# Patient Record
Sex: MALE | Race: WHITE | NOT HISPANIC OR LATINO | URBAN - METROPOLITAN AREA
[De-identification: names, ages, dates, MRNs, and addresses within clinical notes are randomized per-mention and may not be internally consistent; named-entity substitution may affect disease eponyms.]

---

## 2018-06-01 ENCOUNTER — OUTPATIENT (OUTPATIENT)
Dept: OUTPATIENT SERVICES | Facility: HOSPITAL | Age: 53
LOS: 1 days | End: 2018-06-01
Payer: MEDICAID

## 2018-06-01 PROCEDURE — G9001: CPT

## 2018-06-17 ENCOUNTER — INPATIENT (INPATIENT)
Facility: HOSPITAL | Age: 53
LOS: 9 days | Discharge: HOME | DRG: 64 | End: 2018-06-27
Attending: HOSPITALIST | Admitting: STUDENT IN AN ORGANIZED HEALTH CARE EDUCATION/TRAINING PROGRAM
Payer: MEDICAID

## 2018-06-17 ENCOUNTER — EMERGENCY (EMERGENCY)
Facility: HOSPITAL | Age: 53
LOS: 1 days | Discharge: ANOTHER HOSPITAL | End: 2018-06-17
Attending: EMERGENCY MEDICINE | Admitting: EMERGENCY MEDICINE
Payer: MEDICAID

## 2018-06-17 VITALS
TEMPERATURE: 99 F | SYSTOLIC BLOOD PRESSURE: 137 MMHG | OXYGEN SATURATION: 99 % | DIASTOLIC BLOOD PRESSURE: 86 MMHG | RESPIRATION RATE: 18 BRPM | HEART RATE: 88 BPM

## 2018-06-17 VITALS
TEMPERATURE: 99 F | WEIGHT: 199.96 LBS | RESPIRATION RATE: 20 BRPM | HEART RATE: 92 BPM | DIASTOLIC BLOOD PRESSURE: 92 MMHG | HEIGHT: 68 IN | OXYGEN SATURATION: 99 % | SYSTOLIC BLOOD PRESSURE: 155 MMHG

## 2018-06-17 VITALS
RESPIRATION RATE: 18 BRPM | SYSTOLIC BLOOD PRESSURE: 114 MMHG | OXYGEN SATURATION: 96 % | DIASTOLIC BLOOD PRESSURE: 67 MMHG | HEART RATE: 89 BPM

## 2018-06-17 DIAGNOSIS — N17.9 ACUTE KIDNEY FAILURE, UNSPECIFIED: ICD-10-CM

## 2018-06-17 DIAGNOSIS — D35.00 BENIGN NEOPLASM OF UNSPECIFIED ADRENAL GLAND: ICD-10-CM

## 2018-06-17 DIAGNOSIS — E87.5 HYPERKALEMIA: ICD-10-CM

## 2018-06-17 LAB
ALBUMIN SERPL ELPH-MCNC: 2.4 G/DL — LOW (ref 3.3–5)
ALBUMIN SERPL ELPH-MCNC: 3 G/DL — LOW (ref 3.3–5)
ALBUMIN SERPL ELPH-MCNC: 3.4 G/DL — SIGNIFICANT CHANGE UP (ref 3.3–5)
ALBUMIN SERPL ELPH-MCNC: 3.5 G/DL — SIGNIFICANT CHANGE UP (ref 3.3–5)
ALP SERPL-CCNC: 100 U/L — SIGNIFICANT CHANGE UP (ref 30–120)
ALP SERPL-CCNC: 82 U/L — SIGNIFICANT CHANGE UP (ref 30–120)
ALP SERPL-CCNC: 91 U/L — SIGNIFICANT CHANGE UP (ref 40–120)
ALP SERPL-CCNC: 95 U/L — SIGNIFICANT CHANGE UP (ref 40–120)
ALT FLD-CCNC: 491 U/L — HIGH (ref 10–45)
ALT FLD-CCNC: 492 U/L — HIGH (ref 10–45)
ALT FLD-CCNC: 530 U/L DA — HIGH (ref 10–60)
ALT FLD-CCNC: 666 U/L DA — HIGH (ref 10–60)
AMPHET UR-MCNC: NEGATIVE — SIGNIFICANT CHANGE UP
ANION GAP SERPL CALC-SCNC: 11 MMOL/L — SIGNIFICANT CHANGE UP (ref 5–17)
ANION GAP SERPL CALC-SCNC: 19 MMOL/L — HIGH (ref 5–17)
ANION GAP SERPL CALC-SCNC: 24 MMOL/L — HIGH (ref 5–17)
ANION GAP SERPL CALC-SCNC: 9 MMOL/L — SIGNIFICANT CHANGE UP (ref 5–17)
APAP SERPL-MCNC: <15 UG/ML — SIGNIFICANT CHANGE UP (ref 10–30)
APPEARANCE UR: ABNORMAL
APPEARANCE UR: ABNORMAL
APTT BLD: 28.2 SEC — SIGNIFICANT CHANGE UP (ref 27.5–37.4)
APTT BLD: 31 SEC — SIGNIFICANT CHANGE UP (ref 27.5–37.4)
AST SERPL-CCNC: 496 U/L — HIGH (ref 10–40)
AST SERPL-CCNC: 578 U/L — HIGH (ref 10–40)
AST SERPL-CCNC: 770 U/L — HIGH (ref 10–40)
AST SERPL-CCNC: 982 U/L — HIGH (ref 10–40)
BACTERIA # UR AUTO: ABNORMAL
BACTERIA # UR AUTO: ABNORMAL /HPF
BARBITURATES UR SCN-MCNC: NEGATIVE — SIGNIFICANT CHANGE UP
BASE EXCESS BLDA CALC-SCNC: -11.3 MMOL/L — LOW (ref -2–2)
BASE EXCESS BLDV CALC-SCNC: -9.6 MMOL/L — LOW (ref -2–2)
BASOPHILS # BLD AUTO: 0 K/UL — SIGNIFICANT CHANGE UP (ref 0–0.2)
BASOPHILS # BLD AUTO: 0.1 K/UL — SIGNIFICANT CHANGE UP (ref 0–0.2)
BASOPHILS NFR BLD AUTO: 0 % — SIGNIFICANT CHANGE UP (ref 0–2)
BENZODIAZ UR-MCNC: NEGATIVE — SIGNIFICANT CHANGE UP
BILIRUB SERPL-MCNC: 0.3 MG/DL — SIGNIFICANT CHANGE UP (ref 0.2–1.2)
BILIRUB SERPL-MCNC: 0.4 MG/DL — SIGNIFICANT CHANGE UP (ref 0.2–1.2)
BILIRUB UR-MCNC: ABNORMAL
BILIRUB UR-MCNC: NEGATIVE — SIGNIFICANT CHANGE UP
BLD GP AB SCN SERPL QL: NEGATIVE — SIGNIFICANT CHANGE UP
BUN SERPL-MCNC: 71 MG/DL — HIGH (ref 7–23)
BUN SERPL-MCNC: 74 MG/DL — HIGH (ref 7–23)
BUN SERPL-MCNC: 76 MG/DL — HIGH (ref 7–23)
BUN SERPL-MCNC: 81 MG/DL — HIGH (ref 7–23)
CALCIUM SERPL-MCNC: 5.6 MG/DL — CRITICAL LOW (ref 8.4–10.5)
CALCIUM SERPL-MCNC: 6.6 MG/DL — LOW (ref 8.4–10.5)
CALCIUM SERPL-MCNC: 6.8 MG/DL — LOW (ref 8.4–10.5)
CALCIUM SERPL-MCNC: 7.6 MG/DL — LOW (ref 8.4–10.5)
CHLORIDE SERPL-SCNC: 101 MMOL/L — SIGNIFICANT CHANGE UP (ref 96–108)
CHLORIDE SERPL-SCNC: 104 MMOL/L — SIGNIFICANT CHANGE UP (ref 96–108)
CHLORIDE SERPL-SCNC: 104 MMOL/L — SIGNIFICANT CHANGE UP (ref 96–108)
CHLORIDE SERPL-SCNC: 109 MMOL/L — HIGH (ref 96–108)
CHLORIDE UR-SCNC: 104 MMOL/L — SIGNIFICANT CHANGE UP
CK MB BLD-MCNC: SIGNIFICANT CHANGE UP % (ref 0–3.5)
CK MB CFR SERPL CALC: 254.7 NG/ML — HIGH (ref 0–6.7)
CK SERPL-CCNC: CRITICAL HIGH U/L (ref 30–200)
CK SERPL-CCNC: CRITICAL HIGH U/L (ref 30–200)
CK SERPL-CCNC: CRITICAL HIGH U/L (ref 39–308)
CO2 BLDA-SCNC: 15 MMOL/L — LOW (ref 22–30)
CO2 BLDV-SCNC: 17 MMOL/L — LOW (ref 22–30)
CO2 SERPL-SCNC: 12 MMOL/L — LOW (ref 22–31)
CO2 SERPL-SCNC: 13 MMOL/L — LOW (ref 22–31)
CO2 SERPL-SCNC: 16 MMOL/L — LOW (ref 22–31)
CO2 SERPL-SCNC: 17 MMOL/L — LOW (ref 22–31)
COCAINE METAB.OTHER UR-MCNC: POSITIVE
COLOR SPEC: YELLOW — SIGNIFICANT CHANGE UP
COLOR SPEC: YELLOW — SIGNIFICANT CHANGE UP
COMMENT - URINE: SIGNIFICANT CHANGE UP
COMMENT - URINE: SIGNIFICANT CHANGE UP
CREAT ?TM UR-MCNC: 19 MG/DL — SIGNIFICANT CHANGE UP
CREAT ?TM UR-MCNC: 35 MG/DL — SIGNIFICANT CHANGE UP
CREAT SERPL-MCNC: 5.13 MG/DL — HIGH (ref 0.5–1.3)
CREAT SERPL-MCNC: 5.41 MG/DL — HIGH (ref 0.5–1.3)
CREAT SERPL-MCNC: 5.9 MG/DL — HIGH (ref 0.5–1.3)
CREAT SERPL-MCNC: 6.1 MG/DL — HIGH (ref 0.5–1.3)
DIFF PNL FLD: ABNORMAL
DIFF PNL FLD: ABNORMAL
EOSINOPHIL # BLD AUTO: 0 K/UL — SIGNIFICANT CHANGE UP (ref 0–0.5)
EOSINOPHIL # BLD AUTO: 0 K/UL — SIGNIFICANT CHANGE UP (ref 0–0.5)
EOSINOPHIL NFR BLD AUTO: 0 % — SIGNIFICANT CHANGE UP (ref 0–6)
EPI CELLS # UR: SIGNIFICANT CHANGE UP
ETHANOL SERPL-MCNC: SIGNIFICANT CHANGE UP MG/DL (ref 0–10)
GAS PNL BLDA: SIGNIFICANT CHANGE UP
GAS PNL BLDV: SIGNIFICANT CHANGE UP
GLUCOSE BLDC GLUCOMTR-MCNC: 78 MG/DL — SIGNIFICANT CHANGE UP (ref 70–99)
GLUCOSE SERPL-MCNC: 106 MG/DL — HIGH (ref 70–99)
GLUCOSE SERPL-MCNC: 118 MG/DL — HIGH (ref 70–99)
GLUCOSE SERPL-MCNC: 119 MG/DL — HIGH (ref 70–99)
GLUCOSE SERPL-MCNC: 123 MG/DL — HIGH (ref 70–99)
GLUCOSE UR QL: 50 MG/DL
GLUCOSE UR QL: NEGATIVE MG/DL — SIGNIFICANT CHANGE UP
HCO3 BLDA-SCNC: 14 MMOL/L — LOW (ref 21–29)
HCO3 BLDV-SCNC: 16 MMOL/L — LOW (ref 21–29)
HCT VFR BLD CALC: 47.6 % — SIGNIFICANT CHANGE UP (ref 39–50)
HCT VFR BLD CALC: 49.1 % — SIGNIFICANT CHANGE UP (ref 39–50)
HCT VFR BLD CALC: 51.6 % — HIGH (ref 39–50)
HGB BLD-MCNC: 15.8 G/DL — SIGNIFICANT CHANGE UP (ref 13–17)
HGB BLD-MCNC: 15.9 G/DL — SIGNIFICANT CHANGE UP (ref 13–17)
HGB BLD-MCNC: 17.5 G/DL — HIGH (ref 13–17)
HOROWITZ INDEX BLDA+IHG-RTO: 28 — SIGNIFICANT CHANGE UP
INR BLD: 1.1 RATIO — SIGNIFICANT CHANGE UP (ref 0.88–1.16)
INR BLD: 1.11 RATIO — SIGNIFICANT CHANGE UP (ref 0.88–1.16)
INTUBATED: SIGNIFICANT CHANGE UP
KETONES UR-MCNC: NEGATIVE — SIGNIFICANT CHANGE UP
KETONES UR-MCNC: NEGATIVE — SIGNIFICANT CHANGE UP
LACTATE SERPL-SCNC: 2.3 MMOL/L — HIGH (ref 0.7–2)
LACTATE SERPL-SCNC: 2.7 MMOL/L — HIGH (ref 0.7–2)
LEUKOCYTE ESTERASE UR-ACNC: ABNORMAL
LEUKOCYTE ESTERASE UR-ACNC: NEGATIVE — SIGNIFICANT CHANGE UP
LIDOCAIN IGE QN: 1831 U/L — HIGH (ref 73–393)
LYMPHOCYTES # BLD AUTO: 0.8 K/UL — LOW (ref 1–3.3)
LYMPHOCYTES # BLD AUTO: 0.9 K/UL — LOW (ref 1–3.3)
LYMPHOCYTES # BLD AUTO: 8 % — LOW (ref 13–44)
LYMPHOCYTES # BLD AUTO: 9 % — LOW (ref 13–44)
MAGNESIUM SERPL-MCNC: 2.2 MG/DL — SIGNIFICANT CHANGE UP (ref 1.6–2.6)
MAGNESIUM SERPL-MCNC: 2.3 MG/DL — SIGNIFICANT CHANGE UP (ref 1.6–2.6)
MANUAL DIF COMMENT BLD-IMP: SIGNIFICANT CHANGE UP
MANUAL SMEAR VERIFICATION: SIGNIFICANT CHANGE UP
MCHC RBC-ENTMCNC: 31.5 PG — SIGNIFICANT CHANGE UP (ref 27–34)
MCHC RBC-ENTMCNC: 31.9 PG — SIGNIFICANT CHANGE UP (ref 27–34)
MCHC RBC-ENTMCNC: 32.4 GM/DL — SIGNIFICANT CHANGE UP (ref 32–36)
MCHC RBC-ENTMCNC: 32.6 PG — SIGNIFICANT CHANGE UP (ref 27–34)
MCHC RBC-ENTMCNC: 33.2 GM/DL — SIGNIFICANT CHANGE UP (ref 32–36)
MCHC RBC-ENTMCNC: 34 GM/DL — SIGNIFICANT CHANGE UP (ref 32–36)
MCV RBC AUTO: 93.8 FL — SIGNIFICANT CHANGE UP (ref 80–100)
MCV RBC AUTO: 97.2 FL — SIGNIFICANT CHANGE UP (ref 80–100)
MCV RBC AUTO: 98.2 FL — SIGNIFICANT CHANGE UP (ref 80–100)
METHADONE UR-MCNC: NEGATIVE — SIGNIFICANT CHANGE UP
MONOCYTES # BLD AUTO: 0.7 K/UL — SIGNIFICANT CHANGE UP (ref 0–0.9)
MONOCYTES # BLD AUTO: 1 K/UL — HIGH (ref 0–0.9)
MONOCYTES NFR BLD AUTO: 4 % — SIGNIFICANT CHANGE UP (ref 2–14)
MONOCYTES NFR BLD AUTO: 7 % — SIGNIFICANT CHANGE UP (ref 2–14)
NEUTROPHILS # BLD AUTO: 14 K/UL — HIGH (ref 1.8–7.4)
NEUTROPHILS # BLD AUTO: 19.7 K/UL — HIGH (ref 1.8–7.4)
NEUTROPHILS NFR BLD AUTO: 69 % — SIGNIFICANT CHANGE UP (ref 43–77)
NEUTROPHILS NFR BLD AUTO: 74 % — SIGNIFICANT CHANGE UP (ref 43–77)
NEUTS BAND # BLD: 19 % — HIGH (ref 0–8)
NITRITE UR-MCNC: NEGATIVE — SIGNIFICANT CHANGE UP
NITRITE UR-MCNC: POSITIVE
OPIATES UR-MCNC: NEGATIVE — SIGNIFICANT CHANGE UP
OSMOLALITY SERPL: 315 MOS/KG — HIGH (ref 275–300)
PCO2 BLDA: 31 MMHG — LOW (ref 32–46)
PCO2 BLDV: 37 MMHG — SIGNIFICANT CHANGE UP (ref 35–50)
PCP SPEC-MCNC: SIGNIFICANT CHANGE UP
PCP UR-MCNC: NEGATIVE — SIGNIFICANT CHANGE UP
PH BLDA: 7.28 — LOW (ref 7.35–7.45)
PH BLDV: 7.27 — LOW (ref 7.35–7.45)
PH UR: 5 — SIGNIFICANT CHANGE UP (ref 5–8)
PH UR: 6 — SIGNIFICANT CHANGE UP (ref 5–8)
PHOSPHATE SERPL-MCNC: 5.2 MG/DL — HIGH (ref 2.5–4.5)
PLAT MORPH BLD: NORMAL — SIGNIFICANT CHANGE UP
PLATELET # BLD AUTO: 136 K/UL — LOW (ref 150–400)
PLATELET # BLD AUTO: 151 K/UL — SIGNIFICANT CHANGE UP (ref 150–400)
PLATELET # BLD AUTO: 195 K/UL — SIGNIFICANT CHANGE UP (ref 150–400)
PO2 BLDA: 89 MMHG — SIGNIFICANT CHANGE UP (ref 74–108)
PO2 BLDV: 46 MMHG — HIGH (ref 25–45)
POTASSIUM SERPL-MCNC: 5.3 MMOL/L — SIGNIFICANT CHANGE UP (ref 3.5–5.3)
POTASSIUM SERPL-MCNC: 5.6 MMOL/L — HIGH (ref 3.5–5.3)
POTASSIUM SERPL-MCNC: 6.3 MMOL/L — CRITICAL HIGH (ref 3.5–5.3)
POTASSIUM SERPL-MCNC: 6.4 MMOL/L — CRITICAL HIGH (ref 3.5–5.3)
POTASSIUM SERPL-SCNC: 5.3 MMOL/L — SIGNIFICANT CHANGE UP (ref 3.5–5.3)
POTASSIUM SERPL-SCNC: 5.6 MMOL/L — HIGH (ref 3.5–5.3)
POTASSIUM SERPL-SCNC: 6.3 MMOL/L — CRITICAL HIGH (ref 3.5–5.3)
POTASSIUM SERPL-SCNC: 6.4 MMOL/L — CRITICAL HIGH (ref 3.5–5.3)
PROT ?TM UR-MCNC: 37 MG/DL — HIGH (ref 0–12)
PROT SERPL-MCNC: 5.4 G/DL — LOW (ref 6–8.3)
PROT SERPL-MCNC: 6.7 G/DL — SIGNIFICANT CHANGE UP (ref 6–8.3)
PROT SERPL-MCNC: 6.8 G/DL — SIGNIFICANT CHANGE UP (ref 6–8.3)
PROT SERPL-MCNC: 7.1 G/DL — SIGNIFICANT CHANGE UP (ref 6–8.3)
PROT UR-MCNC: 100 MG/DL
PROT UR-MCNC: 30 MG/DL
PROT/CREAT UR-RTO: 1.1 RATIO — HIGH (ref 0–0.2)
PROTHROM AB SERPL-ACNC: 12 SEC — SIGNIFICANT CHANGE UP (ref 9.8–12.7)
PROTHROM AB SERPL-ACNC: 12 SEC — SIGNIFICANT CHANGE UP (ref 9.8–12.7)
RAPID RVP RESULT: SIGNIFICANT CHANGE UP
RBC # BLD: 4.85 M/UL — SIGNIFICANT CHANGE UP (ref 4.2–5.8)
RBC # BLD: 5.06 M/UL — SIGNIFICANT CHANGE UP (ref 4.2–5.8)
RBC # BLD: 5.5 M/UL — SIGNIFICANT CHANGE UP (ref 4.2–5.8)
RBC # FLD: 12.6 % — SIGNIFICANT CHANGE UP (ref 10.3–14.5)
RBC # FLD: 12.6 % — SIGNIFICANT CHANGE UP (ref 10.3–14.5)
RBC # FLD: 13.1 % — SIGNIFICANT CHANGE UP (ref 10.3–14.5)
RBC BLD AUTO: NORMAL — SIGNIFICANT CHANGE UP
RBC CASTS # UR COMP ASSIST: ABNORMAL /HPF (ref 0–2)
RBC CASTS # UR COMP ASSIST: SIGNIFICANT CHANGE UP /HPF (ref 0–4)
RH IG SCN BLD-IMP: POSITIVE — SIGNIFICANT CHANGE UP
SAO2 % BLDA: 96 % — SIGNIFICANT CHANGE UP (ref 92–96)
SAO2 % BLDV: 79 % — SIGNIFICANT CHANGE UP (ref 67–88)
SODIUM SERPL-SCNC: 132 MMOL/L — LOW (ref 135–145)
SODIUM SERPL-SCNC: 134 MMOL/L — LOW (ref 135–145)
SODIUM SERPL-SCNC: 136 MMOL/L — SIGNIFICANT CHANGE UP (ref 135–145)
SODIUM SERPL-SCNC: 137 MMOL/L — SIGNIFICANT CHANGE UP (ref 135–145)
SODIUM UR-SCNC: 119 MMOL/L — SIGNIFICANT CHANGE UP
SODIUM UR-SCNC: 77 MMOL/L — SIGNIFICANT CHANGE UP
SP GR SPEC: 1.01 — LOW (ref 1.01–1.02)
SP GR SPEC: 1.02 — SIGNIFICANT CHANGE UP (ref 1.01–1.02)
THC UR QL: NEGATIVE — SIGNIFICANT CHANGE UP
TROPONIN I SERPL-MCNC: 12.34 NG/ML — HIGH (ref 0.02–0.06)
TROPONIN T, HIGH SENSITIVITY RESULT: 460 NG/L — HIGH (ref 0–51)
TROPONIN T, HIGH SENSITIVITY RESULT: 542 NG/L — HIGH (ref 0–51)
UROBILINOGEN FLD QL: 1 MG/DL
UROBILINOGEN FLD QL: NEGATIVE — SIGNIFICANT CHANGE UP
WBC # BLD: 14.2 K/UL — HIGH (ref 3.8–10.5)
WBC # BLD: 15.6 K/UL — HIGH (ref 3.8–10.5)
WBC # BLD: 21.7 K/UL — HIGH (ref 3.8–10.5)
WBC # FLD AUTO: 14.2 K/UL — HIGH (ref 3.8–10.5)
WBC # FLD AUTO: 15.6 K/UL — HIGH (ref 3.8–10.5)
WBC # FLD AUTO: 21.7 K/UL — HIGH (ref 3.8–10.5)
WBC UR QL: SIGNIFICANT CHANGE UP
WBC UR QL: SIGNIFICANT CHANGE UP /HPF (ref 0–5)

## 2018-06-17 PROCEDURE — 71045 X-RAY EXAM CHEST 1 VIEW: CPT | Mod: 26

## 2018-06-17 PROCEDURE — 99254 IP/OBS CNSLTJ NEW/EST MOD 60: CPT | Mod: GC

## 2018-06-17 PROCEDURE — 36556 INSERT NON-TUNNEL CV CATH: CPT | Mod: GC

## 2018-06-17 PROCEDURE — 74176 CT ABD & PELVIS W/O CONTRAST: CPT | Mod: 26

## 2018-06-17 PROCEDURE — 93010 ELECTROCARDIOGRAM REPORT: CPT

## 2018-06-17 PROCEDURE — 71250 CT THORAX DX C-: CPT | Mod: 26

## 2018-06-17 PROCEDURE — 99291 CRITICAL CARE FIRST HOUR: CPT | Mod: 25

## 2018-06-17 PROCEDURE — 71045 X-RAY EXAM CHEST 1 VIEW: CPT | Mod: 26,77

## 2018-06-17 PROCEDURE — 70450 CT HEAD/BRAIN W/O DYE: CPT | Mod: 26

## 2018-06-17 PROCEDURE — 72125 CT NECK SPINE W/O DYE: CPT | Mod: 26

## 2018-06-17 PROCEDURE — 99291 CRITICAL CARE FIRST HOUR: CPT

## 2018-06-17 RX ORDER — ALBUTEROL 90 UG/1
2.5 AEROSOL, METERED ORAL ONCE
Qty: 0 | Refills: 0 | Status: COMPLETED | OUTPATIENT
Start: 2018-06-17 | End: 2018-06-17

## 2018-06-17 RX ORDER — ALBUTEROL 90 UG/1
10 AEROSOL, METERED ORAL ONCE
Qty: 0 | Refills: 0 | Status: DISCONTINUED | OUTPATIENT
Start: 2018-06-17 | End: 2018-06-17

## 2018-06-17 RX ORDER — SODIUM CHLORIDE 9 MG/ML
1000 INJECTION INTRAMUSCULAR; INTRAVENOUS; SUBCUTANEOUS
Qty: 0 | Refills: 0 | Status: DISCONTINUED | OUTPATIENT
Start: 2018-06-17 | End: 2018-06-18

## 2018-06-17 RX ORDER — HEPARIN SODIUM 5000 [USP'U]/ML
5000 INJECTION INTRAVENOUS; SUBCUTANEOUS EVERY 8 HOURS
Qty: 0 | Refills: 0 | Status: DISCONTINUED | OUTPATIENT
Start: 2018-06-17 | End: 2018-06-18

## 2018-06-17 RX ORDER — VANCOMYCIN HCL 1 G
1000 VIAL (EA) INTRAVENOUS ONCE
Qty: 0 | Refills: 0 | Status: COMPLETED | OUTPATIENT
Start: 2018-06-17 | End: 2018-06-17

## 2018-06-17 RX ORDER — INSULIN HUMAN 100 [IU]/ML
5 INJECTION, SOLUTION SUBCUTANEOUS ONCE
Qty: 0 | Refills: 0 | Status: COMPLETED | OUTPATIENT
Start: 2018-06-17 | End: 2018-06-17

## 2018-06-17 RX ORDER — SODIUM CHLORIDE 9 MG/ML
1000 INJECTION INTRAMUSCULAR; INTRAVENOUS; SUBCUTANEOUS ONCE
Qty: 0 | Refills: 0 | Status: COMPLETED | OUTPATIENT
Start: 2018-06-17 | End: 2018-06-17

## 2018-06-17 RX ORDER — INSULIN HUMAN 100 [IU]/ML
5 INJECTION, SOLUTION SUBCUTANEOUS ONCE
Qty: 0 | Refills: 0 | Status: DISCONTINUED | OUTPATIENT
Start: 2018-06-17 | End: 2018-06-17

## 2018-06-17 RX ORDER — FUROSEMIDE 40 MG
60 TABLET ORAL ONCE
Qty: 0 | Refills: 0 | Status: COMPLETED | OUTPATIENT
Start: 2018-06-17 | End: 2018-06-17

## 2018-06-17 RX ORDER — ASPIRIN/CALCIUM CARB/MAGNESIUM 324 MG
300 TABLET ORAL ONCE
Qty: 0 | Refills: 0 | Status: COMPLETED | OUTPATIENT
Start: 2018-06-17 | End: 2018-06-17

## 2018-06-17 RX ORDER — PIPERACILLIN AND TAZOBACTAM 4; .5 G/20ML; G/20ML
3.38 INJECTION, POWDER, LYOPHILIZED, FOR SOLUTION INTRAVENOUS ONCE
Qty: 0 | Refills: 0 | Status: COMPLETED | OUTPATIENT
Start: 2018-06-17 | End: 2018-06-17

## 2018-06-17 RX ORDER — DEXTROSE 50 % IN WATER 50 %
50 SYRINGE (ML) INTRAVENOUS ONCE
Qty: 0 | Refills: 0 | Status: COMPLETED | OUTPATIENT
Start: 2018-06-17 | End: 2018-06-17

## 2018-06-17 RX ORDER — NALOXONE HYDROCHLORIDE 4 MG/.1ML
0.4 SPRAY NASAL ONCE
Qty: 0 | Refills: 0 | Status: COMPLETED | OUTPATIENT
Start: 2018-06-17 | End: 2018-06-17

## 2018-06-17 RX ORDER — CALCIUM GLUCONATE 100 MG/ML
2 VIAL (ML) INTRAVENOUS ONCE
Qty: 0 | Refills: 0 | Status: COMPLETED | OUTPATIENT
Start: 2018-06-17 | End: 2018-06-17

## 2018-06-17 RX ADMIN — SODIUM CHLORIDE 1000 MILLILITER(S): 9 INJECTION INTRAMUSCULAR; INTRAVENOUS; SUBCUTANEOUS at 12:38

## 2018-06-17 RX ADMIN — ALBUTEROL 2.5 MILLIGRAM(S): 90 AEROSOL, METERED ORAL at 23:20

## 2018-06-17 RX ADMIN — Medication 60 MILLIGRAM(S): at 16:12

## 2018-06-17 RX ADMIN — Medication 50 MILLILITER(S): at 16:25

## 2018-06-17 RX ADMIN — Medication 300 MILLIGRAM(S): at 12:30

## 2018-06-17 RX ADMIN — SODIUM CHLORIDE 1000 MILLILITER(S): 9 INJECTION INTRAMUSCULAR; INTRAVENOUS; SUBCUTANEOUS at 11:01

## 2018-06-17 RX ADMIN — Medication 50 MILLILITER(S): at 22:24

## 2018-06-17 RX ADMIN — Medication 200 GRAM(S): at 16:25

## 2018-06-17 RX ADMIN — ALBUTEROL 2.5 MILLIGRAM(S): 90 AEROSOL, METERED ORAL at 16:12

## 2018-06-17 RX ADMIN — INSULIN HUMAN 5 UNIT(S): 100 INJECTION, SOLUTION SUBCUTANEOUS at 22:24

## 2018-06-17 RX ADMIN — SODIUM CHLORIDE 1000 MILLILITER(S): 9 INJECTION INTRAMUSCULAR; INTRAVENOUS; SUBCUTANEOUS at 15:10

## 2018-06-17 RX ADMIN — SODIUM CHLORIDE 1000 MILLILITER(S): 9 INJECTION INTRAMUSCULAR; INTRAVENOUS; SUBCUTANEOUS at 10:14

## 2018-06-17 RX ADMIN — Medication 250 MILLIGRAM(S): at 11:16

## 2018-06-17 RX ADMIN — INSULIN HUMAN 5 UNIT(S): 100 INJECTION, SOLUTION SUBCUTANEOUS at 16:26

## 2018-06-17 RX ADMIN — NALOXONE HYDROCHLORIDE 0.4 MILLIGRAM(S): 4 SPRAY NASAL at 10:30

## 2018-06-17 RX ADMIN — SODIUM CHLORIDE 125 MILLILITER(S): 9 INJECTION INTRAMUSCULAR; INTRAVENOUS; SUBCUTANEOUS at 20:37

## 2018-06-17 RX ADMIN — HEPARIN SODIUM 5000 UNIT(S): 5000 INJECTION INTRAVENOUS; SUBCUTANEOUS at 22:25

## 2018-06-17 RX ADMIN — PIPERACILLIN AND TAZOBACTAM 200 GRAM(S): 4; .5 INJECTION, POWDER, LYOPHILIZED, FOR SOLUTION INTRAVENOUS at 11:17

## 2018-06-17 RX ADMIN — Medication 125 MILLIGRAM(S): at 16:25

## 2018-06-17 NOTE — ED PROVIDER NOTE - OBJECTIVE STATEMENT
51 yo male BIBEMS found at home lethargic but arousable with altered mental status.  As per son, who found him today, states patient last known normal was 2 days ago. Son states patient has had a generalized rash on his body x 2 weeks, thinks it was the soap he used.  Rectal temp here 97.8.  Son also admits patient uses cocaine.  No hx of heroin use.  As per son, does not have PMD and doesn't see any doctors.

## 2018-06-17 NOTE — H&P ADULT - REASON FOR ADMISSION
Patient is a 52y old  Male who presents with a chief complaint of Patient is a 52y old  Male who presents with a chief complaint of AMS, found down

## 2018-06-17 NOTE — ED PROVIDER NOTE - MEDICAL DECISION MAKING DETAILS
Ronen Walker MD (resident): AMS and rash, limited historian (both pt and family). concern for ARF w/ rhabdo (Tx w/ IVF) and hyperK+ (Tx w/ Ca2+, insulin, dextrose, consult w/ Renal). Pt also w/ UTI. Empiric atbx given by OSH as pt still with undifferentiate cause of organ failure. + cocaine on UTox, possible contributing factor. Also with subacute CVA, not a TPA candidate, last normal well past TPA window, and complicated by other medical issues at this time. Transferred and accepted by MICU.

## 2018-06-17 NOTE — H&P ADULT - HISTORY OF PRESENT ILLNESS
52 M w/ Hx of CVA, who was transferred from [facility], [facility], UTI, and [facility]. Initial presentation at OSH was AMS, found by son on the floor, last normal 2 days ago. Diffuse rash for 2 weeks.    In ED, T: 52 M w/ Hx of CVA, who was transferred from [place] for ARF. Per son, his father lives alone and he visited him on Thursday. and was acting "different". He gave him some water and came back today to check up on him. He was very lethargic, not speaking, and found down on the floor. He noticed rash on the patient's trunk and leg, decided to bring his father to [place].       In ED, T: 52 M w/ Hx of CVA, who was transferred from [place] for ARF. Per son, his father lives alone and he visited him on Thursday. and was acting "different". He gave him some water and came back today to check up on him. He was very lethargic, not speaking, and found down on the floor. He noticed rash on the patient's trunk and leg, decided to bring his father to [place].     Pt was recently at [facility] a few weeks prior for cyst near the groin. Cyst was drained, but unclear if received abx.    In ED, T: 99.3 rectal, HR: 88, BP: 137/86, RR;18, SpO2: 99% on O2  Labs: WBC 15.6, K: 6.4, BUN/Cr: 74/5.41, ALP: 91, AST: 578, ALT:492, CK>51789  Trop 542, U Tox + for cocaine   Pt got 1L NS, Albuterol/calcium gluconate/Insulin reg 5U/D50, furosemide 60. Methylpred 125mg 52 M w/ Hx of CVA, who was transferred from [place] for ARF. Per son, he visited him on Thursday (patient lives alone) and was acting "different". He gave him some water and came back today to check up on him. He was very lethargic, not speaking, and found down on the floor. He noticed rash on the patient's trunk and leg, decided to bring his father to [place].     Pt was recently at [facility] a few weeks prior for cyst near the groin. Cyst was drained, but unclear if received abx.    In ED, T: 99.3 rectal, HR: 88, BP: 137/86, RR;18, SpO2: 99% on O2  Labs: WBC 15.6, K: 6.4, BUN/Cr: 74/5.41, ALP: 91, AST: 578, ALT:492, CK>14267  Trop 542, U Tox + for cocaine   Pt got 1L NS, Albuterol/calcium gluconate/Insulin reg 5U/D50, furosemide 60. Methylpred 125mg 52 M w/ with unknown PMHx, who was transferred from [place] for ARF. Per son, he visited him on Thursday (patient lives alone) and was acting "different". He gave him some water and came back today to check up on him. He was very lethargic, not speaking, and found down on the floor. He noticed rash on the patient's trunk and leg, decided to bring his father to [place].   Pt has not been to a doctor before, no hx of renal disease.    Pt was recently at [facility] a few weeks prior for cyst near the groin. Cyst was drained, but unclear if received abx. Labs were drawn at that time, son says renal function was within normal limits at that time.     In ED, T: 99.3 rectal, HR: 88, BP: 137/86, RR;18, SpO2: 99% on O2  Labs: WBC 15.6, K: 6.4, BUN/Cr: 74/5.41, ALP: 91, AST: 578, ALT:492, CK>79401  Trop 542, U Tox + for cocaine   Pt got 1L NS, Albuterol/calcium gluconate/Insulin reg 5U/D50, furosemide 60. Methylpred 125mg 52 M w/ with unknown PMHx, who was transferred from [place] for ARF. Per son, he visited him on Saturday (patient lives alone) and was acting "different". He gave him some water and came back today to check up on him. He was very lethargic, not speaking, and found down on the floor. He noticed rash on the patient's trunk and leg, decided to bring his father to [place].   Pt has not been to a doctor before, no hx of renal disease.    Pt was recently at [facility] a few weeks prior for cyst near the groin. Cyst was drained, but unclear if received abx. Labs were drawn at that time, son says renal function was within normal limits at that time.     In ED, T: 99.3 rectal, HR: 88, BP: 137/86, RR;18, SpO2: 99% on O2  Labs: WBC 15.6, K: 6.4, BUN/Cr: 74/5.41, ALP: 91, AST: 578, ALT:492, CK>21993  Trop 542, U Tox + for cocaine   Pt got 1L NS, Albuterol/calcium gluconate/Insulin reg 5U/D50, furosemide 60. Methylpred 125mg

## 2018-06-17 NOTE — CONSULT NOTE ADULT - SUBJECTIVE AND OBJECTIVE BOX
Neurology Consult    Reason for consult: L MCA/PCA infarct    HPI: Patient is a 52 year old right handed male currently admitted for rhabdomyolysis, SHAQUILLE, subacute infarcts. Patient transferred from [place]. As per chart and primary team, patient was last seen by son on Thursday when he was acting differently. Today when the son went to see him again, he was found to be on the floor, unclear for how long. Was not speaking and was noted to have a petechial rash on trunk and legs. U tox positive for cocaine. Patient is a poor historian.    REVIEW OF SYSTEMS:  Unable to assess    MEDICATIONS  ALBUTerol   0.5% 10 milliGRAM(s) Nebulizer once  dextrose 50% Injectable 50 milliLiter(s) IV Push once  heparin  Injectable 5000 Unit(s) SubCutaneous every 8 hours  insulin regular  human recombinant. 5 Unit(s) SubCutaneous once  sodium chloride 0.9%. 1000 milliLiter(s) IV Continuous <Continuous>    PMH: No pertinent past medical history     PSH: No significant past surgical history      FAMILY HISTORY:  No pertinent family history in first degree relatives  No history of dementia, strokes, or seizures     SOCIAL HISTORY:  No history of tobacco or alcohol use     Allergies  Allergy Status Unknown    Vital Signs Last 24 Hrs  T(C): 37.3 (2018 19:46), Max: 37.4 (2018 15:05)  T(F): 99.1 (2018 19:46), Max: 99.3 (2018 15:05)  HR: 87 (2018 22:00) (81 - 98)  BP: 129/60 (2018 20:00) (108/66 - 155/92)  BP(mean): 86 (2018 20:00) (86 - 95)  RR: 17 (2018 22:00) (17 - 46)  SpO2: 95% (2018 22:00) (94% - 99%)    Neurological Examination:    Mental Status: Patient is alert, awake, oriented only to self. Does not answer questions directly, when asked where he is, answers "hell". Patient is fluent, no dysarthria, no aphasia. Does not follow all commands.    Cranial Nerves: PERRL, EOMI, no blink to threat on right, no gross facial asymmetry    Motor Exam: Not cooperating for exam  Right upper extremity: no movement against gravity  Left upper extremity: no drift  Right lower extremity: minimal movement against gravity  Left lower extremity: no drift    Normal bulk/tone    Sensory: Decreased sensation to noxious stimuli in RLE    Coordination: Unable to assess    Plantar: Up on right    GENERAL: No acute distress  HEENT:  Normocephalic, atraumatic  EXTREMITIES: No edema, clubbing, cyanosis  MUSCULOSKELETAL: Normal range of motion  SKIN: No rashes    LABS:  CBC Full  -  ( 2018 21:01 )  WBC Count : 14.2 K/uL  Hemoglobin : 15.9 g/dL  Hematocrit : 49.1 %  Platelet Count - Automated : 136 K/uL  Mean Cell Volume : 97.2 fl  Mean Cell Hemoglobin : 31.5 pg  Mean Cell Hemoglobin Concentration : 32.4 gm/dL    Urinalysis Basic - ( 2018 15:28 )    Color: Yellow / Appearance: SL Turbid / S.008 / pH: x  Gluc: x / Ketone: Negative  / Bili: Negative / Urobili: Negative   Blood: x / Protein: 30 mg/dL / Nitrite: Negative   Leuk Esterase: Negative / RBC: 5-10 /HPF / WBC 3-5 /HPF   Sq Epi: x / Non Sq Epi: x / Bacteria: Few /HPF    -17    137  |  101  |  81<H>  ----------------------------<  106<H>  6.3<HH>   |  12<L>  |  6.10<H>    Ca    7.6<L>      2018 21:01  Phos  5.2     06-17  Mg     2.3     -17    TPro  7.1  /  Alb  3.5  /  TBili  0.4  /  DBili  x   /  AST  496<H>  /  ALT  491<H>  /  AlkPhos  95  06-17    LIVER FUNCTIONS - ( 2018 21:01 )  Alb: 3.5 g/dL / Pro: 7.1 g/dL / ALK PHOS: 95 U/L / ALT: 491 U/L / AST: 496 U/L / GGT: x           PT/INR - ( 2018 15:03 )   PT: 12.0 sec;   INR: 1.11 ratio      PTT - ( 2018 15:03 )  PTT:28.2 sec    RADIOLOGY:  CT head: Left MCA distribution and probable left posterior cerebral artery distribution changes most consistent with subacute infarction.

## 2018-06-17 NOTE — CONSULT NOTE ADULT - PROBLEM SELECTOR RECOMMENDATION 2
in setting of renal failure. S/p D50 and insulin and lasix in the ER. No peaked Ts noted on initial EKG.   - Monitor K.  - Manage medically.  - If K does not improved w/ medical management will need dialysis in ICU setting; discussed w/ son.

## 2018-06-17 NOTE — ED PROVIDER NOTE - CARE PLAN
Principal Discharge DX:	Cerebrovascular accident (CVA), unspecified mechanism  Secondary Diagnosis:	Sepsis, due to unspecified organism Principal Discharge DX:	Cerebrovascular accident (CVA), unspecified mechanism  Secondary Diagnosis:	Sepsis, due to unspecified organism  Secondary Diagnosis:	Acute renal failure, unspecified acute renal failure type

## 2018-06-17 NOTE — ED PROVIDER NOTE - PROGRESS NOTE DETAILS
discussed case with Dr. Benjamin, will see patient later today, will admit attempted to call transfer center to speak with MICU attending, no call back, will call transfer center again discussed case with Dr. Beach from ICU, recommend ED to ED transfer (no beds in ICU), Dr. Ramos in ED aware and accepting.

## 2018-06-17 NOTE — H&P ADULT - NSHPPHYSICALEXAM_GEN_ALL_CORE
PHYSICAL EXAM:  GENERAL: NAD, calm  HEAD:  Atraumatic, Normocephalic  EYES: EOMI, PERRLA, conjunctiva and sclera clear  NECK: Supple, No JVD  CHEST/LUNG: Clear to auscultation bilaterally; No wheeze  HEART: Regular rate and rhythm; No murmurs, rubs, or gallops  ABDOMEN: Soft, Nontender, Distended; Bowel sounds present  EXTREMITIES:  2+ Peripheral Pulses, No clubbing, cyanosis, or edema  PSYCH: AAOx1-2, calm   NEUROLOGY: non-focal  SKIN: Urticarial rash on trunk w/ blanching, confluence. Petechial rash on leg PHYSICAL EXAM:  GENERAL: NAD, appears uncomfortable but no severe distress. follows simple commands  HEAD:  Atraumatic, Normocephalic  EYES: EOMI, conjunctiva and sclera clear  NECK: Supple, No JVD  CHEST/LUNG: Clear to auscultation bilaterally; No wheeze  HEART: Regular rate and rhythm; No murmurs, rubs, or gallops  ABDOMEN: Soft, Nontender, Distended; Bowel sounds present  EXTREMITIES:  2+ Peripheral Pulses, No clubbing, cyanosis, or edema  PSYCH: AAOx1-2,   NEUROLOGY: non-focal  SKIN: Urticarial rash on trunk w/ blanching, confluence. Petechial rash on leg PHYSICAL EXAM:  GENERAL: NAD, appears uncomfortable but no severe distress. follows simple commands  HEAD:  Atraumatic, Normocephalic  EYES: EOMI, conjunctiva and sclera clear  NECK: Supple, No JVD  CHEST/LUNG: Clear to auscultation bilaterally; No wheeze  HEART: Regular rate and rhythm; No murmurs, rubs, or gallops  ABDOMEN: Soft, Nontender, Distended; Bowel sounds present  EXTREMITIES:  2+ Peripheral Pulses, No clubbing, cyanosis, or edema  PSYCH: AAOx1-2,   NEUROLOGY: R hemiparesis, decreased R sided sensation; R hand with edema +pulse +cap refill; not tight  SKIN: Urticarial rash on trunk w/ blanching, confluence. Petechial rash on leg

## 2018-06-17 NOTE — ED ADULT NURSE NOTE - OBJECTIVE STATEMENT
52 y.o. Male presents to the ED transferred from [facility] for cerebral infarct, acute renal failure, NSTEMI, urosepsis and rhabdomyolysis. EMS reports pt was found on floor 2 days ago with altered mental status. Pt arrived with generalized rash throughout body. Lethargic appearance. Pt states feeling weak. Verbalizing. A&Ox2 - disoriented to time. Edema noted in all four extremities. Pt arrived with b/l thumb 20g and 20g on L AC from [facility]. Vital signs stable. Stage 2 noted on buttock b/l. Pt arrived with gilbert catheter - draining. Comfort and safety provided. Will continue to monitor. Son at bedside. Dr. Ramos and Dr. Walker at bedside for assessment.

## 2018-06-17 NOTE — ED PROVIDER NOTE - CRITICAL CARE PROVIDED
additional history taking/consult w/ pt's family directly relating to pts condition/interpretation of diagnostic studies/consultation with other physicians

## 2018-06-17 NOTE — H&P ADULT - NSHPLABSRESULTS_GEN_ALL_CORE
15.8   15.6  )-----------( 151      ( 2018 15:03 )             47.6           136  |  104  |  74<H>  ----------------------------<  119<H>  6.4<HH>   |  13<L>  |  5.41<H>    Ca    6.6<L>      2018 15:03  Mg     2.2         TPro  6.7  /  Alb  3.4  /  TBili  0.3  /  DBili  x   /  AST  578<H>  /  ALT  492<H>  /  AlkPhos  91                Urinalysis Basic - ( 2018 15:28 )    Color: Yellow / Appearance: SL Turbid / S.008 / pH: x  Gluc: x / Ketone: Negative  / Bili: Negative / Urobili: Negative   Blood: x / Protein: 30 mg/dL / Nitrite: Negative   Leuk Esterase: Negative / RBC: 5-10 /HPF / WBC 3-5 /HPF   Sq Epi: x / Non Sq Epi: x / Bacteria: Few /HPF        PT/INR - ( 2018 15:03 )   PT: 12.0 sec;   INR: 1.11 ratio         PTT - ( 2018 15:03 )  PTT:28.2 sec    Lactate Trend   @ 12:05 Lactate:2.3    @ 10:56 Lactate:2.7       CARDIAC MARKERS ( 2018 15:03 )  x     / x     / >96079 U/L / x     / x      CARDIAC MARKERS ( 2018 10:56 )  12.338 ng/mL / x     / 03898 U/L / x     / x            CAPILLARY BLOOD GLUCOSE      POCT Blood Glucose.: 101 mg/dL (2018 14:40) 15.8   15.6  )-----------( 151      ( 2018 15:03 )             47.6           136  |  104  |  74<H>  ----------------------------<  119<H>  6.4<HH>   |  13<L>  |  5.41<H>    Ca    6.6<L>      2018 15:03  Mg     2.2         TPro  6.7  /  Alb  3.4  /  TBili  0.3  /  DBili  x   /  AST  578<H>  /  ALT  492<H>  /  AlkPhos  91      Urinalysis Basic - ( 2018 15:28 )    Color: Yellow / Appearance: SL Turbid / S.008 / pH: x  Gluc: x / Ketone: Negative  / Bili: Negative / Urobili: Negative   Blood: x / Protein: 30 mg/dL / Nitrite: Negative   Leuk Esterase: Negative / RBC: 5-10 /HPF / WBC 3-5 /HPF   Sq Epi: x / Non Sq Epi: x / Bacteria: Few /HPF        PT/INR - ( 2018 15:03 )   PT: 12.0 sec;   INR: 1.11 ratio         PTT - ( 2018 15:03 )  PTT:28.2 sec    Lactate Trend   @ 12:05 Lactate:2.3    @ 10:56 Lactate:2.7       CARDIAC MARKERS ( 2018 15:03 )  x     / x     / >51567 U/L / x     / x      CARDIAC MARKERS ( 2018 10:56 )  12.338 ng/mL / x     / 52472 U/L / x     / x            CAPILLARY BLOOD GLUCOSE      POCT Blood Glucose.: 101 mg/dL (2018 14:40)    < from: CT Abdomen and Pelvis No Cont (18 @ 10:24) >    2.8 x 2.0 cm low density nodule right adrenal gland likely a benign   adenoma. Markedly enlarged left adrenal gland measuring at least 4.7 x   4.3 cm also low density, however given its size and the definitive   diagnosis of benign adenoma is uncertain. Recommend MRI evaluation.     Atrophic left kidney.    There is anterior wedging of the L2 vertebral body of uncertain   chronicity. Correlate clinically for pain.    < end of copied text >    < from: Xray Chest 1 View AP/PA (18 @ 10:30) >    No acute process.    < end of copied text >

## 2018-06-17 NOTE — ED ADULT NURSE REASSESSMENT NOTE - NS ED NURSE REASSESS COMMENT FT1
hob 44 when lower snores and sats drop
lactate repeated post bolus infusion pt denies chest pains pt pending dispo chemistry repeated also
p more awake moves left leg off bed with command attempts to lift right leg but does come off bed able to squeeze with left arm but unable to move right arm hand swollen from ems iv but no worse than arrival scattered rhonchi to ascultation denies pains monitor nsr no ectopy  pt pending dispo  another liter to be given per md due to labs
pt returned from ct scan and more awake with chills rash status quo ? sulfur rash h/o being on unknown antibiotic for groin abscess twice a day last known well 2 days ago right slight facial droop moves both legs but strength weak to both legs not moving right arm but can squeeze with left arm pupils equal and reactive sluggishly still  fluid blouses in place labs repeated hemolyzed

## 2018-06-17 NOTE — ED PROVIDER NOTE - PHYSICAL EXAMINATION
Physical Exam:   GEN: middle aged M who is in NAD, AAOx2 (place and person)  HEAD: NCAT  ENT: PERRLA, MMM  RESP: CTAB, no respiratory distress  CV: normal rate and regular rhythm  ABD: soft and NTND  EXT: No edema, No deformity of extremities  SKIN: + petechial rash of BLE, + patches of erythematous blanching rash in lower ext and abd  NEURO: CN grossly intact, No focal motor or sensory deficits.   ~ Ronen Walker MD Physical Exam:   GEN: middle aged M who is in NAD, AAOx2 (place and person)  HEAD: NCAT  ENT: PERRLA, MMM  RESP: CTAB, no respiratory distress  CV: normal rate and regular rhythm  ABD: soft and NTND  EXT: No edema, No deformity of extremities  SKIN: + non-blanchable rash of BLE, + patches of erythematous blanching rash in lower ext and abd  NEURO: CN grossly intact, No focal motor or sensory deficits.   ~ Ronen Walker MD Physical Exam:   GEN: middle aged M who is in NAD, AAOx2 (place and person)  HEAD: NCAT  ENT: PERRLA, MMM  RESP: CTAB, no respiratory distress  CV: normal rate and regular rhythm  ABD: soft and NTND  EXT: No edema, No deformity of extremities  SKIN: + non-blanchable rash of BLE, + patches of erythematous blanching rash in lower ext and abd  NEURO: CN grossly intact, moving all 4 extremities  ~ Ronen Walker MD

## 2018-06-17 NOTE — CONSULT NOTE ADULT - PROBLEM SELECTOR RECOMMENDATION 9
in setting of left atrophic kidney. SHAQUILLE likely 2/2 rhabdomyolysis 2/2 cocaine use, or AIN from drug use. Unknown baseline sCr. P/w sCr to 5.9. Making urine. CPK trending down from 35k to 20k s/p 1L NS IVF. UTox positive for cocaine. No significant peripheral Eos, but pt w/ diffuse new rash; possibly from cocaine, recent antibiotics or ?new soaps.     - Obtain UA, Ulytes.  - Obtain renal sono.  - BMP q12h.  - Obtain C3, C4, dsDNA, ANCAs  - C/w Gutierres and monitor UO.  - Give NS IVF for hydration.  - Monitor CPK.  - No need for HD at this time, but if UO downtrends and pt w/ worsening electrolyte abnormalities will need HD. Discussed with son.   - HD consent signed and in chart.

## 2018-06-17 NOTE — H&P ADULT - ASSESSMENT
51 y/o M w/ Hx of CVA, who was transferred from [place] for ARF, rhabdomyolysis 51 y/o M w/ Hx of CVA, who was transferred from [place] for ARF, rhabdomyolysis found to be cocaine positive.     #Neuro   Pt w/ AMS, AAO x 1-2, poss 2/2 cocaine use   Neuro check as per ICU protocol     #CV    #Pulmonary     #GI    #Renal/FEN     #Heme    #ID    #DVT ppx 51 y/o M w/ Hx of CVA, who was transferred from [place] for ARF, rhabdomyolysis found to be cocaine positive, subacute strokes on CT.     #Neuro   Pt w/ AMS, AAO x 1-2, in setting of subacute strokes and cocaine   Neuro check as per ICU protocol   Repeat CT in 24 hours  Will obtain MRI, MRA     #CV  HD stable  + trop in setting of rhabdo cocaine use  No ST change  Cont to trend trop    #Pulmonary   Saturating well on NC  No acute issues at this time     #GI  NPO at this time  Elevated LFTs, hepatocellular pattern   Check Tylenol level, EToH level   Check hepatitis panel     #Renal/FEN   Acute renal failure w/ hyperkalemia in the setting of rhabdo, cocaine use  As per renal, consider poss AIN 2/2 drug use  No need for urgent HD at this time  Will send C3, C4, dsDNA, ANCAs  Renal U/S  Trend BMP, CK q6  IVF @ 125 mL/hr   Monitor Urine output     #Heme  H/H wnl   Leukocytosis w/ bandemia from unknown source  Trend CBC daily     #ID  Leukocytosis w/ bandemia  If spikes, low threshold for broad spectrum abx, ID c/s    #DVT ppx   HSQ q8 51 y/o M w/ Hx of CVA, who was transferred from [place] for ARF, rhabdomyolysis found to be cocaine positive, subacute strokes on CT.     #Neuro   Pt w/ AMS, AAO x 1-2, in setting of subacute strokes and cocaine use; will assess for other toxidrome - check ASA,APAP, EtOH, osmol gap  Neuro check as per ICU protocol   Repeat CT in 24 hours  Will obtain MRI, MRA; appreciate neuro consult    #CV  HD stable  + trop in setting of rhabdo cocaine use  No ST change  Cont to trend trop, EKG    #Pulmonary   Saturating well on NC  No acute issues at this time     #GI  NPO at this time  Elevated LFTs, hepatocellular pattern   Check Tylenol level, EToH level   Check hepatitis panel     #Renal/FEN   Acute renal failure w/ hyperkalemia in the setting of rhabdo, cocaine use  As per renal, consider poss AIN 2/2 drug use  No need for urgent HD at this time  Will send C3, C4, dsDNA, ANCAs  Renal U/S  Trend BMP, CK q6  IVF @ 125 mL/hr   Monitor Urine output     #Heme  H/H wnl   Leukocytosis w/ bandemia from unknown source  Trend CBC daily     #ID  Leukocytosis w/ bandemia  UA and CXR benign  If spikes, low threshold for broad spectrum abx    #Vasc  Edema of RUE and hand- remains soft, goodperfusion, +pulse  -neurovascular checks q1h  -RUE elevation  -RUE duplex, XR    #DVT ppx   HSQ q8

## 2018-06-17 NOTE — ED ADULT NURSE NOTE - OBJECTIVE STATEMENT
unable to assess psych questions but hard stop pt with diffuse rash to body some flat in patches hive like to axilla petechial to lower legs restless and confused was at [facility] recently for groin abcess  son states pt told him rash from soap use no lip swelling pupils equal react sluggish b/l  scab to right arm linear x 2 etiology unknown 2 punctures left arm from [facility] per son redness and sclerosed to left hand son states from iv at [facility] son denies heroin use only knows of h/o cocaine use and snorts not shoots unable to assess psych questions but hard stop pt with diffuse rash to body some flat in patches hive like to axilla petechial to lower legs restless and confused was at [facility] recently for groin abcess  son states pt told him rash from soap use no lip swelling pupils equal react sluggish b/l  scab to right arm linear x 2 etiology unknown 2 punctures left arm from [facility] per son redness and sclerosed to left hand son states from iv at [facility] son denies heroin use only knows of h/o cocaine use and snorts not shoots  iv from ems  d'c/d on arrival infiltrated

## 2018-06-17 NOTE — ED PROVIDER NOTE - OBJECTIVE STATEMENT
Ronen Walker MD (resident): 52 M transferred for ARF, rhabdo, UTI, and CVA. Ronen Walker MD (resident): 52 M w/ Hx of CVA, who was transferred for ARF, rhabdo, UTI, and CVA. Initial presentation at OSH was AMS, found by son on the floor, last normal 2 days ago. Diffuse rash for 2 weeks.

## 2018-06-17 NOTE — ED PROVIDER NOTE - ATTENDING CONTRIBUTION TO CARE
52M with no known previous medical history transferred from OSH for L MCA stroke, ARF in the setting of rhabdomyolysis, urosepsis and elevated troponin with unremarkable EKG.  As per son, pt found down by son yesterday with altered mental status.  Son put patient in bed, however, this morning patient was no better prompting ED visit.  Pt last seen by son 1 week prior.  Unclear how long patient was down.  Pt also noted to have diffuse rash noted by son.  Pt recently hospitalized at [facility] and treated with "sulfa drug" for unclear reason.  Pt was additionally using new soap.  At transferring ED pt underwent pan-scan which revealed L MCA infarct, UA suggestive of urinary tract infection, rhabdo with renal failure creatinine of 5.  Covered broadly with vancomycin and zosyn.  4L IVF bolus.     VSS  (Attending - Richard) ill-appearing, AAOx1 (person), responsive to verbal stimuli, NCAT, MMM, uvula midline, OP WNL, Trachea midline, PERRL, EOMI, CTAB, Non-tachy, Normal perfusion, soft, NTND, No edema, No deformity of extremities, non-blanching rash of b/l extremities with blanching erythematous patches of trunk, CN grossly intact, Normal coordination, No focal motor or sensory deficits. 52M with no known previous medical history transferred from OSH for L MCA stroke, ARF in the setting of rhabdomyolysis, possible urosepsis and elevated troponin with unremarkable EKG.  As per son, pt found down by son yesterday with altered mental status.  Son put patient in bed, however, this morning patient was no better prompting ED visit.  Pt last seen by son 1 week prior.  Unclear how long patient was down.  Pt also noted to have diffuse rash noted by son.  Pt recently hospitalized at [facility] and treated with "sulfa drug" for unclear reason.  Pt was additionally using new soap.  At transferring ED pt underwent pan-scan which revealed L MCA infarct, UA suggestive of urinary tract infection, rhabdo with renal failure creatinine of 5.  Covered broadly with vancomycin and zosyn.  4L IVF bolus.     VSS  (Attending - Richard) ill-appearing, AAOx1 (person), responsive to verbal stimuli, NCAT, MMM, uvula midline, OP WNL, Trachea midline, PERRL, EOMI, CTAB, Non-tachy, Normal perfusion, soft, NTND, No edema, No deformity of extremities, non-blanching rash of b/l extremities with blanching erythematous patches of trunk, CN grossly intact, no focal motor deficits     pt with multiple medical issues.  AMS likely 2/2 subacute L MCA infarct +/- metabolic encephalopathy.  ARF 2/2 rhabdo.  pt covered with broad spectrum abx for possible UTI.  additionally transaminitis.  rash potentially drug reaction, vasculitis also in differential given apparent cocaine use.  continue ivf, repeat cmp, pt/ptt, ck, MICU consult, likely renal consult, reasses 52M with no known previous medical history transferred from OSH for L MCA stroke, ARF in the setting of rhabdomyolysis, possible urosepsis and elevated troponin with unremarkable EKG.  As per son, pt found down by son yesterday with altered mental status.  Son put patient in bed, however, this morning patient was no better prompting ED visit.  Pt last seen by son 1 week prior.  Unclear how long patient was down.  Pt also noted to have diffuse rash noted by son.  Pt recently hospitalized at [facility] and treated with "sulfa drug" for unclear reason.  Pt was additionally using new soap.  At transferring ED pt underwent pan-scan which revealed L MCA infarct, UA suggestive of urinary tract infection, rhabdo with renal failure creatinine of 5.  Covered broadly with vancomycin and zosyn.  4L IVF bolus.     VSS  (Attending - Richard) ill-appearing, AAOx1 (person), responsive to verbal stimuli, NCAT, MMM, uvula midline, OP WNL, Trachea midline, PERRL, EOMI, CTAB, Non-tachy, Normal perfusion, soft, NTND, No edema, No deformity of extremities, non-blanching rash of b/l extremities with blanching erythematous patches of trunk, CN grossly intact, no focal motor deficits     pt with multiple medical issues.  AMS likely 2/2 subacute L MCA infarct +/- metabolic encephalopathy.  ARF 2/2 rhabdo.  pt covered with broad spectrum abx for possible UTI.  additionally transaminitis.  rash potentially drug reaction, vasculitis also in differential given apparent cocaine use.  evaluated troponin, no ekg changes, will continue to trend with cardiac monitoring.  continue ivf, repeat cmp, pt/ptt, ck, MICU consult, likely renal consult, reassess

## 2018-06-17 NOTE — H&P ADULT - NSHPSOCIALHISTORY_GEN_ALL_CORE
Pt , lives alone, has one son that checks in on him  Works as    No hx of IV drug use Pt , lives alone, has one son that checks in on him  Works as    Known hx of cocaine use   No hx of IV drug use, never smoker, no alcohol use

## 2018-06-17 NOTE — ED PROVIDER NOTE - CARE PLAN
Principal Discharge DX:	Acute renal failure (ARF)  Secondary Diagnosis:	Hyperkalemia  Secondary Diagnosis:	Rhabdomyolysis

## 2018-06-17 NOTE — CONSULT NOTE ADULT - ASSESSMENT
52 year old right handed male currently admitted for rhabdomyolysis, SHAQUILLE, subacute infarcts. Patient transferred from [place]. As per chart and primary team, patient was last seen by son on Thursday when he was acting differently. Today when the son went to see him again, he was found to be on the floor, unclear for how long. Was not speaking and was noted to have a petechial rash on trunk and legs. U tox positive for cocaine. Patient is a poor historian.  On neuro exam, patient oriented only to self. Minimally follows commands. No dysarthria or aphasia. No blink to threat on right. RUE and RLE weakness. Decreased sensation in RLE. Petechial rash on extremities. Babinski on right.  CT head showed subacute infarct in L PCA and MCA distribution  NIHSS 14    L PCA/MCA infarct    Recommend:  MRI brain without contrast  MRA head without contrast and neck with contrast  TTE with bubble study for possible valvular heart disease  ASA 81 mg PO QD once patient passes swallow evaluation, if not,  PA  Lipitor 80 mg PO QHS  DVT prophylaxis with heparin/lovenox  HbA1c, LDL and continue with aggressive vascular risk factors modifications   NPO until patient passes dysphagia screen  Tele monitor  PT/OT/S&S eval

## 2018-06-17 NOTE — CONSULT NOTE ADULT - SUBJECTIVE AND OBJECTIVE BOX
[facility] DIVISION OF KIDNEY DISEASES AND HYPERTENSION -- INITIAL CONSULT NOTE  --------------------------------------------------------------------------------  HPI:  Pt is a 52yoM brought in by son after being found down at home and w/ AMS. Hx obtained from son at bedside.    No known medical Hx, but pt has not been to a doctor.   Son says pts sugars "have been high" but never diagnosed w/ diabetes.  Pt does not take any medications.  No known allergies.  Denies any Hx of alcohol use, but pt does some cigarettes. Says he has hx of cocaine use in the past, but as far as he knows, pt has stopped long ago.     Last he spoke to pt was Friday, and pt sounded at baseline.  Pt lives alone (is ). Son came to see him the next day (Saturday) and found pt to be very lethargic, not speaking, and down on the ground.     Pt also developed rash.   Son says pt had posted on Facebook Thursday, asking what soaps could give a rash. Son says pt used a new Dial soap which he never used before and then c/o rash.   Pt also had a groin MRSA abscess for which he was prescribed Abx last week. Son is unsure of name of Abx and feels pt probably taking the medication on and off.     PAST HISTORY  --------------------------------------------------------------------------------  PAST MEDICAL & SURGICAL HISTORY:  No pertinent past medical history    FAMILY HISTORY:    PAST SOCIAL HISTORY:    ALLERGIES & MEDICATIONS  --------------------------------------------------------------------------------  Allergies    Allergy Status Unknown    Intolerances      Standing Inpatient Medications    PRN Inpatient Medications      REVIEW OF SYSTEMS  --------------------------------------------------------------------------------  Unable to assess due to clinical condition.    VITALS/PHYSICAL EXAM  --------------------------------------------------------------------------------  T(C): 37.1 (06-17-18 @ 15:16), Max: 37.4 (06-17-18 @ 15:05)  HR: 92 (06-17-18 @ 16:05) (87 - 98)  BP: 139/88 (06-17-18 @ 16:05) (108/66 - 155/92)  RR: 18 (06-17-18 @ 16:05) (18 - 20)  SpO2: 97% (06-17-18 @ 16:05) (95% - 99%)  Wt(kg): --  Height (cm): 172.72 (06-17-18 @ 08:55)  Weight (kg): 90.7 (06-17-18 @ 08:55)  BMI (kg/m2): 30.4 (06-17-18 @ 08:55)  BSA (m2): 2.04 (06-17-18 @ 08:55)      Physical Exam:  	Gen: appears lethargic, obese male  	HEENT: anicteric, supple neck  	Pulm: CTA B/L  	CV: RRR, S1S2; no rub  	Abd: +BS, soft, nontender/nondistended, obese abdomen  	: +Gutierres with dark yellow urine   	UE: Warm, mild dorsal hand edema  	LE: Warm, no edema  	Neuro: awake, makes eye contact, does not respond verbally, grunts  	Skin: Warm, petechial rash over b/l feet and ankles, large patches of erythema over thighs, b/l axillary areas and upper torso     LABS/STUDIES  --------------------------------------------------------------------------------              15.8   15.6  >-----------<  151      [06-17-18 @ 15:03]              47.6     136  |  104  |  74  ----------------------------<  119      [06-17-18 @ 15:03]  6.4   |  13  |  5.41        Ca     6.6     [06-17-18 @ 15:03]      Mg     2.2     [06-17-18 @ 10:56]    TPro  6.7  /  Alb  3.4  /  TBili  0.3  /  DBili  x   /  AST  578  /  ALT  492  /  AlkPhos  91  [06-17-18 @ 15:03]    PT/INR: PT 12.0 , INR 1.11       [06-17-18 @ 15:03]  PTT: 28.2       [06-17-18 @ 15:03]    Troponin 12.338      [06-17-18 @ 10:56]  CK >38410      [06-17-18 @ 15:03]    Creatinine Trend:  SCr 5.41 [06-17 @ 15:03]  SCr 5.13 [06-17 @ 12:05]  SCr 5.90 [06-17 @ 10:56]    Urinalysis - [06-17-18 @ 15:28]      Color Yellow / Appearance SL Turbid / SG 1.008 / pH 6.0      Gluc 50 / Ketone Negative  / Bili Negative / Urobili Negative       Blood Large / Protein 30 / Leuk Est Negative / Nitrite Negative      RBC 5-10 / WBC 3-5 / Hyaline  / Gran  / Sq Epi  / Non Sq Epi  / Bacteria Few    Urine Sodium 77      [06-17-18 @ 15:28]

## 2018-06-17 NOTE — CONSULT NOTE ADULT - ASSESSMENT
52yoM w/ Hx of cocaine and tobacco use and elevated blood sugars, brought in by son after being found down at home and w/ AMS. Renal called for SHAQUILLE and hyperkalemia. Found to have left atrophic kidney on CT scan.

## 2018-06-17 NOTE — H&P ADULT - ATTENDING COMMENTS
care provided 6/17/18  critical care time 40 mins  Patient seen and examined.  Agree with resident note as above.  Patient with hx of cocaine and EtOH use presents with altered mentation, found tohave subacute CVA with R hemiparesis, R hand and UE edema with associated rhabdo, SHAQUILLE with hyperkalemia and metabolic acidosis, encephalopathy.  Exam as noted above, vitals stable.  Plan as noted - volume resuscitation, trend labs and HD if no improvement, MARTHA, check EtOH/APAP/ASA/osm gap.  Infection workup.  Appreciate neuro recs.  Family updated and at bedside.  KATHY is son.  He has designated 2 aunts to be alternates for updates and to give consent.

## 2018-06-18 ENCOUNTER — TRANSCRIPTION ENCOUNTER (OUTPATIENT)
Age: 53
End: 2018-06-18

## 2018-06-18 LAB
ALBUMIN SERPL ELPH-MCNC: 2.9 G/DL — LOW (ref 3.3–5)
ALBUMIN SERPL ELPH-MCNC: 3.1 G/DL — LOW (ref 3.3–5)
ALBUMIN SERPL ELPH-MCNC: 3.3 G/DL — SIGNIFICANT CHANGE UP (ref 3.3–5)
ALP SERPL-CCNC: 74 U/L — SIGNIFICANT CHANGE UP (ref 40–120)
ALP SERPL-CCNC: 77 U/L — SIGNIFICANT CHANGE UP (ref 40–120)
ALP SERPL-CCNC: 82 U/L — SIGNIFICANT CHANGE UP (ref 40–120)
ALT FLD-CCNC: 293 U/L — HIGH (ref 10–45)
ALT FLD-CCNC: 335 U/L — HIGH (ref 10–45)
ALT FLD-CCNC: 382 U/L — HIGH (ref 10–45)
ANION GAP SERPL CALC-SCNC: 19 MMOL/L — HIGH (ref 5–17)
ANION GAP SERPL CALC-SCNC: 20 MMOL/L — HIGH (ref 5–17)
ANION GAP SERPL CALC-SCNC: 21 MMOL/L — HIGH (ref 5–17)
AST SERPL-CCNC: 224 U/L — HIGH (ref 10–40)
AST SERPL-CCNC: 255 U/L — HIGH (ref 10–40)
AST SERPL-CCNC: 331 U/L — HIGH (ref 10–40)
BASE EXCESS BLDV CALC-SCNC: -3.9 MMOL/L — LOW (ref -2–2)
BASE EXCESS BLDV CALC-SCNC: -6.2 MMOL/L — LOW (ref -2–2)
BASOPHILS # BLD AUTO: 0 K/UL — SIGNIFICANT CHANGE UP (ref 0–0.2)
BILIRUB SERPL-MCNC: 0.3 MG/DL — SIGNIFICANT CHANGE UP (ref 0.2–1.2)
BILIRUB SERPL-MCNC: 0.3 MG/DL — SIGNIFICANT CHANGE UP (ref 0.2–1.2)
BILIRUB SERPL-MCNC: 0.4 MG/DL — SIGNIFICANT CHANGE UP (ref 0.2–1.2)
BUN SERPL-MCNC: 67 MG/DL — HIGH (ref 7–23)
BUN SERPL-MCNC: 71 MG/DL — HIGH (ref 7–23)
BUN SERPL-MCNC: 75 MG/DL — HIGH (ref 7–23)
C3 SERPL-MCNC: 89 MG/DL — SIGNIFICANT CHANGE UP (ref 81–157)
C4 SERPL-MCNC: 22 MG/DL — SIGNIFICANT CHANGE UP (ref 13–39)
CA-I SERPL-SCNC: 0.98 MMOL/L — LOW (ref 1.12–1.3)
CA-I SERPL-SCNC: 1.02 MMOL/L — LOW (ref 1.12–1.3)
CALCIUM SERPL-MCNC: 7.2 MG/DL — LOW (ref 8.4–10.5)
CALCIUM SERPL-MCNC: 7.3 MG/DL — LOW (ref 8.4–10.5)
CALCIUM SERPL-MCNC: 7.5 MG/DL — LOW (ref 8.4–10.5)
CHLORIDE BLDV-SCNC: 106 MMOL/L — SIGNIFICANT CHANGE UP (ref 96–108)
CHLORIDE BLDV-SCNC: 109 MMOL/L — HIGH (ref 96–108)
CHLORIDE SERPL-SCNC: 101 MMOL/L — SIGNIFICANT CHANGE UP (ref 96–108)
CHLORIDE SERPL-SCNC: 102 MMOL/L — SIGNIFICANT CHANGE UP (ref 96–108)
CHLORIDE SERPL-SCNC: 99 MMOL/L — SIGNIFICANT CHANGE UP (ref 96–108)
CHOLEST SERPL-MCNC: 176 MG/DL — SIGNIFICANT CHANGE UP (ref 10–199)
CK MB BLD-MCNC: 0.5 % — SIGNIFICANT CHANGE UP (ref 0–3.5)
CK MB BLD-MCNC: 0.8 % — SIGNIFICANT CHANGE UP (ref 0–3.5)
CK MB BLD-MCNC: 1.3 % — SIGNIFICANT CHANGE UP (ref 0–3.5)
CK MB CFR SERPL CALC: 125.4 NG/ML — HIGH (ref 0–6.7)
CK MB CFR SERPL CALC: 47.8 NG/ML — HIGH (ref 0–6.7)
CK MB CFR SERPL CALC: 78.3 NG/ML — HIGH (ref 0–6.7)
CK SERPL-CCNC: 9039 U/L — HIGH (ref 30–200)
CK SERPL-CCNC: 9372 U/L — HIGH (ref 30–200)
CK SERPL-CCNC: CRITICAL HIGH U/L (ref 30–200)
CO2 BLDV-SCNC: 18 MMOL/L — LOW (ref 22–30)
CO2 BLDV-SCNC: 22 MMOL/L — SIGNIFICANT CHANGE UP (ref 22–30)
CO2 SERPL-SCNC: 14 MMOL/L — LOW (ref 22–31)
CO2 SERPL-SCNC: 17 MMOL/L — LOW (ref 22–31)
CO2 SERPL-SCNC: 18 MMOL/L — LOW (ref 22–31)
CREAT SERPL-MCNC: 5.23 MG/DL — HIGH (ref 0.5–1.3)
CREAT SERPL-MCNC: 5.59 MG/DL — HIGH (ref 0.5–1.3)
CREAT SERPL-MCNC: 5.84 MG/DL — HIGH (ref 0.5–1.3)
CULTURE RESULTS: NO GROWTH — SIGNIFICANT CHANGE UP
EOSINOPHIL # BLD AUTO: 0 K/UL — SIGNIFICANT CHANGE UP (ref 0–0.5)
GAS PNL BLDV: 134 MMOL/L — LOW (ref 136–145)
GAS PNL BLDV: 135 MMOL/L — LOW (ref 136–145)
GAS PNL BLDV: SIGNIFICANT CHANGE UP
GLUCOSE BLDV-MCNC: 122 MG/DL — HIGH (ref 70–99)
GLUCOSE BLDV-MCNC: 134 MG/DL — HIGH (ref 70–99)
GLUCOSE SERPL-MCNC: 101 MG/DL — HIGH (ref 70–99)
GLUCOSE SERPL-MCNC: 117 MG/DL — HIGH (ref 70–99)
GLUCOSE SERPL-MCNC: 128 MG/DL — HIGH (ref 70–99)
HBA1C BLD-MCNC: 5.4 % — SIGNIFICANT CHANGE UP (ref 4–5.6)
HBV CORE AB SER-ACNC: SIGNIFICANT CHANGE UP
HBV CORE IGM SER-ACNC: SIGNIFICANT CHANGE UP
HBV SURFACE AG SER-ACNC: SIGNIFICANT CHANGE UP
HCO3 BLDV-SCNC: 17 MMOL/L — LOW (ref 21–29)
HCO3 BLDV-SCNC: 21 MMOL/L — SIGNIFICANT CHANGE UP (ref 21–29)
HCT VFR BLD CALC: 44.5 % — SIGNIFICANT CHANGE UP (ref 39–50)
HCT VFR BLD CALC: 44.9 % — SIGNIFICANT CHANGE UP (ref 39–50)
HCT VFR BLD CALC: 45.3 % — SIGNIFICANT CHANGE UP (ref 39–50)
HCT VFR BLDA CALC: 46 % — SIGNIFICANT CHANGE UP (ref 39–50)
HCT VFR BLDA CALC: 46 % — SIGNIFICANT CHANGE UP (ref 39–50)
HCV AB S/CO SERPL IA: 0.12 S/CO — SIGNIFICANT CHANGE UP
HCV AB SERPL-IMP: SIGNIFICANT CHANGE UP
HDLC SERPL-MCNC: 49 MG/DL — SIGNIFICANT CHANGE UP (ref 40–125)
HGB BLD CALC-MCNC: 15 G/DL — SIGNIFICANT CHANGE UP (ref 13–17)
HGB BLD CALC-MCNC: 15.1 G/DL — SIGNIFICANT CHANGE UP (ref 13–17)
HGB BLD-MCNC: 15 G/DL — SIGNIFICANT CHANGE UP (ref 13–17)
HGB BLD-MCNC: 15.2 G/DL — SIGNIFICANT CHANGE UP (ref 13–17)
HGB BLD-MCNC: 15.2 G/DL — SIGNIFICANT CHANGE UP (ref 13–17)
HIV 1+2 AB+HIV1 P24 AG SERPL QL IA: SIGNIFICANT CHANGE UP
LACTATE BLDV-MCNC: 1.4 MMOL/L — SIGNIFICANT CHANGE UP (ref 0.7–2)
LACTATE BLDV-MCNC: 2.2 MMOL/L — HIGH (ref 0.7–2)
LIPID PNL WITH DIRECT LDL SERPL: 109 MG/DL — SIGNIFICANT CHANGE UP
LYMPHOCYTES # BLD AUTO: 0.5 K/UL — LOW (ref 1–3.3)
LYMPHOCYTES # BLD AUTO: 4 % — LOW (ref 13–44)
MAGNESIUM SERPL-MCNC: 1.9 MG/DL — SIGNIFICANT CHANGE UP (ref 1.6–2.6)
MAGNESIUM SERPL-MCNC: 2 MG/DL — SIGNIFICANT CHANGE UP (ref 1.6–2.6)
MAGNESIUM SERPL-MCNC: 2.1 MG/DL — SIGNIFICANT CHANGE UP (ref 1.6–2.6)
MCHC RBC-ENTMCNC: 32 PG — SIGNIFICANT CHANGE UP (ref 27–34)
MCHC RBC-ENTMCNC: 32.2 PG — SIGNIFICANT CHANGE UP (ref 27–34)
MCHC RBC-ENTMCNC: 32.5 PG — SIGNIFICANT CHANGE UP (ref 27–34)
MCHC RBC-ENTMCNC: 33.4 GM/DL — SIGNIFICANT CHANGE UP (ref 32–36)
MCHC RBC-ENTMCNC: 33.6 GM/DL — SIGNIFICANT CHANGE UP (ref 32–36)
MCHC RBC-ENTMCNC: 34.1 GM/DL — SIGNIFICANT CHANGE UP (ref 32–36)
MCV RBC AUTO: 95.2 FL — SIGNIFICANT CHANGE UP (ref 80–100)
MCV RBC AUTO: 95.8 FL — SIGNIFICANT CHANGE UP (ref 80–100)
MCV RBC AUTO: 96 FL — SIGNIFICANT CHANGE UP (ref 80–100)
MONOCYTES # BLD AUTO: 0.9 K/UL — SIGNIFICANT CHANGE UP (ref 0–0.9)
MONOCYTES NFR BLD AUTO: 6 % — SIGNIFICANT CHANGE UP (ref 2–14)
NEUTROPHILS # BLD AUTO: 13 K/UL — HIGH (ref 1.8–7.4)
NEUTROPHILS NFR BLD AUTO: 63 % — SIGNIFICANT CHANGE UP (ref 43–77)
NEUTS BAND # BLD: 27 % — HIGH (ref 0–8)
OB PNL STL: POSITIVE
OSMOLALITY SERPL: 311 MOS/KG — HIGH (ref 275–300)
OTHER CELLS CSF MANUAL: 15 ML/DL — LOW (ref 18–22)
OTHER CELLS CSF MANUAL: 20 ML/DL — SIGNIFICANT CHANGE UP (ref 18–22)
PCO2 BLDV: 28 MMHG — LOW (ref 35–50)
PCO2 BLDV: 39 MMHG — SIGNIFICANT CHANGE UP (ref 35–50)
PH BLDV: 7.35 — SIGNIFICANT CHANGE UP (ref 7.35–7.45)
PH BLDV: 7.4 — SIGNIFICANT CHANGE UP (ref 7.35–7.45)
PHOSPHATE SERPL-MCNC: 4.4 MG/DL — SIGNIFICANT CHANGE UP (ref 2.5–4.5)
PHOSPHATE SERPL-MCNC: 5.4 MG/DL — HIGH (ref 2.5–4.5)
PHOSPHATE SERPL-MCNC: 5.6 MG/DL — HIGH (ref 2.5–4.5)
PLAT MORPH BLD: NORMAL — SIGNIFICANT CHANGE UP
PLATELET # BLD AUTO: 100 K/UL — LOW (ref 150–400)
PLATELET # BLD AUTO: 101 K/UL — LOW (ref 150–400)
PLATELET # BLD AUTO: 116 K/UL — LOW (ref 150–400)
PO2 BLDV: 109 MMHG — HIGH (ref 25–45)
PO2 BLDV: 40 MMHG — SIGNIFICANT CHANGE UP (ref 25–45)
POTASSIUM BLDV-SCNC: 4.4 MMOL/L — SIGNIFICANT CHANGE UP (ref 3.5–5.3)
POTASSIUM BLDV-SCNC: 4.8 MMOL/L — SIGNIFICANT CHANGE UP (ref 3.5–5.3)
POTASSIUM SERPL-MCNC: 4.8 MMOL/L — SIGNIFICANT CHANGE UP (ref 3.5–5.3)
POTASSIUM SERPL-MCNC: 5.3 MMOL/L — SIGNIFICANT CHANGE UP (ref 3.5–5.3)
POTASSIUM SERPL-MCNC: 5.4 MMOL/L — HIGH (ref 3.5–5.3)
POTASSIUM SERPL-SCNC: 4.8 MMOL/L — SIGNIFICANT CHANGE UP (ref 3.5–5.3)
POTASSIUM SERPL-SCNC: 5.3 MMOL/L — SIGNIFICANT CHANGE UP (ref 3.5–5.3)
POTASSIUM SERPL-SCNC: 5.4 MMOL/L — HIGH (ref 3.5–5.3)
PROT SERPL-MCNC: 6.5 G/DL — SIGNIFICANT CHANGE UP (ref 6–8.3)
PROT SERPL-MCNC: 6.5 G/DL — SIGNIFICANT CHANGE UP (ref 6–8.3)
PROT SERPL-MCNC: 6.7 G/DL — SIGNIFICANT CHANGE UP (ref 6–8.3)
RAPID RVP RESULT: SIGNIFICANT CHANGE UP
RBC # BLD: 4.67 M/UL — SIGNIFICANT CHANGE UP (ref 4.2–5.8)
RBC # BLD: 4.68 M/UL — SIGNIFICANT CHANGE UP (ref 4.2–5.8)
RBC # BLD: 4.72 M/UL — SIGNIFICANT CHANGE UP (ref 4.2–5.8)
RBC # FLD: 12.6 % — SIGNIFICANT CHANGE UP (ref 10.3–14.5)
RBC # FLD: 12.6 % — SIGNIFICANT CHANGE UP (ref 10.3–14.5)
RBC # FLD: 12.7 % — SIGNIFICANT CHANGE UP (ref 10.3–14.5)
RBC BLD AUTO: SIGNIFICANT CHANGE UP
SAO2 % BLDV: 72 % — SIGNIFICANT CHANGE UP (ref 67–88)
SAO2 % BLDV: 98 % — HIGH (ref 67–88)
SODIUM SERPL-SCNC: 136 MMOL/L — SIGNIFICANT CHANGE UP (ref 135–145)
SODIUM SERPL-SCNC: 137 MMOL/L — SIGNIFICANT CHANGE UP (ref 135–145)
SODIUM SERPL-SCNC: 138 MMOL/L — SIGNIFICANT CHANGE UP (ref 135–145)
SPECIMEN SOURCE: SIGNIFICANT CHANGE UP
TOTAL CHOLESTEROL/HDL RATIO MEASUREMENT: 3.6 RATIO — SIGNIFICANT CHANGE UP (ref 3.4–9.6)
TRIGL SERPL-MCNC: 88 MG/DL — SIGNIFICANT CHANGE UP (ref 10–149)
TROPONIN T, HIGH SENSITIVITY RESULT: 373 NG/L — HIGH (ref 0–51)
TROPONIN T, HIGH SENSITIVITY RESULT: 392 NG/L — HIGH (ref 0–51)
TROPONIN T, HIGH SENSITIVITY RESULT: 432 NG/L — HIGH (ref 0–51)
WBC # BLD: 13.4 K/UL — HIGH (ref 3.8–10.5)
WBC # BLD: 14.4 K/UL — HIGH (ref 3.8–10.5)
WBC # BLD: 14.5 K/UL — HIGH (ref 3.8–10.5)
WBC # FLD AUTO: 13.4 K/UL — HIGH (ref 3.8–10.5)
WBC # FLD AUTO: 14.4 K/UL — HIGH (ref 3.8–10.5)
WBC # FLD AUTO: 14.5 K/UL — HIGH (ref 3.8–10.5)

## 2018-06-18 PROCEDURE — 93010 ELECTROCARDIOGRAM REPORT: CPT

## 2018-06-18 PROCEDURE — 99223 1ST HOSP IP/OBS HIGH 75: CPT | Mod: GC

## 2018-06-18 PROCEDURE — 73130 X-RAY EXAM OF HAND: CPT | Mod: 26,RT

## 2018-06-18 PROCEDURE — 99233 SBSQ HOSP IP/OBS HIGH 50: CPT | Mod: GC

## 2018-06-18 PROCEDURE — 74018 RADEX ABDOMEN 1 VIEW: CPT | Mod: 26

## 2018-06-18 PROCEDURE — 99222 1ST HOSP IP/OBS MODERATE 55: CPT

## 2018-06-18 PROCEDURE — 73110 X-RAY EXAM OF WRIST: CPT | Mod: 26,RT

## 2018-06-18 PROCEDURE — 93971 EXTREMITY STUDY: CPT | Mod: 26

## 2018-06-18 PROCEDURE — 73090 X-RAY EXAM OF FOREARM: CPT | Mod: 26,LT

## 2018-06-18 RX ORDER — PIPERACILLIN AND TAZOBACTAM 4; .5 G/20ML; G/20ML
3.38 INJECTION, POWDER, LYOPHILIZED, FOR SOLUTION INTRAVENOUS EVERY 8 HOURS
Qty: 0 | Refills: 0 | Status: DISCONTINUED | OUTPATIENT
Start: 2018-06-18 | End: 2018-06-18

## 2018-06-18 RX ORDER — ASPIRIN/CALCIUM CARB/MAGNESIUM 324 MG
81 TABLET ORAL DAILY
Qty: 0 | Refills: 0 | Status: DISCONTINUED | OUTPATIENT
Start: 2018-06-18 | End: 2018-06-19

## 2018-06-18 RX ORDER — ASPIRIN/CALCIUM CARB/MAGNESIUM 324 MG
81 TABLET ORAL DAILY
Qty: 0 | Refills: 0 | Status: DISCONTINUED | OUTPATIENT
Start: 2018-06-18 | End: 2018-06-18

## 2018-06-18 RX ORDER — NICOTINE POLACRILEX 2 MG
1 GUM BUCCAL DAILY
Qty: 0 | Refills: 0 | Status: DISCONTINUED | OUTPATIENT
Start: 2018-06-18 | End: 2018-06-27

## 2018-06-18 RX ORDER — HEPARIN SODIUM 5000 [USP'U]/ML
5000 INJECTION INTRAVENOUS; SUBCUTANEOUS EVERY 8 HOURS
Qty: 0 | Refills: 0 | Status: DISCONTINUED | OUTPATIENT
Start: 2018-06-18 | End: 2018-06-27

## 2018-06-18 RX ORDER — SODIUM CHLORIDE 9 MG/ML
1000 INJECTION INTRAMUSCULAR; INTRAVENOUS; SUBCUTANEOUS
Qty: 0 | Refills: 0 | Status: DISCONTINUED | OUTPATIENT
Start: 2018-06-18 | End: 2018-06-19

## 2018-06-18 RX ORDER — PIPERACILLIN AND TAZOBACTAM 4; .5 G/20ML; G/20ML
3.38 INJECTION, POWDER, LYOPHILIZED, FOR SOLUTION INTRAVENOUS EVERY 12 HOURS
Qty: 0 | Refills: 0 | Status: DISCONTINUED | OUTPATIENT
Start: 2018-06-18 | End: 2018-06-22

## 2018-06-18 RX ORDER — DEXMEDETOMIDINE HYDROCHLORIDE IN 0.9% SODIUM CHLORIDE 4 UG/ML
0.1 INJECTION INTRAVENOUS
Qty: 200 | Refills: 0 | Status: DISCONTINUED | OUTPATIENT
Start: 2018-06-18 | End: 2018-06-20

## 2018-06-18 RX ORDER — ATORVASTATIN CALCIUM 80 MG/1
80 TABLET, FILM COATED ORAL AT BEDTIME
Qty: 0 | Refills: 0 | Status: DISCONTINUED | OUTPATIENT
Start: 2018-06-18 | End: 2018-06-18

## 2018-06-18 RX ADMIN — PIPERACILLIN AND TAZOBACTAM 25 GRAM(S): 4; .5 INJECTION, POWDER, LYOPHILIZED, FOR SOLUTION INTRAVENOUS at 17:23

## 2018-06-18 RX ADMIN — PIPERACILLIN AND TAZOBACTAM 25 GRAM(S): 4; .5 INJECTION, POWDER, LYOPHILIZED, FOR SOLUTION INTRAVENOUS at 05:54

## 2018-06-18 RX ADMIN — Medication 1 PATCH: at 23:36

## 2018-06-18 RX ADMIN — HEPARIN SODIUM 5000 UNIT(S): 5000 INJECTION INTRAVENOUS; SUBCUTANEOUS at 21:27

## 2018-06-18 RX ADMIN — HEPARIN SODIUM 5000 UNIT(S): 5000 INJECTION INTRAVENOUS; SUBCUTANEOUS at 05:54

## 2018-06-18 RX ADMIN — DEXMEDETOMIDINE HYDROCHLORIDE IN 0.9% SODIUM CHLORIDE 2.99 MICROGRAM(S)/KG/HR: 4 INJECTION INTRAVENOUS at 21:18

## 2018-06-18 RX ADMIN — Medication 1 PATCH: at 14:53

## 2018-06-18 RX ADMIN — Medication 81 MILLIGRAM(S): at 12:13

## 2018-06-18 NOTE — PROGRESS NOTE ADULT - SUBJECTIVE AND OBJECTIVE BOX
CHIEF COMPLAINT: Subacute infarcts - LMCA, SHAQUILLE, Rhabdomyolysis     Interval Events: RUE swelling - pending RUE venous doppler, will need a RUE xray r/o fx, s/p HD 2.5H overnight    This is a 52yoM found down, questionable x 24 hours, found to have a LMCA- subacute infarct, + for cocaine on tox screen, in [place] w rhabdomyolysis and tx to NS from [place] for further treatment. The pt is s/p 2.5H of HD, +R sided neglect, RUE / RLE weakness w diminished sensation RLE, and RUE swelling.     REVIEW OF SYSTEMS:  Constitutional: [ ] negative [ ] fevers [ ] chills [ ] weight loss [ ] weight gain  HEENT: [ ] negative [ ] dry eyes [ ] eye irritation [ ] postnasal drip [ ] nasal congestion  CV: [ ] negative  [ ] chest pain [ ] orthopnea [ ] palpitations [ ] murmur  Resp: [ ] negative [ ] cough [ ] shortness of breath [ ] dyspnea [ ] wheezing [ ] sputum [ ] hemoptysis  GI: [ ] negative [ ] nausea [ ] vomiting [ ] diarrhea [ ] constipation [ ] abd pain [ ] dysphagia   : [ ] negative [ ] dysuria [ ] nocturia [ ] hematuria [ ] increased urinary frequency  Musculoskeletal: [ ] negative [ ] back pain [ ] myalgias [ ] arthralgias [ ] fracture  Skin: [ ] negative [ ] rash [ ] itch  Neurological: [ ] negative [ ] headache [ ] dizziness [ ] syncope [ ] weakness [ ] numbness  Psychiatric: [ ] negative [ ] anxiety [ ] depression  Endocrine: [ ] negative [ ] diabetes [ ] thyroid problem  Hematologic/Lymphatic: [ ] negative [ ] anemia [ ] bleeding problem  Allergic/Immunologic: [ ] negative [ ] itchy eyes [ ] nasal discharge [ ] hives [ ] angioedema  [ ] All other systems negative  [ ] Unable to assess ROS because ________    OBJECTIVE:  ICU Vital Signs Last 24 Hrs  T(C): 36.4 (2018 04:00), Max: 37.4 (2018 15:05)  T(F): 97.6 (2018 04:00), Max: 99.3 (2018 15:05)  HR: 96 (2018 06:00) (81 - 98)  BP: 138/78 (2018 06:00) (101/58 - 155/92)  BP(mean): 103 (2018 06:00) (74 - 103)  ABP: --  ABP(mean): --  RR: 24 (2018 06:00) (16 - 46)  SpO2: 95% (2018 06:00) (93% - 99%)         @ 07:01  -  06-18 @ 06:19  --------------------------------------------------------  IN: 1400 mL / OUT: 2790 mL / NET: -1390 mL      CAPILLARY BLOOD GLUCOSE      POCT Blood Glucose.: 101 mg/dL (2018 14:40)      PHYSICAL EXAM:  General:   HEENT:   Lymph Nodes:  Neck:   Respiratory:   Cardiovascular:   Abdomen:   Extremities:   Skin:   Neurological:  Psychiatry:    LINES:    HOSPITAL MEDICATIONS:  heparin  Injectable 5000 Unit(s) SubCutaneous every 8 hours    piperacillin/tazobactam IVPB. 3.375 Gram(s) IV Intermittent every 8 hours                  sodium chloride 0.9%. 1000 milliLiter(s) IV Continuous <Continuous>            LABS:                        15.9   14.2  )-----------( 136      ( 2018 21:01 )             49.1     Hgb Trend: 15.9<--, 15.8<--, 17.5<--      137  |  101  |  81<H>  ----------------------------<  106<H>  6.3<HH>   |  12<L>  |  6.10<H>    Ca    7.6<L>      2018 21:01  Phos  5.2       Mg     2.3         TPro  7.1  /  Alb  3.5  /  TBili  0.4  /  DBili  x   /  AST  496<H>  /  ALT  491<H>  /  AlkPhos  95      Creatinine Trend: 6.10<--, 5.41<--, 5.13<--, 5.90<--  PT/INR - ( 2018 15:03 )   PT: 12.0 sec;   INR: 1.11 ratio         PTT - ( 2018 15:03 )  PTT:28.2 sec  Urinalysis Basic - ( 2018 15:28 )    Color: Yellow / Appearance: SL Turbid / S.008 / pH: x  Gluc: x / Ketone: Negative  / Bili: Negative / Urobili: Negative   Blood: x / Protein: 30 mg/dL / Nitrite: Negative   Leuk Esterase: Negative / RBC: 5-10 /HPF / WBC 3-5 /HPF   Sq Epi: x / Non Sq Epi: x / Bacteria: Few /HPF      Arterial Blood Gas:   @ 20:32  7.28/31/89/14/96/-11.3  ABG lactate: --    Venous Blood Gas:   @ 23:15  7.27/37/46/16/79  VBG Lactate: --  Venous Blood Gas:   @ 15:03  7.26/34/108/14/97  VBG Lactate: 1.9      MICROBIOLOGY:     RADIOLOGY:  [ ] Reviewed and interpreted by me    EKG: CHIEF COMPLAINT: Subacute infarcts - LMCA, SHAQUILLE, Rhabdomyolysis     Interval Events: RUE swelling - pending RUE venous doppler, will need a RUE xray r/o fx, s/p HD 2.5H overnight    This is a 52yoM found down, questionable x 24 hours, found to have a LMCA- subacute infarct, + for cocaine on tox screen, in [place] w rhabdomyolysis and tx to [facility] from [place] for further treatment. The pt is s/p 2.5H of HD, +R sided neglect, RUE / RLE weakness w diminished sensation RLE, and RUE swelling.     REVIEW OF SYSTEMS:  Constitutional: [ ] negative [ ] fevers [ ] chills [ ] weight loss [ ] weight gain  HEENT: [ ] negative [ ] dry eyes [ ] eye irritation [ ] postnasal drip [ ] nasal congestion  CV: [ ] negative  [ ] chest pain [ ] orthopnea [ ] palpitations [ ] murmur  Resp: [ ] negative [ ] cough [ ] shortness of breath [ ] dyspnea [ ] wheezing [ ] sputum [ ] hemoptysis  GI: [ ] negative [ ] nausea [ ] vomiting [ ] diarrhea [ ] constipation [ ] abd pain [ ] dysphagia   : [ ] negative [ ] dysuria [ ] nocturia [ ] hematuria [ ] increased urinary frequency  Musculoskeletal: [ ] negative [ ] back pain [ ] myalgias [ ] arthralgias [ ] fracture  Skin: [ ] negative [ ] rash [ ] itch  Neurological: [ ] negative [ ] headache [ ] dizziness [ ] syncope [ ] weakness [ ] numbness  Psychiatric: [ ] negative [ ] anxiety [ ] depression  Endocrine: [ ] negative [ ] diabetes [ ] thyroid problem  Hematologic/Lymphatic: [ ] negative [ ] anemia [ ] bleeding problem  Allergic/Immunologic: [ ] negative [ ] itchy eyes [ ] nasal discharge [ ] hives [ ] angioedema  [ ] All other systems negative  [ ] Unable to assess ROS because ________    OBJECTIVE:  ICU Vital Signs Last 24 Hrs  T(C): 36.4 (2018 04:00), Max: 37.4 (2018 15:05)  T(F): 97.6 (2018 04:00), Max: 99.3 (2018 15:05)  HR: 96 (2018 06:00) (81 - 98)  BP: 138/78 (2018 06:00) (101/58 - 155/92)  BP(mean): 103 (2018 06:00) (74 - 103)  ABP: --  ABP(mean): --  RR: 24 (2018 06:00) (16 - 46)  SpO2: 95% (2018 06:00) (93% - 99%)         @ 07:01  -  06-18 @ 06:19  --------------------------------------------------------  IN: 1400 mL / OUT: 2790 mL / NET: -1390 mL      CAPILLARY BLOOD GLUCOSE  POCT Blood Glucose.: 101 mg/dL (2018 14:40)      PHYSICAL EXAM:  General:   HEENT:   Lymph Nodes:  Neck:   Respiratory:   Cardiovascular:   Abdomen:   Extremities:   Skin:   Neurological:  Psychiatry:    LINES:    HOSPITAL MEDICATIONS:  heparin  Injectable 5000 Unit(s) SubCutaneous every 8 hours    piperacillin/tazobactam IVPB. 3.375 Gram(s) IV Intermittent every 8 hours    sodium chloride 0.9%. 1000 milliLiter(s) IV Continuous <Continuous>      LABS:                        15.9   14.2  )-----------( 136      ( 2018 21:01 )             49.1     Hgb Trend: 15.9<--, 15.8<--, 17.5<--      137  |  101  |  81<H>  ----------------------------<  106<H>  6.3<HH>   |  12<L>  |  6.10<H>    Ca    7.6<L>      2018 21:01  Phos  5.2       Mg     2.3         TPro  7.1  /  Alb  3.5  /  TBili  0.4  /  DBili  x   /  AST  496<H>  /  ALT  491<H>  /  AlkPhos  95      Creatinine Trend: 6.10<--, 5.41<--, 5.13<--, 5.90<--  PT/INR - ( 2018 15:03 )   PT: 12.0 sec;   INR: 1.11 ratio         PTT - ( 2018 15:03 )  PTT:28.2 sec  Urinalysis Basic - ( 2018 15:28 )    Color: Yellow / Appearance: SL Turbid / S.008 / pH: x  Gluc: x / Ketone: Negative  / Bili: Negative / Urobili: Negative   Blood: x / Protein: 30 mg/dL / Nitrite: Negative   Leuk Esterase: Negative / RBC: 5-10 /HPF / WBC 3-5 /HPF   Sq Epi: x / Non Sq Epi: x / Bacteria: Few /HPF      Arterial Blood Gas:   @ 20:32  7.28/31/89/14/96/-11.3  ABG lactate: --    Venous Blood Gas:   @ 23:15  7.27/37/46/16/79  VBG Lactate: --  Venous Blood Gas:   @ 15:03  7.26/34/108/14/97  VBG Lactate: 1.9      MICROBIOLOGY:   Rapid Respiratory Viral Panel (18 @ 00:24)    Rapid RVP Result: NotDetec: The FilmArray RVP Rapid uses polymerase chain reaction (PCR) and melt  curve analysis to screen for adenovirus; coronavirus HKU1, NL63, 229E,  OC43; human metapneumovirus (hMPV); human enterovirus/rhinovirus  (Entero/RV); influenza A; influenza A/H1;influenza A/H3; influenza  A/H1-2009; influenza B; parainfluenza viruses 1, 2, 3, 4; respiratory  syncytial virus; Bordetella pertussis; Mycoplasma pneumoniae; and  Chlamydophila pneumoniae.    RADIOLOGY:  < from: CT Head No Cont (18 @ 09:59) >    EXAM:  CT BRAIN                                  PROCEDURE DATE:  2018          INTERPRETATION:  CLINICAL INFORMATION: Altered mental status    TECHNIQUE: Multiple axial CT images of the calvarium and brain were   obtained without contrast.     COMPARISON: No prior studies are available for comparison at this   institution.    FINDINGS:     There is diffuse low density appearance suggesting a subacute infarction   within the MCA distribution on the left. This involves the high parietal   and posterior parietal regions. There is similar findings in the adjacent   occipital lobe. There is associated mild sulcal effacement. There is no   midline shift. The ventricles are normal size. No acute hemorrhage is   noted. Small focus of low density is also noted in the left cerebellum.    There is a partially imaged unerupted tooth seen at the left maxillary   sinus. The imaged portions of the paranasal sinuses and mastoids are   otherwise well aerated.     There is no osseous abnormality.    IMPRESSION:    Left MCA distribution and probable left posterior cerebral artery   distribution changes most consistent with subacute infarction. No   evidence for acute hemorrhage. MRI is recommended for further   characterization.    < from: CT Cervical Spine No Cont (18 @ 10:24) >    EXAM:  CT CERVICAL SPINE                                  PROCEDURE DATE:  2018          INTERPRETATION:  CLINICAL INFORMATION: Altered mental status. Neck pain    CT SCAN OF THE CERVICAL SPINE:     TECHNIQUE: Axial sections were obtained from the skull base through the   T1 level without the administration of intravenous contrast.  The study   was supplemented with sagittal and coronal reformatted images.     FINDINGS:    Motion limited examination    There is no evidence of acute fracture or subluxation. There are   extensive multilevel degenerative changes of the cervical spine The   vertebral bodies are normally aligned.  The prevertebral soft tissues are   unremarkable.     The soft tissues of the neck are grossly normal.  The lung apices are   clear.    The intracranial contents are discussed the CT head from the same day.      IMPRESSION: No evidence of acute fracture or subluxation. Extensive   multilevel degenerative changes. Evaluation markedly limited by motion.      < from: CT Chest No Cont (18 @ 10:24) >    EXAM:  CT ABDOMEN AND PELVIS                          EXAM:  CT CHEST                                  PROCEDURE DATE:  2018          INTERPRETATION:  CLINICAL INFORMATION: Altered mental status. Pain.    CT of the chest, abdomen and pelvis was performed without intravenous   contrast. Sagittal and coronal reconstructions were obtained. Axial   maximum intensity projection images were obtained.    Comparison: None.    CHEST:    Thyroid: Unremarkable  Heart:  Unremarkable.    Lymph nodes: No significant adenopathy.    Lungs and Pleura: There are bilateral dependent atelectatic changes.   There are no nodules masses or consolidations.  Airways: Patent.    ABDOMEN:   Liver: normal.  Spleen: normal.  Pancreas: normal.  Adrenal glands:  2.8 x 2.0 cm low density nodule right adrenal gland   likely a benign adenoma. Markedly enlarged left adrenal gland measuring   at least 4.7 x 4.3 cm also low density, however given its size and the   definitive diagnosis of benign adenoma is uncertain.Recommend MRI   evaluation.   Gall bladder: normal.  Kidneys: Atrophic left kidney.    Lymph nodes: There is no retroperitoneal or abdominal lymphadenopathy.  Aorta and branch vessels: Unremarkable.  Inferior vena cava: Unremarkable.    Bowel: Scattered small colonic diverticula without diverticulitis. Large   and small bowel are otherwise unremarkable. Normal appendix.    There is no free air of free fluid. There is mild nonspecific fat   stranding extending from the renal fossa on the right to the distal psoas   muscle.    Pelvis:  Bladder is collapsed around Gutierres catheter.  Normal prostate and seminal vesicles.  There is no significant inguinal or pelvic adenopathy    Osseous structures:  The osseous structures show scattered degenerativechanges. There is   anterior wedging of the L2 vertebral body of uncertain chronicity.   Correlate clinically for pain.    IMPRESSION:    2.8 x 2.0 cm low density nodule right adrenal gland likely a benign   adenoma. Markedly enlarged left adrenal gland measuring at least 4.7 x   4.3 cm also low density, however given its size and the definitive   diagnosis of benign adenoma is uncertain. Recommend MRI evaluation.     Atrophic left kidney.    There is anterior wedging of the L2 vertebral body of uncertain   chronicity. Correlate clinically for pain.      < from: CT Abdomen and Pelvis No Cont (18 @ 10:24) >    EXAM:  CT ABDOMEN AND PELVIS                          EXAM:  CT CHEST                                  PROCEDURE DATE:  2018          INTERPRETATION:  CLINICAL INFORMATION: Altered mental status. Pain.    CT of the chest, abdomen and pelvis was performed without intravenous   contrast. Sagittal and coronal reconstructions were obtained. Axial   maximum intensity projection images were obtained.    Comparison: None.    CHEST:    Thyroid: Unremarkable  Heart:  Unremarkable.    Lymph nodes: No significant adenopathy.    Lungs and Pleura: There are bilateral dependent atelectatic changes.   There are no nodules masses or consolidations.  Airways: Patent.    ABDOMEN:   Liver: normal.  Spleen: normal.  Pancreas: normal.  Adrenal glands:  2.8 x 2.0 cm low density nodule right adrenal gland   likely a benign adenoma. Markedly enlarged left adrenal gland measuring   at least 4.7 x 4.3 cm also low density, however given its size and the   definitive diagnosis of benign adenoma is uncertain.Recommend MRI   evaluation.   Gall bladder: normal.  Kidneys: Atrophic left kidney.    Lymph nodes: There is no retroperitoneal or abdominal lymphadenopathy.  Aorta and branch vessels: Unremarkable.  Inferior vena cava: Unremarkable.    Bowel: Scattered small colonic diverticula without diverticulitis. Large   and small bowel are otherwise unremarkable. Normal appendix.    There is no free air of free fluid. There is mild nonspecific fat   stranding extending from the renal fossa on the right to the distal psoas   muscle.    Pelvis:  Bladder is collapsed around Gutierres catheter.  Normal prostate and seminal vesicles.  There is no significant inguinal or pelvic adenopathy    Osseous structures:  The osseous structures show scattered degenerativechanges. There is   anterior wedging of the L2 vertebral body of uncertain chronicity.   Correlate clinically for pain.    IMPRESSION:    2.8 x 2.0 cm low density nodule right adrenal gland likely a benign   adenoma. Markedly enlarged left adrenal gland measuring at least 4.7 x   4.3 cm also low density, however given its size and the definitive   diagnosis of benign adenoma is uncertain. Recommend MRI evaluation.     Atrophic left kidney.    There is anterior wedging of the L2 vertebral body of uncertain   chronicity. Correlate clinically for pain.      EKG: CHIEF COMPLAINT: Subacute infarcts - LMCA, SHAQUIL[place], Rhabdomyolysis     Interval Events: RUE swelling - pending RUE venous doppler, will need a RUE xray r/o fx, s/p HD 2.5H overnight    This is a 52yoM found down, questionable x 24 hours, found to have a LMCA- subacute infarct, + for cocaine on tox screen, in [place] w rhabdomyolysis and tx to [facility] from [place] for further treatment. The pt is s/p 2.5H of HD, +R sided neglect, RUE / RLE weakness w diminished sensation RLE, and RUE swelling.     REVIEW OF SYSTEMS:  Constitutional: [ ] negative [ ] fevers [ ] chills [ ] weight loss [ ] weight gain  HEENT: [ ] negative [ ] dry eyes [ ] eye irritation [ ] postnasal drip [ ] nasal congestion  CV: [ ] negative  [ ] chest pain [ ] orthopnea [ ] palpitations [ ] murmur  Resp: [ ] negative [ ] cough [ ] shortness of breath [ ] dyspnea [ ] wheezing [ ] sputum [ ] hemoptysis  GI: [ ] negative [ ] nausea [ ] vomiting [ ] diarrhea [ ] constipation [ ] abd pain [ ] dysphagia   : [ ] negative [ ] dysuria [ ] nocturia [ ] hematuria [ ] increased urinary frequency  Musculoskeletal: [ ] negative [ ] back pain [ ] myalgias [ ] arthralgias [ ] fracture  Skin: [ ] negative [ ] rash [ ] itch  Neurological: [ ] negative [ ] headache [ ] dizziness [ ] syncope [ ] weakness [ ] numbness  Psychiatric: [ ] negative [ ] anxiety [ ] depression  Endocrine: [ ] negative [ ] diabetes [ ] thyroid problem  Hematologic/Lymphatic: [ ] negative [ ] anemia [ ] bleeding problem  Allergic/Immunologic: [ ] negative [ ] itchy eyes [ ] nasal discharge [ ] hives [ ] angioedema  [ ] All other systems negative  [x ] Unable to assess ROS because denies any complaints / easy to arouse /________    OBJECTIVE:  ICU Vital Signs Last 24 Hrs  T(C): 36.4 (2018 04:00), Max: 37.4 (2018 15:05)  T(F): 97.6 (2018 04:00), Max: 99.3 (2018 15:05)  HR: 96 (2018 06:00) (81 - 98)  BP: 138/78 (2018 06:00) (101/58 - 155/92)  BP(mean): 103 (2018 06:00) (74 - 103)  ABP: --  ABP(mean): --  RR: 24 (2018 06:00) (16 - 46)  SpO2: 95% (2018 06:00) (93% - 99%)         @ 07:01  -  06-18 @ 06:19  --------------------------------------------------------  IN: 1400 mL / OUT: 2790 mL / NET: -1390 mL      CAPILLARY BLOOD GLUCOSE  POCT Blood Glucose.: 101 mg/dL (2018 14:40)      PHYSICAL EXAM:  General:   HEENT:   Lymph Nodes:  Neck:   Respiratory:   Cardiovascular:   Abdomen:   Extremities:   Skin:   Neurological:  Psychiatry:    LINES:    HOSPITAL MEDICATIONS:  heparin  Injectable 5000 Unit(s) SubCutaneous every 8 hours    piperacillin/tazobactam IVPB. 3.375 Gram(s) IV Intermittent every 8 hours    sodium chloride 0.9%. 1000 milliLiter(s) IV Continuous <Continuous>      LABS:                        15.9   14.2  )-----------( 136      ( 2018 21:01 )             49.1     Hgb Trend: 15.9<--, 15.8<--, 17.5<--  06    137  |  101  |  81<H>  ----------------------------<  106<H>  6.3<HH>   |  12<L>  |  6.10<H>    Ca    7.6<L>      2018 21:01  Phos  5.2       Mg     2.3         TPro  7.1  /  Alb  3.5  /  TBili  0.4  /  DBili  x   /  AST  496<H>  /  ALT  491<H>  /  AlkPhos  95      Creatinine Trend: 6.10<--, 5.41<--, 5.13<--, 5.90<--  PT/INR - ( 2018 15:03 )   PT: 12.0 sec;   INR: 1.11 ratio         PTT - ( 2018 15:03 )  PTT:28.2 sec  Urinalysis Basic - ( 2018 15:28 )    Color: Yellow / Appearance: SL Turbid / S.008 / pH: x  Gluc: x / Ketone: Negative  / Bili: Negative / Urobili: Negative   Blood: x / Protein: 30 mg/dL / Nitrite: Negative   Leuk Esterase: Negative / RBC: 5-10 /HPF / WBC 3-5 /HPF   Sq Epi: x / Non Sq Epi: x / Bacteria: Few /HPF      Arterial Blood Gas:   @ 20:32  7.28/31/89/14/96/-11.3  ABG lactate: --    Venous Blood Gas:   @ 23:15  7.27/37/46/16/79  VBG Lactate: --  Venous Blood Gas:   @ 15:03  7.26/34/108/14/97  VBG Lactate: 1.9      MICROBIOLOGY:   Rapid Respiratory Viral Panel (18 @ 00:24)    Rapid RVP Result: NotDetec: The Film[employer] RVP Rapid uses polymerase chain reaction (PCR) and melt  curve analysis to screen for adenovirus; coronavirus HKU1, NL63, 229E,  OC43; human metapneumovirus (hMPV); human enterovirus/rhinovirus  (Entero/RV); influenza A; influenza A/H1;influenza A/H3; influenza  A/H1-2009; influenza B; parainfluenza viruses 1, 2, 3, 4; respiratory  syncytial virus; Bordetella pertussis; Mycoplasma pneumoniae; and  Chlamydophila pneumoniae.    RADIOLOGY:  < from: CT Head No Cont (18 @ 09:59) >    EXAM:  CT BRAIN                                  PROCEDURE DATE:  2018          INTERPRETATION:  CLINICAL INFORMATION: Altered mental status    TECHNIQUE: Multiple axial CT images of the calvarium and brain were   obtained without contrast.     COMPARISON: No prior studies are available for comparison at this   institution.    FINDINGS:     There is diffuse low density appearance suggesting a subacute infarction   within the MCA distribution on the left. This involves the high parietal   and posterior parietal regions. There is similar findings in the adjacent   occipital lobe. There is associated mild sulcal effacement. There is no   midline shift. The ventricles are normal size. No acute hemorrhage is   noted. Small focus of low density is also noted in the left cerebellum.    There is a partially imaged unerupted tooth seen at the left maxillary   sinus. The imaged portions of the paranasal sinuses and mastoids are   otherwise well aerated.     There is no osseous abnormality.    IMPRESSION:    Left MCA distribution and probable left posterior cerebral artery   distribution changes most consistent with subacute infarction. No   evidence for acute hemorrhage. MRI is recommended for further   characterization.    < from: CT Cervical Spine No Cont (18 @ 10:24) >    EXAM:  CT CERVICAL SPINE                                  PROCEDURE DATE:  2018          INTERPRETATION:  CLINICAL INFORMATION: Altered mental status. Neck pain    CT SCAN OF THE CERVICAL SPINE:     TECHNIQUE: Axial sections were obtained from the skull base through the   T1 level without the administration of intravenous contrast.  The study   was supplemented with sagittal and coronal reformatted images.     FINDINGS:    Motion limited examination    There is no evidence of acute fracture or subluxation. There are   extensive multilevel degenerative changes of the cervical spine The   vertebral bodies are normally aligned.  The prevertebral soft tissues are   unremarkable.     The soft tissues of the neck are grossly normal.  The lung apices are   clear.    The intracranial contents are discussed the CT head from the same day.      IMPRESSION: No evidence of acute fracture or subluxation. Extensive   multilevel degenerative changes. Evaluation markedly limited by motion.      < from: CT Chest No Cont (18 @ 10:24) >    EXAM:  CT ABDOMEN AND PELVIS                          EXAM:  CT CHEST                                  PROCEDURE DATE:  2018          INTERPRETATION:  CLINICAL INFORMATION: Altered mental status. Pain.    CT of the chest, abdomen and pelvis was performed without intravenous   contrast. Sagittal and coronal reconstructions were obtained. Axial   maximum intensity projection images were obtained.    Comparison: None.    CHEST:    Thyroid: Unremarkable  Heart:  Unremarkable.    Lymph nodes: No significant adenopathy.    Lungs and Pleura: There are bilateral dependent atelectatic changes.   There are no nodules masses or consolidations.  Airways: Patent.    ABDOMEN:   Liver: normal.  Spleen: normal.  Pancreas: normal.  Adrenal glands:  2.8 x 2.0 cm low density nodule right adrenal gland   likely a benign adenoma. Markedly enlarged left adrenal gland measuring   at least 4.7 x 4.3 cm also low density, however given its size and the   definitive diagnosis of benign adenoma is uncertain.Recommend MRI   evaluation.   Gall bladder: normal.  Kidneys: Atrophic left kidney.    Lymph nodes: There is no retroperitoneal or abdominal lymphadenopathy.  Aorta and branch vessels: Unremarkable.  Inferior vena cava: Unremarkable.    Bowel: Scattered small colonic diverticula without diverticulitis. Large   and small bowel are otherwise unremarkable. Normal appendix.    There is no free air of free fluid. There is mild nonspecific fat   stranding extending from the renal fossa on the right to the distal psoas   muscle.    Pelvis:  Bladder is collapsed around Gutierres catheter.  Normal prostate and seminal vesicles.  There is no significant inguinal or pelvic adenopathy    Osseous structures:  The osseous structures show scattered degenerativechanges. There is   anterior wedging of the L2 vertebral body of uncertain chronicity.   Correlate clinically for pain.    IMPRESSION:    2.8 x 2.0 cm low density nodule right adrenal gland likely a benign   adenoma. Markedly enlarged left adrenal gland measuring at least 4.7 x   4.3 cm also low density, however given its size and the definitive   diagnosis of benign adenoma is uncertain. Recommend MRI evaluation.     Atrophic left kidney.    There is anterior wedging of the L2 vertebral body of uncertain   chronicity. Correlate clinically for pain.      < from: CT Abdomen and Pelvis No Cont (18 @ 10:24) >    EXAM:  CT ABDOMEN AND PELVIS                          EXAM:  CT CHEST                                  PROCEDURE DATE:  2018          INTERPRETATION:  CLINICAL INFORMATION: Altered mental status. Pain.    CT of the chest, abdomen and pelvis was performed without intravenous   contrast. Sagittal and coronal reconstructions were obtained. Axial   maximum intensity projection images were obtained.    Comparison: None.    CHEST:    Thyroid: Unremarkable  Heart:  Unremarkable.    Lymph nodes: No significant adenopathy.    Lungs and Pleura: There are bilateral dependent atelectatic changes.   There are no nodules masses or consolidations.  Airways: Patent.    ABDOMEN:   Liver: normal.  Spleen: normal.  Pancreas: normal.  Adrenal glands:  2.8 x 2.0 cm low density nodule right adrenal gland   likely a benign adenoma. Markedly enlarged left adrenal gland measuring   at least 4.7 x 4.3 cm also low density, however given its size and the   definitive diagnosis of benign adenoma is uncertain.Recommend MRI   evaluation.   Gall bladder: normal.  Kidneys: Atrophic left kidney.    Lymph nodes: There is no retroperitoneal or abdominal lymphadenopathy.  Aorta and branch vessels: Unremarkable.  Inferior vena cava: Unremarkable.    Bowel: Scattered small colonic diverticula without diverticulitis. Large   and small bowel are otherwise unremarkable. Normal appendix.    There is no free air of free fluid. There is mild nonspecific fat   stranding extending from the renal fossa on the right to the distal psoas   muscle.    Pelvis:  Bladder is collapsed around Gutierres catheter.  Normal prostate and seminal vesicles.  There is no significant inguinal or pelvic adenopathy    Osseous structures:  The osseous structures show scattered degenerativechanges. There is   anterior wedging of the L2 vertebral body of uncertain chronicity.   Correlate clinically for pain.    IMPRESSION:    2.8 x 2.0 cm low density nodule right adrenal gland likely a benign   adenoma. Markedly enlarged left adrenal gland measuring at least 4.7 x   4.3 cm also low density, however given its size and the definitive   diagnosis of benign adenoma is uncertain. Recommend MRI evaluation.     Atrophic left kidney.    There is anterior wedging of the L2 vertebral body of uncertain   chronicity. Correlate clinically for pain.      EKG: CHIEF COMPLAINT: Subacute infarcts - LMCA, SHAQUILLE, Rhabdomyolysis     Interval Events: RUE swelling - pending RUE venous doppler, will need a RUE xray r/o fx, s/p HD 2.5H overnight    This is a 52yoM found down, questionable x 24 hours, found to have a LMCA- subacute infarct, + for cocaine on tox screen, in SHAQUILLE w rhabdomyolysis and tx to [facility] from [place] for further treatment. The pt is s/p 2.5H of HD, +R sided neglect, RUE / RLE weakness w diminished sensation RLE, and RUE swelling.     REVIEW OF SYSTEMS:  [x ] Unable to assess ROS because denies any complaints / easy to arouse /________    OBJECTIVE:  ICU Vital Signs Last 24 Hrs  T(C): 36.4 (2018 04:00), Max: 37.4 (2018 15:05)  T(F): 97.6 (2018 04:00), Max: 99.3 (2018 15:05)  HR: 96 (2018 06:00) (81 - 98)  BP: 138/78 (2018 06:00) (101/58 - 155/92)  BP(mean): 103 (2018 06:00) (74 - 103)  ABP: --  ABP(mean): --  RR: 24 (2018 06:00) (16 - 46)  SpO2: 95% (2018 06:00) (93% - 99%)        06-17 @ 07:01  -  06-18 @ 06:19  --------------------------------------------------------  IN: 1400 mL / OUT: 2790 mL / NET: -1390 mL      CAPILLARY BLOOD GLUCOSE  POCT Blood Glucose.: 101 mg/dL (2018 14:40)    LINES:    HOSPITAL MEDICATIONS:  heparin  Injectable 5000 Unit(s) SubCutaneous every 8 hours    piperacillin/tazobactam IVPB. 3.375 Gram(s) IV Intermittent every 8 hours    sodium chloride 0.9%. 1000 milliLiter(s) IV Continuous <Continuous>      LABS:                        15.9   14.2  )-----------( 136      ( 2018 21:01 )             49.1     Hgb Trend: 15.9<--, 15.8<--, 17.5<--      137  |  101  |  81<H>  ----------------------------<  106<H>  6.3<HH>   |  12<L>  |  6.10<H>    Ca    7.6<L>      2018 21:01  Phos  5.2       Mg     2.3         TPro  7.1  /  Alb  3.5  /  TBili  0.4  /  DBili  x   /  AST  496<H>  /  ALT  491<H>  /  AlkPhos  95      Creatinine Trend: 6.10<--, 5.41<--, 5.13<--, 5.90<--  PT/INR - ( 2018 15:03 )   PT: 12.0 sec;   INR: 1.11 ratio         PTT - ( 2018 15:03 )  PTT:28.2 sec  Urinalysis Basic - ( 2018 15:28 )    Color: Yellow / Appearance: SL Turbid / S.008 / pH: x  Gluc: x / Ketone: Negative  / Bili: Negative / Urobili: Negative   Blood: x / Protein: 30 mg/dL / Nitrite: Negative   Leuk Esterase: Negative / RBC: 5-10 /HPF / WBC 3-5 /HPF   Sq Epi: x / Non Sq Epi: x / Bacteria: Few /HPF      Arterial Blood Gas:   @ 20:32  7.28/31/89/14/96/-11.3  ABG lactate: --    Venous Blood Gas:   @ 23:15  7.27/37/46/16/79  VBG Lactate: --  Venous Blood Gas:   @ 15:03  7.26/34/108/14/97  VBG Lactate: 1.9      MICROBIOLOGY:   Rapid Respiratory Viral Panel (18 @ 00:24)    Rapid RVP Result: NotDetec: The [employer] RVP Rapid uses polymerase chain reaction (PCR) and melt  curve analysis to screen for adenovirus; coronavirus HKU1, NL63, 229E,  OC43; human metapneumovirus (hMPV); human enterovirus/rhinovirus  (Entero/RV); influenza A; influenza A/H1;influenza A/H3; influenza  A/H1-2009; influenza B; parainfluenza viruses 1, 2, 3, 4; respiratory  syncytial virus; Bordetella pertussis; Mycoplasma pneumoniae; and  Chlamydophila pneumoniae.    RADIOLOGY:  < from: CT Head No Cont (18 @ 09:59) >    EXAM:  CT BRAIN                          PROCEDURE DATE:  2018      INTERPRETATION:  CLINICAL INFORMATION: Altered mental status    TECHNIQUE: Multiple axial CT images of the calvarium and brain were   obtained without contrast.     COMPARISON: No prior studies are available for comparison at this   institution.    FINDINGS:     There is diffuse low density appearance suggesting a subacute infarction   within the MCA distribution on the left. This involves the high parietal   and posterior parietal regions. There is similar findings in the adjacent   occipital lobe. There is associated mild sulcal effacement. There is no   midline shift. The ventricles are normal size. No acute hemorrhage is   noted. Small focus of low density is also noted in the left cerebellum.    There is a partially imaged unerupted tooth seen at the left maxillary   sinus. The imaged portions of the paranasal sinuses and mastoids are   otherwise well aerated.     There is no osseous abnormality.    IMPRESSION:    Left MCA distribution and probable left posterior cerebral artery   distribution changes most consistent with subacute infarction. No   evidence for acute hemorrhage. MRI is recommended for further   characterization.    < from: CT Cervical Spine No Cont (18 @ 10:24) >    EXAM:  CT CERVICAL SPINE                          PROCEDURE DATE:  2018      INTERPRETATION:  CLINICAL INFORMATION: Altered mental status. Neck pain    CT SCAN OF THE CERVICAL SPINE:     TECHNIQUE: Axial sections were obtained from the skull base through the   T1 level without the administration of intravenous contrast.  The study   was supplemented with sagittal and coronal reformatted images.     FINDINGS:    Motion limited examination    There is no evidence of acute fracture or subluxation. There are   extensive multilevel degenerative changes of the cervical spine The   vertebral bodies are normally aligned.  The prevertebral soft tissues are   unremarkable.     The soft tissues of the neck are grossly normal.  The lung apices are   clear.    The intracranial contents are discussed the CT head from the same day.    IMPRESSION: No evidence of acute fracture or subluxation. Extensive   multilevel degenerative changes. Evaluation markedly limited by motion.      < from: CT Chest No Cont (18 @ 10:24) >  EXAM:  CT ABDOMEN AND PELVIS                        EXAM:  CT CHEST                         PROCEDURE DATE:  2018      INTERPRETATION:  CLINICAL INFORMATION: Altered mental status. Pain.    CT of the chest, abdomen and pelvis was performed without intravenous   contrast. Sagittal and coronal reconstructions were obtained. Axial   maximum intensity projection images were obtained.    Comparison: None.  CHEST:  Thyroid: Unremarkable  Heart:  Unremarkable.  Lymph nodes No significant adenopathy.    Lungs and Pleura: There are bilateral dependent atelectatic changes.   There are no nodules masses or consolidations.  Airways: Patent.    ABDOMEN:   Liver: normal.  Spleen: normal.  Pancreas: normal.  Adrenal glands:  2.8 x 2.0 cm low density nodule right adrenal gland   likely a benign adenoma. Markedly enlarged left adrenal gland measuring   at least 4.7 x 4.3 cm also low density, however given its size and the   definitive diagnosis of benign adenoma is uncertain.Recommend MRI   evaluation.   Gall bladder: normal.  Kidneys: Atrophic left kidney.    Lymph nodes: There is no retroperitoneal or abdominal lymphadenopathy.  Aorta and branch vessels: Unremarkable.  Inferior vena cava: Unremarkable.    Bowel: Scattered small colonic diverticula without diverticulitis. Large   and small bowel are otherwise unremarkable. Normal appendix.    There is no free air of free fluid. There is mild nonspecific fat   stranding extending from the renal fossa on the right to the distal psoas   muscle.    Pelvis:  Bladder is collapsed around Gutierres catheter.  Normal prostate and seminal vesicles.  There is no significant inguinal or pelvic adenopathy    Osseous structures:  The osseous structures show scattered degenerativechanges. There is   anterior wedging of the L2 vertebral body of uncertain chronicity.   Correlate clinically for pain.    IMPRESSION:    2.8 x 2.0 cm low density nodule right adrenal gland likely a benign   adenoma. Markedly enlarged left adrenal gland measuring at least 4.7 x   4.3 cm also low density, however given its size and the definitive   diagnosis of benign adenoma is uncertain. Recommend MRI evaluation.     Atrophic left kidney.    There is anterior wedging of the L2 vertebral body of uncertain   chronicity. Correlate clinically for pain.      < from: CT Abdomen and Pelvis No Cont (18 @ 10:24) >    EXAM:  CT ABDOMEN AND PELVIS                          EXAM:  CT CHEST                          PROCEDURE DATE:  2018    INTERPRETATION:  CLINICAL INFORMATION: Altered mental status. Pain.    CT of the chest, abdomen and pelvis was performed without intravenous   contrast. Sagittal and coronal reconstructions were obtained. Axial   maximum intensity projection images were obtained.    Comparison: None.    CHEST:    Thyroid: Unremarkable  Heart:  Unremarkable.    Lymph nodes: No significant adenopathy.    Lungs and Pleura: There are bilateral dependent atelectatic changes.   There are no nodules masses or consolidations.  Airways: Patent.    ABDOMEN:   Liver: normal.  Spleen: normal.  Pancreas: normal.  Adrenal glands:  2.8 x 2.0 cm low density nodule right adrenal gland   likely a benign adenoma. Markedly enlarged left adrenal gland measuring   at least 4.7 x 4.3 cm also low density, however given its size and the   definitive diagnosis of benign adenoma is uncertain.Recommend MRI   evaluation.   Gall bladder: normal.  Kidneys: Atrophic left kidney.    Lymph nodes: There is no retroperitoneal or abdominal lymphadenopathy.  Aorta and branch vessels: Unremarkable.  Inferior vena cava: Unremarkable.    Bowel: Scattered small colonic diverticula without diverticulitis. Large   and small bowel are otherwise unremarkable. Normal appendix.    There is no free air of free fluid. There is mild nonspecific fat   stranding extending from the renal fossa on the right to the distal psoas   muscle.    Pelvis:  Bladder is collapsed around Gutierres catheter.  Normal prostate and seminal vesicles.  There is no significant inguinal or pelvic adenopathy    Osseous structures:  The osseous structures show scattered degenerativechanges. There is   anterior wedging of the L2 vertebral body of uncertain chronicity.   Correlate clinically for pain.    IMPRESSION:    2.8 x 2.0 cm low density nodule right adrenal gland likely a benign   adenoma. Markedly enlarged left adrenal gland measuring at least 4.7 x   4.3 cm also low density, however given its size and the definitive   diagnosis of benign adenoma is uncertain. Recommend MRI evaluation.     Atrophic left kidney.    There is anterior wedging of the L2 vertebral body of uncertain   chronicity. Correlate clinically for pain.      EKG:

## 2018-06-18 NOTE — DISCHARGE NOTE ADULT - PATIENT PORTAL LINK FT
You can access the [facility] Patient Portal, offered by [facility], by registering with the following website: [email or web address]

## 2018-06-18 NOTE — PROGRESS NOTE ADULT - PROBLEM SELECTOR PLAN 2
Suspected due to diminished renal excretion 2/2 SHAQUILLE and increasing potassium release 2/2 rhabdo.  -would check BMP TID at this time  -HD catheter still in place if additional HD is required  -continue IV hydration  -urine output responded well to lasix yesterday, can give additional dose of potassium remains slightly elevated Suspected due to diminished renal excretion 2/2 SHAQUILLE and increasing potassium release 2/2 rhabdo.  - s/p urgent HD overnight, serum potassium improved post HD.  -would check BMP TID at this time  -HD catheter still in place if additional HD is required  -continue IV hydration  -can consider to give additional dose of potassium remains elevated  -monitor serum potassium

## 2018-06-18 NOTE — DISCHARGE NOTE ADULT - PLAN OF CARE
You a cerebral infarct likely from cocaine use. You were seen by the neurologist   Continue with your aspirin. You elevated creatinine kinase likely from muscle breakdown. Please continue drinking plenty of fluids. You a cerebral infarct likely from cocaine use. You were seen by the neurologist   Continue with your aspirin and statin. You elevated creatinine kinase likely from muscle breakdown from being down on the floor. Please continue drinking plenty of fluids. Follow up with Dr. Feldman within two weeks from discharge You had acute renal failure from your rhabdomyolysis. Your were seen by the nephrologist who helped with your management. You were dialyzed once for high potassium Your renal function improved slightly while in the hospital. You were found with an adrenal adenoma on abdominal imaging. Please follow up with your PCP for monitoring this. You were found with ileus in this hospital stay. Medically stable for discharge. You suffered from a cerebral infarct likely from cocaine use. CT showed subacute stroke. Your live function enzymes were elevated, which prompted us to hold lipitor, but the levels have no been stable. You were seen by the neurologist . Please continue with your aspirin 81 mg and Lipitor. Please follow up with Dr. Jose Orona. He can be reached at [phone]. You elevated creatinine kinase likely from muscle breakdown from being down on the floor. Your CK at the time of the admission was [id number]. The CK level has been stabilized. Please continue drinking plenty of fluids. Please follow up with your nephrologist, Dr. Pauline Feldman. She can be reached at [phone]. You had acute renal failure from your rhabdomyolysis. Your were seen by the nephrologist who helped with your management. You were dialyzed once for high potassium Your renal function improved slightly while in the hospital.  Please follow up with your nephrologist, Dr. Pauline Feldman. She can be reached at [phone]. Stable You were found with an adrenal adenoma on abdominal imaging. You did not experience any symptoms due to your condition. No additional workup was needed during your admission and is most likely benign. Please follow up with your PCP for monitoring this. You were found with ileus, which is a collapse of your intestine that lead to gastric distension, in this hospital stay. We consulted the GI team who recommended advancing your diet from clear to regular as tolerated. We treated ileus with simethicone and pantoprazole during your hospital stay. These medications were discontinued. Please follow up with Dr. Hung Lawrence. You suffered from a cerebral infarct likely from cocaine use. CT showed subacute stroke. Your live function enzymes were elevated, which prompted us to hold lipitor, but the levels have no been stable. You were seen by the neurology team and rehabilitation team. You will be given outpatient physical therapy. Please continue with your aspirin 81 mg and Lipitor. Please follow up with Dr. Jose Orona. He can be reached at [phone].

## 2018-06-18 NOTE — DISCHARGE NOTE ADULT - REASON FOR ADMISSION
Patient is a 52y old  Male who presents with a chief complaint of AMS, found down 52y old  Male who presents with a chief complaint of AMS, found down

## 2018-06-18 NOTE — DISCHARGE NOTE ADULT - HOSPITAL COURSE
53 year old man with PMHx of recent scrotal abscess, who [facility] [place] [facility] x 3 days found with MCA infarct 2/2 cocaine use, rhabdomyolysis c/b acute renal failure transferred to [facility]ICU for management. Hospital course complicated by full body drug rash likely 2/2 Bactrim that was given for a recent scrotal abscess. Patient developed hyperkalemia s/p dialyisis x 1. Leukocytosis possibly 2/2 steroid use, however on zosyn for aspiration pna coverage. 53 year old man with PMHx of recent scrotal abscess, who presents to [place] for AMS x 3 days found with MCA infarct 2/2 cocaine use, rhabdomyolysis c/b acute renal failure transferred to [facility]-ICU for management. Hospital course complicated by full body drug rash likely 2/2 Bactrim that was given for a recent scrotal abscess. Patient developed hyperkalemia s/p dialyisis x 1. Leukocytosis possibly 2/2 steroid use, however on zosyn for aspiration pna coverage, which he completed tx. Patient hospital course was complicated by ileus. With no intervention as per Gastroenterology. 53 year old man with PMHx of recent scrotal abscess, who presents to [place] for AMS x 3 days found with MCA infarct 2/2 cocaine use, rhabdomyolysis c/b acute renal failure transferred to [facility]ICU for management. Hospital course complicated by full body drug rash likely 2/2 Bactrim that was given for a recent scrotal abscess. Patient developed hyperkalemia s/p dialysis x 1. Leukocytosis possibly 2/2 steroid use, however on zosyn for aspiration pna coverage, which he completed tx. Patient hospital course was complicated by ileus. With no intervention as per Gastroenterology. Patient treated for candida in his throat. 52 M w/ with unknown PMHx, who was transferred from [place] for ARF. Per son, he visited him on Saturday (patient lives alone) and was acting "different". He gave him some water and came back today to check up on him. He was very lethargic, not speaking, and found down on the floor. He noticed rash on the patient's trunk and leg, decided to bring his father to [place].   Pt has not been to a doctor before, no hx of renal disease. Pt was recently at [facility] a few weeks prior for cyst near the groin. Cyst was drained, but unclear if received abx. Labs were drawn at that time, son says renal function was within normal limits at that time. In ED, T: 99.3 rectal, HR: 88, BP: 137/86, RR;18, SpO2: 99% on O2. abs: WBC 15.6, K: 6.4, BUN/Cr: 74/5.41, ALP: 91, AST: 578, ALT:492, CK>74724, Trop 542, U Tox + for cocaine. Pt got 1L NS, Albuterol/calcium gluconate/Insulin reg 5U/D50, furosemide 60. Methylpred 125 mg. Elevated CK suggested rhabdomyolysis c/b acute renal failure and pt was transferred to [facility]-ICU for management. He was admitted to MICU and was started on IVF for rhabdo. CK and Cr were not improving, and pt underwent HD for 2.5 hours. [place] to [id number]. He was started zosyn for aspiration pneumonia. Patient treated for candida in his throat. Hospital course complicated by full body drug rash. As per derm recs, pt was given triamcinolone 0.1% cream BID to AA. Leukocytosis possibly 2/2 steroid use, however on zosyn for aspiration pna coverage, which he completed tx. Patient hospital course was complicated by ileus. Patient had worsening abdominal distension with CTAP showing air-fluid levels in the small bowel up to the level of the terminal ileum without transition point. Recently, had elevated AST/ALT, ileus clinically improved, duplex b/l carotid US- no carotid artery stenosis. LFT's have been trending down. He will be restarted on statin therapy given that his LFT's have normalized. 52 M w/ with unknown PMHx, who was transferred from [place] for ARF. Per son, he visited him on Saturday (patient lives alone) and was acting "different". He gave him some water and came back today to check up on him. He was very lethargic, not speaking, and found down on the floor. He noticed rash on the patient's trunk and leg, decided to bring his father to [place].   Pt has not been to a doctor before, no hx of renal disease. Pt was recently at [facility] a few weeks prior for cyst near the groin. Cyst was drained, but unclear if received abx. Labs were drawn at that time, son says renal function was within normal limits at that time. In ED, T: 99.3 rectal, HR: 88, BP: 137/86, RR;18, SpO2: 99% on O2. abs: WBC 15.6, K: 6.4, BUN/Cr: 74/5.41, ALP: 91, AST: 578, ALT:492, CK>79834, Trop 542, U Tox + for cocaine. Pt got 1L NS, Albuterol/calcium gluconate/Insulin reg 5U/D50, furosemide 60. Methylpred 125 mg. Elevated CK suggested rhabdomyolysis c/b acute renal failure and pt was transferred to [facility]ICU for management. He was admitted to MICU and was started on IVF for rhabdo. CK and Cr were not improving, and pt underwent HD for 2.5 hours[place] to [id number]. He was started zosyn for aspiration pneumonia. Patient treated for candida in his throat. Hospital course complicated by full body drug rash. As per derm recs, pt was given triamcinolone 0.1% cream BID to AA. Leukocytosis possibly 2/2 steroid use, however on zosyn for aspiration pna coverage, which he completed tx. Patient hospital course was complicated by ileus. Patient had worsening abdominal distension with CTAP showing air-fluid levels in the small bowel up to the level of the terminal ileum without transition point. GI team was consulted, no nasogastric decompression as pt unable to tolerate, his diet was advanced from clears to regular. Recently, had elevated AST/ALT, ileus clinically improved, duplex b/l carotid US- no carotid artery stenosis. After observing the patient for 48 hours without any episodes of hypoxia, hypotension, nausea/vomiting and tolerating a clinical diet he was cleared for discharge home.

## 2018-06-18 NOTE — PROGRESS NOTE ADULT - ASSESSMENT
Assessment	  51 y/o M w/ Hx of CVA, who was transferred from [place] for ARF, rhabdomyolysis found to be cocaine positive, subacute strokes on CT.     #Neuro   Pt w/ AMS, A&O x 1-2, in setting of subacute strokes and cocaine use  Neuro check as per ICU protocol   Repeat CT in 24 hours  Will obtain MRI, MRA  Echo  Carotid dopplers    #CV  HD stable  + trop in setting of rhabdo cocaine use  No ST change  Cont to trend trop, EKG    #Pulmonary   Saturating well on NC  No acute issues at this time     #GI  NPO at this time  Elevated LFTs trending down  Has been NPO - maintain NPO today  - speech and sallow     #Renal/FEN   Acute renal failure w/ hyperkalemia in the setting of rhabdo, cocaine use, s/p 2.5 hours of HD overnight  As per renal, consider poss AIN 2/2 drug use  No need for urgent HD at this time  f/u electrolytesC3, C4, dsDNA, ANCAs  Renal U/S- adrenal mass noted ultrasound  Trend BMP, CK q6  IVF @ 125 mL/hr   Monitor Urine output     #Heme  H/H wnl   Leukocytosis w/ bandemia from unknown source  Trend CBC daily     #ID  Leukocytosis w/ bandemia  UA and CXR benign  If spikes, low threshold for broad spectrum abx  c/w zosyn for now  ID consult- pt w recent scrotal absess s/p I/D scrotal at an OSH- was on abx  Dermatology consult for rash    #Vasc  Edema of RUE and hand- remains soft, and pref, +pulse  -neurovascular checks q1h  -RUE elevation  -RUE duplex, XR    #DVT ppx   HSQ q8 Assessment	  53 y/o M w/ Hx of CVA, who was transferred from [place] for ARF, rhabdomyolysis found to be cocaine positive, subacute strokes on CT.     #Neuro   Pt w/ AMS, A&O x 1-2, in setting of subacute strokes and cocaine use  Neuro check as per ICU protocol   Repeat CT in 24 hours  Will obtain MRI, MRA  Echo  Carotid dopplers  asa / hold statins in the setting of rhabdo    #CV  HD stable  + trop in setting of rhabdo cocaine use  No ST change  -c/w serial CE and EKG  Echo    #Pulmonary   Saturating well on NC  No acute issues at this time     #GI  NPO at this time  Elevated LFTs trending down  Has been NPO - maintain NPO today  - speech and sallow     #Renal/FEN   Acute renal failure w/ hyperkalemia in the setting of rhabdo, cocaine use, s/p 2.5 hours of HD overnight  As per renal, consider poss AIN 2/2 drug use  No need for urgent HD at this time  f/u electrolytesC3, C4, dsDNA, ANCAs  Renal U/S- adrenal mass noted ultrasound  Trend BMP, CK q6  IVF @ 125 mL/hr   Monitor Urine output     #Heme  H/H wnl   Leukocytosis w/ bandemia from unknown source  Trend CBC daily     #ID  Leukocytosis w/ bandemia  UA and CXR benign  If spikes, low threshold for broad spectrum abx  c/w zosyn for now  ID consult- pt w recent scrotal absess s/p I/D scrotal at an OSH- was on abx  Dermatology consult for rash    #Vasc  Edema of RUE and hand- remains soft, and pref, +pulse  -neurovascular checks q1h  -RUE elevation  -RUE duplex, XR    #DVT ppx   HSQ q8

## 2018-06-18 NOTE — DISCHARGE NOTE ADULT - CARE PROVIDERS DIRECT ADDRESSES
,DirectAddress_Unknown,DirectAddress_Unknown,[email or web address] ,DirectAddress_Unknown,DirectAddress_Unknown,[email or web address],DirectAddress_Unknown

## 2018-06-18 NOTE — CONSULT NOTE ADULT - ASSESSMENT
52 year old male with unknown PMHx (does not follow with [facility]), transferred from [facility] for ARF after son found him lethargic, not speaking, and down on the floor. He also noticed rash on the patient's trunk and leg, decided to bring his father to [facility].     Pt was recently at [facility] two weeks prior for a right scrotal cyst that drained on its own   Was given levaquin and a sulfa drug (?bactrim) for right scrotal cyst  Leukocytosis with worsening bandemia  Renal failure (unknown baseline) most likely from rhabdo, with elevated trops   Elevated liver enzymes, positive cocaine test  s/p HD for hyperkalemia now off HD for now  Diffuse rash - Derm on board - most likely drug rash from possibly bactrim  Afebrile. RVP neg. Blood cxs sent.  CT head with subacute infarct      Recommend:  -Continue zosyn in setting of worsening bandemia for possibly aspiration from being down and now with infarct. Also, primary nurse noting productive cough.  -Repeat CXR in the AM.  -F/U blood cxs.  -Aspiration precautions.  -CT A/P with adrenal adenoma - consider MRI when stable for further evaluation.  -Monitor for fevers  -Trend CBC with diff.  -Monitor CPK  -Monitor renal function  -Trend LFTs    Jem Gomez MD  Pager [phone]  After 5pm/weekends call [phone]

## 2018-06-18 NOTE — CONSULT NOTE ADULT - SUBJECTIVE AND OBJECTIVE BOX
HPI:  52 year old male with unknown PMHx (does not follow with [facility]), transferred from [place] for ARF. Per son, he visited him on Saturday (patient lives alone) and was acting "different". He gave him some water and came back  to check up on him and found he was very lethargic, not speaking, and down on the floor. He noticed rash on the patient's trunk and leg, decided to bring his father to [place]. Pt was recently at [facility] two weeks prior for a cyst near the groin. Per son, the cyst drained on its own and he was given levaquin and a sulfa drug (?bactrim). Per patient, he states he is in the hospital because he has an infection but could not elaborate. Per son, he did not take the abx consistently. Patient found to have leukocytosis with bandemia, renal failure (unknown baseline) most likely from rhabdo, elevated liver enzymes, positive cocaine test, s/p HD for hyperkalemia. Per son and patient, he snorts cocaine, but denies IVDU. Also, CT Head with     PAST MEDICAL & SURGICAL HISTORY:  No pertinent past medical history  No significant past surgical history    Allergies    Allergy Status Unknown    Intolerances    ANTIMICROBIALS:  piperacillin/tazobactam IVPB. 3.375 every 12 hours    OTHER MEDS:  aspirin  chewable 81 milliGRAM(s) Oral daily  heparin  Injectable 5000 Unit(s) SubCutaneous every 8 hours  nicotine - 21 mG/24Hr(s) Patch 1 patch Transdermal daily  sodium chloride 0.9%. 1000 milliLiter(s) IV Continuous <Continuous>    SOCIAL HISTORY: Smoker, uses cocaine. Denies IVDU.    FAMILY HISTORY:  No pertinent family history in first degree relatives    Drug Dosing Weight  Height (cm): 172.72 (2018 19:46)  Weight (kg): 119.7 (2018 19:46)  BMI (kg/m2): 40.1 (2018 19:46)  BSA (m2): 2.3 (2018 19:46)    PE:    Vital Signs Last 24 Hrs  T(C): 36.8 (2018 12:00), Max: 37.3 (2018 19:46)  T(F): 98.2 (2018 12:00), Max: 99.1 (2018 19:46)  HR: 87 (2018 13:00) (80 - 98)  BP: 119/63 (2018 13:00) (101/58 - 141/86)  BP(mean): 82 (2018 13:00) (74 - 103)  RR: 16 (2018 13:00) (16 - 46)  SpO2: 95% (2018 13:00) (93% - 99%)    Gen: AOx2, NAD  CV: S1+S2 normal, no murmurs  Resp: Clear bilat, no resp distress  Abd: distended, nontender  Ext: No wounds, no edema  : +Gutierres, no scrotal lesion seen  IV/Skin: No thrombophlebitis, diffuse blanchable petechial rash on extremities, more coalesced on abdomen.  Msk: No low back pain, no arthralgias, no joint swelling  Neuro: No sensory deficits, no motor deficits      LABS:                          15.0   13.4  )-----------( 100      ( 2018 13:07 )             44.9           138  |  102  |  71<H>  ----------------------------<  117<H>  4.8   |  17<L>  |  5.59<H>    Ca    7.2<L>      2018 13:07  Phos  5.4       Mg     2.0         TPro  6.5  /  Alb  3.1<L>  /  TBili  0.3  /  DBili  x   /  AST  255<H>  /  ALT  335<H>  /  AlkPhos  77        Urinalysis Basic - ( 2018 15:28 )    Color: Yellow / Appearance: SL Turbid / S.008 / pH: x  Gluc: x / Ketone: Negative  / Bili: Negative / Urobili: Negative   Blood: x / Protein: 30 mg/dL / Nitrite: Negative   Leuk Esterase: Negative / RBC: 5-10 /HPF / WBC 3-5 /HPF   Sq Epi: x / Non Sq Epi: x / Bacteria: Few /HPF    MICROBIOLOGY:  v  .Blood Blood  18   No growth to date.  --  --    Rapid RVP Result: NotDetec ( @ 00:24)  Rapid RVP Result: NotDetec ( @ 09:43)    RADIOLOGY:  < from: [facility] Duplex Upper Ext Vein Scan, Right (18 @ 12:21) >  IMPRESSION:     No evidence ofright upper extremity deep venous thrombosis.    < end of copied text >    < from: Xray Hand 3 Views, Right (18 @ 08:41) >  IMPRESSION:     No acute fracture or dislocation.     < end of copied text >    < from: Xray Wrist 3 Views, Right (18 @ 08:40) >  IMPRESSION:     No acute fracture or dislocation.     < end of copied text >    < from: Xray Forearm, Right (18 @ 08:40) >  IMPRESSION:     No acute fracture or dislocation.     < end of copied text >    < from: Xray Chest 1 View- PORTABLE-Urgent (18 @ 23:43) >  Impression:    Poor inspiratory effort. The heart is slightly enlarged. The lungs are   clear. A central line was placed on the right and the tip is superior   vena cava. No pneumothorax.      < end of copied text >    < from: CT Abdomen and Pelvis No Cont (18 @ 10:24) >  IMPRESSION:    2.8 x 2.0 cm low density nodule right adrenal gland likely a benign   adenoma. Markedly enlarged left adrenal gland measuring at least 4.7 x   4.3 cm also low density, however given its size and the definitive   diagnosis of benign adenoma is uncertain. Recommend MRI evaluation.     Atrophic left kidney.    There is anterior wedging of the L2 vertebral body of uncertain   chronicity. Correlate clinically for pain.      < end of copied text >    < from: CT Head No Cont (18 @ 09:59) >  IMPRESSION:    Left MCA distribution and probable left posterior cerebral artery   distribution changes most consistent with subacute infarction. No   evidence for acute hemorrhage. MRI is recommended for further   characterization.    < end of copied text >

## 2018-06-18 NOTE — CONSULT NOTE ADULT - SUBJECTIVE AND OBJECTIVE BOX
Patient is a 52y old  Male who presents with a chief complaint of Patient is a 52y old  Male who presents with a chief complaint of AMS, found down (2018 09:34).    Patient was determined to have a stroke and rhabdomyolysis Dental was asked to consult to evaluate a loose anterior tooth and tongue wound. As per medical team, patient is not transportable from the MICU for dental radiographs.      HPI:  52 M w/ with unknown PMHx, who was transferred from [place] for ARF. Per son, he visited him on Saturday (patient lives alone) and was acting "different". He gave him some water and came back today to check up on him. He was very lethargic, not speaking, and found down on the floor. He noticed rash on the patient's trunk and leg, decided to bring his father to [place].   Pt has not been to a doctor before, no hx of renal disease.    Pt was recently at [facility] a few weeks prior for cyst near the groin. Cyst was drained, but unclear if received abx. Labs were drawn at that time, son says renal function was within normal limits at that time.     In ED, T: 99.3 rectal, HR: 88, BP: 137/86, RR;18, SpO2: 99% on O2  Labs: WBC 15.6, K: 6.4, BUN/Cr: 74/5.41, ALP: 91, AST: 578, ALT:492, CK>70146  Trop 542, U Tox + for cocaine   Pt got 1L NS, Albuterol/calcium gluconate/Insulin reg 5U/D50, furosemide 60. Methylpred 125mg (2018 18:36)      PAST MEDICAL & SURGICAL HISTORY:  No pertinent past medical history  No significant past surgical history      MEDICATIONS  (STANDING):  aspirin  chewable 81 milliGRAM(s) Oral daily  heparin  Injectable 5000 Unit(s) SubCutaneous every 8 hours  nicotine - 21 mG/24Hr(s) Patch 1 patch Transdermal daily  piperacillin/tazobactam IVPB. 3.375 Gram(s) IV Intermittent every 12 hours  sodium chloride 0.9%. 1000 milliLiter(s) (125 mL/Hr) IV Continuous <Continuous>    MEDICATIONS  (PRN):      Allergies    Allergy Status Unknown    Intolerances        FAMILY HISTORY:  No pertinent family history in first degree relatives      *Last Dental Visit: Many years    Vital Signs Last 24 Hrs  T(C): 37.4 (2018 16:00), Max: 37.4 (2018 16:00)  T(F): 99.3 (2018 16:00), Max: 99.3 (2018 16:00)  HR: 90 (2018 16:00) (80 - 98)  BP: 111/67 (2018 16:00) (101/58 - 139/65)  BP(mean): 84 (2018 16:00) (74 - 103)  RR: 20 (2018 16:00) (16 - 46)  SpO2: 91% (2018 16:00) (91% - 99%)    EOE:  TMJ ( -  ) clicks                    ( -   ) pops                    (  -  ) crepitus             Mandible FROM             Facial bones and MOM grossly intact             ( -  ) trismus             ( -  ) LAD             ( -  ) swelling             ( -  ) asymmetry             ( -  ) palpation             ( -  ) SOB             ( -  ) dysphagia    IOE:  permanent dentition: multiple carious teeth           hard/soft palate:  WNL           tongue/FOM: 1cm healing laceration to left anterior tongue. White granulation tissue present. Hemostatic. Firm, likely scar tissue forming.           labial/buccal mucosa WNL           ( -  ) percussion           ( -  ) palpation           ( -  ) swelling            Tooth #9 with grade II mobility, still supported within bone, gingival recession and bone loss consistent with chronic periodontitis. Asymptomatic.      Radiographs: Could not be taken.    LABS:                        15.0   13.4  )-----------( 100      ( 2018 13:07 )             44.9     18    138  |  102  |  71<H>  ----------------------------<  117<H>  4.8   |  17<L>  |  5.59<H>    Ca    7.2<L>      2018 13:07  Phos  5.4     18  Mg     2.0         TPro  6.5  /  Alb  3.1<L>  /  TBili  0.3  /  DBili  x   /  AST  255<H>  /  ALT  335<H>  /  AlkPhos  77  18    WBC Count: 13.4 K/uL <H> [3.8 - 10.5] ( @ 13:07)  WBC Count: 14.5 K/uL <H> [3.8 - 10.5] ( 06:25)  WBC Count: 14.2 K/uL <H> [3.8 - 10.5] ( @ 21:01)    Platelet Count - Automated: 100 K/uL <L> [150 - 400] ( @ 13:07)  Platelet Count - Automated: 116 K/uL <L> [150 - 400] ( 06:25)  Platelet Count - Automated: 136 K/uL <L> [150 - 400] ( @ 21:01)  Platelet Count - Automated: 151 K/uL [150 - 400] ( @ 15:03)  Platelet Count - Automated: 195 K/uL [150 - 400] ( @ 09:29)    INR: 1.11 ratio [0.88 - 1.16] ( @ 15:03)  INR: 1.10 ratio [0.88 - 1.16] ( @ 11:10)    Culture Results:   No growth to date. ( @ 17:06)  Culture Results:   No growth to date. ( @ 17:06)  Culture Results:   No growth to date. ( @ 13:06)  Culture Results:   No growth to date. ( @ 13:06)    Urinalysis Basic - ( 2018 15:28 )    Color: Yellow / Appearance: SL Turbid / S.008 / pH: x  Gluc: x / Ketone: Negative  / Bili: Negative / Urobili: Negative   Blood: x / Protein: 30 mg/dL / Nitrite: Negative   Leuk Esterase: Negative / RBC: 5-10 /HPF / WBC 3-5 /HPF   Sq Epi: x / Non Sq Epi: x / Bacteria: Few /HPF      ASSESSMENT: Patient has tooth #9 with grade II mobility, not an aspiration risk at this time. Patient has healing tongue laceration. No urgent dental treatment needed at this time.      RECOMMENDATIONS:   1) Dental F/U with outpatient dentist for comprehensive dental care.   2) If any difficulty swallowing/breathing, fever, swelling occur, page dental.     Yanet Barton DDS  Oral surgeon consulted: Dr. Zion Patton

## 2018-06-18 NOTE — DISCHARGE NOTE ADULT - MEDICATION SUMMARY - MEDICATIONS TO TAKE
I will START or STAY ON the medications listed below when I get home from the hospital:    physical therapy  -- Physical Therapy for Stroke rehabilitation.   -- Indication: For Rehabilitation    aspirin 81 mg oral delayed release tablet  -- 1 tab(s) by mouth once a day  -- Indication: For Stroke    fluconazole 200 mg oral tablet  -- 2 tab(s) by mouth once a day  -- Indication: For Fungal infection    atorvastatin 40 mg oral tablet  -- 1 tab(s) by mouth once a day   -- Avoid grapefruit and grapefruit juice while taking this medication.  Do not take this drug if you are pregnant.  It is very important that you take or use this exactly as directed.  Do not skip doses or discontinue unless directed by your doctor.  Obtain medical advice before taking any non-prescription drugs as some may affect the action of this medication.  Take with food or milk.    -- Indication: For Stroke

## 2018-06-18 NOTE — PROGRESS NOTE ADULT - ATTENDING COMMENTS
pt seen and examined at the bedside. pt admitted with cocaine +, severe rhabd, ARF and hyperkalemia s/p HD yesterday. CK now down to 9k and K 5.4. fup BMP. may need HD again today. making good urine. cont IVF. cont abx fr possible asp pna. ID and derm eval for widespread body rash( was present on admission to [facility]). fup US doppler for RUE swelling. good pulses, x-rays negative for nay fracture. left adrenal adenoma? will need further workup/MRI once stable. subacute CVA started on ASA. stroke wup, fup neuro recommendations.

## 2018-06-18 NOTE — DISCHARGE NOTE ADULT - PROVIDER TOKENS
TOKEN:'[id number]',TOKEN:'[id number]:'[id number]' TOKEN:'[id number]:'[id number]',FREE:[LAST:[Clinic],FIRST:[General Medicine],PHONE:[phone]],FAX:[(   )    -],ADDRESS:[[address] [place] [address]]]

## 2018-06-18 NOTE — DISCHARGE NOTE ADULT - OTHER SIGNIFICANT FINDINGS
Hemoglobin A1C, Whole Blood (06.18.18 @ 05:47)    Hemoglobin A1C, Whole Blood: 5.4: Method: Immunoassay

## 2018-06-18 NOTE — DISCHARGE NOTE ADULT - CARE PLAN
Principal Discharge DX:	Ischemic brain injury  Assessment and plan of treatment:	You a cerebral infarct likely from cocaine use. You were seen by the neurologist   Continue with your aspirin.  Secondary Diagnosis:	Rhabdomyolysis  Assessment and plan of treatment:	You elevated creatinine kinase likely from muscle breakdown. Please continue drinking plenty of fluids.  Secondary Diagnosis:	Acute renal failure (ARF)  Secondary Diagnosis:	Adrenal adenoma Principal Discharge DX:	Ischemic brain injury  Assessment and plan of treatment:	You a cerebral infarct likely from cocaine use. You were seen by the neurologist   Continue with your aspirin and statin.  Secondary Diagnosis:	Rhabdomyolysis  Assessment and plan of treatment:	You elevated creatinine kinase likely from muscle breakdown from being down on the floor. Please continue drinking plenty of fluids.  Secondary Diagnosis:	Acute renal failure (ARF)  Goal:	Follow up with Dr. Feldman within two weeks from discharge  Assessment and plan of treatment:	You had acute renal failure from your rhabdomyolysis. Your were seen by the nephrologist who helped with your management. You were dialyzed once for high potassium Your renal function improved slightly while in the hospital.  Secondary Diagnosis:	Adrenal adenoma  Assessment and plan of treatment:	You were found with an adrenal adenoma on abdominal imaging. Please follow up with your PCP for monitoring this.  Secondary Diagnosis:	Ileus  Assessment and plan of treatment:	You were found with ileus in this hospital stay. Principal Discharge DX:	Ischemic brain injury  Goal:	Medically stable for discharge.  Assessment and plan of treatment:	You suffered from a cerebral infarct likely from cocaine use. CT showed subacute stroke. Your live function enzymes were elevated, which prompted us to hold lipitor, but the levels have no been stable. You were seen by the neurologist . Please continue with your aspirin 81 mg and Lipitor. Please follow up with Dr. Jose Orona. He can be reached at [phone].  Secondary Diagnosis:	Rhabdomyolysis  Goal:	Follow up with Dr. Feldman within two weeks from discharge  Assessment and plan of treatment:	You elevated creatinine kinase likely from muscle breakdown from being down on the floor. Your CK at the time of the admission was 66151. The CK level has been stabilized. Please continue drinking plenty of fluids. Please follow up with your nephrologist, Dr. Pauline Feldman. She can be reached at [phone].  Secondary Diagnosis:	Acute renal failure (ARF)  Goal:	Follow up with Dr. Feldman within two weeks from discharge  Assessment and plan of treatment:	You had acute renal failure from your rhabdomyolysis. Your were seen by the nephrologist who helped with your management. You were dialyzed once for high potassium Your renal function improved slightly while in the hospital.  Please follow up with your nephrologist, Dr. Pauline Feldman. She can be reached at [phone].  Secondary Diagnosis:	Adrenal adenoma  Goal:	Stable  Assessment and plan of treatment:	You were found with an adrenal adenoma on abdominal imaging. You did not experience any symptoms due to your condition. No additional workup was needed during your admission and is most likely benign. Please follow up with your PCP for monitoring this.  Secondary Diagnosis:	Ileus  Goal:	Stable  Assessment and plan of treatment:	You were found with ileus, which is a collapse of your intestine that lead to gastric distension, in this hospital stay. We consulted the GI team who recommended advancing your diet from clear to regular as tolerated. We treated ileus with simethicone and pantoprazole during your hospital stay. These medications were discontinued. Please follow up with Dr. Hung Lawrence. Principal Discharge DX:	Ischemic brain injury  Goal:	Medically stable for discharge.  Assessment and plan of treatment:	You suffered from a cerebral infarct likely from cocaine use. CT showed subacute stroke. Your live function enzymes were elevated, which prompted us to hold lipitor, but the levels have no been stable. You were seen by the neurology team and rehabilitation team. You will be given outpatient physical therapy. Please continue with your aspirin 81 mg and Lipitor. Please follow up with Dr. Jose Orona. He can be reached at [phone].  Secondary Diagnosis:	Rhabdomyolysis  Goal:	Follow up with Dr. Feldman within two weeks from discharge  Assessment and plan of treatment:	You elevated creatinine kinase likely from muscle breakdown from being down on the floor. Your CK at the time of the admission was 02788. The CK level has been stabilized. Please continue drinking plenty of fluids. Please follow up with your nephrologist, Dr. Pauline Feldman. She can be reached at [phone].  Secondary Diagnosis:	Acute renal failure (ARF)  Goal:	Follow up with Dr. Feldman within two weeks from discharge  Assessment and plan of treatment:	You had acute renal failure from your rhabdomyolysis. Your were seen by the nephrologist who helped with your management. You were dialyzed once for high potassium Your renal function improved slightly while in the hospital.  Please follow up with your nephrologist, Dr. Pauline Feldman. She can be reached at [phone].  Secondary Diagnosis:	Adrenal adenoma  Goal:	Stable  Assessment and plan of treatment:	You were found with an adrenal adenoma on abdominal imaging. You did not experience any symptoms due to your condition. No additional workup was needed during your admission and is most likely benign. Please follow up with your PCP for monitoring this.  Secondary Diagnosis:	Ileus  Goal:	Stable  Assessment and plan of treatment:	You were found with ileus, which is a collapse of your intestine that lead to gastric distension, in this hospital stay. We consulted the GI team who recommended advancing your diet from clear to regular as tolerated. We treated ileus with simethicone and pantoprazole during your hospital stay. These medications were discontinued. Please follow up with Dr. Hung Lawrence.

## 2018-06-18 NOTE — PROGRESS NOTE ADULT - PROBLEM SELECTOR PLAN 1
SHAQUILLE on CKD.  CT imaging shows left atrophic kidney.  Suspect SHAQUILLE from rhabdo + hemodynamic injury from cocaine abuse.  -please check renal sonogram to assess size and cortical thickness of right kidney (to evaluate chronicity of damage).  -obtain outpatient labs if able for baseline creatinine  -s/p HD yesterday, no urgent HD indications at this time  -currently on saline @ 125 cc / hour  -monitor creatinine trend and urine output  -avoid nephrotoxins, ace/arb, imaging contrast SHAQUILLE on CKD.  CT imaging shows left atrophic kidney.  Suspect HSAQUILLE from rhabdo + hemodynamic injury from cocaine abuse.  -please check renal sonogram to assess size and cortical thickness of right kidney (to evaluate chronicity of damage).  -obtain outpatient labs if able for baseline creatinine  -s/p HD yesterday for hyperkalemia, no urgent HD indications at this time  -currently on saline @ 125 cc / hour  -monitor creatinine trend and urine output  -avoid nephrotoxins, ace/arb, imaging contrast

## 2018-06-18 NOTE — DISCHARGE NOTE ADULT - CARE PROVIDER_API CALL
Jem Gomez), Internal Medicine  [address] [place] [address]  Phone: [phone]  Fax: [phone]    Pauline Feldman), Internal Medicine  [address] [place] [address]  Phone: [phone]  Fax: [phone]    Hung Lawrence), Gastroenterology; Internal Medicine  [address] [place] [address]  Phone: [phone]  Fax: [phone] Jem Gomez), Internal Medicine  [address] [place] [address]  Phone: [phone]  Fax: [phone]    Pauline Feldman), Internal Medicine  [address] [place] [address]  Phone: [phone]  Fax: [phone]    Hung Lawrence), Gastroenterology; Internal Medicine  [address] [facility]  [address]  [place] [address]  Phone: [phone]  Fax: [phone]    Clinic, General Medicine  [address] [facility] [address]  [place] [address]  Phone: [phone]  Fax: (   )    -

## 2018-06-18 NOTE — PROGRESS NOTE ADULT - SUBJECTIVE AND OBJECTIVE BOX
[facility] DIVISION OF KIDNEY DISEASES AND HYPERTENSION -- FOLLOW UP NOTE  --------------------------------------------------------------------------------  Chief Complaint:  SHAQUILLE and hyperkalemia    24 hour events/subjective:  Patient underwent dialysis yesterday given acute renal failure with hyperkalemia complicated by increasing potassium from rhabdo.  Patient currently reports feeling tired, denies any dyspnea at this time.        PAST HISTORY  --------------------------------------------------------------------------------  No significant changes to PMH, PSH, FHx, SHx, unless otherwise noted    ALLERGIES & MEDICATIONS  --------------------------------------------------------------------------------  Allergies    Allergy Status Unknown    Intolerances      Standing Inpatient Medications  heparin  Injectable 5000 Unit(s) SubCutaneous every 8 hours  piperacillin/tazobactam IVPB. 3.375 Gram(s) IV Intermittent every 8 hours  sodium chloride 0.9%. 1000 milliLiter(s) IV Continuous <Continuous>    PRN Inpatient Medications      REVIEW OF SYSTEMS  --------------------------------------------------------------------------------  Gen: No fevers/chills  Skin: No rashes  Head/Eyes/Ears/Mouth: No headache  Respiratory: No dyspnea  CV: No chest pain  GI: No abdominal pain  : No dysuria  MSK: No edema  Neuro: No dizziness/lightheadedness    VITALS/PHYSICAL EXAM  --------------------------------------------------------------------------------  T(C): 36.8 (06-18-18 @ 08:00), Max: 37.4 (06-17-18 @ 15:05)  HR: 95 (06-18-18 @ 09:00) (80 - 98)  BP: 128/77 (06-18-18 @ 09:00) (101/58 - 141/86)  RR: 16 (06-18-18 @ 09:00) (16 - 46)  SpO2: 97% (06-18-18 @ 09:00) (93% - 99%)  Wt(kg): --  Height (cm): 172.72 (06-17-18 @ 19:46)  Weight (kg): 119.7 (06-17-18 @ 19:46)  BMI (kg/m2): 40.1 (06-17-18 @ 19:46)  BSA (m2): 2.3 (06-17-18 @ 19:46)      06-17-18 @ 07:01  -  06-18-18 @ 07:00  --------------------------------------------------------  IN: 1400 mL / OUT: 2870 mL / NET: -1470 mL    06-18-18 @ 07:01  -  06-18-18 @ 10:26  --------------------------------------------------------  IN: 250 mL / OUT: 230 mL / NET: 20 mL      Physical Exam:  	Gen: NAD  	HEENT: MMM  	Pulm: no rales audible on exam  	CV: RRR, S1S2; no rub  	Abd: +BS, soft, nontender/nondistended  	: No suprapubic tenderness  	UE:  no edema  	LE:  no pitting edema  	Neuro: No focal deficits  	Psych: +somnolent  	Skin: Warm, + petechial rash bilaterally on LE  	Vascular access: + RIJ nontunneled dialysis catheter    LABS/STUDIES  --------------------------------------------------------------------------------              15.2   14.5  >-----------<  116      [06-18-18 @ 06:25]              45.3     137  |  99  |  67  ----------------------------<  128      [06-18-18 @ 06:22]  5.4   |  18  |  5.23        Ca     7.3     [06-18-18 @ 06:22]      Mg     2.1     [06-18-18 @ 06:22]      Phos  5.6     [06-18-18 @ 06:22]    TPro  6.7  /  Alb  3.3  /  TBili  0.3  /  DBili  x   /  AST  331  /  ALT  382  /  AlkPhos  82  [06-18-18 @ 06:22]    PT/INR: PT 12.0 , INR 1.11       [06-17-18 @ 15:03]  PTT: 28.2       [06-17-18 @ 15:03]    Troponin 12.338      [06-17-18 @ 10:56]  CK 9372      [06-18-18 @ 06:22]  Serum Osmolality 315      [06-17-18 @ 21:55]    Creatinine Trend:  SCr 5.23 [06-18 @ 06:22]  SCr 6.10 [06-17 @ 21:01]  SCr 5.41 [06-17 @ 15:03]  SCr 5.13 [06-17 @ 12:05]  SCr 5.90 [06-17 @ 10:56]

## 2018-06-18 NOTE — CONSULT NOTE ADULT - SUBJECTIVE AND OBJECTIVE BOX
HPI:  52 M admitted for rhabdomyolysis with ARF, subacute stroke after found down. Also + cocaine in urine.  Dermatology was consulted for rash. Per pt's son, rash was noted last Thur. Pt reportedly had pruritus. Not clear if any treatment was tried for the rash or itch. His son reported pt was at [facility] 2-3 weeks prior for cyst/abscess near the groin. Cyst/abscess was drained, and received abx including levaquin and a sulfa containing med.     Pt couldn't give a coherent history, but reported a single sore in the mouth that he noticed after he was admitted to MICU. No eye or skin pain.      PAST MEDICAL & SURGICAL HISTORY:  No pertinent past medical history  No significant past surgical history      General: no fevers/chills, no lethargy  Skin: See HPI  Ophthalmologic: no eye pain or change in vision  ENT: no dysphagia or change in hearing  Respiratory: no SOB or cough  Cardiovascular: no palpitations or chest pain  Gastrointestinal: no abdominal  pain or blood in stool  Genitourinary: no dysuria or frequency  Musculoskeletal: no joint pain or weakness  Neurological: no weakness, numbness or tingling    MEDICATIONS  (STANDING):  aspirin  chewable 81 milliGRAM(s) Oral daily  heparin  Injectable 5000 Unit(s) SubCutaneous every 8 hours  nicotine - 21 mG/24Hr(s) Patch 1 patch Transdermal daily  piperacillin/tazobactam IVPB. 3.375 Gram(s) IV Intermittent every 12 hours  sodium chloride 0.9%. 1000 milliLiter(s) (125 mL/Hr) IV Continuous <Continuous>    MEDICATIONS  (PRN):      Allergies    Allergy Status Unknown    Intolerances        SOCIAL HISTORY:    FAMILY HISTORY:  No pertinent family history in first degree relatives      Vital Signs Last 24 Hrs  T(C): 37.4 (2018 16:00), Max: 37.4 (2018 16:00)  T(F): 99.3 (2018 16:00), Max: 99.3 (2018 16:00)  HR: 90 (2018 16:00) (80 - 98)  BP: 111/67 (2018 16:00) (101/58 - 139/65)  BP(mean): 84 (2018 16:00) (74 - 103)  RR: 20 (2018 16:00) (16 - 46)  SpO2: 91% (2018 16:00) (91% - 99%)    PHYSICAL EXAM:     The patient was alert and oriented X 1, well nourished, and in no  apparent distress.  OP showed no ulcerations  There was no visible lymphadenopathy.  Conjunctiva were non injected  There was no clubbing or edema of extremities.  The scalp, hair, face, eyebrows, lips, OP, neck, chest, back,   extremities X 4, nails were examined.  There was no hyperhidrosis or bromhidrosis.    Of note on skin exam:   Morbiliform erythematous papules coalescing to plaques accentuated on flexural areas, flanks, abdomen, back and legs      LABS:                        15.0   13.4  )-----------( 100      ( 2018 13:07 )             44.9     06-18    138  |  102  |  71<H>  ----------------------------<  117<H>  4.8   |  17<L>  |  5.59<H>    Ca    7.2<L>      2018 13:07  Phos  5.4     06-18  Mg     2.0     -18    TPro  6.5  /  Alb  3.1<L>  /  TBili  0.3  /  DBili  x   /  AST  255<H>  /  ALT  335<H>  /  AlkPhos  77  06-18    PT/INR - ( 2018 15:03 )   PT: 12.0 sec;   INR: 1.11 ratio         PTT - ( 2018 15:03 )  PTT:28.2 sec  Urinalysis Basic - ( 2018 15:28 )    Color: Yellow / Appearance: SL Turbid / S.008 / pH: x  Gluc: x / Ketone: Negative  / Bili: Negative / Urobili: Negative   Blood: x / Protein: 30 mg/dL / Nitrite: Negative   Leuk Esterase: Negative / RBC: 5-10 /HPF / WBC 3-5 /HPF   Sq Epi: x / Non Sq Epi: x / Bacteria: Few /HPF

## 2018-06-19 LAB
ALBUMIN SERPL ELPH-MCNC: 2.9 G/DL — LOW (ref 3.3–5)
ALP SERPL-CCNC: 71 U/L — SIGNIFICANT CHANGE UP (ref 40–120)
ALP SERPL-CCNC: 76 U/L — SIGNIFICANT CHANGE UP (ref 40–120)
ALP SERPL-CCNC: 78 U/L — SIGNIFICANT CHANGE UP (ref 40–120)
ALT FLD-CCNC: 215 U/L — HIGH (ref 10–45)
ALT FLD-CCNC: 260 U/L — HIGH (ref 10–45)
ALT FLD-CCNC: 263 U/L — HIGH (ref 10–45)
ANION GAP SERPL CALC-SCNC: 19 MMOL/L — HIGH (ref 5–17)
ANION GAP SERPL CALC-SCNC: 20 MMOL/L — HIGH (ref 5–17)
ANION GAP SERPL CALC-SCNC: 20 MMOL/L — HIGH (ref 5–17)
AST SERPL-CCNC: 116 U/L — HIGH (ref 10–40)
AST SERPL-CCNC: 175 U/L — HIGH (ref 10–40)
AST SERPL-CCNC: 195 U/L — HIGH (ref 10–40)
BASE EXCESS BLDV CALC-SCNC: -6.1 MMOL/L — LOW (ref -2–2)
BASOPHILS # BLD AUTO: 0 K/UL — SIGNIFICANT CHANGE UP (ref 0–0.2)
BILIRUB SERPL-MCNC: 0.4 MG/DL — SIGNIFICANT CHANGE UP (ref 0.2–1.2)
BUN SERPL-MCNC: 78 MG/DL — HIGH (ref 7–23)
BUN SERPL-MCNC: 80 MG/DL — HIGH (ref 7–23)
BUN SERPL-MCNC: 88 MG/DL — HIGH (ref 7–23)
CA-I SERPL-SCNC: 1.11 MMOL/L — LOW (ref 1.12–1.3)
CALCIUM SERPL-MCNC: 7.7 MG/DL — LOW (ref 8.4–10.5)
CALCIUM SERPL-MCNC: 7.9 MG/DL — LOW (ref 8.4–10.5)
CALCIUM SERPL-MCNC: 8.3 MG/DL — LOW (ref 8.4–10.5)
CHLORIDE BLDV-SCNC: 113 MMOL/L — HIGH (ref 96–108)
CHLORIDE SERPL-SCNC: 102 MMOL/L — SIGNIFICANT CHANGE UP (ref 96–108)
CHLORIDE SERPL-SCNC: 103 MMOL/L — SIGNIFICANT CHANGE UP (ref 96–108)
CHLORIDE SERPL-SCNC: 106 MMOL/L — SIGNIFICANT CHANGE UP (ref 96–108)
CK MB BLD-MCNC: 0.4 % — SIGNIFICANT CHANGE UP (ref 0–3.5)
CK MB BLD-MCNC: 0.5 % — SIGNIFICANT CHANGE UP (ref 0–3.5)
CK MB BLD-MCNC: 0.5 % — SIGNIFICANT CHANGE UP (ref 0–3.5)
CK MB CFR SERPL CALC: 17.3 NG/ML — HIGH (ref 0–6.7)
CK MB CFR SERPL CALC: 30.7 NG/ML — HIGH (ref 0–6.7)
CK MB CFR SERPL CALC: 34.2 NG/ML — HIGH (ref 0–6.7)
CK SERPL-CCNC: 4152 U/L — HIGH (ref 30–200)
CK SERPL-CCNC: 6274 U/L — HIGH (ref 30–200)
CK SERPL-CCNC: 7484 U/L — HIGH (ref 30–200)
CMV IGG FLD QL: <0.2 U/ML — SIGNIFICANT CHANGE UP
CMV IGG SERPL-IMP: NEGATIVE — SIGNIFICANT CHANGE UP
CMV IGM FLD-ACNC: <8 AU/ML — SIGNIFICANT CHANGE UP
CMV IGM SERPL QL: NEGATIVE — SIGNIFICANT CHANGE UP
CO2 BLDV-SCNC: 19 MMOL/L — LOW (ref 22–30)
CO2 SERPL-SCNC: 15 MMOL/L — LOW (ref 22–31)
CO2 SERPL-SCNC: 16 MMOL/L — LOW (ref 22–31)
CO2 SERPL-SCNC: 16 MMOL/L — LOW (ref 22–31)
CREAT SERPL-MCNC: 5.86 MG/DL — HIGH (ref 0.5–1.3)
CREAT SERPL-MCNC: 5.88 MG/DL — HIGH (ref 0.5–1.3)
CREAT SERPL-MCNC: 6.07 MG/DL — HIGH (ref 0.5–1.3)
CRP SERPL-MCNC: 26.5 MG/DL — HIGH (ref 0–0.4)
DSDNA AB SER-ACNC: <12 IU/ML — SIGNIFICANT CHANGE UP
EBV EA AB SER IA-ACNC: 5.4 U/ML — SIGNIFICANT CHANGE UP
EBV EA AB TITR SER IF: POSITIVE
EBV EA IGG SER-ACNC: NEGATIVE — SIGNIFICANT CHANGE UP
EBV NA IGG SER IA-ACNC: 92.5 U/ML — HIGH
EBV PATRN SPEC IB-IMP: SIGNIFICANT CHANGE UP
EBV VCA IGG AVIDITY SER QL IA: POSITIVE
EBV VCA IGM SER IA-ACNC: 115 U/ML — HIGH
EBV VCA IGM SER IA-ACNC: <10 U/ML — SIGNIFICANT CHANGE UP
EBV VCA IGM TITR FLD: NEGATIVE — SIGNIFICANT CHANGE UP
EOSINOPHIL # BLD AUTO: 0 K/UL — SIGNIFICANT CHANGE UP (ref 0–0.5)
ERYTHROCYTE [SEDIMENTATION RATE] IN BLOOD: 42 MM/HR — HIGH (ref 0–20)
GAS PNL BLDV: 137 MMOL/L — SIGNIFICANT CHANGE UP (ref 136–145)
GAS PNL BLDV: SIGNIFICANT CHANGE UP
GLUCOSE BLDV-MCNC: 101 MG/DL — HIGH (ref 70–99)
GLUCOSE SERPL-MCNC: 100 MG/DL — HIGH (ref 70–99)
GLUCOSE SERPL-MCNC: 107 MG/DL — HIGH (ref 70–99)
GLUCOSE SERPL-MCNC: 97 MG/DL — SIGNIFICANT CHANGE UP (ref 70–99)
HCO3 BLDV-SCNC: 18 MMOL/L — LOW (ref 21–29)
HCT VFR BLD CALC: 47.4 % — SIGNIFICANT CHANGE UP (ref 39–50)
HCT VFR BLD CALC: 48.2 % — SIGNIFICANT CHANGE UP (ref 39–50)
HCT VFR BLD CALC: 50.1 % — HIGH (ref 39–50)
HCT VFR BLDA CALC: 51 % — HIGH (ref 39–50)
HGB BLD CALC-MCNC: 16.7 G/DL — SIGNIFICANT CHANGE UP (ref 13–17)
HGB BLD-MCNC: 15.1 G/DL — SIGNIFICANT CHANGE UP (ref 13–17)
HGB BLD-MCNC: 15.3 G/DL — SIGNIFICANT CHANGE UP (ref 13–17)
HGB BLD-MCNC: 16.4 G/DL — SIGNIFICANT CHANGE UP (ref 13–17)
HOROWITZ INDEX BLDV+IHG-RTO: 21 — SIGNIFICANT CHANGE UP
LACTATE BLDV-MCNC: 1.9 MMOL/L — SIGNIFICANT CHANGE UP (ref 0.7–2)
LYMPHOCYTES # BLD AUTO: 0.9 K/UL — LOW (ref 1–3.3)
LYMPHOCYTES # BLD AUTO: 6 % — LOW (ref 13–44)
MAGNESIUM SERPL-MCNC: 2 MG/DL — SIGNIFICANT CHANGE UP (ref 1.6–2.6)
MAGNESIUM SERPL-MCNC: 2 MG/DL — SIGNIFICANT CHANGE UP (ref 1.6–2.6)
MAGNESIUM SERPL-MCNC: 2.1 MG/DL — SIGNIFICANT CHANGE UP (ref 1.6–2.6)
MCHC RBC-ENTMCNC: 29.8 PG — SIGNIFICANT CHANGE UP (ref 27–34)
MCHC RBC-ENTMCNC: 30.8 PG — SIGNIFICANT CHANGE UP (ref 27–34)
MCHC RBC-ENTMCNC: 31.3 PG — SIGNIFICANT CHANGE UP (ref 27–34)
MCHC RBC-ENTMCNC: 31.4 GM/DL — LOW (ref 32–36)
MCHC RBC-ENTMCNC: 32.3 GM/DL — SIGNIFICANT CHANGE UP (ref 32–36)
MCHC RBC-ENTMCNC: 32.8 GM/DL — SIGNIFICANT CHANGE UP (ref 32–36)
MCV RBC AUTO: 94.9 FL — SIGNIFICANT CHANGE UP (ref 80–100)
MCV RBC AUTO: 95.2 FL — SIGNIFICANT CHANGE UP (ref 80–100)
MCV RBC AUTO: 95.6 FL — SIGNIFICANT CHANGE UP (ref 80–100)
MONOCYTES # BLD AUTO: 0.6 K/UL — SIGNIFICANT CHANGE UP (ref 0–0.9)
MONOCYTES NFR BLD AUTO: 10 % — SIGNIFICANT CHANGE UP (ref 2–14)
NEUTROPHILS # BLD AUTO: 13.6 K/UL — HIGH (ref 1.8–7.4)
NEUTROPHILS NFR BLD AUTO: 63 % — SIGNIFICANT CHANGE UP (ref 43–77)
NEUTS BAND # BLD: 21 % — HIGH (ref 0–8)
OTHER CELLS CSF MANUAL: 18 ML/DL — SIGNIFICANT CHANGE UP (ref 18–22)
PCO2 BLDV: 33 MMHG — LOW (ref 35–50)
PH BLDV: 7.36 — SIGNIFICANT CHANGE UP (ref 7.35–7.45)
PHOSPHATE SERPL-MCNC: 4 MG/DL — SIGNIFICANT CHANGE UP (ref 2.5–4.5)
PHOSPHATE SERPL-MCNC: 4.2 MG/DL — SIGNIFICANT CHANGE UP (ref 2.5–4.5)
PHOSPHATE SERPL-MCNC: 4.3 MG/DL — SIGNIFICANT CHANGE UP (ref 2.5–4.5)
PLAT MORPH BLD: NORMAL — SIGNIFICANT CHANGE UP
PLATELET # BLD AUTO: 103 K/UL — LOW (ref 150–400)
PLATELET # BLD AUTO: 110 K/UL — LOW (ref 150–400)
PLATELET # BLD AUTO: 96 K/UL — LOW (ref 150–400)
PO2 BLDV: 46 MMHG — HIGH (ref 25–45)
POTASSIUM BLDV-SCNC: 4.7 MMOL/L — SIGNIFICANT CHANGE UP (ref 3.5–5.3)
POTASSIUM SERPL-MCNC: 4.8 MMOL/L — SIGNIFICANT CHANGE UP (ref 3.5–5.3)
POTASSIUM SERPL-MCNC: 5 MMOL/L — SIGNIFICANT CHANGE UP (ref 3.5–5.3)
POTASSIUM SERPL-MCNC: 5.7 MMOL/L — HIGH (ref 3.5–5.3)
POTASSIUM SERPL-SCNC: 4.8 MMOL/L — SIGNIFICANT CHANGE UP (ref 3.5–5.3)
POTASSIUM SERPL-SCNC: 5 MMOL/L — SIGNIFICANT CHANGE UP (ref 3.5–5.3)
POTASSIUM SERPL-SCNC: 5.7 MMOL/L — HIGH (ref 3.5–5.3)
PROT SERPL-MCNC: 6 G/DL — SIGNIFICANT CHANGE UP (ref 6–8.3)
PROT SERPL-MCNC: 6.6 G/DL — SIGNIFICANT CHANGE UP (ref 6–8.3)
PROT SERPL-MCNC: 6.6 G/DL — SIGNIFICANT CHANGE UP (ref 6–8.3)
RBC # BLD: 4.98 M/UL — SIGNIFICANT CHANGE UP (ref 4.2–5.8)
RBC # BLD: 5.08 M/UL — SIGNIFICANT CHANGE UP (ref 4.2–5.8)
RBC # BLD: 5.24 M/UL — SIGNIFICANT CHANGE UP (ref 4.2–5.8)
RBC # FLD: 12.8 % — SIGNIFICANT CHANGE UP (ref 10.3–14.5)
RBC BLD AUTO: SIGNIFICANT CHANGE UP
SAO2 % BLDV: 77 % — SIGNIFICANT CHANGE UP (ref 67–88)
SODIUM SERPL-SCNC: 138 MMOL/L — SIGNIFICANT CHANGE UP (ref 135–145)
SODIUM SERPL-SCNC: 138 MMOL/L — SIGNIFICANT CHANGE UP (ref 135–145)
SODIUM SERPL-SCNC: 141 MMOL/L — SIGNIFICANT CHANGE UP (ref 135–145)
T GONDII IGG SER QL: <3 IU/ML — SIGNIFICANT CHANGE UP
T GONDII IGG SER QL: NEGATIVE — SIGNIFICANT CHANGE UP
T GONDII IGM SER QL: <3 AU/ML — SIGNIFICANT CHANGE UP
T GONDII IGM SER QL: NEGATIVE — SIGNIFICANT CHANGE UP
T PALLIDUM AB TITR SER: NEGATIVE — SIGNIFICANT CHANGE UP
TROPONIN T, HIGH SENSITIVITY RESULT: 393 NG/L — HIGH (ref 0–51)
TROPONIN T, HIGH SENSITIVITY RESULT: 439 NG/L — HIGH (ref 0–51)
TROPONIN T, HIGH SENSITIVITY RESULT: 503 NG/L — HIGH (ref 0–51)
WBC # BLD: 15 K/UL — HIGH (ref 3.8–10.5)
WBC # BLD: 15.1 K/UL — HIGH (ref 3.8–10.5)
WBC # BLD: 17.2 K/UL — HIGH (ref 3.8–10.5)
WBC # FLD AUTO: 15 K/UL — HIGH (ref 3.8–10.5)
WBC # FLD AUTO: 15.1 K/UL — HIGH (ref 3.8–10.5)
WBC # FLD AUTO: 17.2 K/UL — HIGH (ref 3.8–10.5)

## 2018-06-19 PROCEDURE — 99223 1ST HOSP IP/OBS HIGH 75: CPT

## 2018-06-19 PROCEDURE — 99233 SBSQ HOSP IP/OBS HIGH 50: CPT | Mod: GC

## 2018-06-19 PROCEDURE — 71045 X-RAY EXAM CHEST 1 VIEW: CPT | Mod: 26

## 2018-06-19 PROCEDURE — 74176 CT ABD & PELVIS W/O CONTRAST: CPT | Mod: 26

## 2018-06-19 PROCEDURE — 99232 SBSQ HOSP IP/OBS MODERATE 35: CPT

## 2018-06-19 PROCEDURE — 76775 US EXAM ABDO BACK WALL LIM: CPT | Mod: 26

## 2018-06-19 RX ORDER — SODIUM CHLORIDE 9 MG/ML
1000 INJECTION INTRAMUSCULAR; INTRAVENOUS; SUBCUTANEOUS
Qty: 0 | Refills: 0 | Status: DISCONTINUED | OUTPATIENT
Start: 2018-06-19 | End: 2018-06-20

## 2018-06-19 RX ORDER — VANCOMYCIN HCL 1 G
1000 VIAL (EA) INTRAVENOUS ONCE
Qty: 0 | Refills: 0 | Status: COMPLETED | OUTPATIENT
Start: 2018-06-19 | End: 2018-06-19

## 2018-06-19 RX ORDER — PANTOPRAZOLE SODIUM 20 MG/1
40 TABLET, DELAYED RELEASE ORAL EVERY 12 HOURS
Qty: 0 | Refills: 0 | Status: DISCONTINUED | OUTPATIENT
Start: 2018-06-19 | End: 2018-06-27

## 2018-06-19 RX ORDER — PANTOPRAZOLE SODIUM 20 MG/1
40 TABLET, DELAYED RELEASE ORAL DAILY
Qty: 0 | Refills: 0 | Status: DISCONTINUED | OUTPATIENT
Start: 2018-06-19 | End: 2018-06-19

## 2018-06-19 RX ADMIN — PIPERACILLIN AND TAZOBACTAM 25 GRAM(S): 4; .5 INJECTION, POWDER, LYOPHILIZED, FOR SOLUTION INTRAVENOUS at 05:02

## 2018-06-19 RX ADMIN — Medication 1 PATCH: at 06:36

## 2018-06-19 RX ADMIN — Medication 1 APPLICATION(S): at 05:02

## 2018-06-19 RX ADMIN — Medication 81 MILLIGRAM(S): at 12:41

## 2018-06-19 RX ADMIN — Medication 40 MILLIGRAM(S): at 12:41

## 2018-06-19 RX ADMIN — HEPARIN SODIUM 5000 UNIT(S): 5000 INJECTION INTRAVENOUS; SUBCUTANEOUS at 21:46

## 2018-06-19 RX ADMIN — HEPARIN SODIUM 5000 UNIT(S): 5000 INJECTION INTRAVENOUS; SUBCUTANEOUS at 14:42

## 2018-06-19 RX ADMIN — DEXMEDETOMIDINE HYDROCHLORIDE IN 0.9% SODIUM CHLORIDE 2.99 MICROGRAM(S)/KG/HR: 4 INJECTION INTRAVENOUS at 17:12

## 2018-06-19 RX ADMIN — PIPERACILLIN AND TAZOBACTAM 25 GRAM(S): 4; .5 INJECTION, POWDER, LYOPHILIZED, FOR SOLUTION INTRAVENOUS at 17:12

## 2018-06-19 RX ADMIN — Medication 1 APPLICATION(S): at 01:32

## 2018-06-19 RX ADMIN — Medication 250 MILLIGRAM(S): at 12:41

## 2018-06-19 RX ADMIN — HEPARIN SODIUM 5000 UNIT(S): 5000 INJECTION INTRAVENOUS; SUBCUTANEOUS at 05:02

## 2018-06-19 RX ADMIN — SODIUM CHLORIDE 75 MILLILITER(S): 9 INJECTION INTRAMUSCULAR; INTRAVENOUS; SUBCUTANEOUS at 11:14

## 2018-06-19 RX ADMIN — PANTOPRAZOLE SODIUM 40 MILLIGRAM(S): 20 TABLET, DELAYED RELEASE ORAL at 17:12

## 2018-06-19 RX ADMIN — Medication 1 APPLICATION(S): at 17:13

## 2018-06-19 NOTE — PROGRESS NOTE ADULT - PROBLEM SELECTOR PLAN 1
SHAQUILLE on CKD.  CT imaging shows left atrophic kidney.  Suspect SAHQUILLE from rhabdo + hemodynamic injury from cocaine abuse.  -please check renal sonogram to assess size and cortical thickness of right kidney (to evaluate chronicity of damage).  -check serum intact PTH, assess chronicity of CKD  -no urgent HD indications at this time  -monitor creatinine trend and urine output  -avoid nephrotoxins, ace/arb, imaging contrast SHAQUILLE on CKD.  CT imaging shows left atrophic kidney.  Suspect SHAQUILLE from rhabdo + hemodynamic injury from cocaine abuse. Scr worsening but non-oliguric.  -please check renal sonogram with dopplers.  -check serum intact PTH, to assess chronicity of CKD  -no urgent HD indications at this time  -monitor creatinine trend and urine output  -avoid nephrotoxins, ace/arb, imaging contrast

## 2018-06-19 NOTE — PROGRESS NOTE ADULT - ASSESSMENT
52 year old male with unknown PMHx (does not follow with Doctors), transferred from [facility] for ARF after son found him lethargic, not speaking, and down on the floor. He also noticed rash on the patient's trunk and leg, decided to bring his father to [facility].     Pt was recently at [facility] two weeks prior for a right scrotal cyst that drained on its own   Was given levaquin and a sulfa drug (?bactrim) for right scrotal cyst  Leukocytosis with high bandemia  Renal failure (unknown baseline) most likely from rhabdo, with elevated trops   Elevated liver enzymes trending down, positive cocaine test  s/p HD for hyperkalemia   Diffuse rash - Derm on board - most likely drug rash from possibly bactrim  Afebrile. RVP neg. Blood cxs NGTD.  CT head with subacute infarct  Abd distention with xr -> parital sbo vs ileus      Recommend:  -Continue zosyn in setting of high bandemia for possibly ?aspiration (however with clear CXR) vs GI source -> now with distention  -F/U abdominal XR  -F/U blood cxs.  -Aspiration precautions.  -CT A/P with adrenal adenoma - consider MRI when stable for further evaluation.  -Monitor for fevers  -Trend CBC with diff.  -Monitor CPK  -Monitor renal function  -Trend LFTs  -Check EBV/ CMV serologies, toxoplasmosis serologies, leptospirosis serologies, RMSF serologies, tick panel  -Check AGUSTÍN, ESR, CRP, ANCA    Jem Gomez MD  Pager [phone]  After 5pm/weekends call [phone]

## 2018-06-19 NOTE — CHART NOTE - NSCHARTNOTEFT_GEN_A_CORE
Patient was examined by Dermatology resident on consult.    The skin eruptions are stable and not progressing.     Cr is trending up, but LFT's are continuously trending down. Not compatible drug hypersensitivity with systemic involvement, where LFT's would remain elevated even with treatment.     Assessment/plans:  Stable morbiliform drug eruption.    - c/w triamcinolone 0.1% cream BID to AA  - please contact [facility] to obtain medication records to better understand his medication exposure. Pt's son tried to obtain it, but to no avail.

## 2018-06-19 NOTE — PROGRESS NOTE ADULT - ASSESSMENT
Assessment	  53 y/o M with a new CVA, + for cocaine in ARF, rhabdomyolysis found to be cocaine positive, now with distended loops of bowel - most likely ileus, and GIB.     #Neuro   Pt w/ AMS, A&O x 1-2, in setting of subacute strokes and cocaine use  Neuro check as per ICU protocol   Repeat CT in 24 hours  Will obtain MRI, MRA  Echo  Carotid dopplers  asa / hold statins in the setting of rhabdo    #CV  HD stable  + trop in setting of rhabdo cocaine use  No ST change  -c/w serial CE and EKG  Echo    #Pulmonary   Saturating well on NC  No acute issues at this time     #GI  NPO at this time  Elevated LFTs trending down  Has been NPO - maintain NPO today  - speech and sallow     #Renal/FEN   Acute renal failure w/ hyperkalemia in the setting of rhabdo, cocaine use, s/p 2.5 hours of HD overnight  As per renal, consider poss AIN 2/2 drug use  No need for urgent HD at this time  f/u electrolytesC3, C4, dsDNA, ANCAs  Renal U/S- adrenal mass noted ultrasound  Trend BMP, CK q6  IVF @ 125 mL/hr   Monitor Urine output     #Heme  H/H wnl   Leukocytosis w/ bandemia from unknown source  Trend CBC daily     #ID  Leukocytosis w/ bandemia  UA and CXR benign  If spikes, low threshold for broad spectrum abx  c/w zosyn for now  ID consult- pt w recent scrotal absess s/p I/D scrotal at an OSH- was on abx  Dermatology consult for rash    #Vasc  Edema of RUE and hand- remains soft, and pref, +pulse  -neurovascular checks q1h  -RUE elevation  -RUE duplex, XR    #DVT ppx   HSQ q8       Attending Attestation:   pt seen and examined at the bedside. pt admitted with cocaine +, severe rhabd, ARF and hyperkalemia s/p HD yesterday. CK now down to 9k and K 5.4. fup BMP. may need HD again today. making good urine. cont IVF. cont abx fr possible asp pna. ID and derm eval for widespread body rash( was present on admission to [facility]). fup US doppler for RUE swelling. good pulses, x-rays negative for nay fracture. left adrenal adenoma? will need further workup/MRI once stable. subacute CVA started on ASA. stroke wup, fup neuro recommendations. Assessment	  51 y/o M with a new CVA, + for cocaine in ARF, rhabdomyolysis found to be cocaine positive, now with distended loops of bowel - most likely ileus, and GIB.     #Neuro   Pt w/ AMS, A&O x 1-2, in setting of subacute strokes and cocaine use  Neuro check as per ICU protocol   Repeat CT in 24 hours  Will obtain MRI, MRA  Echo  Carotid dopplers  asa- hold in the setting of  GIB  / hold statins in the setting of rhabdo    #CV  HD stable  + trop in setting of rhabdo cocaine use  No ST change  -c/w serial CE and EKG  Echo    #Pulmonary   Saturating well on NC  No acute issues at this time     #GI  NPO at this time  Elevated LFTs trending down  Has been NPO - maintain NPO today  - speech and sallow     #Renal/FEN   Acute renal failure w/ hyperkalemia in the setting of rhabdo, cocaine use, s/p 2.5 hours of HD overnight  As per renal, consider poss AIN 2/2 drug use  No need for urgent HD at this time  f/u electrolytesC3, C4, dsDNA, ANCAs  Renal U/S- adrenal mass noted ultrasound  Trend BMP, CK q6  IVF @ 125 mL/hr   Monitor Urine output     #Heme  H/H wnl   Leukocytosis w/ bandemia from unknown source  Trend CBC daily     #ID  Leukocytosis w/ bandemia  UA and CXR benign  If spikes, low threshold for broad spectrum abx  c/w zosyn for now  ID consult- pt w recent scrotal absess s/p I/D scrotal at an OSH- was on abx  Dermatology consult for rash    #Vasc  Edema of RUE and hand- remains soft, and pref, +pulse  -neurovascular checks q1h  -RUE elevation  -RUE duplex, XR    #DVT ppx   HSQ q8       Attending Attestation:   pt seen and examined at the bedside. pt admitted with cocaine +, severe rhabd, ARF and hyperkalemia s/p HD yesterday. CK now down to 9k and K 5.4. fup BMP. may need HD again today. making good urine. cont IVF. cont abx fr possible asp pna. ID and derm eval for widespread body rash( was present on admission to [facility]). fup US doppler for RUE swelling. good pulses, x-rays negative for nay fracture. left adrenal adenoma? will need further workup/MRI once stable. subacute CVA started on ASA. stroke wup, fup neuro recommendations. Assessment	  51 y/o M with a new CVA, + for cocaine in ARF, rhabdomyolysis found to be cocaine positive, now with distended loops of bowel - most likely ileus, and GIB.     #Neuro   Pt w/ AMS, A&O x 1-2, in setting of subacute strokes and cocaine use  -Neuro check as per ICU protocol   -Repeat CT in 24 hours  -MRI, MRA  -Echo  -Carotid dopplers  -asa- hold in the setting of  GIB  - hold statins in the setting of rhabdo    #CV  -HD stable  - c/w trending+ trop in setting of cocaine use / rhabdo   --c/w serial CE and EKG  - Echo TTE    #Pulmonary - Respiratory status stable- oxygenations status stable -on RA  -Saturating well RA  -Aspiration precautions  -chest PT    #GI  -NPO at this time  - LFTs trending down  -Protonix 40mg IV q12H- in the setting of a possible GIB     #Renal/FEN   Acute renal failure w/ hyperkalemia in the setting of rhabdo, cocaine use, s/p 2.5 hours of HD overnight initially on MICU admission  -Call renal for HD today in the setting of increasing acidemia  -f/u electrolytesC3, C4, dsDNA, ANCAs  -c/w IVF to assist with rhabo clearance  -strict I&O    #ID Leukocytosis w/ bandemia  -c/w zosyn   - f/u ID consult- pt w recent scrotal absess s/p I/D scrotal @ OSH / now with significant bandemia   -trend lactate  -f/u cx  -TicK serums- r/o [place] / tick borne dz in the setting of rash and leukocytosis

## 2018-06-19 NOTE — PROGRESS NOTE ADULT - ATTENDING COMMENTS
pt seen and examined at the bedside. pt admitted with cocaine +, severe rhabd, ARF and hyperkalemia s/p HD on admission. CK now down to 6k and K 4.8. fup BMP. may need HD again today. making good urine. dec IVF. cont abx fr possible asp pna. seen by ID and derm eval for widespread body rash, likely drug rash from sulfa abx pt received as outpt for scrotal infection?. will give dose of steroid today as rash looks more red today. cont steroid topical cream.  US doppler for RUE swelling negative. abdominal distension today with bandemia, will get CT abdomen today to r/o abd pathology. episode of dark colored stool last night.  Left adrenal adenoma? will need further workup/MRI once stable. subacute CVA started on ASA. fup neuro recommendations. Placed on precedex for DTs. troponin elevation in setting of renal failure and rhabdo. EKG without ischemic changes.

## 2018-06-19 NOTE — PROGRESS NOTE ADULT - PROBLEM SELECTOR PLAN 2
Suspected due to diminished renal excretion 2/2 SHAQUILLE and increasing potassium release 2/2 rhabdo.  -HD catheter still in place if additional HD is required  -can consider additional dose of lasix if potassium remains elevated  -monitor serum potassium

## 2018-06-19 NOTE — PROGRESS NOTE ADULT - SUBJECTIVE AND OBJECTIVE BOX
CC: Patient is a 52y old  Male who presents with a chief complaint of Patient is a 52y old  Male who presents with a chief complaint of AMS, found down (2018 09:34)    Interval History/ROS: Patient remains with rash diffuse on body which patient states is itchy. Now with abdominal distention. XR with ileus and/or partial SBO. No fevers, however, bandemia remains high.    Allergies  Allergy Status Unknown    ANTIMICROBIALS:  piperacillin/tazobactam IVPB. 3.375 every 12 hours  vancomycin  IVPB 1000 once    PE:    Vital Signs Last 24 Hrs  T(C): 36.8 (2018 08:00), Max: 37.7 (2018 20:00)  T(F): 98.3 (2018 08:00), Max: 99.8 (2018 20:00)  HR: 76 (2018 09:00) (76 - 101)  BP: 100/71 (:00) (100/71 - 136/72)  BP(mean): 80 (:00) (77 - 98)  RR: 17 (2018 09:00) (13 - 34)  SpO2: 96% (2018 09:00) (91% - 98%)    Gen: Awake, alert, NAD  CV: S1+S2 normal, no murmurs  Resp: Clear bilat, no resp distress  Abd: distended  Ext: LE edema  : +[place]ut[place]  IV/Skin: diffuse morbiliform rash on trunk and extremities, petechial rash on LE bilaterally  Neuro: no focal deficits    LABS:                          15.3   15.1  )-----------( 96       ( 2018 04:59 )             47.4       06-19    138  |  103  |  80<H>  ----------------------------<  97  4.8   |  15<L>  |  6.07<H>    Ca    7.9<L>      2018 04:58  Phos  4.0     -  Mg     2.0     -    TPro  6.0  /  Alb  2.9<L>  /  TBili  0.4  /  DBili  x   /  AST  175<H>  /  ALT  260<H>  /  AlkPhos  76  06-19      Urinalysis Basic - ( 2018 15:28 )    Color: Yellow / Appearance: SL Turbid / S.008 / pH: x  Gluc: x / Ketone: Negative  / Bili: Negative / Urobili: Negative   Blood: x / Protein: 30 mg/dL / Nitrite: Negative   Leuk Esterase: Negative / RBC: 5-10 /HPF / WBC 3-5 /HPF   Sq Epi: x / Non Sq Epi: x / Bacteria: Few /HPF    MICROBIOLOGY:  v  .Urine Clean Catch (Midstream)  18   No growth  --  --    .Blood Blood  18   No growth to date.  --  --    .Blood Blood  18   No growth to date.  --  --    Rapid RVP Result: NotDetec ( @ 00:24)  Rapid RVP Result: NotDetec ( @ 09:43)    RADIOLOGY:  < from: US Renal (18 @ 10:29) >  IMPRESSION:     Increased echogenicity consistent with renal parenchymal disease. No   evidence of hydronephrosis.    < end of copied text >      < from: Xray Chest 1 View- PORTABLE-Routine (18 @ 06:57) >  Impression:    The heart is slightly enlarged. The lungs are clear. A central line is   seen on the right and the tip is in the superior vena cava. No   pneumothorax.    < end of copied text >

## 2018-06-19 NOTE — CONSULT NOTE ADULT - SUBJECTIVE AND OBJECTIVE BOX
HPI: 51 yo man who presented to [facility] 6/17 for AMS secondary to a cocaine overdose and then transferred to [facility] (6/17) for CVA and SHAQUILLE 2/2 rhabdo, now on HD. Patient has had worsening abdominal distension and an episode of melanotic stool overnight. Per RN, patient has had a normal bowel movement today. Surgery consulted for abdominal distension.     PMHx: None    PSHx: None    Medications (inpatient): dexmedetomidine Infusion 0.1 MICROgram(s)/kG/Hr IV Continuous <Continuous>  dextrose 5% + lactated ringers. 1000 milliLiter(s) IV Continuous <Continuous>  heparin  Injectable 5000 Unit(s) SubCutaneous every 8 hours  methylPREDNISolone sodium succinate Injectable 40 milliGRAM(s) IV Push daily  nicotine - 21 mG/24Hr(s) Patch 1 patch Transdermal daily  pantoprazole  Injectable 40 milliGRAM(s) IV Push every 12 hours  piperacillin/tazobactam IVPB. 3.375 Gram(s) IV Intermittent every 12 hours  triamcinolone 0.1% Cream 1 Application(s) Topical every 12 hours    Medications (PRN):  Allergies: Allergy Status Unknown  (Intolerances: )  Social Hx:     Physical Exam  T(C): 36.6 (06-20-18 @ 00:00)  HR: 72 (06-20-18 @ 02:00) (72 - 91)  BP: 115/68 (06-20-18 @ 02:00) (93/56 - 138/84)  RR: 17 (06-20-18 @ 02:00) (13 - 24)  SpO2: 98% (06-20-18 @ 02:00) (94% - 98%)  Tmax: T(C): , Max: 37.2 (06-19-18 @ 04:00)    06-18-18  -  06-19-18  --------------------------------------------------------  IN:    dexmedetomidine Infusion: 184.8 mL    sodium chloride 0.9%: 2600 mL    sodium chloride 0.9%: 250 mL  Total IN: 3034.8 mL    OUT:    Indwelling Catheter - Urethral: 2515 mL  Total OUT: 2515 mL    Total NET: 519.8 mL      06-19-18  -  06-20-18  --------------------------------------------------------  IN:    dexmedetomidine Infusion: 348 mL    dextrose 5% + lactated ringers.: 225 mL    sodium chloride 0.9%: 1050 mL    sodium chloride 0.9%: 375 mL    Solution: 250 mL  Total IN: 2248 mL    OUT:    Indwelling Catheter - Urethral: 1440 mL    Other: 1000 mL  Total OUT: 2440 mL    Total NET: -192 mL        General: Obtunded, oriented to person only  HEENT: NCAT, EOMI, anicteric, mucosa moist  Respiratory: airway patent, respirations unlabored  CVS: regular rate and rhythm  Abdomen: soft, nontender, significantly distended with normoactive bowel sounds.   Rectal: Stool in rectal vault. No BRBPR.   Extremities: no edema, sensation and movement grossly intact. B/l shoulders, gluteus, thighs, calf compartments soft.   Skin: Diffuse abdominal rash.     Labs:                        15.0   19.8  )-----------( 107      ( 20 Jun 2018 00:55 )             44.4     PT/INR - ( 20 Jun 2018 00:55 )   PT: 11.5 sec;   INR: 1.06 ratio         PTT - ( 20 Jun 2018 00:55 )  PTT:25.0 sec  06-20    141  |  105  |  78<H>  ----------------------------<  122<H>  4.7   |  18<L>  |  5.04<H>    Ca    7.9<L>      20 Jun 2018 00:55  Phos  4.8     06-20  Mg     2.0     06-20    TPro  6.1  /  Alb  2.7<L>  /  TBili  0.4  /  DBili  x   /  AST  86<H>  /  ALT  177<H>  /  AlkPhos  77  06-20      Imaging and other studies:    < from: CT Abdomen and Pelvis No Cont (06.19.18 @ 14:22) >    EXAM:  CT ABDOMEN AND PELVIS                            PROCEDURE DATE:  06/19/2018            INTERPRETATION:  CLINICAL INFORMATION: Alcohol abuse, found down.   Rhabdomyolysis and renal failure. Abdominal distention.    COMPARISON: CT abdomen andpelvis performed 6/17/2018.    PROCEDURE:   CT of the Abdomen and Pelvis was performed without intravenous contrast.   Intravenous contrast: None.  Oral contrast: None.  Sagittal and coronal reformats were performed.    FINDINGS:    LOWER CHEST: Bilateral lower lobe linear atelectasis.    LIVER: Within normal limits.  BILE DUCTS: Normal caliber.  GALLBLADDER: Within normal limits.  SPLEEN: Within normal limits.  PANCREAS: Within normal limits.  ADRENALS: Right adrenal gland nodularity likely due to adenoma, as seen   on recent study. Stable low-density left adrenal thickening.  KIDNEYS/URETERS: Atrophic left kidney.    BLADDER: Contains a Gutierres catheter.  REPRODUCTIVE ORGANS: The prostate is within normal limits.    BOWEL: Near diffuse dilatation of small bowel to the level of the   terminal ileum, ileus favored over obstruction. Colonic diverticulosis.   Appendix is normal.  PERITONEUM: Small volume right upper quadrant ascites. Mesenteric edema.  VESSELS:  Mild atherosclerotic calcifications.  RETROPERITONEUM: No lymphadenopathy.    ABDOMINAL WALL: Left anterior thigh muscular lipoma.  BONES: Degenerative changes. Age indeterminant L2 compression fracture   deformity.    IMPRESSION:     New diffuse dilatation of fluid-filled small bowel loops without discrete   transition point. Ileus is favored over small bowel obstruction. Small   volume ascites and mesenteric edema are present.    Other findings are unchanged, as described above.                LESLIE GONSALES M.D., RADIOLOGY RESIDENT  This document has been electronically signed.  KUMAR LOPEZ M.D., ATTENDING RADIOLOGIST  This document has been electronically signed. Jun 19 2018  3:31PM                < end of copied text >  < from: CT Abdomen and Pelvis No Cont (06.19.18 @ 14:22) >    EXAM:  CT ABDOMEN AND PELVIS                            PROCEDURE DATE:  06/19/2018            INTERPRETATION:  CLINICAL INFORMATION: Alcohol abuse, found down.   Rhabdomyolysis and renal failure. Abdominal distention.    COMPARISON: CT abdomen andpelvis performed 6/17/2018.    PROCEDURE:   CT of the Abdomen and Pelvis was performed without intravenous contrast.   Intravenous contrast: None.  Oral contrast: None.  Sagittal and coronal reformats were performed.    FINDINGS:    LOWER CHEST: Bilateral lower lobe linear atelectasis.    LIVER: Within normal limits.  BILE DUCTS: Normal caliber.  GALLBLADDER: Within normal limits.  SPLEEN: Within normal limits.  PANCREAS: Within normal limits.  ADRENALS: Right adrenal gland nodularity likely due to adenoma, as seen   on recent study. Stable low-density left adrenal thickening.  KIDNEYS/URETERS: Atrophic left kidney.    BLADDER: Contains a Gutierres catheter.  REPRODUCTIVE ORGANS: The prostate is within normal limits.    BOWEL: Near diffuse dilatation of small bowel to the level of the   terminal ileum, ileus favored over obstruction. Colonic diverticulosis.   Appendix is normal.  PERITONEUM: Small volume right upper quadrant ascites. Mesenteric edema.  VESSELS:  Mild atherosclerotic calcifications.  RETROPERITONEUM: No lymphadenopathy.    ABDOMINAL WALL: Left anterior thigh muscular lipoma.  BONES: Degenerative changes. Age indeterminant L2 compression fracture   deformity.    IMPRESSION:     New diffuse dilatation of fluid-filled small bowel loops without discrete   transition point. Ileus is favored over small bowel obstruction. Small   volume ascites and mesenteric edema are present.    Other findings are unchanged, as described above.                LESLIE GONSALES M.D., RADIOLOGY RESIDENT  This document has been electronically signed.  KUMAR LOPEZ M.D., ATTENDING RADIOLOGIST  This document has been electronically signed. Jun 19 2018  3:31PM          < end of copied text >

## 2018-06-19 NOTE — CONSULT NOTE ADULT - ASSESSMENT
53 yo man with cocaine overdose, subacute stroke, SHAQUILLE 2/2 rhabdo with a distended abdomen. CT suggestive of ileus; small volume ascites and mesenteric edema are nonspecific findings. No bowel wall thickening on CT. Given cocaine overdose, possible punctuate ischemia of bowel or mesenteric infarcts. However, unlikely in the setting of a normal lactate and without tachycardia.     - NPO until distension improved  - f/u serial lactate  - No surgical reason for leukocytosis elucidated at this time  - No acute surgical intervention at this time; surgery will follow    ARSALAN Huffman MD PGY2  ATP: [id number]

## 2018-06-19 NOTE — PROGRESS NOTE ADULT - SUBJECTIVE AND OBJECTIVE BOX
CHIEF COMPLAINT: Subacute infarcts - LMCA, SHAQUILLE, Rhabdomyolysis,     Interval Events: Agitated overnight, precedex started, abdomen grossly distended flat plate abd done- noted diffuse distention of loops of bowel- pt refused NGT for decompression, + maroon colored BM - occult positive- H/H stable. Derm consult done- assessing rash is r/t to drug reaction- most probable 2/2 Bactrim that pt received from OSH. Troponin's c/w increasing slightly, creat c/w increasing.     This is a 52yoM found down, questionable x 24 hours, found to have a LMCA- subacute infarct, + for cocaine on tox screen, as per family- the pt does consume ample amounts of alcohol on a regular basis, remains in SHAQUILLE w rhabdomyolysis, and increased troponin.          REVIEW OF SYSTEMS:  Constitutional: [ ] negative [ ] fevers [ ] chills [ ] weight loss [ ] weight gain  HEENT: [ ] negative [ ] dry eyes [ ] eye irritation [ ] postnasal drip [ ] nasal congestion  CV: [ ] negative  [ ] chest pain [ ] orthopnea [ ] palpitations [ ] murmur  Resp: [ ] negative [ ] cough [ ] shortness of breath [ ] dyspnea [ ] wheezing [ ] sputum [ ] hemoptysis  GI: [ ] negative [ ] nausea [ ] vomiting [ ] diarrhea [ ] constipation [ ] abd pain [ ] dysphagia   : [ ] negative [ ] dysuria [ ] nocturia [ ] hematuria [ ] increased urinary frequency  Musculoskeletal: [ ] negative [ ] back pain [ ] myalgias [ ] arthralgias [ ] fracture  Skin: [ ] negative [ ] rash [ ] itch  Neurological: [ ] negative [ ] headache [ ] dizziness [ ] syncope [ ] weakness [ ] numbness  Psychiatric: [ ] negative [ ] anxiety [ ] depression  Endocrine: [ ] negative [ ] diabetes [ ] thyroid problem  Hematologic/Lymphatic: [ ] negative [ ] anemia [ ] bleeding problem  Allergic/Immunologic: [ ] negative [ ] itchy eyes [ ] nasal discharge [ ] hives [ ] angioedema  [ ] All other systems negative  [ ] Unable to assess ROS because ________    OBJECTIVE:  ICU Vital Signs Last 24 Hrs  T(C): 37.2 (2018 04:00), Max: 37.7 (2018 20:00)  T(F): 98.9 (2018 04:00), Max: 99.8 (2018 20:00)  HR: 79 (2018 07:00) (79 - 101)  BP: 107/67 (2018 07:00) (100/75 - 136/72)  BP(mean): 82 (2018 07:00) (77 - 98)  ABP: --  ABP(mean): --  RR: 20 (2018 07:00) (14 - 34)  SpO2: 96% (2018 07:00) (91% - 98%)         @ 07:01  -  - @ 07:00  --------------------------------------------------------  IN: 3034.8 mL / OUT: 2515 mL / NET: 519.8 mL      CAPILLARY BLOOD GLUCOSE      POCT Blood Glucose.: 101 mg/dL (2018 14:40)      PHYSICAL EXAM:  General:   HEENT:   Lymph Nodes:  Neck:   Respiratory:   Cardiovascular:   Abdomen:   Extremities:   Skin:   Neurological:  Psychiatry:    LINES:    HOSPITAL MEDICATIONS:  aspirin  chewable 81 milliGRAM(s) Oral daily  heparin  Injectable 5000 Unit(s) SubCutaneous every 8 hours    piperacillin/tazobactam IVPB. 3.375 Gram(s) IV Intermittent every 12 hours          dexmedetomidine Infusion 0.1 MICROgram(s)/kG/Hr IV Continuous <Continuous>          sodium chloride 0.9%. 1000 milliLiter(s) IV Continuous <Continuous>      triamcinolone 0.1% Cream 1 Application(s) Topical every 12 hours    nicotine - 21 mG/24Hr(s) Patch 1 patch Transdermal daily      LABS:                        15.3   15.1  )-----------( 96       ( 2018 04:59 )             47.4     Hgb Trend: 15.3<--, 15.1<--, 15.2<--, 15.0<--, 15.2<--  06-19    138  |  103  |  80<H>  ----------------------------<  97  4.8   |  15<L>  |  6.07<H>    Ca    7.9<L>      2018 04:58  Phos  4.0       Mg     2.0         TPro  6.0  /  Alb  2.9<L>  /  TBili  0.4  /  DBili  x   /  AST  175<H>  /  ALT  260<H>  /  AlkPhos  76  -    Creatinine Trend: 6.07<--, 5.88<--, 5.84<--, 5.59<--, 5.23<--, 6.10<--  PT/INR - ( 2018 15:03 )   PT: 12.0 sec;   INR: 1.11 ratio         PTT - ( 2018 15:03 )  PTT:28.2 sec  Urinalysis Basic - ( 2018 15:28 )    Color: Yellow / Appearance: SL Turbid / S.008 / pH: x  Gluc: x / Ketone: Negative  / Bili: Negative / Urobili: Negative   Blood: x / Protein: 30 mg/dL / Nitrite: Negative   Leuk Esterase: Negative / RBC: 5-10 /HPF / WBC 3-5 /HPF   Sq Epi: x / Non Sq Epi: x / Bacteria: Few /HPF      Arterial Blood Gas:   @ 20:32  7.28/31/89/14/96/-11.3  ABG lactate: --    Venous Blood Gas:   @ 12:11  7.40/28/109/17/98  VBG Lactate: 1.4  Venous Blood Gas:   @ 06:18  7.35/39/40/21/72  VBG Lactate: 2.2  Venous Blood Gas:   @ 23:15  7.27/37/46/16/79  VBG Lactate: --  Venous Blood Gas:   @ 15:03  7.26/34/108/14/97  VBG Lactate: 1.9      MICROBIOLOGY:     Culture - Urine (collected 2018 19:59)  Source: .Urine Clean Catch (Midstream)  Final Report (2018 21:59):    No growth    Culture - Blood (collected 2018 17:06)  Source: .Blood Blood  Preliminary Report (2018 18:02):    No growth to date.    Culture - Blood (collected 2018 17:06)  Source: .Blood Blood  Preliminary Report (2018 18:02):    No growth to date.    Culture - Blood (collected 2018 13:06)  Source: .Blood Blood  Preliminary Report (2018 14:01):    No growth to date.    Culture - Blood (collected 2018 13:06)  Source: .Blood Blood  Preliminary Report (2018 14:01):    No growth to date.    HIV-1/2 Antigen/Antibody Screen by [facility] (18 @ 15:43)    HIV-1/2 Combo Result: Nonreact: The HIV Ag/Ab Combo test performed screens for HIV-1 p24 antigen,  antibodies to HIV-1 (group M and group O), and antibodies to HIV-2. All  specimens repeatedly reactive will reflex to an HIV 1/2 antibody  confirmation and differentiation test. This assay detects p24 antigen  which may be present prior to the development of HIV antibodies,  therefore a reactive result with a negative HIV 1/2 AB Confirmation  should be followed up with HIV-1 RNA, HIV-2 RNA and repeat testing in 4-8  weeks. A nonreactive result does not preclude previous exposure to or  infection with HIV-1 or HIV-2. [facility] prohibits disclosure of this  result to any unauthorized party.    RADIOLOGY:  < from: CT Abdomen and Pelvis No Cont (18 @ 10:24) >    EXAM:  CT ABDOMEN AND PELVIS                          EXAM:  CT CHEST                                  PROCEDURE DATE:  2018          INTERPRETATION:  CLINICAL INFORMATION: Altered mental status. Pain.    CT of the chest, abdomen and pelvis was performed without intravenous   contrast. Sagittal and coronal reconstructions were obtained. Axial   maximum intensity projection images were obtained.    Comparison: None.    CHEST:    Thyroid: Unremarkable  Heart:  Unremarkable.    Lymph nodes: No significant adenopathy.    Lungs and Pleura: There are bilateral dependent atelectatic changes.   There are no nodules masses or consolidations.  Airways: Patent.    ABDOMEN:   Liver: normal.  Spleen: normal.  Pancreas: normal.  Adrenal glands:  2.8 x 2.0 cm low density nodule right adrenal gland   likely a benign adenoma. Markedly enlarged left adrenal gland measuring   at least 4.7 x 4.3 cm also low density, however given its size and the   definitive diagnosis of benign adenoma is uncertain.Recommend MRI   evaluation.   Gall bladder: normal.  Kidneys: Atrophic left kidney.    Lymph nodes: There is no retroperitoneal or abdominal lymphadenopathy.  Aorta and branch vessels: Unremarkable.  Inferior vena cava: Unremarkable.    Bowel: Scattered small colonic diverticula without diverticulitis. Large   and small bowel are otherwise unremarkable. Normal appendix.    There is no free air of free fluid. There is mild nonspecific fat   stranding extending from the renal fossa on the right to the distal psoas   muscle.    Pelvis:  Bladder is collapsed around Gutierres catheter.  Normal prostate and seminal vesicles.  There is no significant inguinal or pelvic adenopathy    Osseous structures:  The osseous structures show scattered degenerativechanges. There is   anterior wedging of the L2 vertebral body of uncertain chronicity.   Correlate clinically for pain.    IMPRESSION:    2.8 x 2.0 cm low density nodule right adrenal gland likely a benign   adenoma. Markedly enlarged left adrenal gland measuring at least 4.7 x   4.3 cm also low density, however given its size and the definitive   diagnosis of benign adenoma is uncertain. Recommend MRI evaluation.     Atrophic left kidney.    There is anterior wedging of the L2 vertebral body of uncertain   chronicity. Correlate clinically for pain.      < from: CT Head No Cont (18 @ 09:59) >    EXAM:  CT BRAIN                          PROCEDURE DATE:  2018      INTERPRETATION:  CLINICAL INFORMATION: Altered mental status    < from: Xray Abdomen 1 View PORTABLE -Urgent (18 @ 23:17) >    EXAM:  XR ABDOMEN PORTABLE URGENT 1V                          PROCEDURE DATE:  2018         INTERPRETATION:  gaseous distension of the small and large bowel likely     COMPARISON: No prior studies are available for comparison at this   institution.    FINDINGS:     There is diffuse low density appearance suggesting a subacute infarction   within the MCA distribution on the left. This involves the high parietal   and posterior parietal regions. There is similar findings in the adjacent   occipital lobe. There is associated mild sulcal effacement. There is no   midline shift. The ventricles are normal size. No acute hemorrhage is   noted. Small focus of low density is also noted in the left cerebellum.    There is a partially imaged unerupted tooth seen at the left maxillary   sinus. The imaged portions of the paranasal sinuses and mastoids are   otherwise well aerated.     There is no osseous abnormality.    IMPRESSION:  Left MCA distribution and probable left posterior cerebral artery   distribution changes most consistent with subacute infarction. No   evidence for acute hemorrhage. MRI is recommended for further   characterization.        EKG: CHIEF COMPLAINT: Subacute infarcts - LMCA, SHAQUILLE, Rhabdomyolysis,     Interval Events: Agitated overnight, precedex started, abdomen grossly distended flat plate abd done- noted diffuse distention of loops of bowel- pt refused NGT for decompression, + maroon colored BM - occult positive- H/H stable. Derm consult done- assessing rash is r/t to drug reaction- most probable 2/2 Bactrim that pt received from OSH. Troponin's c/w increasing slightly, creat c/w increasing.     This is a 52yoM found down, questionable x 24 hours, found to have a LMCA- subacute infarct, + for cocaine on tox screen, as per family- the pt does consume ample amounts of alcohol on a regular basis, remains in SHAQUILLE w rhabdomyolysis, and increased troponin.          REVIEW OF SYSTEMS:  [x ] All other systems negative- denies complaints      OBJECTIVE:  ICU Vital Signs Last 24 Hrs  T(C): 37.2 (2018 04:00), Max: 37.7 (2018 20:00)  T(F): 98.9 (2018 04:00), Max: 99.8 (2018 20:00)  HR: 79 (2018 07:00) (79 - 101)  BP: 107/67 (2018 07:00) (100/75 - 136/72)  BP(mean): 82 (2018 07:00) (77 - 98)  ABP: --  ABP(mean): --  RR: 20 (2018 07:00) (14 - 34)  SpO2: 96% (2018 07:00) (91% - 98%)        -18 @ 07:01  -  -19 @ 07:00  --------------------------------------------------------  IN: 3034.8 mL / OUT: 2515 mL / NET: 519.8 mL      CAPILLARY BLOOD GLUCOSE      POCT Blood Glucose.: 101 mg/dL (2018 14:40)    LINES: [facility] MEDICATIONS:  aspirin  chewable 81 milliGRAM(s) Oral daily  heparin  Injectable 5000 Unit(s) SubCutaneous every 8 hours    piperacillin/tazobactam IVPB. 3.375 Gram(s) IV Intermittent every 12 hours    dexmedetomidine Infusion 0.1 MICROgram(s)/kG/Hr IV Continuous <Continuous>    sodium chloride 0.9%. 1000 milliLiter(s) IV Continuous <Continuous>      triamcinolone 0.1% Cream 1 Application(s) Topical every 12 hours    nicotine - 21 mG/24Hr(s) Patch 1 patch Transdermal daily      LABS:                        15.3   15.1  )-----------( 96       ( 2018 04:59 )             47.4     Hgb Trend: 15.3<--, 15.1<--, 15.2<--, 15.0<--, 15.2<--  19    138  |  103  |  80<H>  ----------------------------<  97  4.8   |  15<L>  |  6.07<H>    Ca    7.9<L>      2018 04:58  Phos  4.0       Mg     2.0         TPro  6.0  /  Alb  2.9<L>  /  TBili  0.4  /  DBili  x   /  AST  175<H>  /  ALT  260<H>  /  AlkPhos  76      Creatinine Trend: 6.07<--, 5.88<--, 5.84<--, 5.59<--, 5.23<--, 6.10<--  PT/INR - ( 2018 15:03 )   PT: 12.0 sec;   INR: 1.11 ratio         PTT - ( 2018 15:03 )  PTT:28.2 sec  Urinalysis Basic - ( 2018 15:28 )    Color: Yellow / Appearance: SL Turbid / S.008 / pH: x  Gluc: x / Ketone: Negative  / Bili: Negative / Urobili: Negative   Blood: x / Protein: 30 mg/dL / Nitrite: Negative   Leuk Esterase: Negative / RBC: 5-10 /HPF / WBC 3-5 /HPF   Sq Epi: x / Non Sq Epi: x / Bacteria: Few /HPF      Arterial Blood Gas:   @ 20:32  7.28/31/89/14/96/-11.3  ABG lactate: --    Venous Blood Gas:   @ 12:11  7.40/28/109/17/98  VBG Lactate: 1.4  Venous Blood Gas:   @ 06:18  7.35/39/40/21/72  VBG Lactate: 2.2  Venous Blood Gas:   @ 23:15  7.27/37/46/16/79  VBG Lactate: --  Venous Blood Gas:   @ 15:03  7.26/34/108/14/97  VBG Lactate: 1.9      MICROBIOLOGY:     Culture - Urine (collected 2018 19:59)  Source: .Urine Clean Catch (Midstream)  Final Report (2018 21:59):    No growth    Culture - Blood (collected 2018 17:06)  Source: .Blood Blood  Preliminary Report (2018 18:02):    No growth to date.    Culture - Blood (collected 2018 17:06)  Source: .Blood Blood  Preliminary Report (2018 18:02):    No growth to date.    Culture - Blood (collected 2018 13:06)  Source: .Blood Blood  Preliminary Report (2018 14:01):    No growth to date.    Culture - Blood (collected 2018 13:06)  Source: .Blood Blood  Preliminary Report (2018 14:01):    No growth to date.    HIV-1/2 Antigen/Antibody Screen by CMIA (18 @ 15:43)    HIV-1/2 Combo Result: Nonreact: The HIV Ag/Ab Combo test performed screens for HIV-1 p24 antigen,  antibodies to HIV-1 (group M and group O), and antibodies to HIV-2. All  specimens repeatedly reactive will reflex to an HIV 1/2 antibody  confirmation and differentiation test. This assay detects p24 antigen  which may be present prior to the development of HIV antibodies,  therefore a reactive result with a negative HIV 1/2 AB Confirmation  should be followed up with HIV-1 RNA, HIV-2 RNA and repeat testing in 4-8  weeks. A nonreactive result does not preclude previous exposure to or  infection with HIV-1 or HIV-2. [facility] prohibits disclosure of this  result to any unauthorized party.    RADIOLOGY:  < from: CT Abdomen and Pelvis No Cont (18 @ 14:22) >  EXAM:  CT ABDOMEN AND PELVIS                        PROCEDURE DATE:  2018      INTERPRETATION:  CLINICAL INFORMATION: Alcohol abuse, found down.   Rhabdomyolysis and renal failure. Abdominal distention.    COMPARISON: CT abdomen andpelvis performed 2018.    PROCEDURE:   CT of the Abdomen and Pelvis was performed without intravenous contrast.   Intravenous contrast: None.  Oral contrast: None.  Sagittal and coronal reformats were performed.    FINDINGS:    LOWER CHEST: Bilateral lower lobe linear atelectasis.    LIVER: Within normal limits.  BILE DUCTS: Normal caliber.  GALLBLADDER: Within normal limits.  SPLEEN: Within normal limits.  PANCREAS: Within normal limits.  ADRENALS: Right adrenal gland nodularity likely due to adenoma, as seen   on recent study. Stable low-density left adrenal thickening.  KIDNEYS/URETERS: Atrophic left kidney.    BLADDER: Contains a Gutierres catheter.  REPRODUCTIVE ORGANS: The prostate is within normal limits.    BOWEL: Near diffuse dilatation of small bowel to the level of the   terminal ileum, ileus favored over obstruction. Colonic diverticulosis.   Appendix is normal.  PERITONEUM: Small volume right upper quadrant ascites. Mesenteric edema.  VESSELS:  Mild atherosclerotic calcifications.  RETROPERITONEUM: No lymphadenopathy.    ABDOMINAL WALL: Left anterior thigh muscular lipoma.  BONES: Degenerative changes. Age indeterminant L2 compression fracture   deformity.    IMPRESSION:     New diffuse dilatation of fluid-filled small bowel loops without discrete   transition point. Ileus is favored over small bowel obstruction. Small   volume ascites and mesenteric edema are present.    Other findings are unchanged, as described above.          < end of copied text >          < from: CT Abdomen and Pelvis No Cont (18 @ 10:24) >    EXAM:  CT ABDOMEN AND PELVIS                          EXAM:  CT CHEST                                  PROCEDURE DATE:  2018          INTERPRETATION:  CLINICAL INFORMATION: Altered mental status. Pain.    CT of the chest, abdomen and pelvis was performed without intravenous   contrast. Sagittal and coronal reconstructions were obtained. Axial   maximum intensity projection images were obtained.    Comparison: None.    CHEST:    Thyroid: Unremarkable  Heart:  Unremarkable.    Lymph nodes: No significant adenopathy.    Lungs and Pleura: There are bilateral dependent atelectatic changes.   There are no nodules masses or consolidations.  Airways: Patent.    ABDOMEN:   Liver: normal.  Spleen: normal.  Pancreas: normal.  Adrenal glands:  2.8 x 2.0 cm low density nodule right adrenal gland   likely a benign adenoma. Markedly enlarged left adrenal gland measuring   at least 4.7 x 4.3 cm also low density, however given its size and the   definitive diagnosis of benign adenoma is uncertain.Recommend MRI   evaluation.   Gall bladder: normal.  Kidneys: Atrophic left kidney.    Lymph nodes: There is no retroperitoneal or abdominal lymphadenopathy.  Aorta and branch vessels: Unremarkable.  Inferior vena cava: Unremarkable.    Bowel: Scattered small colonic diverticula without diverticulitis. Large   and small bowel are otherwise unremarkable. Normal appendix.    There is no free air of free fluid. There is mild nonspecific fat   stranding extending from the renal fossa on the right to the distal psoas   muscle.    Pelvis:  Bladder is collapsed around Gutierres catheter.  Normal prostate and seminal vesicles.  There is no significant inguinal or pelvic adenopathy    Osseous structures:  The osseous structures show scattered degenerativechanges. There is   anterior wedging of the L2 vertebral body of uncertain chronicity.   Correlate clinically for pain.    IMPRESSION:    2.8 x 2.0 cm low density nodule right adrenal gland likely a benign   adenoma. Markedly enlarged left adrenal gland measuring at least 4.7 x   4.3 cm also low density, however given its size and the definitive   diagnosis of benign adenoma is uncertain. Recommend MRI evaluation.     Atrophic left kidney.    There is anterior wedging of the L2 vertebral body of uncertain   chronicity. Correlate clinically for pain.      < from: CT Head No Cont (18 @ 09:59) >    EXAM:  CT BRAIN                          PROCEDURE DATE:  2018      INTERPRETATION:  CLINICAL INFORMATION: Altered mental status    < from: Xray Abdomen 1 View PORTABLE -Urgent (18 @ 23:17) >    EXAM:  XR ABDOMEN PORTABLE URGENT 1V                          PROCEDURE DATE:  2018         INTERPRETATION:  gaseous distension of the small and large bowel likely     COMPARISON: No prior studies are available for comparison at this   institution.    FINDINGS:     There is diffuse low density appearance suggesting a subacute infarction   within the MCA distribution on the left. This involves the high parietal   and posterior parietal regions. There is similar findings in the adjacent   occipital lobe. There is associated mild sulcal effacement. There is no   midline shift. The ventricles are normal size. No acute hemorrhage is   noted. Small focus of low density is also noted in the left cerebellum.    There is a partially imaged unerupted tooth seen at the left maxillary   sinus. The imaged portions of the paranasal sinuses and mastoids are   otherwise well aerated.     There is no osseous abnormality.    IMPRESSION:  Left MCA distribution and probable left posterior cerebral artery   distribution changes most consistent with subacute infarction. No   evidence for acute hemorrhage. MRI is recommended for further   characterization.        EKG:

## 2018-06-19 NOTE — PROGRESS NOTE ADULT - SUBJECTIVE AND OBJECTIVE BOX
[facility] DIVISION OF KIDNEY DISEASES AND HYPERTENSION -- FOLLOW UP NOTE  --------------------------------------------------------------------------------  Chief Complaint:  SHAQUILLE    24 hour events/subjective:  Patient is more alert today, responsive and verbal.        PAST HISTORY  --------------------------------------------------------------------------------  No significant changes to PMH, PSH, FHx, SHx, unless otherwise noted    ALLERGIES & MEDICATIONS  --------------------------------------------------------------------------------  Allergies    Allergy Status Unknown    Intolerances      Standing Inpatient Medications  aspirin  chewable 81 milliGRAM(s) Oral daily  dexmedetomidine Infusion 0.1 MICROgram(s)/kG/Hr IV Continuous <Continuous>  heparin  Injectable 5000 Unit(s) SubCutaneous every 8 hours  nicotine - 21 mG/24Hr(s) Patch 1 patch Transdermal daily  piperacillin/tazobactam IVPB. 3.375 Gram(s) IV Intermittent every 12 hours  sodium chloride 0.9%. 1000 milliLiter(s) IV Continuous <Continuous>  triamcinolone 0.1% Cream 1 Application(s) Topical every 12 hours    PRN Inpatient Medications      REVIEW OF SYSTEMS  --------------------------------------------------------------------------------  Gen: No fevers/chills  Skin: No rashes  Head/Eyes/Ears/Mouth: No headache  Respiratory: No dyspnea  CV: No chest pain  GI: No abdominal pain  : No dysuria  MSK: No edema  Neuro: No dizziness/lightheadedness    VITALS/PHYSICAL EXAM  --------------------------------------------------------------------------------  T(C): 36.8 (06-19-18 @ 08:00), Max: 37.7 (06-18-18 @ 20:00)  HR: 76 (06-19-18 @ 09:00) (76 - 101)  BP: 100/71 (06-19-18 @ 09:00) (100/71 - 136/72)  RR: 17 (06-19-18 @ 09:00) (13 - 34)  SpO2: 96% (06-19-18 @ 09:00) (91% - 98%)  Wt(kg): --  Height (cm): 172.72 (06-17-18 @ 19:46)  Weight (kg): 119.7 (06-17-18 @ 19:46)  BMI (kg/m2): 40.1 (06-17-18 @ 19:46)  BSA (m2): 2.3 (06-17-18 @ 19:46)      06-18-18 @ 07:01  -  06-19-18 @ 07:00  --------------------------------------------------------  IN: 3034.8 mL / OUT: 2515 mL / NET: 519.8 mL    06-19-18 @ 07:01  -  06-19-18 @ 10:43  --------------------------------------------------------  IN: 429 mL / OUT: 200 mL / NET: 229 mL      Physical Exam:  	Gen: NAD  	HEENT: MMM  	Pulm: CTA B/L  	CV: RRR, S1S2; no rub  	Abd: nontender, +distended  	: No suprapubic tenderness, + gilbert catheter  	UE:  +mild edema  	LE:  +mild pitting edema bilaterally  	Neuro: No focal deficits  	Psych: Normal affect and mood  	Skin: Warm    LABS/STUDIES  --------------------------------------------------------------------------------              15.3   15.1  >-----------<  96       [06-19-18 @ 04:59]              47.4     138  |  103  |  80  ----------------------------<  97      [06-19-18 @ 04:58]  4.8   |  15  |  6.07        Ca     7.9     [06-19-18 @ 04:58]      Mg     2.0     [06-19-18 @ 04:58]      Phos  4.0     [06-19-18 @ 04:58]    TPro  6.0  /  Alb  2.9  /  TBili  0.4  /  DBili  x   /  AST  175  /  ALT  260  /  AlkPhos  76  [06-19-18 @ 04:58]    PT/INR: PT 12.0 , INR 1.11       [06-17-18 @ 15:03]  PTT: 28.2       [06-17-18 @ 15:03]    Troponin 12.338      [06-17-18 @ 10:56]  CK 6274      [06-19-18 @ 04:58]  Serum Osmolality 311      [06-18-18 @ 09:54]    Creatinine Trend:  SCr 6.07 [06-19 @ 04:58]  SCr 5.88 [06-19 @ 03:38]  SCr 5.84 [06-18 @ 20:55]  SCr 5.59 [06-18 @ 13:07]  SCr 5.23 [06-18 @ 06:22]

## 2018-06-19 NOTE — SWALLOW BEDSIDE ASSESSMENT ADULT - COMMENTS
Hx cont: In ED, T: 99.3 rectal, HR: 88, BP: 137/86, RR;18, SpO2: 99% on O2 Labs: WBC 15.6, K: 6.4, BUN/Cr: 74/5.41, ALP: 91, AST: 578, ALT:492, CK>70040. Trop 542, U Tox + for cocaine. Pt got 1L NS, Albuterol/calcium gluconate/Insulin reg 5U/D50, furosemide 60. Methylpred 125mg. Patient with hx of cocaine and EtOH use presents with altered mentation, found to have subacute CVA with R hemiparesis, R hand and UE edema with associated rhabdo, SHAQUILLE with hyperkalemia and metabolic acidosis, encephalopathy. + trop in setting of rhabdo cocaine use. Renal called for SHAQUILLE and hyperkalemia. Found to have left atrophic kidney on CT scan. s/p HD. CT head: Left MCA distribution and probable left posterior cerebral artery distribution changes most consistent with subacute infarction. Per neuro: On neuro exam, patient oriented only to self. Minimally follows commands. No dysarthria or aphasia. No blink to threat on right. RUE and RLE weakness. Decreased sensation in RLE. Petechial rash on extremities. Babinski on right. CT head showed subacute infarct in L PCA and MCA distribution. NIHSS 14. MICU: Leukocytosis w/ bandemia. UA and CXR benign. Abx for possible asp pna. ID and derm eval for widespread body rash. ID: Continue zosyn in setting of worsening bandemia for possibly aspiration from being down and now with infarct. Derm: The skin eruption represents morbiliform rash likely 2/2 medication exposure. Possibly the "sulfa" abx pt received after a groin abscess draining. Dental on board for loose front tooth and tongue wound. + 1cm healing laceration to left anterior tongue. White granulation tissue present. Hemostatic. Firm, likely scar tissue forming. Patient has tooth #9 with grade II mobility, not an aspiration risk at this time. Tooth #9 with grade II mobility, still supported within bone, gingival recession and bone loss consistent with chronic periodontitis.  No urgent dental treatment needed at this time. On 6/19 per MICU,

## 2018-06-19 NOTE — SWALLOW BEDSIDE ASSESSMENT ADULT - SLP PERTINENT HISTORY OF CURRENT PROBLEM
52 M w/ with unknown PMHx, who was transferred from [place] for ARF. Per son, he visited him on Saturday (patient lives alone) and was acting "different". He gave him some water and came back today to check up on him. He was very lethargic, not speaking, and found down on the floor. He noticed rash on the patient's trunk and leg, decided to bring his father to [place]. Pt has not been to a doctor before, no hx of renal disease. Pt was recently at [facility] a few weeks prior for cyst near the groin. Cyst was drained, but unclear if received abx. Labs were drawn at that time, son says renal function was within normal limits at that time. See history in addendum below

## 2018-06-19 NOTE — SWALLOW BEDSIDE ASSESSMENT ADULT - SWALLOW EVAL: DIAGNOSIS
Chart reviewed. Per MICU note, pt with most likely ileus, and GIB. Discussed with MD Miller. Swallow evaluation on hold at this time. This service to sign off. Team to reconsult this service when medically appropriate. Chart reviewed. Per MICU note, pt with most likely ileus and GIB. Discussed with MD Miller. Swallow evaluation on hold at this time. This service to sign off. Team to reconsult this service when medically appropriate.

## 2018-06-20 DIAGNOSIS — D69.6 THROMBOCYTOPENIA, UNSPECIFIED: ICD-10-CM

## 2018-06-20 DIAGNOSIS — Z29.9 ENCOUNTER FOR PROPHYLACTIC MEASURES, UNSPECIFIED: ICD-10-CM

## 2018-06-20 DIAGNOSIS — K56.7 ILEUS, UNSPECIFIED: ICD-10-CM

## 2018-06-20 DIAGNOSIS — R21 RASH AND OTHER NONSPECIFIC SKIN ERUPTION: ICD-10-CM

## 2018-06-20 DIAGNOSIS — R74.8 ABNORMAL LEVELS OF OTHER SERUM ENZYMES: ICD-10-CM

## 2018-06-20 DIAGNOSIS — F19.90 OTHER PSYCHOACTIVE SUBSTANCE USE, UNSPECIFIED, UNCOMPLICATED: ICD-10-CM

## 2018-06-20 DIAGNOSIS — I67.82 CEREBRAL ISCHEMIA: ICD-10-CM

## 2018-06-20 DIAGNOSIS — M62.82 RHABDOMYOLYSIS: ICD-10-CM

## 2018-06-20 DIAGNOSIS — D72.829 ELEVATED WHITE BLOOD CELL COUNT, UNSPECIFIED: ICD-10-CM

## 2018-06-20 LAB
ALBUMIN SERPL ELPH-MCNC: 2.7 G/DL — LOW (ref 3.3–5)
ALBUMIN SERPL ELPH-MCNC: 2.8 G/DL — LOW (ref 3.3–5)
ALBUMIN SERPL ELPH-MCNC: 3.1 G/DL — LOW (ref 3.3–5)
ALP SERPL-CCNC: 77 U/L — SIGNIFICANT CHANGE UP (ref 40–120)
ALP SERPL-CCNC: 87 U/L — SIGNIFICANT CHANGE UP (ref 40–120)
ALP SERPL-CCNC: 91 U/L — SIGNIFICANT CHANGE UP (ref 40–120)
ALT FLD-CCNC: 163 U/L — HIGH (ref 10–45)
ALT FLD-CCNC: 165 U/L — HIGH (ref 10–45)
ALT FLD-CCNC: 177 U/L — HIGH (ref 10–45)
ANION GAP SERPL CALC-SCNC: 17 MMOL/L — SIGNIFICANT CHANGE UP (ref 5–17)
ANION GAP SERPL CALC-SCNC: 17 MMOL/L — SIGNIFICANT CHANGE UP (ref 5–17)
ANION GAP SERPL CALC-SCNC: 18 MMOL/L — HIGH (ref 5–17)
ANION GAP SERPL CALC-SCNC: 19 MMOL/L — HIGH (ref 5–17)
APPEARANCE UR: CLEAR — SIGNIFICANT CHANGE UP
APTT BLD: 25 SEC — LOW (ref 27.5–37.4)
AST SERPL-CCNC: 70 U/L — HIGH (ref 10–40)
AST SERPL-CCNC: 79 U/L — HIGH (ref 10–40)
AST SERPL-CCNC: 86 U/L — HIGH (ref 10–40)
AUTO DIFF PNL BLD: NEGATIVE — SIGNIFICANT CHANGE UP
BASE EXCESS BLDV CALC-SCNC: -3.7 MMOL/L — LOW (ref -2–2)
BASE EXCESS BLDV CALC-SCNC: -3.9 MMOL/L — LOW (ref -2–2)
BILIRUB SERPL-MCNC: 0.4 MG/DL — SIGNIFICANT CHANGE UP (ref 0.2–1.2)
BILIRUB SERPL-MCNC: 0.4 MG/DL — SIGNIFICANT CHANGE UP (ref 0.2–1.2)
BILIRUB SERPL-MCNC: 0.5 MG/DL — SIGNIFICANT CHANGE UP (ref 0.2–1.2)
BILIRUB UR-MCNC: NEGATIVE — SIGNIFICANT CHANGE UP
BUN SERPL-MCNC: 78 MG/DL — HIGH (ref 7–23)
BUN SERPL-MCNC: 84 MG/DL — HIGH (ref 7–23)
BUN SERPL-MCNC: 84 MG/DL — HIGH (ref 7–23)
BUN SERPL-MCNC: 86 MG/DL — HIGH (ref 7–23)
C-ANCA SER-ACNC: NEGATIVE — SIGNIFICANT CHANGE UP
CA-I SERPL-SCNC: 1.08 MMOL/L — LOW (ref 1.12–1.3)
CA-I SERPL-SCNC: 1.16 MMOL/L — SIGNIFICANT CHANGE UP (ref 1.12–1.3)
CALCIUM SERPL-MCNC: 7.9 MG/DL — LOW (ref 8.4–10.5)
CALCIUM SERPL-MCNC: 8.2 MG/DL — LOW (ref 8.4–10.5)
CALCIUM SERPL-MCNC: 8.3 MG/DL — LOW (ref 8.4–10.5)
CALCIUM SERPL-MCNC: 8.7 MG/DL — SIGNIFICANT CHANGE UP (ref 8.4–10.5)
CHLORIDE BLDV-SCNC: 110 MMOL/L — HIGH (ref 96–108)
CHLORIDE BLDV-SCNC: 111 MMOL/L — HIGH (ref 96–108)
CHLORIDE SERPL-SCNC: 104 MMOL/L — SIGNIFICANT CHANGE UP (ref 96–108)
CHLORIDE SERPL-SCNC: 104 MMOL/L — SIGNIFICANT CHANGE UP (ref 96–108)
CHLORIDE SERPL-SCNC: 105 MMOL/L — SIGNIFICANT CHANGE UP (ref 96–108)
CHLORIDE SERPL-SCNC: 105 MMOL/L — SIGNIFICANT CHANGE UP (ref 96–108)
CK MB BLD-MCNC: 0.4 % — SIGNIFICANT CHANGE UP (ref 0–3.5)
CK MB BLD-MCNC: 0.4 % — SIGNIFICANT CHANGE UP (ref 0–3.5)
CK MB BLD-MCNC: 0.5 % — SIGNIFICANT CHANGE UP (ref 0–3.5)
CK MB CFR SERPL CALC: 10.6 NG/ML — HIGH (ref 0–6.7)
CK MB CFR SERPL CALC: 7.7 NG/ML — HIGH (ref 0–6.7)
CK MB CFR SERPL CALC: 9.1 NG/ML — HIGH (ref 0–6.7)
CK SERPL-CCNC: 1627 U/L — HIGH (ref 30–200)
CK SERPL-CCNC: 1856 U/L — HIGH (ref 30–200)
CK SERPL-CCNC: 2236 U/L — HIGH (ref 30–200)
CK SERPL-CCNC: 2796 U/L — HIGH (ref 30–200)
CMV DNA CSF QL NAA+PROBE: SIGNIFICANT CHANGE UP
CMV DNA SPEC NAA+PROBE-LOG#: SIGNIFICANT CHANGE UP LOGIU/ML
CO2 BLDV-SCNC: 20 MMOL/L — LOW (ref 22–30)
CO2 BLDV-SCNC: 22 MMOL/L — SIGNIFICANT CHANGE UP (ref 22–30)
CO2 SERPL-SCNC: 15 MMOL/L — LOW (ref 22–31)
CO2 SERPL-SCNC: 18 MMOL/L — LOW (ref 22–31)
CO2 SERPL-SCNC: 19 MMOL/L — LOW (ref 22–31)
CO2 SERPL-SCNC: 19 MMOL/L — LOW (ref 22–31)
COLOR SPEC: YELLOW — SIGNIFICANT CHANGE UP
COMMENT - URINE: SIGNIFICANT CHANGE UP
CREAT SERPL-MCNC: 4.57 MG/DL — HIGH (ref 0.5–1.3)
CREAT SERPL-MCNC: 4.74 MG/DL — HIGH (ref 0.5–1.3)
CREAT SERPL-MCNC: 5.02 MG/DL — HIGH (ref 0.5–1.3)
CREAT SERPL-MCNC: 5.04 MG/DL — HIGH (ref 0.5–1.3)
DIFF PNL FLD: ABNORMAL
GAS PNL BLDV: 137 MMOL/L — SIGNIFICANT CHANGE UP (ref 136–145)
GAS PNL BLDV: 137 MMOL/L — SIGNIFICANT CHANGE UP (ref 136–145)
GAS PNL BLDV: SIGNIFICANT CHANGE UP
GLUCOSE BLDV-MCNC: 101 MG/DL — HIGH (ref 70–99)
GLUCOSE BLDV-MCNC: 116 MG/DL — HIGH (ref 70–99)
GLUCOSE SERPL-MCNC: 102 MG/DL — HIGH (ref 70–99)
GLUCOSE SERPL-MCNC: 104 MG/DL — HIGH (ref 70–99)
GLUCOSE SERPL-MCNC: 120 MG/DL — HIGH (ref 70–99)
GLUCOSE SERPL-MCNC: 122 MG/DL — HIGH (ref 70–99)
GLUCOSE UR QL: 250 MG/DL
HCO3 BLDV-SCNC: 19 MMOL/L — LOW (ref 21–29)
HCO3 BLDV-SCNC: 21 MMOL/L — SIGNIFICANT CHANGE UP (ref 21–29)
HCT VFR BLD CALC: 31.7 % — LOW (ref 39–50)
HCT VFR BLD CALC: 44.4 % — SIGNIFICANT CHANGE UP (ref 39–50)
HCT VFR BLDA CALC: 46 % — SIGNIFICANT CHANGE UP (ref 39–50)
HCT VFR BLDA CALC: 48 % — SIGNIFICANT CHANGE UP (ref 39–50)
HGB BLD CALC-MCNC: 14.9 G/DL — SIGNIFICANT CHANGE UP (ref 13–17)
HGB BLD CALC-MCNC: 15.6 G/DL — SIGNIFICANT CHANGE UP (ref 13–17)
HGB BLD-MCNC: 10.7 G/DL — LOW (ref 13–17)
HGB BLD-MCNC: 15 G/DL — SIGNIFICANT CHANGE UP (ref 13–17)
HOROWITZ INDEX BLDV+IHG-RTO: 21 — SIGNIFICANT CHANGE UP
INR BLD: 1.06 RATIO — SIGNIFICANT CHANGE UP (ref 0.88–1.16)
KETONES UR-MCNC: NEGATIVE — SIGNIFICANT CHANGE UP
LACTATE BLDV-MCNC: 1.3 MMOL/L — SIGNIFICANT CHANGE UP (ref 0.7–2)
LACTATE BLDV-MCNC: 1.8 MMOL/L — SIGNIFICANT CHANGE UP (ref 0.7–2)
LACTATE BLDV-MCNC: 1.8 MMOL/L — SIGNIFICANT CHANGE UP (ref 0.7–2)
LEUKOCYTE ESTERASE UR-ACNC: NEGATIVE — SIGNIFICANT CHANGE UP
MAGNESIUM SERPL-MCNC: 2 MG/DL — SIGNIFICANT CHANGE UP (ref 1.6–2.6)
MCHC RBC-ENTMCNC: 32 PG — SIGNIFICANT CHANGE UP (ref 27–34)
MCHC RBC-ENTMCNC: 32.4 PG — SIGNIFICANT CHANGE UP (ref 27–34)
MCHC RBC-ENTMCNC: 33.6 GM/DL — SIGNIFICANT CHANGE UP (ref 32–36)
MCHC RBC-ENTMCNC: 33.9 GM/DL — SIGNIFICANT CHANGE UP (ref 32–36)
MCV RBC AUTO: 95.3 FL — SIGNIFICANT CHANGE UP (ref 80–100)
MCV RBC AUTO: 95.6 FL — SIGNIFICANT CHANGE UP (ref 80–100)
NITRITE UR-MCNC: NEGATIVE — SIGNIFICANT CHANGE UP
OTHER CELLS CSF MANUAL: 14 ML/DL — LOW (ref 18–22)
OTHER CELLS CSF MANUAL: 18 ML/DL — SIGNIFICANT CHANGE UP (ref 18–22)
P-ANCA SER-ACNC: NEGATIVE — SIGNIFICANT CHANGE UP
PCO2 BLDV: 31 MMHG — LOW (ref 35–50)
PCO2 BLDV: 38 MMHG — SIGNIFICANT CHANGE UP (ref 35–50)
PH BLDV: 7.36 — SIGNIFICANT CHANGE UP (ref 7.35–7.45)
PH BLDV: 7.41 — SIGNIFICANT CHANGE UP (ref 7.35–7.45)
PH UR: 5.5 — SIGNIFICANT CHANGE UP (ref 5–8)
PHOSPHATE SERPL-MCNC: 4.2 MG/DL — SIGNIFICANT CHANGE UP (ref 2.5–4.5)
PHOSPHATE SERPL-MCNC: 4.5 MG/DL — SIGNIFICANT CHANGE UP (ref 2.5–4.5)
PHOSPHATE SERPL-MCNC: 4.8 MG/DL — HIGH (ref 2.5–4.5)
PLATELET # BLD AUTO: 107 K/UL — LOW (ref 150–400)
PLATELET # BLD AUTO: 96 K/UL — LOW (ref 150–400)
PO2 BLDV: 39 MMHG — SIGNIFICANT CHANGE UP (ref 25–45)
PO2 BLDV: 58 MMHG — HIGH (ref 25–45)
POTASSIUM BLDV-SCNC: 4.4 MMOL/L — SIGNIFICANT CHANGE UP (ref 3.5–5.3)
POTASSIUM BLDV-SCNC: 4.4 MMOL/L — SIGNIFICANT CHANGE UP (ref 3.5–5.3)
POTASSIUM SERPL-MCNC: 4.5 MMOL/L — SIGNIFICANT CHANGE UP (ref 3.5–5.3)
POTASSIUM SERPL-MCNC: 4.5 MMOL/L — SIGNIFICANT CHANGE UP (ref 3.5–5.3)
POTASSIUM SERPL-MCNC: 4.7 MMOL/L — SIGNIFICANT CHANGE UP (ref 3.5–5.3)
POTASSIUM SERPL-MCNC: 5 MMOL/L — SIGNIFICANT CHANGE UP (ref 3.5–5.3)
POTASSIUM SERPL-SCNC: 4.5 MMOL/L — SIGNIFICANT CHANGE UP (ref 3.5–5.3)
POTASSIUM SERPL-SCNC: 4.5 MMOL/L — SIGNIFICANT CHANGE UP (ref 3.5–5.3)
POTASSIUM SERPL-SCNC: 4.7 MMOL/L — SIGNIFICANT CHANGE UP (ref 3.5–5.3)
POTASSIUM SERPL-SCNC: 5 MMOL/L — SIGNIFICANT CHANGE UP (ref 3.5–5.3)
PROT SERPL-MCNC: 6.1 G/DL — SIGNIFICANT CHANGE UP (ref 6–8.3)
PROT SERPL-MCNC: 6.2 G/DL — SIGNIFICANT CHANGE UP (ref 6–8.3)
PROT SERPL-MCNC: 6.6 G/DL — SIGNIFICANT CHANGE UP (ref 6–8.3)
PROT UR-MCNC: 30 MG/DL
PROTHROM AB SERPL-ACNC: 11.5 SEC — SIGNIFICANT CHANGE UP (ref 9.8–12.7)
RBC # BLD: 3.33 M/UL — LOW (ref 4.2–5.8)
RBC # BLD: 4.64 M/UL — SIGNIFICANT CHANGE UP (ref 4.2–5.8)
RBC # FLD: 12.5 % — SIGNIFICANT CHANGE UP (ref 10.3–14.5)
RBC # FLD: 12.8 % — SIGNIFICANT CHANGE UP (ref 10.3–14.5)
RBC CASTS # UR COMP ASSIST: ABNORMAL /HPF (ref 0–2)
SAO2 % BLDV: 67 % — SIGNIFICANT CHANGE UP (ref 67–88)
SAO2 % BLDV: 87 % — SIGNIFICANT CHANGE UP (ref 67–88)
SODIUM SERPL-SCNC: 139 MMOL/L — SIGNIFICANT CHANGE UP (ref 135–145)
SODIUM SERPL-SCNC: 140 MMOL/L — SIGNIFICANT CHANGE UP (ref 135–145)
SODIUM SERPL-SCNC: 140 MMOL/L — SIGNIFICANT CHANGE UP (ref 135–145)
SODIUM SERPL-SCNC: 141 MMOL/L — SIGNIFICANT CHANGE UP (ref 135–145)
SP GR SPEC: 1.01 — SIGNIFICANT CHANGE UP (ref 1.01–1.02)
TROPONIN T, HIGH SENSITIVITY RESULT: 446 NG/L — HIGH (ref 0–51)
TROPONIN T, HIGH SENSITIVITY RESULT: 454 NG/L — HIGH (ref 0–51)
TROPONIN T, HIGH SENSITIVITY RESULT: 463 NG/L — HIGH (ref 0–51)
TROPONIN T, HIGH SENSITIVITY RESULT: 475 NG/L — HIGH (ref 0–51)
UROBILINOGEN FLD QL: NEGATIVE — SIGNIFICANT CHANGE UP
WBC # BLD: 19.8 K/UL — HIGH (ref 3.8–10.5)
WBC # BLD: 21.6 K/UL — HIGH (ref 3.8–10.5)
WBC # FLD AUTO: 19.8 K/UL — HIGH (ref 3.8–10.5)
WBC # FLD AUTO: 21.6 K/UL — HIGH (ref 3.8–10.5)
WBC UR QL: SIGNIFICANT CHANGE UP /HPF (ref 0–5)

## 2018-06-20 PROCEDURE — 99232 SBSQ HOSP IP/OBS MODERATE 35: CPT

## 2018-06-20 PROCEDURE — 99223 1ST HOSP IP/OBS HIGH 75: CPT | Mod: GC

## 2018-06-20 PROCEDURE — 99232 SBSQ HOSP IP/OBS MODERATE 35: CPT | Mod: GC

## 2018-06-20 PROCEDURE — 70551 MRI BRAIN STEM W/O DYE: CPT | Mod: 26

## 2018-06-20 PROCEDURE — 93306 TTE W/DOPPLER COMPLETE: CPT | Mod: 26

## 2018-06-20 RX ORDER — SODIUM CHLORIDE 9 MG/ML
1000 INJECTION, SOLUTION INTRAVENOUS
Qty: 0 | Refills: 0 | Status: DISCONTINUED | OUTPATIENT
Start: 2018-06-20 | End: 2018-06-27

## 2018-06-20 RX ORDER — SIMETHICONE 80 MG/1
80 TABLET, CHEWABLE ORAL DAILY
Qty: 0 | Refills: 0 | Status: DISCONTINUED | OUTPATIENT
Start: 2018-06-20 | End: 2018-06-27

## 2018-06-20 RX ORDER — ASPIRIN/CALCIUM CARB/MAGNESIUM 324 MG
81 TABLET ORAL DAILY
Qty: 0 | Refills: 0 | Status: DISCONTINUED | OUTPATIENT
Start: 2018-06-20 | End: 2018-06-27

## 2018-06-20 RX ORDER — SODIUM CHLORIDE 9 MG/ML
1000 INJECTION, SOLUTION INTRAVENOUS
Qty: 0 | Refills: 0 | Status: DISCONTINUED | OUTPATIENT
Start: 2018-06-20 | End: 2018-06-20

## 2018-06-20 RX ADMIN — HEPARIN SODIUM 5000 UNIT(S): 5000 INJECTION INTRAVENOUS; SUBCUTANEOUS at 06:01

## 2018-06-20 RX ADMIN — SODIUM CHLORIDE 75 MILLILITER(S): 9 INJECTION, SOLUTION INTRAVENOUS at 00:36

## 2018-06-20 RX ADMIN — PIPERACILLIN AND TAZOBACTAM 25 GRAM(S): 4; .5 INJECTION, POWDER, LYOPHILIZED, FOR SOLUTION INTRAVENOUS at 06:01

## 2018-06-20 RX ADMIN — PANTOPRAZOLE SODIUM 40 MILLIGRAM(S): 20 TABLET, DELAYED RELEASE ORAL at 06:01

## 2018-06-20 RX ADMIN — Medication 1 APPLICATION(S): at 17:27

## 2018-06-20 RX ADMIN — Medication 1 PATCH: at 06:03

## 2018-06-20 RX ADMIN — PIPERACILLIN AND TAZOBACTAM 25 GRAM(S): 4; .5 INJECTION, POWDER, LYOPHILIZED, FOR SOLUTION INTRAVENOUS at 17:26

## 2018-06-20 RX ADMIN — Medication 81 MILLIGRAM(S): at 17:42

## 2018-06-20 RX ADMIN — PANTOPRAZOLE SODIUM 40 MILLIGRAM(S): 20 TABLET, DELAYED RELEASE ORAL at 17:26

## 2018-06-20 RX ADMIN — HEPARIN SODIUM 5000 UNIT(S): 5000 INJECTION INTRAVENOUS; SUBCUTANEOUS at 14:14

## 2018-06-20 RX ADMIN — SODIUM CHLORIDE 75 MILLILITER(S): 9 INJECTION, SOLUTION INTRAVENOUS at 21:57

## 2018-06-20 RX ADMIN — Medication 40 MILLIGRAM(S): at 06:01

## 2018-06-20 RX ADMIN — Medication 1 APPLICATION(S): at 06:02

## 2018-06-20 RX ADMIN — SODIUM CHLORIDE 75 MILLILITER(S): 9 INJECTION, SOLUTION INTRAVENOUS at 14:14

## 2018-06-20 RX ADMIN — HEPARIN SODIUM 5000 UNIT(S): 5000 INJECTION INTRAVENOUS; SUBCUTANEOUS at 21:57

## 2018-06-20 RX ADMIN — SIMETHICONE 80 MILLIGRAM(S): 80 TABLET, CHEWABLE ORAL at 20:00

## 2018-06-20 RX ADMIN — Medication 1 PATCH: at 06:01

## 2018-06-20 NOTE — DIETITIAN INITIAL EVALUATION ADULT. - NS AS NUTRI INTERV MEALS SNACK
when approved for po by surgery, recommend clear liquids, advance to DASH when approved for po, recommend clear liquids, advance to DASH

## 2018-06-20 NOTE — PROGRESS NOTE ADULT - ASSESSMENT
51 y/o M w/ Hx of CVA, who was transferred from [place] for ARF, rhabdomyolysis found to be cocaine positive, subacute strokes on CT, noted to have a full body rash - attributed to drug rash- thought to be 2/2 possible Bactrim that was given for a recent scrotal abscess along with levaquin over the past few weeks at an OSH.    #Neuro + Subacute LMCA CVA, Mental status greatly improved since yesterday and physical exam- noting full RUE / RLE movement and full strength  -Neuro check as per ICU protocol   -Will obtain MRI, Plan fo MRA as renal function recovers and HD can be arranged for further clearance  -f/u Echo  -Carotid dopplers  -hold statins in the setting of rhabdo  -restart asa- thought have a GIB -     #CV  -HD stable  -+ trop t c/w increasing in setting of rhabdo,  cocaine use and ATN   -c/w serial CK and troponin T  -f/u echo   -Cardiology consult    #Pulmonary - respiratory status stable  -aspiration precautions  -chest PT  -c/w zosyn for aspiration PNA    GI: +Abdominal distention / probable ileus- appears to be resolving in the setting of +BM's since last night, +maroon stools x 1 yesterday- no further bleeding-CT of abd w/o contrast-pt refused was done  -NPO  -protonix 4o IV q 12 h  -f/u Surgery  -f/u GI  -speech / swallow- bedside- assess by speech pathology if pt fails bedside    #Renal/FEN   Acute renal failure w/ hyperkalemia in the setting of rhabdo, cocaine use, s/p HD x 2 since admission  -f/u bmp / renal function  -f/u u/o  -f/u by renal    #ID Leukocytosis w/ bandemia, recent tx at OSH for a scrotal abcesss tx levaquin and bactrim- over the past 3 weeks- admitted to NS with a full body rash  -f/u all cultures  -c/w zosyn for now  ID consult- pt w recent scrotal abcess s/p I/D scrotal at an OSH- was on abx- obtain records from [facility]   Dermatology consult done and following- topicals applied- will order antihistimine     #Vasc  Edema of RUE and hand- remains soft, and pref, +pulse- negative for DVT  -c/w NV checks q 4 hours

## 2018-06-20 NOTE — PROGRESS NOTE ADULT - SUBJECTIVE AND OBJECTIVE BOX
Ravi Lucas  PGY-1 Resident Physician   Pager [phone] or [phone] from 7am to 7pm, after 7pm    Patient is a 52y old  Male who presents with a chief complaint of Patient is a 52y old  Male who presents with a chief complaint of AMS, found down (2018 09:34)    HPI:     52M PMHx history of cocaine use presented with altered mental status and rash. He was found by his son three days prior to admission altered; at baseline he is fully functional, works as . His son continued to check up on him without improvement in mental status; discovered new rash around trunk, and extremities. Of note he had a ER visit for groin cyst that was drained and given "sulfa" antibiotic. He was found to have profound SHAQUILLE and rhabdomyalsis with positive for cocaine. He had troponin leak without ekg changes. He had significant LFT's which downtrended quickly, possibly due to ischemia. He had CTH which demonstrated subacute infarcts in left MCA and PCA; with weakness in right side that has not noticeably improved. He was brought to MICU for possible HD for hyperkalemia in setting of SHAQUILLE; he was dialyzed once. Regarding his rash, ID and derm were consulted; consensus was likely drug induced but not DRESS; started on topical and systemic steroids. He was noted to have distended abdomen and CT was done demonstrated ileus. His mental status improved throughout admission, returning to near baseline.       SUBJECTIVE / OVERNIGHT EVENTS:  No acute events. On telemetry normal sinus rhythm . Patient continues to have bowel movements. Patient abdomen is distended, but refuses NG tube. Patient denied fever, chills, chest pain.     MEDICATIONS  (STANDING):  aspirin enteric coated 81 milliGRAM(s) Oral daily  dextrose 5% + lactated ringers. 1000 milliLiter(s) (75 mL/Hr) IV Continuous <Continuous>  heparin  Injectable 5000 Unit(s) SubCutaneous every 8 hours  nicotine - 21 mG/24Hr(s) Patch 1 patch Transdermal daily  pantoprazole  Injectable 40 milliGRAM(s) IV Push every 12 hours  piperacillin/tazobactam IVPB. 3.375 Gram(s) IV Intermittent every 12 hours  triamcinolone 0.1% Cream 1 Application(s) Topical every 12 hours    MEDICATIONS  (PRN):    Allergies  Bactrim (Rash (Skin))  Intolerances    Vital Signs Last 24 Hrs  T(C): 36.9 (2018 16:00), Max: 37.1 (2018 21:20)  T(F): 98.5 (2018 16:00), Max: 98.8 (2018 21:20)  HR: 82 (2018 17:24) (72 - 87)  BP: 140/103 (2018 17:24) (93/56 - 149/71)  BP(mean): 118 (2018 17:24) (68 - 118)  RR: 19 (2018 17:24) (13 - 33)  SpO2: 99% (2018 17:24) (94% - 100%)  Daily Height in cm: 172.7 (2018 08:40)    Daily Weight in k.1 (2018 04:00)  CAPILLARY BLOOD GLUCOSE    I&O's Summary    2018 07:  -  2018 07:00  --------------------------------------------------------  IN: 2585 mL / OUT: 2675 mL / NET: -90 mL    2018 07:01  -  2018 17:45  --------------------------------------------------------  IN: 1765 mL / OUT: 1125 mL / NET: 640 mL    PHYSICAL EXAM:  GENERAL: NAD, well-developed  HEAD:  Atraumatic, Normocephalic  EYES: EOMI, PERRLA, conjunctiva and sclera clear  NECK: Supple, No JVD  CHEST/LUNG: Clear to auscultation bilaterally; No wheeze  HEART: Regular rate and rhythm; Normal S1 S2, No murmurs, rubs, or gallops  ABDOMEN: Soft, Nontender, Nondistended; Bowel sounds present  EXTREMITIES:  2+ Peripheral Pulses, No clubbing, cyanosis, or edema  PSYCH: AAOx3  NEUROLOGY: non-focal  SKIN: No rashes or lesions    LABS:                        15.0   21.6  )-----------( 96       ( 2018 06:43 )             47.1     Hgb Trend: 15.0<--, 15.0<--, 16.4<--, 15.3<--, 15.1<--  06-20    140  |  104  |  84<H>  ----------------------------<  120<H>  4.5   |  19<L>  |  4.57<H>    Ca    8.3<L>      2018 12:11  Phos  4.2     0620  Mg     2.0     20    TPro  6.6  /  Alb  3.1<L>  /  TBili  0.4  /  DBili  x   /  AST  70<H>  /  ALT  163<H>  /  AlkPhos  91  20    Creatinine Trend: 4.57<--, 5.02<--, 5.04<--, 5.86<--, 6.07<--, 5.88<--  LIVER FUNCTIONS - ( 2018 12:11 )  Alb: 3.1 g/dL / Pro: 6.6 g/dL / ALK PHOS: 91 U/L / ALT: 163 U/L / AST: 70 U/L / GGT: x           PT/INR - ( 2018 00:55 )   PT: 11.5 sec;   INR: 1.06 ratio         PTT - ( 2018 00:55 )  PTT:25.0 sec  CARDIAC MARKERS ( 2018 12:11 )  x     / x     / 1856 U/L / x     / x      CARDIAC MARKERS ( 2018 06:43 )  x     / x     / 2236 U/L / x     / 9.1 ng/mL  CARDIAC MARKERS ( 2018 00:55 )  x     / x     / 2796 U/L / x     / 10.6 ng/mL  CARDIAC MARKERS ( 2018 17:18 )  x     / x     / 4152 U/L / x     / 17.3 ng/mL  CARDIAC MARKERS ( 2018 04:58 )  x     / x     / 6274 U/L / x     / 30.7 ng/mL  CARDIAC MARKERS ( 2018 03:38 )  x     / x     / 7484 U/L / x     / 34.2 ng/mL  CARDIAC MARKERS ( 2018 20:55 )  x     / x     / 9039 U/L / x     / 47.8 ng/mL    Urinalysis Basic - ( 2018 11:22 )  Color: Yellow / Appearance: Clear / S.013 / pH: x  Gluc: x / Ketone: Negative  / Bili: Negative / Urobili: Negative   Blood: x / Protein: 30 mg/dL / Nitrite: Negative   Leuk Esterase: Negative / RBC: 5-10 /HPF / WBC 3-5 /HPF   Sq Epi: x / Non Sq Epi: x / Bacteria: x      RADIOLOGY & ADDITIONAL TESTS:    Imaging Personally Reviewed:    Consultant(s) Notes Reviewed:      Care Discussed with Consultants/Other Providers: MEDICINE ACCEPT NOTE     Ravi Lucas  PGY-1 Resident Physician   Pager [phone] or [phone] from 7am to 7pm, after 7pm    Patient is a 52y old  Male who presents with a chief complaint of Patient is a 52y old  Male who presents with a chief complaint of AMS, found down (2018 09:34)    HPI:     52M PMHx history of cocaine use presented with altered mental status and rash. He was found by his son three days prior to admission altered; at baseline he is fully functional, works as . His son continued to check up on him without improvement in mental status; discovered new rash around trunk, and extremities. Of note he had a ER visit for groin cyst that was drained and given "sulfa" antibiotic. He was found to have profound SHAQUILLE and rhabdomyalsis with positive for cocaine. He had troponin leak without ekg changes. He had significant LFT's which downtrended quickly, possibly due to ischemia. He had CTH which demonstrated subacute infarcts in left MCA and PCA; with weakness in right side that has not noticeably improved. He was brought to MICU for possible HD for hyperkalemia in setting of SHAQUILLE; he was dialyzed once. Regarding his rash, ID and derm were consulted; consensus was likely drug induced but not DRESS; started on topical and systemic steroids. He was noted to have distended abdomen and CT was done demonstrated ileus. His mental status improved throughout admission, returning to near baseline.     SUBJECTIVE / OVERNIGHT EVENTS:  No acute events. On telemetry normal sinus rhythm . Patient continues to have bowel movements. Patient abdomen is distended, but refuses NG tube. Patient denied fever, chills, chest pain.     MEDICATIONS  (STANDING):  aspirin enteric coated 81 milliGRAM(s) Oral daily  dextrose 5% + lactated ringers. 1000 milliLiter(s) (75 mL/Hr) IV Continuous <Continuous>  heparin  Injectable 5000 Unit(s) SubCutaneous every 8 hours  nicotine - 21 mG/24Hr(s) Patch 1 patch Transdermal daily  pantoprazole  Injectable 40 milliGRAM(s) IV Push every 12 hours  piperacillin/tazobactam IVPB. 3.375 Gram(s) IV Intermittent every 12 hours  triamcinolone 0.1% Cream 1 Application(s) Topical every 12 hours    MEDICATIONS  (PRN):    Allergies  Bactrim (Rash (Skin))  Intolerances    Vital Signs Last 24 Hrs  T(C): 36.9 (2018 16:00), Max: 37.1 (2018 21:20)  T(F): 98.5 (2018 16:00), Max: 98.8 (2018 21:20)  HR: 82 (2018 17:24) (72 - 87)  BP: 140/103 (2018 17:24) (93/56 - 149/71)  BP(mean): 118 (2018 17:24) (68 - 118)  RR: 19 (2018 17:24) (13 - 33)  SpO2: 99% (2018 17:24) (94% - 100%)  Daily Height in cm: 172.7 (2018 08:40)    Daily Weight in k.1 (2018 04:00)  CAPILLARY BLOOD GLUCOSE    I&O's Summary    2018 07:  -  2018 07:00  --------------------------------------------------------  IN: 2585 mL / OUT: 2675 mL / NET: -90 mL    2018 07:01  -  2018 17:45  --------------------------------------------------------  IN: 1765 mL / OUT: 1125 mL / NET: 640 mL    PHYSICAL EXAM:  GENERAL: NAD, well-developed.   HEAD:  Atraumatic, Normocephalic  EYES: EOMI, PERRLA, conjunctiva and sclera clear  NECK: Supple, No JVD  CHEST/LUNG: Clear to auscultation bilaterally; No wheeze  HEART: Regular rate and rhythm; Normal S1 S2, No murmurs, rubs, or gallops  ABDOMEN: Soft, Nontender, distended; Bowel sounds decreased   EXTREMITIES:  2+ Peripheral Pulses, No clubbing, cyanosis, or edema  PSYCH: AAOx3  NEUROLOGY: Left UE weakness 3/5. Right UE 5/5.   SKIN: No rashes or lesions    LABS:                        15.0   21.6  )-----------( 96       ( 2018 06:43 )             47.1     Hgb Trend: 15.0<--, 15.0<--, 16.4<--, 15.3<--, 15.1<--  06-20    140  |  104  |  84<H>  ----------------------------<  120<H>  4.5   |  19<L>  |  4.57<H>    Ca    8.3<L>      2018 12:11  Phos  4.2     20  Mg     2.0     20    TPro  6.6  /  Alb  3.1<L>  /  TBili  0.4  /  DBili  x   /  AST  70<H>  /  ALT  163<H>  /  AlkPhos  91  20    Creatinine Trend: 4.57<--, 5.02<--, 5.04<--, 5.86<--, 6.07<--, 5.88<--  LIVER FUNCTIONS - ( 2018 12:11 )  Alb: 3.1 g/dL / Pro: 6.6 g/dL / ALK PHOS: 91 U/L / ALT: 163 U/L / AST: 70 U/L / GGT: x           PT/INR - ( 2018 00:55 )   PT: 11.5 sec;   INR: 1.06 ratio      PTT - ( 2018 00:55 )  PTT:25.0 sec  CARDIAC MARKERS ( 2018 12:11 )  x     / x     / 1856 U/L / x     / x      CARDIAC MARKERS ( 2018 06:43 )  x     / x     / 2236 U/L / x     / 9.1 ng/mL  CARDIAC MARKERS ( 2018 00:55 )  x     / x     / 2796 U/L / x     / 10.6 ng/mL  CARDIAC MARKERS ( 2018 17:18 )  x     / x     / 4152 U/L / x     / 17.3 ng/mL  CARDIAC MARKERS ( 2018 04:58 )  x     / x     / 6274 U/L / x     / 30.7 ng/mL  CARDIAC MARKERS ( 2018 03:38 )  x     / x     / 7484 U/L / x     / 34.2 ng/mL  CARDIAC MARKERS ( 2018 20:55 )  x     / x     / 9039 U/L / x     / 47.8 ng/mL    Urinalysis Basic - ( 2018 11:22 )  Color: Yellow / Appearance: Clear / S.013 / pH: x  Gluc: x / Ketone: Negative  / Bili: Negative / Urobili: Negative   Blood: x / Protein: 30 mg/dL / Nitrite: Negative   Leuk Esterase: Negative / RBC: 5-10 /HPF / WBC 3-5 /HPF   Sq Epi: x / Non Sq Epi: x / Bacteria: x      RADIOLOGY & ADDITIONAL TESTS:    Imaging Personally Reviewed:    Consultant(s) Notes Reviewed:      Care Discussed with Consultants/Other Providers: MEDICINE ACCEPT NOTE     Ravi Lucas  PGY-1 Resident Physician   Pager [phone] or [phone] from 7am to 7pm, after 7pm    Patient is a 52y old  Male who presents with a chief complaint of Patient is a 52y old  Male who presents with a chief complaint of AMS, found down (2018 09:34)    HPI:     52M PMHx history of cocaine use presented with altered mental status and rash. He was found by his son three days prior to admission altered; at baseline he is fully functional, works as . His son continued to check up on him without improvement in mental status; discovered new rash around trunk, and extremities. Of note he had a ER visit for groin cyst that was drained and given "sulfa" antibiotic. He was found to have profound SHAQUILLE and rhabdomyalsis with positive for cocaine. He had troponin leak without ekg changes. He had significant LFT's which downtrended quickly, possibly due to ischemia. He had CTH which demonstrated subacute infarcts in left MCA and PCA; with weakness in right side that has not noticeably improved. He was brought to MICU for possible HD for hyperkalemia in setting of SHAQUILLE; he was dialyzed once. Regarding his rash, ID and derm were consulted; consensus was likely drug induced but not DRESS; started on topical and systemic steroids. He was noted to have distended abdomen and CT was done demonstrated ileus. His mental status improved throughout admission, returning to near baseline.     SUBJECTIVE / OVERNIGHT EVENTS:  No acute events. On telemetry normal sinus rhythm . Patient continues to have bowel movements. Patient abdomen is distended, but refuses NG tube. Patient denied fever, chills, chest pain.     Patient History:    Past Medical History:  No pertinent past medical history.     Past Surgical History:  No significant past surgical history.     Family History:  No pertinent family history in first degree relatives.     Social History:  Social History (marital status, living situation, occupation, tobacco use, alcohol and drug use, and sexual history): Pt , lives alone, has one son that checks in on him  Works as    Known hx of cocaine use  No hx of IV drug use, never smoker, no alcohol use	      MEDICATIONS  (STANDING):  aspirin enteric coated 81 milliGRAM(s) Oral daily  dextrose 5% + lactated ringers. 1000 milliLiter(s) (75 mL/Hr) IV Continuous <Continuous>  heparin  Injectable 5000 Unit(s) SubCutaneous every 8 hours  nicotine - 21 mG/24Hr(s) Patch 1 patch Transdermal daily  pantoprazole  Injectable 40 milliGRAM(s) IV Push every 12 hours  piperacillin/tazobactam IVPB. 3.375 Gram(s) IV Intermittent every 12 hours  triamcinolone 0.1% Cream 1 Application(s) Topical every 12 hours    MEDICATIONS  (PRN):    Allergies  Bactrim (Rash (Skin))  Intolerances    Vital Signs Last 24 Hrs  T(C): 36.9 (2018 16:00), Max: 37.1 (2018 21:20)  T(F): 98.5 (2018 16:00), Max: 98.8 (2018 21:20)  HR: 82 (2018 17:24) (72 - 87)  BP: 140/103 (2018 17:24) (93/56 - 149/71)  BP(mean): 118 (2018 17:24) (68 - 118)  RR: 19 (2018 17:24) (13 - 33)  SpO2: 99% (2018 17:24) (94% - 100%)  Daily Height in cm: 172.7 (2018 08:40)    Daily Weight in k.1 (2018 04:00)  CAPILLARY BLOOD GLUCOSE    I&O's Summary    2018 07:  -  2018 07:00  --------------------------------------------------------  IN: 2585 mL / OUT: 2675 mL / NET: -90 mL    :  -  2018 17:45  --------------------------------------------------------  IN: 1765 mL / OUT: 1125 mL / NET: 640 mL    PHYSICAL EXAM:  GENERAL: NAD, well-developed.   HEAD:  Atraumatic, Normocephalic  EYES: EOMI, PERRLA, conjunctiva and sclera clear  NECK: Supple, No JVD  CHEST/LUNG: Clear to auscultation bilaterally; No wheeze  HEART: Regular rate and rhythm; Normal S1 S2, No murmurs, rubs, or gallops  ABDOMEN: Soft, Nontender, distended; Bowel sounds decreased   EXTREMITIES:  2+ Peripheral Pulses, No clubbing, cyanosis, or edema  PSYCH: AAOx3  NEUROLOGY: Left UE weakness 3/5. Right UE 5/5.   SKIN: No rashes or lesions    LABS:                        15.0   21.6  )-----------( 96       ( 2018 06:43 )             47.1     Hgb Trend: 15.0<--, 15.0<--, 16.4<--, 15.3<--, 15.1<--  06-20    140  |  104  |  84<H>  ----------------------------<  120<H>  4.5   |  19<L>  |  4.57<H>    Ca    8.3<L>      2018 12:11  Phos  4.2     06-20  Mg     2.0     06-20    TPro  6.6  /  Alb  3.1<L>  /  TBili  0.4  /  DBili  x   /  AST  70<H>  /  ALT  163<H>  /  AlkPhos  91  06-20    Creatinine Trend: 4.57<--, 5.02<--, 5.04<--, 5.86<--, 6.07<--, 5.88<--  LIVER FUNCTIONS - ( 2018 12:11 )  Alb: 3.1 g/dL / Pro: 6.6 g/dL / ALK PHOS: 91 U/L / ALT: 163 U/L / AST: 70 U/L / GGT: x           PT/INR - ( 2018 00:55 )   PT: 11.5 sec;   INR: 1.06 ratio      PTT - ( 2018 00:55 )  PTT:25.0 sec  CARDIAC MARKERS ( 2018 12:11 )  x     / x     / 1856 U/L / x     / x      CARDIAC MARKERS ( 2018 06:43 )  x     / x     / 2236 U/L / x     / 9.1 ng/mL  CARDIAC MARKERS ( 2018 00:55 )  x     / x     / 2796 U/L / x     / 10.6 ng/mL  CARDIAC MARKERS ( 2018 17:18 )  x     / x     / 4152 U/L / x     / 17.3 ng/mL  CARDIAC MARKERS ( 2018 04:58 )  x     / x     / 6274 U/L / x     / 30.7 ng/mL  CARDIAC MARKERS ( 2018 03:38 )  x     / x     / 7484 U/L / x     / 34.2 ng/mL  CARDIAC MARKERS ( 2018 20:55 )  x     / x     / 9039 U/L / x     / 47.8 ng/mL    Urinalysis Basic - ( 2018 11:22 )  Color: Yellow / Appearance: Clear / S.013 / pH: x  Gluc: x / Ketone: Negative  / Bili: Negative / Urobili: Negative   Blood: x / Protein: 30 mg/dL / Nitrite: Negative   Leuk Esterase: Negative / RBC: 5-10 /HPF / WBC 3-5 /HPF   Sq Epi: x / Non Sq Epi: x / Bacteria: x      RADIOLOGY & ADDITIONAL TESTS:    Imaging Personally Reviewed:    Consultant(s) Notes Reviewed:      Care Discussed with Consultants/Other Providers:

## 2018-06-20 NOTE — PROGRESS NOTE ADULT - GASTROINTESTINAL DETAILS
nontender
no masses palpable/no rebound tenderness/no rigidity/bowel sounds normal/soft/no bruit/no guarding

## 2018-06-20 NOTE — PROGRESS NOTE ADULT - ATTENDING COMMENTS
Patient seen and examined  No complaints of abdominal pain  non-toxic appearing   Hemodynamically stable  Oxygenation ok  abd - morbidly obese, nontender, nondistended, soft    - doubt acute bowel ischemia at this point  - care as per MICU

## 2018-06-20 NOTE — PROGRESS NOTE ADULT - SUBJECTIVE AND OBJECTIVE BOX
ACS Daily Progress Note (pager [phone])    SUBJECTIVE:  Pt seen and examined this AM. Overnight, continued to have large liquid BM. As per nurse, BM was brown, nonbilious nonbloody. This morning awake and alert, states he is having some abdominal pain but not uncontrollable . Denied any feels of nausea, vomiting, fever, chills, CP or SOB.     OBJECTIVE:  Vital Signs Last 24 Hrs  T(C): 36.8 (20 Jun 2018 08:00), Max: 37.1 (19 Jun 2018 21:20)  T(F): 98.3 (20 Jun 2018 08:00), Max: 98.8 (19 Jun 2018 21:20)  HR: 84 (20 Jun 2018 11:00) (72 - 87)  BP: 115/72 (20 Jun 2018 11:00) (93/56 - 149/71)  BP(mean): 89 (20 Jun 2018 11:00) (68 - 104)  RR: 20 (20 Jun 2018 11:00) (13 - 24)  SpO2: 96% (20 Jun 2018 11:00) (94% - 100%)    I&O's Detail    19 Jun 2018 07:01  -  20 Jun 2018 07:00  --------------------------------------------------------  IN:    dexmedetomidine Infusion: 360 mL    dextrose 5% + lactated ringers.: 525 mL    sodium chloride 0.9%: 1050 mL    sodium chloride 0.9%: 375 mL    Solution: 250 mL    Solution: 25 mL  Total IN: 2585 mL    OUT:    Indwelling Catheter - Urethral: 1675 mL    Other: 1000 mL  Total OUT: 2675 mL    Total NET: -90 mL      20 Jun 2018 07:01  -  20 Jun 2018 12:03  --------------------------------------------------------  IN:    dextrose 5% + lactated ringers.: 150 mL    Solution: 50 mL  Total IN: 200 mL    OUT:    Indwelling Catheter - Urethral: 250 mL  Total OUT: 250 mL    Total NET: -50 mL        Exam:  GEN:  middle aged man, lying in bed , NAD  Neuro: Alert and oriented to person, place and time   CV: RRR on monitor   RESP: Breathing comfortably on RA, no increased work of breathing, no use of accessory muscles  GI/ABD: softly distended, improved from yesterday, obese. TTP through out abdomen on deep palpation, otherwise nontender. No rebound or guarding.   EXTREMITIES: warm, pink, well-perfused                        15.0   21.6  )-----------( 96       ( 20 Jun 2018 06:43 )             47.1       06-20    139  |  105  |  84<H>  ----------------------------<  104<H>  5.0   |  15<L>  |  5.02<H>    Ca    8.2<L>      20 Jun 2018 06:43  Phos  4.5     06-20  Mg     2.0     06-20    TPro  6.2  /  Alb  2.8<L>  /  TBili  0.5  /  DBili  x   /  AST  79<H>  /  ALT  165<H>  /  AlkPhos  87  06-20      PT/INR - ( 20 Jun 2018 00:55 )   PT: 11.5 sec;   INR: 1.06 ratio         PTT - ( 20 Jun 2018 00:55 )  PTT:25.0 sec ACS Daily Progress Note (pager [phone])    SUBJECTIVE:  Pt seen and examined this AM. Overnight, continued to have large liquid BM. As per nurse, BM was brown, nonbilious nonbloody. This morning awake and alert, states he is having some abdominal pain but not uncontrollable . Denied any feels of nausea, vomiting, fever, chills, CP or SOB.     OBJECTIVE:  Vital Signs Last 24 Hrs  T(C): 36.8 (20 Jun 2018 08:00), Max: 37.1 (19 Jun 2018 21:20)  T(F): 98.3 (20 Jun 2018 08:00), Max: 98.8 (19 Jun 2018 21:20)  HR: 84 (20 Jun 2018 11:00) (72 - 87)  BP: 115/72 (20 Jun 2018 11:00) (93/56 - 149/71)  BP(mean): 89 (20 Jun 2018 11:00) (68 - 104)  RR: 20 (20 Jun 2018 11:00) (13 - 24)  SpO2: 96% (20 Jun 2018 11:00) (94% - 100%)    I&O's Detail    19 Jun 2018 07:01  -  20 Jun 2018 07:00  --------------------------------------------------------  IN:    dexmedetomidine Infusion: 360 mL    dextrose 5% + lactated ringers.: 525 mL    sodium chloride 0.9%: 1050 mL    sodium chloride 0.9%: 375 mL    Solution: 250 mL    Solution: 25 mL  Total IN: 2585 mL    OUT:    Indwelling Catheter - Urethral: 1675 mL    Other: 1000 mL  Total OUT: 2675 mL    Total NET: -90 mL      20 Jun 2018 07:01  -  20 Jun 2018 12:03  --------------------------------------------------------  IN:    dextrose 5% + lactated ringers.: 150 mL    Solution: 50 mL  Total IN: 200 mL    OUT:    Indwelling Catheter - Urethral: 250 mL  Total OUT: 250 mL    Total NET: -50 mL        Exam:  GEN:  middle aged man, lying in bed , NAD  Neuro: Alert and oriented to person, place and time   CV: RRR on monitor   RESP: Breathing comfortably on RA, no increased work of breathing, no use of accessory muscles  GI/ABD: softly distended, improved from yesterday, obese. TTP through out abdomen on deep palpation, otherwise nontender. No rebound or guarding.   EXTREMITIES: warm, pink, well-perfused                        15.0   21.6  )-----------( 96       ( 20 Jun 2018 06:43 )             47.1       06-20    139  |  105  |  84<H>  ----------------------------<  104<H>  5.0   |  15<L>  |  5.02<H>    Ca    8.2<L>      20 Jun 2018 06:43  Phos  4.5     06-20  Mg     2.0     06-20    TPro  6.2  /  Alb  2.8<L>  /  TBili  0.5  /  DBili  x   /  AST  79<H>  /  ALT  165<H>  /  AlkPhos  87  06-20      PT/INR - ( 20 Jun 2018 00:55 )   PT: 11.5 sec;   INR: 1.06 ratio         PTT - ( 20 Jun 2018 00:55 )  PTT:25.0 sec  Blood Gas Venous - Lactate (06.20.18 @ 00:50)    Blood Gas Venous - Lactate: 1.3 mmoL/L  Blood Gas Venous - Lactate (06.19.18 @ 17:12)    Blood Gas Venous - Lactate: 1.9 mmoL/L

## 2018-06-20 NOTE — PHYSICAL THERAPY INITIAL EVALUATION ADULT - TRANSFER TRAINING, PT EVAL
GOAL: Pt will perform ALL transfers with independently, w/use of appropriate assistive device as needed, in 4 weeks.

## 2018-06-20 NOTE — PROGRESS NOTE ADULT - RS GEN PE MLT RESP DETAILS PC
good air movement/no wheezes/normal/respirations non-labored/no rhonchi/no subcutaneous emphysema/airway patent/clear to auscultation bilaterally/no intercostal retractions/no rales/breath sounds equal
no wheezes/good air movement/respirations non-labored/breath sounds equal/airway patent/no intercostal retractions/no rales/no chest wall tenderness/rhonchi/no subcutaneous emphysema

## 2018-06-20 NOTE — PROGRESS NOTE ADULT - PROBLEM SELECTOR PLAN 1
SHAQUILLE on CKD.  CT imaging shows left atrophic kidney.  Suspect SHAQUILLE from rhabdo + hemodynamic injury from cocaine abuse. Scr worsening but non-oliguric.  -check serum intact PTH, to assess chronicity of CKD  -patient may have ESRD, began discussions of potentially requiring long term dialysis  -no urgent HD indications at this time, no plans for HD today  -monitor creatinine trend and urine output  -avoid nephrotoxins, ace/arb, imaging contrast SHAQUILLE on CKD.  CT imaging shows left atrophic kidney.  Suspect SHAQUILLE from rhabdo + hemodynamic injury from cocaine abuse. Scr worsening but non-oliguric.  -check serum intact PTH, to assess chronicity of CKD  -no urgent HD indications at this time, no plan for HD today  -monitor creatinine trend and urine output  -avoid nephrotoxins, ace/arb, imaging contrast

## 2018-06-20 NOTE — PROGRESS NOTE ADULT - PROBLEM SELECTOR PLAN 2
Suspected due to diminished renal excretion 2/2 SHAQUILLE and increasing potassium release 2/2 rhabdo.  -can consider additional dose of lasix if potassium remains elevated  -monitor serum potassium Suspected due to diminished renal excretion 2/2 SHAQUILLE and increasing potassium release 2/2 rhabdo.  -Resolved post HD  -monitor serum potassium

## 2018-06-20 NOTE — PROGRESS NOTE ADULT - PROBLEM SELECTOR PLAN 6
SHAQUILLE on CKD, likely secondary to rhabdomyolysis vs hemodynamic instability. Left atrophic kidney.    Renal recs appreciated.   -s/p dialysis for hyperkalemia.   CTM on BMP - SHAQIULLE on CKD, likely secondary to rhabdomyolysis vs hemodynamic instability. Left atrophic kidney.    - Renal recs appreciated.   - s/p dialysis for hyperkalemia.  - CTM on BMP  - C/w gilbert for monitoring of the critically ill.

## 2018-06-20 NOTE — PROGRESS NOTE ADULT - SUBJECTIVE AND OBJECTIVE BOX
CHIEF COMPLAINT: LMCA subacute infarcts, Rhabdomyolysis, SHAQUILLE / ATN    Interval Events: Now starting to have brown loose BM's, mental status greatly improved- now calm, off Precedex     REVIEW OF SYSTEMS:  Constitutional: [ x] negative [ ] fevers [ ] chills [ ] weight loss [ ] weight gain  HEENT: [x ] negative [ ] dry eyes [ ] eye irritation [ ] postnasal drip [ ] nasal congestion  CV: [ x] negative  [ ] chest pain [ ] orthopnea [ ] palpitations [ ] murmur  Resp: [x ] negative [ ] cough [ ] shortness of breath [ ] dyspnea [ ] wheezing [ ] sputum [ ] hemoptysis  GI: [x ] negative [ ] nausea [ ] vomiting [ ] diarrhea [ ] constipation [ ] abd pain [ ] dysphagia   : [x ] negative [ ] dysuria [ ] nocturia [ ] hematuria [ ] increased urinary frequency  Musculoskeletal: [ x] negative [ ] back pain [ ] myalgias [ ] arthralgias [ ] fracture  Skin: [ ] negative [x ] rash [ ] itch  Neurological: [x ] negative [ ] headache [ ] dizziness [ ] syncope [ ] weakness [ ] numbness  Psychiatric: [ ] negative [ ] anxiety [x ] depression- sad 2/2 realizes now what has occured- states wants to go to rehab  Endocrine: [x ] negative [ ] diabetes [ ] thyroid problem  Hematologic/Lymphatic: [ ] negative [ ] anemia [ ] bleeding problem  Allergic/Immunologic: [ ] negative [ ] itchy eyes [ ] nasal discharge [ ] hives [ ] angioedema- + rash - received sulfar based antibiotic at an OSH  [ x] All other systems negative  [ ] Unable to assess ROS because ________    OBJECTIVE:  ICU Vital Signs Last 24 Hrs  T(C): 36.8 (20 Jun 2018 08:00), Max: 37.1 (19 Jun 2018 21:20)  T(F): 98.3 (20 Jun 2018 08:00), Max: 98.8 (19 Jun 2018 21:20)  HR: 82 (20 Jun 2018 10:00) (72 - 87)  BP: 149/71 (20 Jun 2018 10:00) (93/56 - 149/71)  BP(mean): 99 (20 Jun 2018 10:00) (68 - 104)  ABP: --  ABP(mean): --  RR: 13 (20 Jun 2018 10:00) (13 - 24)  SpO2: 98% (20 Jun 2018 10:00) (94% - 100%)        06-19 @ 07:01 - 06-20 @ 07:00  --------------------------------------------------------  IN: 2585 mL / OUT: 2675 mL / NET: -90 mL    06-20 @ 07:01 - 06-20 @ 11:04  --------------------------------------------------------  IN: 200 mL / OUT: 250 mL / NET: -50 mL      CAPILLARY BLOOD GLUCOSE        LINES: [facility] MEDICATIONS:  heparin  Injectable 5000 Unit(s) SubCutaneous every 8 hours    piperacillin/tazobactam IVPB. 3.375 Gram(s) IV Intermittent every 12 hours      pantoprazole  Injectable 40 milliGRAM(s) IV Push every 12 hours        dextrose 5% + lactated ringers. 1000 milliLiter(s) IV Continuous <Continuous>      triamcinolone 0.1% Cream 1 Application(s) Topical every 12 hours    nicotine - 21 mG/24Hr(s) Patch 1 patch Transdermal daily      LABS:                        15.0   21.6  )-----------( 96       ( 20 Jun 2018 06:43 )             47.1     Hgb Trend: 15.0<--, 15.0<--, 16.4<--, 15.3<--, 15.1<--  06-20    139  |  105  |  84<H>  ----------------------------<  104<H>  5.0   |  15<L>  |  5.02<H>    Ca    8.2<L>      20 Jun 2018 06:43  Phos  4.5     06-20  Mg     2.0     06-20    TPro  6.2  /  Alb  2.8<L>  /  TBili  0.5  /  DBili  x   /  AST  79<H>  /  ALT  165<H>  /  AlkPhos  87  06-20    Creatinine Trend: 5.02<--, 5.04<--, 5.86<--, 6.07<--, 5.88<--, 5.84<--  PT/INR - ( 20 Jun 2018 00:55 )   PT: 11.5 sec;   INR: 1.06 ratio         PTT - ( 20 Jun 2018 00:55 )  PTT:25.0 sec      Venous Blood Gas:  06-20 @ 00:50  7.41/31/58/19/87  VBG Lactate: 1.3  Venous Blood Gas:  06-19 @ 17:12  7.36/33/46/18/77  VBG Lactate: 1.9  Venous Blood Gas:  06-18 @ 12:11  7.40/28/109/17/98  VBG Lactate: 1.4      MICROBIOLOGY:   Culture - Urine (collected 17 Jun 2018 19:59)  Source: .Urine Clean Catch (Midstream)  Final Report (18 Jun 2018 21:59):    No growth    Culture - Blood (collected 17 Jun 2018 17:06)  Source: .Blood Blood  Preliminary Report (18 Jun 2018 18:02):    No growth to date.    Culture - Blood (collected 17 Jun 2018 17:06)  Source: .Blood Blood  Preliminary Report (18 Jun 2018 18:02):    No growth to date.    Culture - Blood (collected 17 Jun 2018 13:06)  Source: .Blood Blood  Preliminary Report (18 Jun 2018 14:01):    No growth to date.    Culture - Blood (collected 17 Jun 2018 13:06)  Source: .Blood Blood  Preliminary Report (18 Jun 2018 14:01):    No growth to date.    RADIOLOGY:  < from: CT Abdomen and Pelvis No Cont (06.19.18 @ 14:22) >    EXAM:  CT ABDOMEN AND PELVIS                            PROCEDURE DATE:  06/19/2018            INTERPRETATION:  CLINICAL INFORMATION: Alcohol abuse, found down.   Rhabdomyolysis and renal failure. Abdominal distention.    COMPARISON: CT abdomen andpelvis performed 6/17/2018.    PROCEDURE:   CT of the Abdomen and Pelvis was performed without intravenous contrast.   Intravenous contrast: None.  Oral contrast: None.  Sagittal and coronal reformats were performed.    FINDINGS:    LOWER CHEST: Bilateral lower lobe linear atelectasis.    LIVER: Within normal limits.  BILE DUCTS: Normal caliber.  GALLBLADDER: Within normal limits.  SPLEEN: Within normal limits.  PANCREAS: Within normal limits.  ADRENALS: Right adrenal gland nodularity likely due to adenoma, as seen   on recent study. Stable low-density left adrenal thickening.  KIDNEYS/URETERS: Atrophic left kidney.    BLADDER: Contains a Gutierres catheter.  REPRODUCTIVE ORGANS: The prostate is within normal limits.    BOWEL: Near diffuse dilatation of small bowel to the level of the   terminal ileum, ileus favored over obstruction. Colonic diverticulosis.   Appendix is normal.  PERITONEUM: Small volume right upper quadrant ascites. Mesenteric edema.  VESSELS:  Mild atherosclerotic calcifications.  RETROPERITONEUM: No lymphadenopathy.    ABDOMINAL WALL: Left anterior thigh muscular lipoma.  BONES: Degenerative changes. Age indeterminant L2 compression fracture   deformity.    IMPRESSION:     New diffuse dilatation of fluid-filled small bowel loops without discrete   transition point. Ileus is favored over small bowel obstruction. Small   volume ascites and mesenteric edema are present.    Other findings are unchanged, as described above.    < from: CT Abdomen and Pelvis No Cont (06.17.18 @ 10:24) >    EXAM:  CT ABDOMEN AND PELVIS                          EXAM:  CT CHEST                                  PROCEDURE DATE:  06/17/2018          INTERPRETATION:  CLINICAL INFORMATION: Altered mental status. Pain.    CT of the chest, abdomen and pelvis was performed without intravenous   contrast. Sagittal and coronal reconstructions were obtained. Axial   maximum intensity projection images were obtained.    Comparison: None.    CHEST:    Thyroid: Unremarkable  Heart:  Unremarkable.    Lymph nodes: No significant adenopathy.    Lungs and Pleura: There are bilateral dependent atelectatic changes.   There are no nodules masses or consolidations.  Airways: Patent.    ABDOMEN:   Liver: normal.  Spleen: normal.  Pancreas: normal.  Adrenal glands:  2.8 x 2.0 cm low density nodule right adrenal gland   likely a benign adenoma. Markedly enlarged left adrenal gland measuring   at least 4.7 x 4.3 cm also low density, however given its size and the   definitive diagnosis of benign adenoma is uncertain.Recommend MRI   evaluation.   Gall bladder: normal.  Kidneys: Atrophic left kidney.    Lymph nodes: There is no retroperitoneal or abdominal lymphadenopathy.  Aorta and branch vessels: Unremarkable.  Inferior vena cava: Unremarkable.    Bowel: Scattered small colonic diverticula without diverticulitis. Large   and small bowel are otherwise unremarkable. Normal appendix.    There is no free air of free fluid. There is mild nonspecific fat   stranding extending from the renal fossa on the right to the distal psoas   muscle.    Pelvis:  Bladder is collapsed around Gutierres catheter.  Normal prostate and seminal vesicles.  There is no significant inguinal or pelvic adenopathy    Osseous structures:  The osseous structures show scattered degenerativechanges. There is   anterior wedging of the L2 vertebral body of uncertain chronicity.   Correlate clinically for pain.    IMPRESSION:    2.8 x 2.0 cm low density nodule right adrenal gland likely a benign   adenoma. Markedly enlarged left adrenal gland measuring at least 4.7 x   4.3 cm also low density, however given its size and the definitive   diagnosis of benign adenoma is uncertain. Recommend MRI evaluation.     Atrophic left kidney.    There is anterior wedging of the L2 vertebral body of uncertain   chronicity. Correlate clinically for pain.        < end of copied text >        EKG: CHIEF COMPLAINT: LMCA subacute infarcts, Rhabdomyolysis, SHAQUILLE / ATN    Interval Events: Now starting to have brown loose BM's, mental status greatly improved- now calm, off Precedex     REVIEW OF SYSTEMS:  Constitutional: [ x] negative [ ] fevers [ ] chills [ ] weight loss [ ] weight gain  HEENT: [x ] negative [ ] dry eyes [ ] eye irritation [ ] postnasal drip [ ] nasal congestion  CV: [ x] negative  [ ] chest pain [ ] orthopnea [ ] palpitations [ ] murmur  Resp: [x ] negative [ ] cough [ ] shortness of breath [ ] dyspnea [ ] wheezing [ ] sputum [ ] hemoptysis  GI: [x ] negative [ ] nausea [ ] vomiting [ ] diarrhea [ ] constipation [ ] abd pain [ ] dysphagia   : [x ] negative [ ] dysuria [ ] nocturia [ ] hematuria [ ] increased urinary frequency  Musculoskeletal: [ x] negative [ ] back pain [ ] myalgias [ ] arthralgias [ ] fracture  Skin: [ ] negative [x ] rash [ ] itch  Neurological: [x ] negative [ ] headache [ ] dizziness [ ] syncope [ ] weakness [ ] numbness  Psychiatric: [ ] negative [ ] anxiety [x ] depression- sad 2/2 realizes now what has occured- states wants to go to rehab  Endocrine: [x ] negative [ ] diabetes [ ] thyroid problem  Hematologic/Lymphatic: [ ] negative [ ] anemia [ ] bleeding problem  Allergic/Immunologic: [ ] negative [ ] itchy eyes [ ] nasal discharge [ ] hives [ ] angioedema- + rash - received sulfar based antibiotic at an OSH  [ x] All other systems negative  [ ] Unable to assess ROS because ________    OBJECTIVE:  ICU Vital Signs Last 24 Hrs  T(C): 36.8 (20 Jun 2018 08:00), Max: 37.1 (19 Jun 2018 21:20)  T(F): 98.3 (20 Jun 2018 08:00), Max: 98.8 (19 Jun 2018 21:20)  HR: 82 (20 Jun 2018 10:00) (72 - 87)  BP: 149/71 (20 Jun 2018 10:00) (93/56 - 149/71)  BP(mean): 99 (20 Jun 2018 10:00) (68 - 104)  ABP: --  ABP(mean): --  RR: 13 (20 Jun 2018 10:00) (13 - 24)  SpO2: 98% (20 Jun 2018 10:00) (94% - 100%)        06-19 @ 07:01 - 06-20 @ 07:00  --------------------------------------------------------  IN: 2585 mL / OUT: 2675 mL / NET: -90 mL    06-20 @ 07:01 - 06-20 @ 11:04  --------------------------------------------------------  IN: 200 mL / OUT: 250 mL / NET: -50 mL      CAPILLARY BLOOD GLUCOSE        LINES: [facility] MEDICATIONS:  heparin  Injectable 5000 Unit(s) SubCutaneous every 8 hours    piperacillin/tazobactam IVPB. 3.375 Gram(s) IV Intermittent every 12 hours      pantoprazole  Injectable 40 milliGRAM(s) IV Push every 12 hours        dextrose 5% + lactated ringers. 1000 milliLiter(s) IV Continuous <Continuous>      triamcinolone 0.1% Cream 1 Application(s) Topical every 12 hours    nicotine - 21 mG/24Hr(s) Patch 1 patch Transdermal daily      LABS:                        15.0   21.6  )-----------( 96       ( 20 Jun 2018 06:43 )             47.1     Hgb Trend: 15.0<--, 15.0<--, 16.4<--, 15.3<--, 15.1<--  06-20    139  |  105  |  84<H>  ----------------------------<  104<H>  5.0   |  15<L>  |  5.02<H>    Ca    8.2<L>      20 Jun 2018 06:43  Phos  4.5     06-20  Mg     2.0     06-20    TPro  6.2  /  Alb  2.8<L>  /  TBili  0.5  /  DBili  x   /  AST  79<H>  /  ALT  165<H>  /  AlkPhos  87  06-20    Creatinine Trend: 5.02<--, 5.04<--, 5.86<--, 6.07<--, 5.88<--, 5.84<--  PT/INR - ( 20 Jun 2018 00:55 )   PT: 11.5 sec;   INR: 1.06 ratio         PTT - ( 20 Jun 2018 00:55 )  PTT:25.0 sec      Venous Blood Gas:  06-20 @ 00:50  7.41/31/58/19/87  VBG Lactate: 1.3  Venous Blood Gas:  06-19 @ 17:12  7.36/33/46/18/77  VBG Lactate: 1.9  Venous Blood Gas:  06-18 @ 12:11  7.40/28/109/17/98  VBG Lactate: 1.4      MICROBIOLOGY:   Culture - Urine (collected 17 Jun 2018 19:59)  Source: .Urine Clean Catch (Midstream)  Final Report (18 Jun 2018 21:59):    No growth    Culture - Blood (collected 17 Jun 2018 17:06)  Source: .Blood Blood  Preliminary Report (18 Jun 2018 18:02):    No growth to date.    Culture - Blood (collected 17 Jun 2018 17:06)  Source: .Blood Blood  Preliminary Report (18 Jun 2018 18:02):    No growth to date.    Culture - Blood (collected 17 Jun 2018 13:06)  Source: .Blood Blood  Preliminary Report (18 Jun 2018 14:01):    No growth to date.    Culture - Blood (collected 17 Jun 2018 13:06)  Source: .Blood Blood  Preliminary Report (18 Jun 2018 14:01):    No growth to date.    RADIOLOGY:  < from: CT Abdomen and Pelvis No Cont (06.19.18 @ 14:22) >    EXAM:  CT ABDOMEN AND PELVIS                            PROCEDURE DATE:  06/19/2018            INTERPRETATION:  CLINICAL INFORMATION: Alcohol abuse, found down.   Rhabdomyolysis and renal failure. Abdominal distention.    COMPARISON: CT abdomen andpelvis performed 6/17/2018.    PROCEDURE:   CT of the Abdomen and Pelvis was performed without intravenous contrast.   Intravenous contrast: None.  Oral contrast: None.  Sagittal and coronal reformats were performed.    FINDINGS:    LOWER CHEST: Bilateral lower lobe linear atelectasis.    LIVER: Within normal limits.  BILE DUCTS: Normal caliber.  GALLBLADDER: Within normal limits.  SPLEEN: Within normal limits.  PANCREAS: Within normal limits.  ADRENALS: Right adrenal gland nodularity likely due to adenoma, as seen   on recent study. Stable low-density left adrenal thickening.  KIDNEYS/URETERS: Atrophic left kidney.    BLADDER: Contains a Gutierres catheter.  REPRODUCTIVE ORGANS: The prostate is within normal limits.    BOWEL: Near diffuse dilatation of small bowel to the level of the   terminal ileum, ileus favored over obstruction. Colonic diverticulosis.   Appendix is normal.  PERITONEUM: Small volume right upper quadrant ascites. Mesenteric edema.  VESSELS:  Mild atherosclerotic calcifications.  RETROPERITONEUM: No lymphadenopathy.    ABDOMINAL WALL: Left anterior thigh muscular lipoma.  BONES: Degenerative changes. Age indeterminant L2 compression fracture   deformity.    IMPRESSION:     New diffuse dilatation of fluid-filled small bowel loops without discrete   transition point. Ileus is favored over small bowel obstruction. Small   volume ascites and mesenteric edema are present.    Other findings are unchanged, as described above.    < from: CT Abdomen and Pelvis No Cont (06.17.18 @ 10:24) >    EXAM:  CT ABDOMEN AND PELVIS                          EXAM:  CT CHEST                              PROCEDURE DATE:  06/17/2018        INTERPRETATION:  CLINICAL INFORMATION: Altered mental status. Pain.    CT of the chest, abdomen and pelvis was performed without intravenous   contrast. Sagittal and coronal reconstructions were obtained. Axial   maximum intensity projection images were obtained.    Comparison: None.    CHEST:    Thyroid: Unremarkable  Heart:  Unremarkable.    Lymph nodes: No significant adenopathy.    Lungs and Pleura: There are bilateral dependent atelectatic changes.   There are no nodules masses or consolidations.  Airways: Patent.    ABDOMEN:   Liver: normal.  Spleen: normal.  Pancreas: normal.  Adrenal glands:  2.8 x 2.0 cm low density nodule right adrenal gland   likely a benign adenoma. Markedly enlarged left adrenal gland measuring   at least 4.7 x 4.3 cm also low density, however given its size and the   definitive diagnosis of benign adenoma is uncertain.Recommend MRI   evaluation.   Gall bladder: normal.  Kidneys: Atrophic left kidney.    Lymph nodes: There is no retroperitoneal or abdominal lymphadenopathy.  Aorta and branch vessels: Unremarkable.  Inferior vena cava: Unremarkable.    Bowel: Scattered small colonic diverticula without diverticulitis. Large   and small bowel are otherwise unremarkable. Normal appendix.    There is no free air of free fluid. There is mild nonspecific fat   stranding extending from the renal fossa on the right to the distal psoas   muscle.    Pelvis:  Bladder is collapsed around Gutierres catheter.  Normal prostate and seminal vesicles.  There is no significant inguinal or pelvic adenopathy    Osseous structures:  The osseous structures show scattered degenerativechanges. There is   anterior wedging of the L2 vertebral body of uncertain chronicity.   Correlate clinically for pain.    IMPRESSION:    2.8 x 2.0 cm low density nodule right adrenal gland likely a benign   adenoma. Markedly enlarged left adrenal gland measuring at least 4.7 x   4.3 cm also low density, however given its size and the definitive   diagnosis of benign adenoma is uncertain. Recommend MRI evaluation.     Atrophic left kidney.    There is anterior wedging of the L2 vertebral body of uncertain   chronicity. Correlate clinically for pain.    < from: CT Head No Cont (06.17.18 @ 09:59) >    EXAM:  CT BRAIN                                  PROCEDURE DATE:  06/17/2018          INTERPRETATION:  CLINICAL INFORMATION: Altered mental status    TECHNIQUE: Multiple axial CT images of the calvarium and brain were   obtained without contrast.     COMPARISON: No prior studies are available for comparison at this   institution.    FINDINGS:     There is diffuse low density appearance suggesting a subacute infarction   within the MCA distribution on the left. This involves the high parietal   and posterior parietal regions. There is similar findings in the adjacent   occipital lobe. There is associated mild sulcal effacement. There is no   midline shift. The ventricles are normal size. No acute hemorrhage is   noted. Small focus of low density is also noted in the left cerebellum.    There is a partially imaged unerupted tooth seen at the left maxillary   sinus. The imaged portions of the paranasal sinuses and mastoids are   otherwise well aerated.     There is no osseous abnormality.    IMPRESSION:    Left MCA distribution and probable left posterior cerebral artery   distribution changes most consistent with subacute infarction. No   evidence for acute hemorrhage. MRI is recommended for further   characterization.      EKG:

## 2018-06-20 NOTE — PROGRESS NOTE ADULT - PROBLEM SELECTOR PLAN 7
DIET - npo   - DVT prophylaxis - heparin sub-q   - Aspiration precautions  - Fall precautions.  - Nicotine patch - Morbiliform rash likely 2/2 medication exposure. Possibly the "sulfa" abx pt received after a groin abscess draining.   - c/w triamcinolone 0.1% cream BID to AA prn itch  - Dermatology recs appreciated.  - Pending tick-borne disease, [place], Leptospirosis.

## 2018-06-20 NOTE — PROGRESS NOTE ADULT - ASSESSMENT
53 y/o M w/ Hx of CVA, who was transferred [place] [facility], rhabdomyolysis found to be cocaine positive, subacute strokes on CT, noted to have a full body rash - attributed to drug rash- thought to be 2/2 possible Bactrim that was given for a recent scrotal abscess along with levaquin over the past few weeks at an OSH. 51 y/o MAN w/ Hx of CVA, who was transferred from [place] for ARF, rhabdomyolysis found to be cocaine positive, subacute strokes on CT, noted to have a full body rash - attributed to drug rash- thought to be 2/2 possible Bactrim that was given for a recent scrotal abscess along with levaquin over the past few weeks at an OSH. 52 year old man with PMHx of CVA, who was transferred from [place] for ARF, rhabdomyolysis found to be cocaine positive, subacute strokes on CT, noted to have a full body rash - attributed to drug rash- thought to be 2/2 possible Bactrim that was given for a recent scrotal abscess along with levaquin over the past few weeks at an OSH.

## 2018-06-20 NOTE — PHYSICAL THERAPY INITIAL EVALUATION ADULT - GAIT TRAINING, PT EVAL
GOAL: Pt will ambulate 200 feet w/contact gaurd assist, w/use of appropriate assistive device in 4 weeks

## 2018-06-20 NOTE — CONSULT NOTE ADULT - NSHPATTENDINGPLANDISCUSS_GEN_ALL_CORE
MICU
cardiology fellow, Dr. AUSTIN Brower; patient seen and examined.  Hx., exam and labs as above.  I agree with the assessment and recommendations.
[facility]U team

## 2018-06-20 NOTE — PHYSICAL THERAPY INITIAL EVALUATION ADULT - LIVES WITH, PROFILE
alone/pt reports he lives alone in apartment with no stairs to entrance.  Prior to admission pt independent with all functional mobility and ambulating without AD.

## 2018-06-20 NOTE — PROGRESS NOTE ADULT - SUBJECTIVE AND OBJECTIVE BOX
[facility] DIVISION OF KIDNEY DISEASES AND HYPERTENSION -- FOLLOW UP NOTE  --------------------------------------------------------------------------------  Chief Complaint:  SHAQUILLE requiring dialysis    24 hour events/subjective:  Patient tolerated dialysis yesterday, reports that he passed flatus and small bowel movement today.        PAST HISTORY  --------------------------------------------------------------------------------  No significant changes to PMH, PSH, FHx, SHx, unless otherwise noted    ALLERGIES & MEDICATIONS  --------------------------------------------------------------------------------  Allergies    Bactrim (Rash (Skin))    Intolerances      Standing Inpatient Medications  dexmedetomidine Infusion 0.1 MICROgram(s)/kG/Hr IV Continuous <Continuous>  dextrose 5% + lactated ringers. 1000 milliLiter(s) IV Continuous <Continuous>  heparin  Injectable 5000 Unit(s) SubCutaneous every 8 hours  nicotine - 21 mG/24Hr(s) Patch 1 patch Transdermal daily  pantoprazole  Injectable 40 milliGRAM(s) IV Push every 12 hours  piperacillin/tazobactam IVPB. 3.375 Gram(s) IV Intermittent every 12 hours  triamcinolone 0.1% Cream 1 Application(s) Topical every 12 hours    PRN Inpatient Medications      REVIEW OF SYSTEMS  --------------------------------------------------------------------------------  Gen: No fevers/chills  Skin: No rashes  Head/Eyes/Ears/Mouth: No headache  Respiratory: No dyspnea  CV: No chest pain  GI: No abdominal pain, + bowel movement  : No dysuria  MSK: No edema  Neuro: No dizziness/lightheadedness    VITALS/PHYSICAL EXAM  --------------------------------------------------------------------------------  T(C): 36.8 (06-20-18 @ 08:00), Max: 37.1 (06-19-18 @ 21:20)  HR: 78 (06-20-18 @ 09:00) (72 - 87)  BP: 131/86 (06-20-18 @ 09:00) (93/56 - 138/84)  RR: 18 (06-20-18 @ 09:00) (15 - 24)  SpO2: 97% (06-20-18 @ 09:00) (94% - 100%)  Wt(kg): --  Height (cm): 172.7 (06-20-18 @ 08:40)      06-19-18 @ 07:01  -  06-20-18 @ 07:00  --------------------------------------------------------  IN: 2585 mL / OUT: 2675 mL / NET: -90 mL    06-20-18 @ 07:01  -  06-20-18 @ 09:59  --------------------------------------------------------  IN: 200 mL / OUT: 250 mL / NET: -50 mL      Physical Exam:  	Gen: NAD  	HEENT: MMM  	Pulm: CTA B/L, no rales on exam  	CV: RRR, S1S2; no rub  	Abd: +distended, no bowel sounds audible  	: No suprapubic tenderness  	UE:  no edema  	LE:  no edema  	Neuro: No focal deficits  	Psych: Normal affect and mood  	Skin: Warm  	Vascular access:  RIJ nontunneled dialysis catheter nontender    LABS/STUDIES  --------------------------------------------------------------------------------              15.0   21.6  >-----------<  96       [06-20-18 @ 06:43]              47.1     139  |  105  |  84  ----------------------------<  104      [06-20-18 @ 06:43]  5.0   |  15  |  5.02        Ca     8.2     [06-20-18 @ 06:43]      Mg     2.0     [06-20-18 @ 06:43]      Phos  4.5     [06-20-18 @ 06:43]    TPro  6.2  /  Alb  2.8  /  TBili  0.5  /  DBili  x   /  AST  79  /  ALT  165  /  AlkPhos  87  [06-20-18 @ 06:43]    PT/INR: PT 11.5 , INR 1.06       [06-20-18 @ 00:55]  PTT: 25.0       [06-20-18 @ 00:55]    CK 2236      [06-20-18 @ 06:43]    Creatinine Trend:  SCr 5.02 [06-20 @ 06:43]  SCr 5.04 [06-20 @ 00:55]  SCr 5.86 [06-19 @ 17:18]  SCr 6.07 [06-19 @ 04:58]  SCr 5.88 [06-19 @ 03:38] [facility] DIVISION OF KIDNEY DISEASES AND HYPERTENSION -- FOLLOW UP NOTE  --------------------------------------------------------------------------------  Chief Complaint:  SHAQUILLE requiring dialysis    24 hour events/subjective:  Patient tolerated hemodialysis yesterday, reports that he passed flatus and small bowel movement today.        PAST HISTORY  --------------------------------------------------------------------------------  No significant changes to PMH, PSH, FHx, SHx, unless otherwise noted    ALLERGIES & MEDICATIONS  --------------------------------------------------------------------------------  Allergies    Bactrim (Rash (Skin))    Intolerances      Standing Inpatient Medications  dexmedetomidine Infusion 0.1 MICROgram(s)/kG/Hr IV Continuous <Continuous>  dextrose 5% + lactated ringers. 1000 milliLiter(s) IV Continuous <Continuous>  heparin  Injectable 5000 Unit(s) SubCutaneous every 8 hours  nicotine - 21 mG/24Hr(s) Patch 1 patch Transdermal daily  pantoprazole  Injectable 40 milliGRAM(s) IV Push every 12 hours  piperacillin/tazobactam IVPB. 3.375 Gram(s) IV Intermittent every 12 hours  triamcinolone 0.1% Cream 1 Application(s) Topical every 12 hours    PRN Inpatient Medications      REVIEW OF SYSTEMS  --------------------------------------------------------------------------------  Gen: No fevers/chills  Skin: No rashes  Head/Eyes/Ears/Mouth: No headache  Respiratory: No dyspnea  CV: No chest pain  GI: No abdominal pain, + bowel movement  : No dysuria  MSK: No edema  Neuro: No dizziness/lightheadedness    VITALS/PHYSICAL EXAM  --------------------------------------------------------------------------------  T(C): 36.8 (06-20-18 @ 08:00), Max: 37.1 (06-19-18 @ 21:20)  HR: 78 (06-20-18 @ 09:00) (72 - 87)  BP: 131/86 (06-20-18 @ 09:00) (93/56 - 138/84)  RR: 18 (06-20-18 @ 09:00) (15 - 24)  SpO2: 97% (06-20-18 @ 09:00) (94% - 100%)  Wt(kg): --  Height (cm): 172.7 (06-20-18 @ 08:40)      06-19-18 @ 07:01  -  06-20-18 @ 07:00  --------------------------------------------------------  IN: 2585 mL / OUT: 2675 mL / NET: -90 mL    06-20-18 @ 07:01  -  06-20-18 @ 09:59  --------------------------------------------------------  IN: 200 mL / OUT: 250 mL / NET: -50 mL      Physical Exam:  	Gen: NAD  	HEENT: MMM  	Pulm: CTA B/L, no rales on exam  	CV: RRR, S1S2; no rub  	Abd: +distended, no bowel sounds audible  	: No suprapubic tenderness  	UE:  no edema  	LE:  no edema  	Neuro: No focal deficits  	Psych: Normal affect and mood  	Skin: Warm  	Vascular access:  RIJ nontunneled dialysis catheter nontender    LABS/STUDIES  --------------------------------------------------------------------------------              15.0   21.6  >-----------<  96       [06-20-18 @ 06:43]              47.1     139  |  105  |  84  ----------------------------<  104      [06-20-18 @ 06:43]  5.0   |  15  |  5.02        Ca     8.2     [06-20-18 @ 06:43]      Mg     2.0     [06-20-18 @ 06:43]      Phos  4.5     [06-20-18 @ 06:43]    TPro  6.2  /  Alb  2.8  /  TBili  0.5  /  DBili  x   /  AST  79  /  ALT  165  /  AlkPhos  87  [06-20-18 @ 06:43]    PT/INR: PT 11.5 , INR 1.06       [06-20-18 @ 00:55]  PTT: 25.0       [06-20-18 @ 00:55]    CK 2236      [06-20-18 @ 06:43]    Creatinine Trend:  SCr 5.02 [06-20 @ 06:43]  SCr 5.04 [06-20 @ 00:55]  SCr 5.86 [06-19 @ 17:18]  SCr 6.07 [06-19 @ 04:58]  SCr 5.88 [06-19 @ 03:38]

## 2018-06-20 NOTE — PROGRESS NOTE ADULT - SUBJECTIVE AND OBJECTIVE BOX
CC: Patient is a 52y old  Male who presents with a chief complaint of Patient is a 52y old  Male who presents with a chief complaint of AMS, found down (2018 09:34)    Interval History/ROS: Patient states has pressure in his abdomen. Refusing NGT for decompression. Rash improving. Denies fever, chills.    Allergies  Bactrim (Rash (Skin))    ANTIMICROBIALS:  piperacillin/tazobactam IVPB. 3.375 every 12 hours    PE:    Vital Signs Last 24 Hrs  T(C): 36.9 (2018 16:00), Max: 37.1 (2018 21:20)  T(F): 98.5 (2018 16:00), Max: 98.8 (2018 21:20)  HR: 82 (2018 17:24) (72 - 87)  BP: 140/103 (2018 17:24) (93/56 - 149/71)  BP(mean): 118 (2018 17:24) (68 - 118)  RR: 19 (2018 17:24) (13 - 33)  SpO2: 99% (2018 17:24) (94% - 100%)    Gen: AOx3, NAD  CV: S1+S2 normal, no murmurs  Resp: Clear bilat, no resp distress  Abd: distended  Ext: LE edema  : +Gutierres  IV/Skin: diffuse morbiliform rash on trunk and extremities, blanchable petechial rash on LE bilaterally  Neuro: no focal deficits    LABS:                          15.0   21.6  )-----------( 96       ( 2018 06:43 )             47.1       06-20    140  |  104  |  86<H>  ----------------------------<  102<H>  4.5   |  19<L>  |  4.74<H>    Ca    8.7      2018 17:48  Phos  4.2     06-20  Mg     2.0     06-20    TPro  6.6  /  Alb  3.1<L>  /  TBili  0.4  /  DBili  x   /  AST  70<H>  /  ALT  163<H>  /  AlkPhos  91  06-20      Urinalysis Basic - ( 2018 11:22 )    Color: Yellow / Appearance: Clear / S.013 / pH: x  Gluc: x / Ketone: Negative  / Bili: Negative / Urobili: Negative   Blood: x / Protein: 30 mg/dL / Nitrite: Negative   Leuk Esterase: Negative / RBC: 5-10 /HPF / WBC 3-5 /HPF   Sq Epi: x / Non Sq Epi: x / Bacteria: x    MICROBIOLOGY:  v  .Urine Clean Catch (Midstream)  18   No growth  --  --      .Blood Blood  18   No growth to date.  --  --      .Blood Blood  18   No growth to date.  --  --    CMV IgG Antibody: <0.20 U/mL (18 @ 15:29)  Toxoplasma IgG Screen: <3.0 IU/mL (18 @ 15:29)    CMVPCR Log: NotDetec LogIU/mL ( @ 15:36)  Rapid RVP Result: NotDetec ( @ 00:24)  Rapid RVP Result: NotDetec ( @ 09:43)    RADIOLOGY:    < from: CT Abdomen and Pelvis No Cont (18 @ 14:22) >  IMPRESSION:     New diffuse dilatation of fluid-filled small bowel loops without discrete   transition point. Ileus is favored over small bowel obstruction. Small   volume ascites and mesenteric edema are present.    Other findings are unchanged, as described above.    < end of copied text >

## 2018-06-20 NOTE — PHYSICAL THERAPY INITIAL EVALUATION ADULT - BALANCE TRAINING, PT EVAL
GOAL: Pt will demonstrate improved static/dynamic standing balance to good, in order to improve stability, decrease fall risk and increase independence with transfers & ambulation within 4 weeks.

## 2018-06-20 NOTE — PROGRESS NOTE ADULT - SKIN COMMENTS
c/w rash over entire
full body rash, petechiae type rash over the BLE to his feet becomes a full solid color rash from his groin to over this entire abdomen and flanks.

## 2018-06-20 NOTE — PHYSICAL THERAPY INITIAL EVALUATION ADULT - PERTINENT HX OF CURRENT PROBLEM, REHAB EVAL
51 y/o M w/ Hx of CVA, who was transferred from [place] for ARF, rhabdomyolysis found to be cocaine positive, subacute strokes on CT.

## 2018-06-20 NOTE — CHART NOTE - NSCHARTNOTEFT_GEN_A_CORE
52M PMHx history of cocaine use presented with altered mental status and rash. He was found by his son three days prior to admission altered; at baseline he is fully functional, works as . His son continued to check up on him without improvement in mental status; discovered new rash around trunk, and extremities. Of note he had a ER visit for groin cyst that was drained and given "sulfa" antibiotic. He was found to have profound SHAQUILLE and rhabdomyalsis with positive for cocaine. He had troponin leak without ekg changes. He had significant LFT's which downtrended quickly, possibly due to ischemia. He had CTH which demonstrated subacute infarcts in left MCA and PCA; with weakness in right side that has not noticeably improved. He was brought to MICU for possible HD for hyperkalemia in setting of SHAQUILLE; he was dialyzed once. 52M PMHx history of cocaine use presented with altered mental status and rash. He was found by his son three days prior to admission altered; at baseline he is fully functional, works as . His son continued to check up on him without improvement in mental status; discovered new rash around trunk, and extremities. Of note he had a ER visit for groin cyst that was drained and given "sulfa" antibiotic. He was found to have profound SHAQUILLE and rhabdomyalsis with positive for cocaine. He had troponin leak without ekg changes. He had significant LFT's which downtrended quickly, possibly due to ischemia. He had CTH which demonstrated subacute infarcts in left MCA and PCA; with weakness in right side that has not noticeably improved. He was brought to MICU for possible HD for hyperkalemia in setting of SHAQUILLE; he was dialyzed once. Regarding his rash, ID and derm were consulted; consensus was likely drug induced but not DRESS; started on topical and systemic steroids. He was noted to have distended abdomen and CT was done demonstrated ileus. His mental status improved throughout admission, returning to near baseline.     Plan  needs follow up for right adrenal nodule incidentally found on CT either inpt or outpt  trend SCr, suspect pre-renal vs. ATN; renal following  CPK trending down  follow up cards recs regarding elevated troponins  continue asa; lipitor was held in setting of elevated LFTs, may start soon  continue zosyn 3.375 q12 empirically; f/u ID recommendations  advance diet as tolerated  speech and swallow    Accepting physician: Dr. Pablo

## 2018-06-20 NOTE — PROGRESS NOTE ADULT - PROBLEM SELECTOR PLAN 8
- Morbiliform rash likely 2/2 medication exposure. Possibly the "sulfa" abx pt received after a groin abscess draining.   - c/w triamcinolone 0.1% cream BID to AA prn itch  - Dermatology recs appreciated. - Likely in the setting of liver damage   - Continue to monitor on cbc   - Monitor for signs of bleeding.

## 2018-06-20 NOTE — PROGRESS NOTE ADULT - MENTAL STATUS
A& O x 3 today, EOMI, speech is clear, +STREETER equally 8/10 strength
Right sided neglect, easy to arouse, RUE & RLE weakness / no WD to pain / noxious stimuli

## 2018-06-20 NOTE — PHYSICAL THERAPY INITIAL EVALUATION ADULT - CRITERIA FOR SKILLED THERAPEUTIC INTERVENTIONS
rehab potential/functional limitations in following categories/therapy frequency/anticipated equipment needs at discharge/anticipated discharge recommendation/impairments found/risk reduction/prevention/predicted duration of therapy intervention

## 2018-06-20 NOTE — DIETITIAN INITIAL EVALUATION ADULT. - ENERGY NEEDS
Ht: 68"  Wt: 258  BMI: 39.3 kg/m2   IBW: 154 (+/-10%)     168% IBW  Edema: 1+ generalized   Skin: no pressure injuries

## 2018-06-20 NOTE — PROGRESS NOTE ADULT - PROBLEM SELECTOR PLAN 4
- Likely in the setting of muscle break down 2/2 to being found down in the floor.   - CK downtrending. - Likely in the setting of muscle break down 2/2 to being found down in the floor.   - CK downtrending.  - Follow up levels in the am.

## 2018-06-20 NOTE — PROGRESS NOTE ADULT - ASSESSMENT
52 year old male with unknown PMHx (does not follow with Doctors), transferred from [facility] for ARF after son found him lethargic, not speaking, and down on the floor. He also noticed rash on the patient's trunk and leg, decided to bring his father to [facility].     Pt was recently at [facility] two weeks prior for a right scrotal cyst that drained on its own   Was given levaquin and a sulfa drug (?bactrim) for right scrotal cyst  Renal failure (unknown baseline) most likely from rhabdo, with elevated trops   Elevated liver enzymes trending down, positive cocaine test  s/p HD for hyperkalemia   Diffuse rash - Derm on board - most likely drug rash from possibly bactrim - improving  Afebrile. RVP neg. Blood cxs NGTD.  CT head with subacute infarct  Abd distention with xr -> parital sbo vs ileus refusing NGT decompression  Leukocytosis remains elevated - patient was on steroids      Recommend:  -Continue zosyn for now  -CT A/P with adrenal adenoma - consider MRI when stable for further evaluation.  -Monitor for fevers  -Trend CBC with diff.  -Monitor CPK  -Monitor renal function  -Trend LFTs  -Check leptospirosis serologies, RMSF serologies, tick panel  -F/U ANCA    Jem Gomez MD  Pager [phone]  After 5pm/weekends call [phone]

## 2018-06-20 NOTE — CONSULT NOTE ADULT - SUBJECTIVE AND OBJECTIVE BOX
CHIEF COMPLAINT: Found Down    HISTORY OF PRESENT ILLNESS: The Pt is a 53 y/o man with unknown PMH who presented to OSH after being found down in the setting of Cocaine use. He was also found to have Subacute CVA and ARF, so he was transferred to       [facility] (Rash (Skin))    Intolerances    	    MEDICATIONS:  heparin  Injectable 5000 Unit(s) SubCutaneous every 8 hours    piperacillin/tazobactam IVPB. 3.375 Gram(s) IV Intermittent every 12 hours        pantoprazole  Injectable 40 milliGRAM(s) IV Push every 12 hours      dextrose 5% + lactated ringers. 1000 milliLiter(s) IV Continuous <Continuous>  triamcinolone 0.1% Cream 1 Application(s) Topical every 12 hours      PAST MEDICAL & SURGICAL HISTORY:  No pertinent past medical history  No significant past surgical history      FAMILY HISTORY:  No pertinent family history in first degree relatives      SOCIAL HISTORY:    [ ] Non-smoker  [ ] Smoker  [ ] Alcohol      REVIEW OF SYSTEMS:  General: no fatigue/malaise, weight loss/gain.  Skin: no rashes.  Ophthalmologic: no blurred vision, no loss of vision. 	  ENT: no sore throat, rhinorrhea, sinus congestion.  Respiratory: no SOB, cough or wheeze.  Gastrointestinal:  no N/V/D, no melena/hematemesis/hematochezia.  Genitourinary: no dysuria/hesitancy or hematuria.  Musculoskeletal: no myalgias or arthralgias.  Neurological: no changes in vision or hearing, no lightheadedness/dizziness, no syncope/near syncope	  Psychiatric: no unusual stress/anxiety.   Hematology/Lymphatics: no unusual bleeding, bruising and no lymphadenopathy.  Endocrine: no unusual thirst.   All others negative except as stated above and in HPI.    PHYSICAL EXAM:  T(C): 36.8 (06-20-18 @ 12:00), Max: 37.1 (06-19-18 @ 21:20)  HR: 82 (06-20-18 @ 15:00) (72 - 87)  BP: 144/76 (06-20-18 @ 15:00) (93/56 - 149/71)  RR: 20 (06-20-18 @ 15:00) (13 - 33)  SpO2: 99% (06-20-18 @ 15:00) (94% - 100%)  Wt(kg): --  I&O's Summary    19 Jun 2018 07:01  -  20 Jun 2018 07:00  --------------------------------------------------------  IN: 2585 mL / OUT: 2675 mL / NET: -90 mL    20 Jun 2018 07:01  -  20 Jun 2018 16:04  --------------------------------------------------------  IN: 600 mL / OUT: 810 mL / NET: -210 mL        Appearance: Normal	  HEENT:   Normal oral mucosa  Cardiovascular: RRR, No JVD, No edema  Respiratory: Lungs clear to auscultation	  Psychiatry: Mood & affect appropriate  Gastrointestinal:  Soft  Skin: No rashes, No ecchymoses, No cyanosis	  Neurologic: Non-focal  Extremities: No clubbing, cyanosis or edema  Vascular: Peripheral pulses palpable         LABS:	 	    CBC Full  -  ( 20 Jun 2018 06:43 )  WBC Count : 21.6 K/uL  Hemoglobin : 15.0 g/dL  Hematocrit : 47.1 %  Platelet Count - Automated : 96 K/uL  Mean Cell Volume : 95.3 fl  Mean Cell Hemoglobin : 30.5 pg  Mean Cell Hemoglobin Concentration : 31.6 gm/dL  Auto Neutrophil # : x  Auto Lymphocyte # : x  Auto Monocyte # : x  Auto Eosinophil # : x  Auto Basophil # : x  Auto Neutrophil % : x  Auto Lymphocyte % : x  Auto Monocyte % : x  Auto Eosinophil % : x  Auto Basophil % : x    06-20    140  |  104  |  84<H>  ----------------------------<  120<H>  4.5   |  19<L>  |  4.57<H>  06-20    139  |  105  |  84<H>  ----------------------------<  104<H>  5.0   |  15<L>  |  5.02<H>    Ca    8.3<L>      20 Jun 2018 12:11  Ca    8.2<L>      20 Jun 2018 06:43  Phos  4.2     06-20  Phos  4.5     06-20  Mg     2.0     06-20  Mg     2.0     06-20    TPro  6.6  /  Alb  3.1<L>  /  TBili  0.4  /  DBili  x   /  AST  70<H>  /  ALT  163<H>  /  AlkPhos  91  06-20  TPro  6.2  /  Alb  2.8<L>  /  TBili  0.5  /  DBili  x   /  AST  79<H>  /  ALT  165<H>  /  AlkPhos  87  06-20      proBNP:   Lipid Profile:   HgA1c:   TSH:       CARDIAC MARKERS:            TELEMETRY: 	    ECG:  	  RADIOLOGY:  OTHER: 	    PREVIOUS DIAGNOSTIC TESTING:    [ ] Echocardiogram:  [ ]  Catheterization:  [ ] Stress Test:  	  	  ASSESSMENT/PLAN: 	          Isaias Brower  Cardiology Fellow  Consult Fellow carries in-house phone [phone]) from 7:30 am - 5:00 pm M - F  For non-urgent issues outside of the above time period, please feel free to contact me directly by text or call at [phone] CHIEF COMPLAINT: Found Down    HISTORY OF PRESENT ILLNESS: The Pt is a 51 y/o man with unknown PMH who presented to OSH after being found down in the setting of Cocaine use. He was also found to have Subacute CVA and ARF with Rhabdomyolysis, so he was transferred to [facility] further management, including HD. He denies any known Heart Disease, does report h/o CVA on further interview, but no other known medical issues. Cardiology consulted to evaluate abnormal cardiac biomarkers.       Allergies    Bactrim (Rash (Skin))    Intolerances    	    MEDICATIONS:  heparin  Injectable 5000 Unit(s) SubCutaneous every 8 hours    piperacillin/tazobactam IVPB. 3.375 Gram(s) IV Intermittent every 12 hours        pantoprazole  Injectable 40 milliGRAM(s) IV Push every 12 hours      dextrose 5% + lactated ringers. 1000 milliLiter(s) IV Continuous <Continuous>  triamcinolone 0.1% Cream 1 Application(s) Topical every 12 hours      PAST MEDICAL & SURGICAL HISTORY:  No pertinent past medical history  No significant past surgical history      FAMILY HISTORY:  No pertinent family history in first degree relatives      SOCIAL HISTORY:  Cocaine Abuse      REVIEW OF SYSTEMS:  General: no fatigue/malaise, weight loss/gain.  Skin: no rashes.  Ophthalmologic: no blurred vision, no loss of vision. 	  ENT: no sore throat, rhinorrhea, sinus congestion.  Respiratory: no SOB, cough or wheeze.  Gastrointestinal:  no N/V/D, no melena/hematemesis/hematochezia.  Genitourinary: no dysuria/hesitancy or hematuria.  Musculoskeletal: no myalgias or arthralgias.  Neurological: no changes in vision or hearing, no lightheadedness/dizziness, no syncope/near syncope	  Psychiatric: no unusual stress/anxiety.   Hematology/Lymphatics: no unusual bleeding, bruising and no lymphadenopathy.  Endocrine: no unusual thirst.   All others negative except as stated above and in HPI.    PHYSICAL EXAM:  T(C): 36.8 (06-20-18 @ 12:00), Max: 37.1 (06-19-18 @ 21:20)  HR: 82 (06-20-18 @ 15:00) (72 - 87)  BP: 144/76 (06-20-18 @ 15:00) (93/56 - 149/71)  RR: 20 (06-20-18 @ 15:00) (13 - 33)  SpO2: 99% (06-20-18 @ 15:00) (94% - 100%)  Wt(kg): --  I&O's Summary    19 Jun 2018 07:01  -  20 Jun 2018 07:00  --------------------------------------------------------  IN: 2585 mL / OUT: 2675 mL / NET: -90 mL    20 Jun 2018 07:01  -  20 Jun 2018 16:04  --------------------------------------------------------  IN: 600 mL / OUT: 810 mL / NET: -210 mL        Appearance: Normal	  HEENT:   Normal oral mucosa  Cardiovascular: RRR, No JVD, No edema  Respiratory: Lungs clear to auscultation	  Psychiatry: Mood & affect appropriate  Gastrointestinal:  Soft  Skin: No rashes, No ecchymoses, No cyanosis	  Neurologic: Non-focal  Extremities: No clubbing, cyanosis or edema  Vascular: Peripheral pulses palpable         LABS:	 	    CBC Full  -  ( 20 Jun 2018 06:43 )  WBC Count : 21.6 K/uL  Hemoglobin : 15.0 g/dL  Hematocrit : 47.1 %  Platelet Count - Automated : 96 K/uL  Mean Cell Volume : 95.3 fl  Mean Cell Hemoglobin : 30.5 pg  Mean Cell Hemoglobin Concentration : 31.6 gm/dL  Auto Neutrophil # : x  Auto Lymphocyte # : x  Auto Monocyte # : x  Auto Eosinophil # : x  Auto Basophil # : x  Auto Neutrophil % : x  Auto Lymphocyte % : x  Auto Monocyte % : x  Auto Eosinophil % : x  Auto Basophil % : x    06-20    140  |  104  |  84<H>  ----------------------------<  120<H>  4.5   |  19<L>  |  4.57<H>  06-20    139  |  105  |  84<H>  ----------------------------<  104<H>  5.0   |  15<L>  |  5.02<H>    Ca    8.3<L>      20 Jun 2018 12:11  Ca    8.2<L>      20 Jun 2018 06:43  Phos  4.2     06-20  Phos  4.5     06-20  Mg     2.0     06-20  Mg     2.0     06-20    TPro  6.6  /  Alb  3.1<L>  /  TBili  0.4  /  DBili  x   /  AST  70<H>  /  ALT  163<H>  /  AlkPhos  91  06-20  TPro  6.2  /  Alb 2.8<L>  /  TBili  0.5  /  DBili  x   /  AST  79<H>  /  ALT  165<H>  /  AlkPhos  87  06-20        ECG: Sinus Rhythm, Possible LAE, no ST changes 	    OTHER: 	    PREVIOUS DIAGNOSTIC TESTING:      Echocardiogram:    < from: TTE with Doppler (w/Cont) (06.20.18 @ 10:17) >  Conclusions:  Technically difficult study.  1. Mitral valve not well visualized; valve leaflets appear  to open normally. Minimal mitral regurgitation.  2. Aortic valve not well visualized; valve leaflets appear  to open normally. No aortic valve regurgitation seen.  3. Endocardial visualization enhanced with intravenous  injection of echo contrast (Definity). Left ventricle  suboptimally visualized despite the use of intravenous echo  contrast; from limited views, grossly preserved left  ventricular systolic function.  4. The right ventricle is not well visualized; grossly  normal right ventricular systolic function.  	  ASSESSMENT/PLAN: 51 y/o man with unknown PMH who presented to OSH after being found down in the setting of Cocaine use. He was also found to have Subacute CVA and ARF with Rhabdomyolysis    1) Elevated Cardiac Biomarkers - these are elevated in the setting of severe MSK injury and ARF, and CTNT is renally cleared, so its slow resolution is expected, additionally the stability of his High Sensitivity Troponin does not suggest Acute MI  - his EKG and TTE do not suggest myocardial injury, nor does his clinical presentation  - no further inpatient cardiac evaluation indicated         Isaias Brower  Cardiology Fellow  Consult Fellow carries in-house phone [phone]) from 7:30 am - 5:00 pm M - F  For non-urgent issues outside of the above time period, please feel free to contact me directly by text or call at [phone] CHIEF COMPLAINT: Found Down    HISTORY OF PRESENT ILLNESS: The Pt is a 53 y/o man with unknown PMH who presented to OSH after being found down in the setting of Cocaine use. He was also found to have Subacute CVA and ARF with Rhabdomyolysis, so he was transferred to [facility] further management, including HD. He denies any known Heart Disease, does report h/o CVA on further interview, but no other known medical issues. Cardiology consulted to evaluate abnormal cardiac biomarkers.       Allergies:  Bactrim (Rash (Skin))      MEDICATIONS:  heparin  Injectable 5000 Unit(s) SubCutaneous every 8 hours    piperacillin/tazobactam IVPB. 3.375 Gram(s) IV Intermittent every 12 hours        pantoprazole  Injectable 40 milliGRAM(s) IV Push every 12 hours      dextrose 5% + lactated ringers. 1000 milliLiter(s) IV Continuous <Continuous>  triamcinolone 0.1% Cream 1 Application(s) Topical every 12 hours      PAST MEDICAL & SURGICAL HISTORY:  No pertinent past medical history  No significant past surgical history      FAMILY HISTORY:  No pertinent family history in first degree relatives      SOCIAL HISTORY:  Cocaine Abuse      REVIEW OF SYSTEMS:  General: no fatigue/malaise, weight loss/gain.  Skin: no rashes.  Ophthalmologic: no blurred vision, no loss of vision. 	  ENT: no sore throat, rhinorrhea, sinus congestion.  Respiratory: no SOB, cough or wheeze.  Gastrointestinal:  no N/V/D, no melena/hematemesis/hematochezia.  Genitourinary: no dysuria/hesitancy or hematuria.  Musculoskeletal: no myalgias or arthralgias.  Neurological: no changes in vision or hearing, no lightheadedness/dizziness, no syncope/near syncope	  Psychiatric: no unusual stress/anxiety.   Hematology/Lymphatics: no unusual bleeding, bruising and no lymphadenopathy.  Endocrine: no unusual thirst.   All others negative except as stated above and in HPI.    PHYSICAL EXAM:  T(C): 36.8 (06-20-18 @ 12:00), Max: 37.1 (06-19-18 @ 21:20)  HR: 82 (06-20-18 @ 15:00) (72 - 87)  BP: 144/76 (06-20-18 @ 15:00) (93/56 - 149/71)  RR: 20 (06-20-18 @ 15:00) (13 - 33)  SpO2: 99% (06-20-18 @ 15:00) (94% - 100%)      Appearance: Normal	  HEENT:   Normal oral mucosa  Cardiovascular: RRR, No JVD, No edema  Respiratory: Lungs clear to auscultation	  Psychiatry: Mood & affect appropriate  Gastrointestinal:  Soft  Skin: No rashes, No ecchymoses, No cyanosis	  Neurologic: Non-focal  Extremities: No clubbing, cyanosis or edema  Vascular: Peripheral pulses palpable       ECG: Sinus Rhythm, Possible LAE, no ST changes       I&O's Summary    19 Jun 2018 07:01  -  20 Jun 2018 07:00  --------------------------------------------------------  IN: 2585 mL / OUT: 2675 mL / NET: -90 mL    20 Jun 2018 07:01  -  20 Jun 2018 16:04  --------------------------------------------------------  IN: 600 mL / OUT: 810 mL / NET: -210 mL    LABS:	 	    CBC Full  -  ( 20 Jun 2018 06:43 )  WBC Count : 21.6 K/uL  Hemoglobin : 15.0 g/dL  Hematocrit : 47.1 %  Platelet Count - Automated : 96 K/uL    06-20    140  |  104  |  84<H>  ----------------------------<  120<H>  4.5   |  19<L>  |  4.57<H>  06-20    139  |  105  |  84<H>  ----------------------------<  104<H>  5.0   |  15<L>  |  5.02<H>    Ca    8.3<L>      20 Jun 2018 12:11  Ca    8.2<L>      20 Jun 2018 06:43  Phos  4.2     06-20  Phos  4.5     06-20  Mg     2.0     06-20  Mg     2.0     06-20    TPro  6.6  /  Alb  3.1<L>  /  TBili  0.4  /  DBili  x   /  AST  70<H>  /  ALT  163<H>  /  AlkPhos  91  06-20  TPro  6.2  /  Alb 2.8<L>  /  TBili  0.5  /  DBili  x   /  AST  79<H>  /  ALT  165<H>  /  AlkPhos  87  06-20        Echocardiogram:    < from: TTE with Doppler (w/Cont) (06.20.18 @ 10:17) >  Conclusions:  Technically difficult study.  1. Mitral valve not well visualized; valve leaflets appear  to open normally. Minimal mitral regurgitation.  2. Aortic valve not well visualized; valve leaflets appear  to open normally. No aortic valve regurgitation seen.  3. Endocardial visualization enhanced with intravenous  injection of echo contrast (Definity). Left ventricle  suboptimally visualized despite the use of intravenous echo  contrast; from limited views, grossly preserved left  ventricular systolic function.  4. The right ventricle is not well visualized; grossly  normal right ventricular systolic function.  	  ASSESSMENT/PLAN: 53 y/o man with unknown PMH who presented to OSH after being found down in the setting of Cocaine use. He was also found to have Subacute CVA and ARF with Rhabdomyolysis    1) Elevated Cardiac Biomarkers - these are elevated in the setting of severe MSK injury and ARF, and CTNT is renally cleared, so its slow resolution is expected in setting of renal insufficiency, additionally the stability of his High Sensitivity Troponin does not suggest Acute MI  - his EKG and TTE do not suggest myocardial injury, nor does his clinical presentation  - no further inpatient cardiac evaluation indicated         Isaias Brower  Cardiology Fellow  Consult Fellow carries in-house phone [phone]) from 7:30 am - 5:00 pm M - F  For non-urgent issues outside of the above time period, please feel free to contact me directly by text or call at [phone]    José Miguel Ross M.D.  Cardiology Consult Service  [phone]

## 2018-06-20 NOTE — DIETITIAN INITIAL EVALUATION ADULT. - OTHER INFO
Pt seen for: MICU Length Of Stay   Adm dx: SHAQUILLE w/ rhabdo s/p HD 6/19, no further HD at this time per renal; per surgey likely ileus    GI issues: denies vomiting, abd distended   Last BM: 6/20   Food allergies: NKFA   Vit/supplement PTA: none

## 2018-06-20 NOTE — PROGRESS NOTE ADULT - PROBLEM SELECTOR PLAN 10
- Hx of tobacco and cocaine use.   - Nicotine patch   - consult DIET - npo   - DVT prophylaxis - heparin sub-q   - Aspiration precautions  - Fall precautions.

## 2018-06-20 NOTE — PROGRESS NOTE ADULT - PROBLEM SELECTOR PLAN 2
- Leukocytosis with bandemia concerning for aspiration.  - ID recs appreciated.   - C/w zosyn.    Hx of abx tx for Scrotal abscess.   - - Leukocytosis with bandemia concerning infection likely for aspiration.  - ID recs appreciated.   - C/w zosyn.   - Cultures NGTD.   - Continue to monitor on CBCs.

## 2018-06-20 NOTE — PROGRESS NOTE ADULT - ATTENDING COMMENTS
Briefly, 52M pw CVA in setting of cocaine abuse, course complicated by SHAQUILLE/rhabdo requiring HD session, aspiration PNA and ilues. Pt hemodynamically stable but with abdomen distension. Has been refusing NGT as it is too painful - RBA explained but pt still not agreeable. (+) BM    - continue zosyn for presumed aspiration PNA and trend cbc. appreciate ID recs  - s/p gilbert with urine output. hyperkalemia resolved. no indication for emergent HD. Appreciate renal recs on need for further HD - if not, will RIJ catheter  - troponin elevated likely 2/2 demand ischemia. continue to trend and appreciate card recs  - FOBT positive, H/H stable. appreciate GI recs  - continue triamcinolone cream for drug rash  - PT/OT consult  - SW consult    Paige Cruz MD  Division of Hospital Medicine  Pager: [phone]  Office: [phone]

## 2018-06-20 NOTE — PHYSICAL THERAPY INITIAL EVALUATION ADULT - ADDITIONAL COMMENTS
6/19 CT ABD/pelvis: New diffuse dilatation of fluid-filled small bowel loops without discrete transition point. Ileus is favored over small bowel obstruction. Small volume ascites and mesenteric edema are present. 6/17 MRI: Left MCA distribution and probable left posterior cerebral artery distribution changes most consistent with subacute infarction. No evidence for acute hemorrhage. MRI is recommended for further characterization.

## 2018-06-20 NOTE — PROGRESS NOTE ADULT - ASSESSMENT
51 yo man with cocaine overdose, subacute stroke, SHAQUILLE 2/2 rhabdo with a distended abdomen. CT suggestive of ileus; small volume ascites and mesenteric edema are nonspecific findings. No bowel wall thickening on CT. Given cocaine overdose, possible punctuate ischemia of bowel or mesenteric infarcts. However, unlikely in the setting of a normal lactate and without tachycardia. Today, patient now off sedation , alert and oriented ; continues to have bowel function without worsening pain but remains distended, lactate continues to decrease and remains in normal limits . Low likelihood of bowel ischemia.     - given return of bowel function , would advance diet as tolerated  - Given lactate has remained within normal limits, may stop trending lactate  - No acute surgical intervention at this time; reconsult as necessary     ACS (pager [phone])

## 2018-06-20 NOTE — PHYSICAL THERAPY INITIAL EVALUATION ADULT - DIAGNOSIS, PT EVAL
decreased functional mobility secondary to decreased strength, impaired sensory feedback/sensation, impaired coordination

## 2018-06-20 NOTE — PROGRESS NOTE ADULT - PROBLEM SELECTOR PLAN 1
Sub-acute L MCA CVA 2/2 to cocaine use.   - Mental status improving, AOx2. RUE full strength. LUE weakness.   - c/w aspirin. Start statin once LFTs improve.  - C/w neuro checks - Sub-acute L MCA CVA 2/2 to cocaine use.   - Mental status improving, AOx2. RUE full strength. LUE weakness.   - c/w aspirin. Start statin once LFTs improve.  - C/w neuro checks  - Pending MRI of the brain.

## 2018-06-21 LAB
ALBUMIN SERPL ELPH-MCNC: 3 G/DL — LOW (ref 3.3–5)
ALP SERPL-CCNC: 83 U/L — SIGNIFICANT CHANGE UP (ref 40–120)
ALT FLD-CCNC: 132 U/L — HIGH (ref 10–45)
ANION GAP SERPL CALC-SCNC: 18 MMOL/L — HIGH (ref 5–17)
APTT BLD: 24.2 SEC — LOW (ref 27.5–37.4)
AST SERPL-CCNC: 66 U/L — HIGH (ref 10–40)
AUTO DIFF PNL BLD: NEGATIVE — SIGNIFICANT CHANGE UP
BASOPHILS # BLD AUTO: 0 K/UL — SIGNIFICANT CHANGE UP (ref 0–0.2)
BASOPHILS NFR BLD AUTO: 0 % — SIGNIFICANT CHANGE UP (ref 0–2)
BILIRUB SERPL-MCNC: 0.4 MG/DL — SIGNIFICANT CHANGE UP (ref 0.2–1.2)
BUN SERPL-MCNC: 84 MG/DL — HIGH (ref 7–23)
C-ANCA SER-ACNC: NEGATIVE — SIGNIFICANT CHANGE UP
CALCIUM SERPL-MCNC: 8.4 MG/DL — SIGNIFICANT CHANGE UP (ref 8.4–10.5)
CHLORIDE SERPL-SCNC: 107 MMOL/L — SIGNIFICANT CHANGE UP (ref 96–108)
CK MB BLD-MCNC: 0.6 % — SIGNIFICANT CHANGE UP (ref 0–3.5)
CK MB CFR SERPL CALC: 6.9 NG/ML — HIGH (ref 0–6.7)
CK SERPL-CCNC: 1167 U/L — HIGH (ref 30–200)
CO2 SERPL-SCNC: 17 MMOL/L — LOW (ref 22–31)
CREAT SERPL-MCNC: 4.43 MG/DL — HIGH (ref 0.5–1.3)
EOSINOPHIL # BLD AUTO: 0.19 K/UL — SIGNIFICANT CHANGE UP (ref 0–0.5)
EOSINOPHIL NFR BLD AUTO: 1 % — SIGNIFICANT CHANGE UP (ref 0–6)
GLUCOSE SERPL-MCNC: 89 MG/DL — SIGNIFICANT CHANGE UP (ref 70–99)
HCT VFR BLD CALC: 43.1 % — SIGNIFICANT CHANGE UP (ref 39–50)
HGB BLD-MCNC: 14.9 G/DL — SIGNIFICANT CHANGE UP (ref 13–17)
INR BLD: 1.01 RATIO — SIGNIFICANT CHANGE UP (ref 0.88–1.16)
LYMPHOCYTES # BLD AUTO: 1.13 K/UL — SIGNIFICANT CHANGE UP (ref 1–3.3)
LYMPHOCYTES # BLD AUTO: 6 % — LOW (ref 13–44)
MAGNESIUM SERPL-MCNC: 2 MG/DL — SIGNIFICANT CHANGE UP (ref 1.6–2.6)
MANUAL SMEAR VERIFICATION: SIGNIFICANT CHANGE UP
MCHC RBC-ENTMCNC: 31.2 PG — SIGNIFICANT CHANGE UP (ref 27–34)
MCHC RBC-ENTMCNC: 34.6 GM/DL — SIGNIFICANT CHANGE UP (ref 32–36)
MCV RBC AUTO: 90.2 FL — SIGNIFICANT CHANGE UP (ref 80–100)
MONOCYTES # BLD AUTO: 1.32 K/UL — HIGH (ref 0–0.9)
MONOCYTES NFR BLD AUTO: 7 % — SIGNIFICANT CHANGE UP (ref 2–14)
MYELOCYTES NFR BLD: 2 % — HIGH (ref 0–0)
NEUTROPHILS # BLD AUTO: 15.65 K/UL — HIGH (ref 1.8–7.4)
NEUTROPHILS NFR BLD AUTO: 81 % — HIGH (ref 43–77)
NEUTS BAND # BLD: 2 % — SIGNIFICANT CHANGE UP (ref 0–8)
NRBC # BLD: 0 /100 — SIGNIFICANT CHANGE UP (ref 0–0)
P-ANCA SER-ACNC: NEGATIVE — SIGNIFICANT CHANGE UP
PHOSPHATE SERPL-MCNC: 3.9 MG/DL — SIGNIFICANT CHANGE UP (ref 2.5–4.5)
PLAT MORPH BLD: NORMAL — SIGNIFICANT CHANGE UP
PLATELET # BLD AUTO: 113 K/UL — LOW (ref 150–400)
POTASSIUM SERPL-MCNC: 4.1 MMOL/L — SIGNIFICANT CHANGE UP (ref 3.5–5.3)
POTASSIUM SERPL-SCNC: 4.1 MMOL/L — SIGNIFICANT CHANGE UP (ref 3.5–5.3)
PROT SERPL-MCNC: 6.2 G/DL — SIGNIFICANT CHANGE UP (ref 6–8.3)
PROTHROM AB SERPL-ACNC: 11.4 SEC — SIGNIFICANT CHANGE UP (ref 10–13.1)
RBC # BLD: 4.78 M/UL — SIGNIFICANT CHANGE UP (ref 4.2–5.8)
RBC # FLD: 14.4 % — SIGNIFICANT CHANGE UP (ref 10.3–14.5)
RBC BLD AUTO: SIGNIFICANT CHANGE UP
SODIUM SERPL-SCNC: 142 MMOL/L — SIGNIFICANT CHANGE UP (ref 135–145)
TROPONIN T, HIGH SENSITIVITY RESULT: 568 NG/L — HIGH (ref 0–51)
VARIANT LYMPHS # BLD: 1 % — SIGNIFICANT CHANGE UP (ref 0–6)
WBC # BLD: 18.86 K/UL — HIGH (ref 3.8–10.5)
WBC # FLD AUTO: 18.86 K/UL — HIGH (ref 3.8–10.5)

## 2018-06-21 PROCEDURE — 99233 SBSQ HOSP IP/OBS HIGH 50: CPT | Mod: GC

## 2018-06-21 PROCEDURE — 99232 SBSQ HOSP IP/OBS MODERATE 35: CPT

## 2018-06-21 PROCEDURE — 99232 SBSQ HOSP IP/OBS MODERATE 35: CPT | Mod: GC

## 2018-06-21 RX ORDER — ACETAMINOPHEN 500 MG
1000 TABLET ORAL ONCE
Qty: 0 | Refills: 0 | Status: COMPLETED | OUTPATIENT
Start: 2018-06-21 | End: 2018-06-21

## 2018-06-21 RX ORDER — LANOLIN ALCOHOL/MO/W.PET/CERES
3 CREAM (GRAM) TOPICAL AT BEDTIME
Qty: 0 | Refills: 0 | Status: COMPLETED | OUTPATIENT
Start: 2018-06-21 | End: 2018-06-21

## 2018-06-21 RX ADMIN — Medication 1 APPLICATION(S): at 17:26

## 2018-06-21 RX ADMIN — PANTOPRAZOLE SODIUM 40 MILLIGRAM(S): 20 TABLET, DELAYED RELEASE ORAL at 17:43

## 2018-06-21 RX ADMIN — HEPARIN SODIUM 5000 UNIT(S): 5000 INJECTION INTRAVENOUS; SUBCUTANEOUS at 13:24

## 2018-06-21 RX ADMIN — PIPERACILLIN AND TAZOBACTAM 25 GRAM(S): 4; .5 INJECTION, POWDER, LYOPHILIZED, FOR SOLUTION INTRAVENOUS at 06:36

## 2018-06-21 RX ADMIN — HEPARIN SODIUM 5000 UNIT(S): 5000 INJECTION INTRAVENOUS; SUBCUTANEOUS at 06:36

## 2018-06-21 RX ADMIN — Medication 81 MILLIGRAM(S): at 12:21

## 2018-06-21 RX ADMIN — HEPARIN SODIUM 5000 UNIT(S): 5000 INJECTION INTRAVENOUS; SUBCUTANEOUS at 22:41

## 2018-06-21 RX ADMIN — SODIUM CHLORIDE 75 MILLILITER(S): 9 INJECTION, SOLUTION INTRAVENOUS at 17:43

## 2018-06-21 RX ADMIN — Medication 400 MILLIGRAM(S): at 23:28

## 2018-06-21 RX ADMIN — PANTOPRAZOLE SODIUM 40 MILLIGRAM(S): 20 TABLET, DELAYED RELEASE ORAL at 06:36

## 2018-06-21 RX ADMIN — Medication 1 PATCH: at 06:31

## 2018-06-21 RX ADMIN — SIMETHICONE 80 MILLIGRAM(S): 80 TABLET, CHEWABLE ORAL at 12:21

## 2018-06-21 RX ADMIN — Medication 3 MILLIGRAM(S): at 22:41

## 2018-06-21 RX ADMIN — Medication 1 PATCH: at 06:36

## 2018-06-21 RX ADMIN — Medication 1 APPLICATION(S): at 06:36

## 2018-06-21 RX ADMIN — PIPERACILLIN AND TAZOBACTAM 25 GRAM(S): 4; .5 INJECTION, POWDER, LYOPHILIZED, FOR SOLUTION INTRAVENOUS at 17:43

## 2018-06-21 NOTE — PROGRESS NOTE ADULT - PROBLEM SELECTOR PLAN 1
SHAQUILLE on CKD.  CT imaging shows left atrophic kidney.  Suspect SHAQUILLE from rhabdo + hemodynamic injury from cocaine abuse. Scr improving and non-oliguric.  -ordered intact PTH, to assess chronicity of CKD  -no urgent HD indications at this time, no plan for HD today.  If creatinine continues to improve on this trend then would remove HD catehter tomorrow  -monitor creatinine trend and urine output  -avoid nephrotoxins, ace/arb, imaging contrast

## 2018-06-21 NOTE — PROGRESS NOTE ADULT - PROBLEM SELECTOR PLAN 5
- Likely in the setting of acute renal failure.   - c/w telemetry   - Cardiology consulted. - Likely in the setting of acute renal failure.   - c/w telemetry   - Cardiology recs appreciated.

## 2018-06-21 NOTE — SWALLOW BEDSIDE ASSESSMENT ADULT - SLP GENERAL OBSERVATIONS
Upon entry pt awake, in bed, A&Ox4, able to follow commands for purposes of evaluation, vocal quality dysphonic/hoarse with decreased vocal intensity, but pt stated this is his voice at baseline.  Denied hx of dysphagia.  Stated he had a stroke 6 months ago and was not hospitalized.  When asked how he knew he had a stroke pt replied "the symptoms, my leg went weak and I couldn't walk."  Current diet ordered by MD allows ice chips/sips of water; pt denied coughing/throat clearing during or post swallow.  Patient c/o abdominal pressure, but stated he wanted to proceed with PO trials which were cleared by MD.  After trials of puree textures, soft solids, and ice chips pt began c/o increasing abdominal pressure and pain 5/10.  Trials terminated.  ALBERT Holt and MD Carpio informed.  Pt appears to have poor insight evidenced by refusing NGT for decompression despite being encouraged by RN.

## 2018-06-21 NOTE — PROGRESS NOTE ADULT - PROBLEM SELECTOR PLAN 4
- Likely in the setting of muscle break down 2/2 to being found down in the floor.   - CK downtrending.  - Follow up levels in the am. - Likely in the setting of muscle break down 2/2 to being found down in the floor.   - CK downtrending.

## 2018-06-21 NOTE — PROGRESS NOTE ADULT - PROBLEM SELECTOR PLAN 6
- SHAQUILLE on CKD, likely secondary to rhabdomyolysis vs hemodynamic instability. Left atrophic kidney.    - Renal recs appreciated.   - s/p dialysis for hyperkalemia.  - CTM on BMP  - C/w gilbert for monitoring of the critically ill. - SHAQUILLE on CKD, likely secondary to rhabdomyolysis vs hemodynamic instability. Left atrophic kidney.    - Renal recs appreciated.   - s/p dialysis for hyperkalemia.  - CTM on BMP  - Gutierres removed. TOV.

## 2018-06-21 NOTE — PROGRESS NOTE ADULT - PROBLEM SELECTOR PLAN 2
- Leukocytosis with bandemia concerning infection likely for aspiration.  - ID recs appreciated.   - C/w zosyn.   - Cultures NGTD.   - Continue to monitor on CBCs.

## 2018-06-21 NOTE — SWALLOW BEDSIDE ASSESSMENT ADULT - COMMENTS
As per MD Carpio patient cleared for all PO trials including all textures and no limitations regarding quantity.

## 2018-06-21 NOTE — PROGRESS NOTE ADULT - SUBJECTIVE AND OBJECTIVE BOX
[facility] DIVISION OF KIDNEY DISEASES AND HYPERTENSION -- FOLLOW UP NOTE  --------------------------------------------------------------------------------  Chief Complaint:  SHAQUILLE requiring dialysis    24 hour events/subjective:  Patient is somnolent, otherwise denies any complaints.  Transferred from [facility] yesterday.        PAST HISTORY  --------------------------------------------------------------------------------  No significant changes to PMH, PSH, FHx, SHx, unless otherwise noted    ALLERGIES & MEDICATIONS  --------------------------------------------------------------------------------  Allergies    Bactrim (Rash (Skin))    Intolerances      Standing Inpatient Medications  aspirin enteric coated 81 milliGRAM(s) Oral daily  dextrose 5% + lactated ringers. 1000 milliLiter(s) IV Continuous <Continuous>  heparin  Injectable 5000 Unit(s) SubCutaneous every 8 hours  nicotine - 21 mG/24Hr(s) Patch 1 patch Transdermal daily  pantoprazole  Injectable 40 milliGRAM(s) IV Push every 12 hours  piperacillin/tazobactam IVPB. 3.375 Gram(s) IV Intermittent every 12 hours  simethicone 80 milliGRAM(s) Chew daily  triamcinolone 0.1% Cream 1 Application(s) Topical every 12 hours    PRN Inpatient Medications      REVIEW OF SYSTEMS  --------------------------------------------------------------------------------  Gen: No fevers/chills  Skin: No rashes  Head/Eyes/Ears/Mouth: No headache  Respiratory: No dyspnea  CV: No chest pain  GI: No abdominal pain  : No dysuria  MSK: no edema  Neuro: No dizziness/lightheadedness    VITALS/PHYSICAL EXAM  --------------------------------------------------------------------------------  T(C): 36.7 (06-21-18 @ 08:00), Max: 36.9 (06-20-18 @ 16:00)  HR: 76 (06-21-18 @ 08:00) (76 - 86)  BP: 156/90 (06-21-18 @ 08:00) (114/72 - 157/78)  RR: 18 (06-21-18 @ 08:00) (17 - 33)  SpO2: 97% (06-21-18 @ 08:00) (95% - 99%)  Wt(kg): --  Height (cm): 172.7 (06-20-18 @ 08:40)      06-20-18 @ 07:01  -  06-21-18 @ 07:00  --------------------------------------------------------  IN: 3260 mL / OUT: 2940 mL / NET: 320 mL      Physical Exam:  	Gen: NAD  	HEENT: MMM  	Pulm: CTA B/L  	CV: RRR, S1S2; no rub  	Abd: soft, nontender  	: No suprapubic tenderness  	UE:  no edema  	LE:  +nonpitting edema bilaterally  	Neuro: No focal deficits  	Psych: Normal affect and mood  	Skin: Warm    LABS/STUDIES  --------------------------------------------------------------------------------              14.9   18.86 >-----------<  113      [06-21-18 @ 08:17]              43.1     142  |  107  |  84  ----------------------------<  89      [06-21-18 @ 06:22]  4.1   |  17  |  4.43        Ca     8.4     [06-21-18 @ 06:22]      Mg     2.0     [06-21-18 @ 06:22]      Phos  3.9     [06-21-18 @ 06:22]    TPro  6.2  /  Alb  3.0  /  TBili  0.4  /  DBili  x   /  AST  66  /  ALT  132  /  AlkPhos  83  [06-21-18 @ 06:22]    PT/INR: PT 11.4 , INR 1.01       [06-21-18 @ 08:03]  PTT: 24.2       [06-21-18 @ 08:03]    CK 1167      [06-21-18 @ 06:22]    Creatinine Trend:  SCr 4.43 [06-21 @ 06:22]  SCr 4.74 [06-20 @ 17:48]  SCr 4.57 [06-20 @ 12:11]  SCr 5.02 [06-20 @ 06:43]  SCr 5.04 [06-20 @ 00:55]

## 2018-06-21 NOTE — PROGRESS NOTE ADULT - PROBLEM SELECTOR PLAN 7
- Morbiliform rash likely 2/2 medication exposure. Possibly the "sulfa" abx pt received after a groin abscess draining.   - c/w triamcinolone 0.1% cream BID to AA prn itch  - Dermatology recs appreciated.  - Pending tick-borne disease, [place], Leptospirosis.

## 2018-06-21 NOTE — PROGRESS NOTE ADULT - PROBLEM SELECTOR PLAN 3
- Ileus likely secondary to ARF/CVA.   - Patient refusing NG tube placement.   - Will start simethicone daily  - Positive FOBT- on pantoprazole. - Ileus likely secondary to ARF/CVA.   - Patient refusing NG tube placement.   - C/w simethicone daily  - Positive FOBT- on pantoprazole.

## 2018-06-21 NOTE — SWALLOW BEDSIDE ASSESSMENT ADULT - PHARYNGEAL PHASE
Delayed pharyngeal swallow/Decreased laryngeal elevation one instance of throat clearing noted approx. 2 minutes after intake; unable to discern if related to swallow function/Delayed pharyngeal swallow/Delayed throat clear post oral intake/Decreased laryngeal elevation Delayed pharyngeal swallow/Decreased laryngeal elevation/Delayed throat clear post oral intake pt with productive cough and asked for a cup to expectorate phlegm/Delayed cough post oral intake/Delayed pharyngeal swallow/Decreased laryngeal elevation

## 2018-06-21 NOTE — PROGRESS NOTE ADULT - PROBLEM SELECTOR PLAN 8
- Likely in the setting of liver damage   - Continue to monitor on cbc   - Monitor for signs of bleeding.

## 2018-06-21 NOTE — SWALLOW BEDSIDE ASSESSMENT ADULT - SWALLOW EVAL: DIAGNOSIS
Exam limited given pt reported abdominal pressure/pain after trials of puree textures, soft solids, and ice chips consumed.  For trials administered pt presented with suspected pharyngeal dysphagia characterized by s/s suggestive of laryngeal penetration/aspiration.  Pt also presented with dysphonia notable for hoarse vocal quality and decreased vocal intensity, though he states this is his voice at baseline.

## 2018-06-21 NOTE — PROGRESS NOTE ADULT - ATTENDING COMMENTS
pt still with abdominal distension, continues to refuse NGT placement - understands the risks  pt still passing flatus and BMs, abdomen without tenderness - unlikely obstructed  will start OOB to chair, PT   continue simethicone, bowel regimen    Paige Cruz MD  Division of Hospital Medicine  Pager: [phone]  Office: [phone]

## 2018-06-21 NOTE — PROGRESS NOTE ADULT - SUBJECTIVE AND OBJECTIVE BOX
CC: Patient is a 52y old  Male who presents with a chief complaint of Patient is a 52y old  Male who presents with a chief complaint of AMS, found down (2018 09:34)    Interval History/ROS: patient has no complaints. Remains with distended abdomen and pressure in his abdomen. Denies fever, chills. States making bowel movements.    Allergies  Bactrim (Rash (Skin))    ANTIMICROBIALS:  piperacillin/tazobactam IVPB. 3.375 every 12 hours    PE:    Vital Signs Last 24 Hrs  T(C): 36.7 (2018 08:00), Max: 36.9 (2018 16:00)  T(F): 98.1 (2018 08:00), Max: 98.5 (2018 16:00)  HR: 76 (2018 08:00) (76 - 86)  BP: 156/90 (2018 08:00) (114/72 - 157/78)  BP(mean): 101 (2018 18:00) (89 - 118)  RR: 18 (2018 08:00) (17 - 33)  SpO2: 97% (2018 08:00) (95% - 99%)    Gen: AOx3, NAD  CV: S1+S2 normal, no murmurs  Resp: Clear bilat, no resp distress  Abd: distended  Ext: LE edema  : No CVA tenderness  IV/Skin: No thrombophlebitis, rash improving  Neuro: no focal deficits    LABS:                          14.9   18.86 )-----------( 113      ( 2018 08:17 )             43.1       -    142  |  107  |  84<H>  ----------------------------<  89  4.1   |  17<L>  |  4.43<H>    Ca    8.4      2018 06:22  Phos  3.9     06-  Mg     2.0     -    TPro  6.2  /  Alb  3.0<L>  /  TBili  0.4  /  DBili  x   /  AST  66<H>  /  ALT  132<H>  /  AlkPhos  83  06-21      Urinalysis Basic - ( 2018 11:22 )    Color: Yellow / Appearance: Clear / S.013 / pH: x  Gluc: x / Ketone: Negative  / Bili: Negative / Urobili: Negative   Blood: x / Protein: 30 mg/dL / Nitrite: Negative   Leuk Esterase: Negative / RBC: 5-10 /HPF / WBC 3-5 /HPF   Sq Epi: x / Non Sq Epi: x / Bacteria: x    MICROBIOLOGY:  v  .Urine Clean Catch (Midstream)  18   No growth  --  --      .Blood Blood  18   No growth to date.  --  --      .Blood Blood  18   No growth to date.  --  --    CMV IgG Antibody: <0.20 U/mL (18 @ 15:29)  Toxoplasma IgG Screen: <3.0 IU/mL (18 @ 15:29)    CMVPCR Log: NotDetec LogIU/mL ( @ 15:36)  Rapid RVP Result: NotDetec ( @ 00:24)  Rapid RVP Result: NotDetec ( @ 09:43)    RADIOLOGY:  < from: MR Head No Cont (18 @ 23:47) >  Impression: Acute left MCA infarcts are identified with possible areas of   petechial hemorrhage seen involving the left parietal region. Clinical   correlation and continued close interval follow-up until resolution is   recommended.    < end of copied text >

## 2018-06-21 NOTE — PROGRESS NOTE ADULT - ASSESSMENT
52yoM w/ Hx of cocaine and tobacco use and elevated blood sugars, brought in by son after being found down at home and w/ AMS. Renal called for SHAQUILLE and hyperkalemia. Found to have left atrophic kidney on CT scan, confirmed with renal scan.

## 2018-06-21 NOTE — PROGRESS NOTE ADULT - PROBLEM SELECTOR PLAN 2
Suspected due to diminished renal excretion 2/2 SHAQUILLE and increasing potassium release 2/2 rhabdo.  -Resolved post HD  -monitor serum potassium

## 2018-06-21 NOTE — CHART NOTE - NSCHARTNOTEFT_GEN_A_CORE
Patient reported he picked up antibiotics for his groin cyst from [facility] in [facility] and took them as instructed.  I contacted pt's pharmacy ([phone]) and confirmed that Levaquin and Bactrim were picked up on 6/7/18.    Of these two medications, Bactrim is more likely to have cause his morbiliform drug eruption. Patient reported he picked up antibiotics for his groin cyst from [facility] in [facility] and took them as instructed.  I contacted pt's pharmacy ([phone]) and confirmed that Levaquin and Bactrim were picked up on 6/7/18.    Of these two medications, Bactrim is more likely to have caused his morbiliform drug eruption.

## 2018-06-21 NOTE — PROGRESS NOTE ADULT - SUBJECTIVE AND OBJECTIVE BOX
Ravi Lucas  PGY-1 Resident Physician   Pager [phone] or [phone] from 7am to 7pm, after 7pm    Patient is a 52y old  Male who presents with a chief complaint of Patient is a 52y old  Male who presents with a chief complaint of AMS, found down (2018 09:34)    SUBJECTIVE / OVERNIGHT EVENTS:  No acute overnight events. Patient on telemetry was sinus rhythm. Patient endorsed feeling ok. Patient denied fever, chills, sob, nausea, vomiting, diarr\hea     MEDICATIONS  (STANDING):  aspirin enteric coated 81 milliGRAM(s) Oral daily  dextrose 5% + lactated ringers. 1000 milliLiter(s) (75 mL/Hr) IV Continuous <Continuous>  heparin  Injectable 5000 Unit(s) SubCutaneous every 8 hours  nicotine - 21 mG/24Hr(s) Patch 1 patch Transdermal daily  pantoprazole  Injectable 40 milliGRAM(s) IV Push every 12 hours  piperacillin/tazobactam IVPB. 3.375 Gram(s) IV Intermittent every 12 hours  simethicone 80 milliGRAM(s) Chew daily  triamcinolone 0.1% Cream 1 Application(s) Topical every 12 hours    MEDICATIONS  (PRN):    Allergies    Bactrim (Rash (Skin))    Intolerances        Vital Signs Last 24 Hrs  T(C): 36.7 (2018 08:00), Max: 36.9 (2018 16:00)  T(F): 98.1 (2018 08:00), Max: 98.5 (2018 16:00)  HR: 76 (2018 08:00) (76 - 86)  BP: 156/90 (2018 08:00) (114/72 - 157/78)  BP(mean): 101 (2018 18:00) (89 - 118)  RR: 18 (2018 08:00) (13 - 33)  SpO2: 97% (2018 08:00) (95% - 99%)  Daily     Daily   CAPILLARY BLOOD GLUCOSE        I&O's Summary    2018 07:01  -  2018 07:00  --------------------------------------------------------  IN: 3260 mL / OUT: 2940 mL / NET: 320 mL        PHYSICAL EXAM:  GENERAL: NAD, well-developed  HEAD:  Atraumatic, Normocephalic  EYES: EOMI, PERRLA, conjunctiva and sclera clear  NECK: Supple, No JVD  CHEST/LUNG: Clear to auscultation bilaterally; No wheeze  HEART: Regular rate and rhythm; Normal S1 S2, No murmurs, rubs, or gallops  ABDOMEN: Soft, Nontender, Nondistended; Bowel sounds present  EXTREMITIES:  2+ Peripheral Pulses, No clubbing, cyanosis, or edema  PSYCH: AAOx3  NEUROLOGY: non-focal  SKIN: No rashes or lesions    LABS:                        14.9   18.86 )-----------( 113      ( 2018 08:17 )             43.1     Hgb Trend: 14.9<--, 15.0<--, 15.0<--, 16.4<--, 15.3<--      142  |  107  |  84<H>  ----------------------------<  89  4.1   |  17<L>  |  4.43<H>    Ca    8.4      2018 06:22  Phos  3.9       Mg     2.0         TPro  6.2  /  Alb  3.0<L>  /  TBili  0.4  /  DBili  x   /  AST  66<H>  /  ALT  132<H>  /  AlkPhos  83      Creatinine Trend: 4.43<--, 4.74<--, 4.57<--, 5.02<--, 5.04<--, 5.86<--  LIVER FUNCTIONS - ( 2018 06:22 )  Alb: 3.0 g/dL / Pro: 6.2 g/dL / ALK PHOS: 83 U/L / ALT: 132 U/L / AST: 66 U/L / GGT: x           PT/INR - ( 2018 08:03 )   PT: 11.4 sec;   INR: 1.01 ratio         PTT - ( 2018 08:03 )  PTT:24.2 sec  CARDIAC MARKERS ( 2018 06:22 )  x     / x     / 1167 U/L / x     / 6.9 ng/mL  CARDIAC MARKERS ( 2018 17:48 )  x     / x     / 1627 U/L / x     / 7.7 ng/mL  CARDIAC MARKERS ( 2018 12:11 )  x     / x     / 1856 U/L / x     / x      CARDIAC MARKERS ( 2018 06:43 )  x     / x     / 2236 U/L / x     / 9.1 ng/mL  CARDIAC MARKERS ( 2018 00:55 )  x     / x     / 2796 U/L / x     / 10.6 ng/mL  CARDIAC MARKERS ( 2018 17:18 )  x     / x     / 4152 U/L / x     / 17.3 ng/mL      Urinalysis Basic - ( 2018 11:22 )    Color: Yellow / Appearance: Clear / S.013 / pH: x  Gluc: x / Ketone: Negative  / Bili: Negative / Urobili: Negative   Blood: x / Protein: 30 mg/dL / Nitrite: Negative   Leuk Esterase: Negative / RBC: 5-10 /HPF / WBC 3-5 /HPF   Sq Epi: x / Non Sq Epi: x / Bacteria: x        RADIOLOGY & ADDITIONAL TESTS:    Imaging Personally Reviewed:    Consultant(s) Notes Reviewed:      Care Discussed with Consultants/Other Providers: Ravi Lucas  PGY-1 Resident Physician   Pager [phone] or [phone] from 7am to 7pm, after 7pm    Patient is a 52y old  Male who presents with a chief complaint of Patient is a 52y old  Male who presents with a chief complaint of AMS, found down (2018 09:34)    SUBJECTIVE / OVERNIGHT EVENTS:  No acute overnight events. Patient on telemetry was sinus rhythm. Patient endorsed feeling ok. Patient denied fever, chills, sob, nausea, vomiting, constipation, dizziness, chest pain, abdominal pain.      MEDICATIONS  (STANDING):  aspirin enteric coated 81 milliGRAM(s) Oral daily  dextrose 5% + lactated ringers. 1000 milliLiter(s) (75 mL/Hr) IV Continuous <Continuous>  heparin  Injectable 5000 Unit(s) SubCutaneous every 8 hours  nicotine - 21 mG/24Hr(s) Patch 1 patch Transdermal daily  pantoprazole  Injectable 40 milliGRAM(s) IV Push every 12 hours  piperacillin/tazobactam IVPB. 3.375 Gram(s) IV Intermittent every 12 hours  simethicone 80 milliGRAM(s) Chew daily  triamcinolone 0.1% Cream 1 Application(s) Topical every 12 hours    MEDICATIONS  (PRN):    Allergies  Bactrim (Rash (Skin))  Intolerances    Vital Signs Last 24 Hrs  T(C): 36.7 (2018 08:00), Max: 36.9 (2018 16:00)  T(F): 98.1 (2018 08:00), Max: 98.5 (2018 16:00)  HR: 76 (2018 08:00) (76 - 86)  BP: 156/90 (2018 08:00) (114/72 - 157/78)  BP(mean): 101 (2018 18:00) (89 - 118)  RR: 18 (2018 08:00) (13 - 33)  SpO2: 97% (2018 08:00) (95% - 99%)  Daily     CAPILLARY BLOOD GLUCOSE    I&O's Summary    2018 07:01  -  2018 07:00  --------------------------------------------------------  IN: 3260 mL / OUT: 2940 mL / NET: 320 mL    PHYSICAL EXAM:  GENERAL: NAD, well-developed.   HEAD:  Atraumatic, Normocephalic  EYES: EOMI, PERRLA, conjunctiva and sclera clear  NECK: Supple, No JVD  CHEST/LUNG: Clear to auscultation bilaterally; No wheeze  HEART: Regular rate and rhythm; Normal S1 S2, No murmurs, rubs, or gallops  ABDOMEN: Soft, Nontender, distended; Bowel sounds decreased   EXTREMITIES:  2+ Peripheral Pulses, No clubbing, cyanosis, or edema  PSYCH: AAOx3  NEUROLOGY: Left UE weakness 3/5. Right UE 5/5.   SKIN: No rashes or lesions    LABS:                        14.9   18.86 )-----------( 113      ( 2018 08:17 )             43.1     Hgb Trend: 14.9<--, 15.0<--, 15.0<--, 16.4<--, 15.3<--      142  |  107  |  84<H>  ----------------------------<  89  4.1   |  17<L>  |  4.43<H>    Ca    8.4      2018 06:22  Phos  3.9       Mg     2.0         TPro  6.2  /  Alb  3.0<L>  /  TBili  0.4  /  DBili  x   /  AST  66<H>  /  ALT  132<H>  /  AlkPhos  83      Creatinine Trend: 4.43<--, 4.74<--, 4.57<--, 5.02<--, 5.04<--, 5.86<--  LIVER FUNCTIONS - ( 2018 06:22 )  Alb: 3.0 g/dL / Pro: 6.2 g/dL / ALK PHOS: 83 U/L / ALT: 132 U/L / AST: 66 U/L / GGT: x           PT/INR - ( 2018 08:03 )   PT: 11.4 sec;   INR: 1.01 ratio         PTT - ( 2018 08:03 )  PTT:24.2 sec  CARDIAC MARKERS ( 2018 06:22 )  x     / x     / 1167 U/L / x     / 6.9 ng/mL  CARDIAC MARKERS ( 2018 17:48 )  x     / x     / 1627 U/L / x     / 7.7 ng/mL  CARDIAC MARKERS ( 2018 12:11 )  x     / x     / 1856 U/L / x     / x      CARDIAC MARKERS ( 2018 06:43 )  x     / x     / 2236 U/L / x     / 9.1 ng/mL  CARDIAC MARKERS ( 2018 00:55 )  x     / x     / 2796 U/L / x     / 10.6 ng/mL  CARDIAC MARKERS ( 2018 17:18 )  x     / x     / 4152 U/L / x     / 17.3 ng/mL      Urinalysis Basic - ( 2018 11:22 )    Color: Yellow / Appearance: Clear / S.013 / pH: x  Gluc: x / Ketone: Negative  / Bili: Negative / Urobili: Negative   Blood: x / Protein: 30 mg/dL / Nitrite: Negative   Leuk Esterase: Negative / RBC: 5-10 /HPF / WBC 3-5 /HPF   Sq Epi: x / Non Sq Epi: x / Bacteria: x        RADIOLOGY & ADDITIONAL TESTS:    Imaging Personally Reviewed:    Consultant(s) Notes Reviewed:      Care Discussed with Consultants/Other Providers: Ravi Lucas  PGY-1 Resident Physician   Pager [phone] or [phone] from 7am to 7pm, after 7pm    Patient is a 52y old  Male who presents with a chief complaint of Patient is a 52y old  Male who presents with a chief complaint of AMS, found down (2018 09:34)    SUBJECTIVE / OVERNIGHT EVENTS:  No acute overnight events. Patient was sinus rhythm 70-90s. Patient on telemetry was sinus rhythm. Patient endorsed feeling ok. Patient denied fever, chills, sob, nausea, vomiting, constipation, dizziness, chest pain, abdominal pain.      MEDICATIONS  (STANDING):  aspirin enteric coated 81 milliGRAM(s) Oral daily  dextrose 5% + lactated ringers. 1000 milliLiter(s) (75 mL/Hr) IV Continuous <Continuous>  heparin  Injectable 5000 Unit(s) SubCutaneous every 8 hours  nicotine - 21 mG/24Hr(s) Patch 1 patch Transdermal daily  pantoprazole  Injectable 40 milliGRAM(s) IV Push every 12 hours  piperacillin/tazobactam IVPB. 3.375 Gram(s) IV Intermittent every 12 hours  simethicone 80 milliGRAM(s) Chew daily  triamcinolone 0.1% Cream 1 Application(s) Topical every 12 hours    MEDICATIONS  (PRN):    Allergies  Bactrim (Rash (Skin))  Intolerances    Vital Signs Last 24 Hrs  T(C): 36.7 (2018 08:00), Max: 36.9 (2018 16:00)  T(F): 98.1 (2018 08:00), Max: 98.5 (2018 16:00)  HR: 76 (2018 08:00) (76 - 86)  BP: 156/90 (2018 08:00) (114/72 - 157/78)  BP(mean): 101 (2018 18:00) (89 - 118)  RR: 18 (2018 08:00) (13 - 33)  SpO2: 97% (2018 08:00) (95% - 99%)  Daily     CAPILLARY BLOOD GLUCOSE    I&O's Summary    2018 07:01  -  2018 07:00  --------------------------------------------------------  IN: 3260 mL / OUT: 2940 mL / NET: 320 mL    PHYSICAL EXAM:  GENERAL: NAD, well-developed.   HEAD:  Atraumatic, Normocephalic  EYES: EOMI, PERRLA, conjunctiva and sclera clear  NECK: Supple, No JVD  CHEST/LUNG: Clear to auscultation bilaterally; No wheeze  HEART: Regular rate and rhythm; Normal S1 S2, No murmurs, rubs, or gallops  ABDOMEN: Soft, Nontender, distended; Bowel sounds decreased   EXTREMITIES:  2+ Peripheral Pulses, No clubbing, cyanosis, or edema  PSYCH: AAOx3  NEUROLOGY: Left UE weakness 3/5. Right UE 5/5.   SKIN: No rashes or lesions    LABS:                        14.9   18.86 )-----------( 113      ( 2018 08:17 )             43.1     Hgb Trend: 14.9<--, 15.0<--, 15.0<--, 16.4<--, 15.3<--  06    142  |  107  |  84<H>  ----------------------------<  89  4.1   |  17<L>  |  4.43<H>    Ca    8.4      2018 06:22  Phos  3.9       Mg     2.0         TPro  6.2  /  Alb  3.0<L>  /  TBili  0.4  /  DBili  x   /  AST  66<H>  /  ALT  132<H>  /  AlkPhos  83  -    Creatinine Trend: 4.43<--, 4.74<--, 4.57<--, 5.02<--, 5.04<--, 5.86<--  LIVER FUNCTIONS - ( 2018 06:22 )  Alb: 3.0 g/dL / Pro: 6.2 g/dL / ALK PHOS: 83 U/L / ALT: 132 U/L / AST: 66 U/L / GGT: x           PT/INR - ( 2018 08:03 )   PT: 11.4 sec;   INR: 1.01 ratio         PTT - ( 2018 08:03 )  PTT:24.2 sec  CARDIAC MARKERS ( 2018 06:22 )  x     / x     / 1167 U/L / x     / 6.9 ng/mL  CARDIAC MARKERS ( 2018 17:48 )  x     / x     / 1627 U/L / x     / 7.7 ng/mL  CARDIAC MARKERS ( 2018 12:11 )  x     / x     / 1856 U/L / x     / x      CARDIAC MARKERS ( 2018 06:43 )  x     / x     / 2236 U/L / x     / 9.1 ng/mL  CARDIAC MARKERS ( 2018 00:55 )  x     / x     / 2796 U/L / x     / 10.6 ng/mL  CARDIAC MARKERS ( 2018 17:18 )  x     / x     / 4152 U/L / x     / 17.3 ng/mL      Urinalysis Basic - ( 2018 11:22 )    Color: Yellow / Appearance: Clear / S.013 / pH: x  Gluc: x / Ketone: Negative  / Bili: Negative / Urobili: Negative   Blood: x / Protein: 30 mg/dL / Nitrite: Negative   Leuk Esterase: Negative / RBC: 5-10 /HPF / WBC 3-5 /HPF   Sq Epi: x / Non Sq Epi: x / Bacteria: x        RADIOLOGY & ADDITIONAL TESTS:    Imaging Personally Reviewed:    Consultant(s) Notes Reviewed:      Care Discussed with Consultants/Other Providers:

## 2018-06-21 NOTE — PROGRESS NOTE ADULT - ASSESSMENT
52 year old man with PMHx of CVA, who was transferred from [place] for ARF, rhabdomyolysis found to be cocaine positive, subacute strokes on CT, noted to have a full body rash - attributed to drug rash- thought to be 2/2 possible Bactrim that was given for a recent scrotal abscess along with levaquin over the past few weeks at an OSH. 52 year old man with PMHx of recent scrotal abscess,  who presents to [place] for AMS x 3 days found with MCA infarct 2/2 c/b rhabdomyolysis and acute renal failure transferred to [facility]-ICU for management. Hospital course complicated by full body rash - attributed to drug rash- thought to be 2/2 possible Bactrim that was given for a recent scrotal abscess. Patient developed hyperkalemia s/p dialyisis x 1. 52 year old man with PMHx of recent scrotal abscess,  who presents to [place] for AMS x 3 days found with MCA infarct 2/2 c/b rhabdomyolysis and acute renal failure transferred to [facility]-ICU for management. Hospital course complicated by full body rash - attributed to drug rash- thought to be 2/2 possible Bactrim that was given for a recent scrotal abscess. Patient developed hyperkalemia s/p dialyisis x 1. Patient on zosyn for aspiration pna.

## 2018-06-21 NOTE — PROGRESS NOTE ADULT - ASSESSMENT
52 year old male with unknown PMHx (does not follow with [facility]), transferred from [facility] for ARF after son found him lethargic, not speaking, and down on the floor. He also noticed rash on the patient's trunk and leg, decided to bring his father to [facility].     Pt was recently at [facility] two weeks prior for a right scrotal cyst that drained on its own   Was given levaquin and a sulfa drug (?bactrim) for right scrotal cyst  Renal failure (unknown baseline) most likely from rhabdo, with elevated trops   Elevated liver enzymes trending down, positive cocaine test  s/p HD for hyperkalemia   Diffuse rash - Derm on board - most likely drug rash from bactrim - improving  Afebrile. RVP neg. Blood cxs NGTD.  CT head with subacute infarct  Abd distention with xr -> parital sbo vs ileus   Leukocytosis remains elevated - patient was on steroids - now trending down  MRI with acute left MCA infarcts      Recommend:  -Continue zosyn for now - if WBC continues to improve, then can consider discontinuing tomorrow  -CT A/P with adrenal adenoma - consider MRI when stable for further evaluation.  -Monitor for fevers  -Trend CBC with diff.  -Monitor CPK  -Monitor renal function  -Trend LFTs  -Check leptospirosis serologies, RMSF serologies, tick panel  -F/U ANCA      Jem Gomez MD  Pager [phone]  After 5pm/weekends call [phone]

## 2018-06-21 NOTE — PROGRESS NOTE ADULT - PROBLEM SELECTOR PLAN 1
- Sub-acute L MCA CVA 2/2 to cocaine use.   - Mental status improving, AOx2. RUE full strength. LUE weakness.   - c/w aspirin. Start statin once LFTs improve.  - C/w neuro checks  - Pending MRI of the brain. - Sub-acute L MCA CVA 2/2 to cocaine use.   - Mental status improving, AOx2. RUE full strength. LUE weakness.   - c/w aspirin. Start statin once LFTs improve.  - C/w neuro checks  - Pending MRI of the brain.  - ANCA negative.

## 2018-06-22 DIAGNOSIS — B49 UNSPECIFIED MYCOSIS: ICD-10-CM

## 2018-06-22 LAB
A PHAGOCYTOPH IGG TITR SER IF: SIGNIFICANT CHANGE UP TITER
ALBUMIN SERPL ELPH-MCNC: 3.1 G/DL — LOW (ref 3.3–5)
ALP SERPL-CCNC: 75 U/L — SIGNIFICANT CHANGE UP (ref 40–120)
ALT FLD-CCNC: 111 U/L — HIGH (ref 10–45)
ANION GAP SERPL CALC-SCNC: 18 MMOL/L — HIGH (ref 5–17)
AST SERPL-CCNC: 47 U/L — HIGH (ref 10–40)
B BURGDOR AB SER QL IA: NEGATIVE — SIGNIFICANT CHANGE UP
B MICROTI IGG TITR SER: SIGNIFICANT CHANGE UP TITER
BASOPHILS # BLD AUTO: 0 K/UL — SIGNIFICANT CHANGE UP (ref 0–0.2)
BASOPHILS NFR BLD AUTO: 0 % — SIGNIFICANT CHANGE UP (ref 0–2)
BILIRUB SERPL-MCNC: 0.5 MG/DL — SIGNIFICANT CHANGE UP (ref 0.2–1.2)
BUN SERPL-MCNC: 75 MG/DL — HIGH (ref 7–23)
CALCIUM SERPL-MCNC: 8.7 MG/DL — SIGNIFICANT CHANGE UP (ref 8.4–10.5)
CALCIUM SERPL-MCNC: 8.7 MG/DL — SIGNIFICANT CHANGE UP (ref 8.4–10.5)
CHLORIDE SERPL-SCNC: 105 MMOL/L — SIGNIFICANT CHANGE UP (ref 96–108)
CO2 SERPL-SCNC: 17 MMOL/L — LOW (ref 22–31)
CREAT SERPL-MCNC: 3.8 MG/DL — HIGH (ref 0.5–1.3)
CULTURE RESULTS: SIGNIFICANT CHANGE UP
E CHAFFEENSIS IGG TITR SER IF: SIGNIFICANT CHANGE UP TITER
EOSINOPHIL # BLD AUTO: 0.13 K/UL — SIGNIFICANT CHANGE UP (ref 0–0.5)
EOSINOPHIL NFR BLD AUTO: 1 % — SIGNIFICANT CHANGE UP (ref 0–6)
GLUCOSE SERPL-MCNC: 90 MG/DL — SIGNIFICANT CHANGE UP (ref 70–99)
HCT VFR BLD CALC: 42.5 % — SIGNIFICANT CHANGE UP (ref 39–50)
HGB BLD-MCNC: 14.8 G/DL — SIGNIFICANT CHANGE UP (ref 13–17)
LEPTOSPIRA AB TITR SER: NEGATIVE — SIGNIFICANT CHANGE UP
LYMPHOCYTES # BLD AUTO: 1.3 K/UL — SIGNIFICANT CHANGE UP (ref 1–3.3)
LYMPHOCYTES # BLD AUTO: 10 % — LOW (ref 13–44)
MAGNESIUM SERPL-MCNC: 2 MG/DL — SIGNIFICANT CHANGE UP (ref 1.6–2.6)
MANUAL SMEAR VERIFICATION: SIGNIFICANT CHANGE UP
MCHC RBC-ENTMCNC: 31.7 PG — SIGNIFICANT CHANGE UP (ref 27–34)
MCHC RBC-ENTMCNC: 34.8 GM/DL — SIGNIFICANT CHANGE UP (ref 32–36)
MCV RBC AUTO: 91 FL — SIGNIFICANT CHANGE UP (ref 80–100)
METAMYELOCYTES # FLD: 2 % — HIGH (ref 0–0)
MONOCYTES # BLD AUTO: 1.56 K/UL — HIGH (ref 0–0.9)
MONOCYTES NFR BLD AUTO: 12 % — SIGNIFICANT CHANGE UP (ref 2–14)
MYELOCYTES NFR BLD: 5 % — HIGH (ref 0–0)
NEUTROPHILS # BLD AUTO: 9.09 K/UL — HIGH (ref 1.8–7.4)
NEUTROPHILS NFR BLD AUTO: 70 % — SIGNIFICANT CHANGE UP (ref 43–77)
NRBC # BLD: 0 /100 — SIGNIFICANT CHANGE UP (ref 0–0)
PHOSPHATE SERPL-MCNC: 4.3 MG/DL — SIGNIFICANT CHANGE UP (ref 2.5–4.5)
PLAT MORPH BLD: NORMAL — SIGNIFICANT CHANGE UP
PLATELET # BLD AUTO: 126 K/UL — LOW (ref 150–400)
POTASSIUM SERPL-MCNC: 4.3 MMOL/L — SIGNIFICANT CHANGE UP (ref 3.5–5.3)
POTASSIUM SERPL-SCNC: 4.3 MMOL/L — SIGNIFICANT CHANGE UP (ref 3.5–5.3)
PROT SERPL-MCNC: 6.2 G/DL — SIGNIFICANT CHANGE UP (ref 6–8.3)
PTH-INTACT FLD-MCNC: 140 PG/ML — HIGH (ref 15–65)
R RICKETTSI AB SER-ACNC: NEGATIVE — SIGNIFICANT CHANGE UP
R RICKETTSI IGM SER-ACNC: 0.86 INDEX — SIGNIFICANT CHANGE UP (ref 0–0.89)
RBC # BLD: 4.67 M/UL — SIGNIFICANT CHANGE UP (ref 4.2–5.8)
RBC # FLD: 14.5 % — SIGNIFICANT CHANGE UP (ref 10.3–14.5)
RBC BLD AUTO: NORMAL — SIGNIFICANT CHANGE UP
RICK SF IGG TITR SER IF: NEGATIVE — SIGNIFICANT CHANGE UP
RICK SF IGM TITR SER IF: 0.86 INDEX — SIGNIFICANT CHANGE UP (ref 0–0.89)
SODIUM SERPL-SCNC: 140 MMOL/L — SIGNIFICANT CHANGE UP (ref 135–145)
SPECIMEN SOURCE: SIGNIFICANT CHANGE UP
WBC # BLD: 12.99 K/UL — HIGH (ref 3.8–10.5)
WBC # FLD AUTO: 12.99 K/UL — HIGH (ref 3.8–10.5)

## 2018-06-22 PROCEDURE — 74018 RADEX ABDOMEN 1 VIEW: CPT | Mod: 26

## 2018-06-22 PROCEDURE — 99232 SBSQ HOSP IP/OBS MODERATE 35: CPT

## 2018-06-22 PROCEDURE — 31231 NASAL ENDOSCOPY DX: CPT

## 2018-06-22 PROCEDURE — 99233 SBSQ HOSP IP/OBS HIGH 50: CPT | Mod: GC

## 2018-06-22 PROCEDURE — 99222 1ST HOSP IP/OBS MODERATE 55: CPT | Mod: 25

## 2018-06-22 PROCEDURE — 74176 CT ABD & PELVIS W/O CONTRAST: CPT | Mod: 26

## 2018-06-22 PROCEDURE — 99232 SBSQ HOSP IP/OBS MODERATE 35: CPT | Mod: GC

## 2018-06-22 PROCEDURE — 99222 1ST HOSP IP/OBS MODERATE 55: CPT | Mod: GC

## 2018-06-22 RX ORDER — ACETAMINOPHEN 500 MG
1000 TABLET ORAL ONCE
Qty: 0 | Refills: 0 | Status: COMPLETED | OUTPATIENT
Start: 2018-06-22 | End: 2018-06-22

## 2018-06-22 RX ADMIN — SODIUM CHLORIDE 75 MILLILITER(S): 9 INJECTION, SOLUTION INTRAVENOUS at 17:24

## 2018-06-22 RX ADMIN — PANTOPRAZOLE SODIUM 40 MILLIGRAM(S): 20 TABLET, DELAYED RELEASE ORAL at 05:36

## 2018-06-22 RX ADMIN — PIPERACILLIN AND TAZOBACTAM 25 GRAM(S): 4; .5 INJECTION, POWDER, LYOPHILIZED, FOR SOLUTION INTRAVENOUS at 05:36

## 2018-06-22 RX ADMIN — Medication 1000 MILLIGRAM(S): at 00:00

## 2018-06-22 RX ADMIN — PANTOPRAZOLE SODIUM 40 MILLIGRAM(S): 20 TABLET, DELAYED RELEASE ORAL at 17:24

## 2018-06-22 RX ADMIN — Medication 400 MILLIGRAM(S): at 19:47

## 2018-06-22 RX ADMIN — SIMETHICONE 80 MILLIGRAM(S): 80 TABLET, CHEWABLE ORAL at 13:21

## 2018-06-22 RX ADMIN — Medication 1 APPLICATION(S): at 17:23

## 2018-06-22 RX ADMIN — Medication 1 APPLICATION(S): at 05:36

## 2018-06-22 RX ADMIN — HEPARIN SODIUM 5000 UNIT(S): 5000 INJECTION INTRAVENOUS; SUBCUTANEOUS at 13:21

## 2018-06-22 RX ADMIN — Medication 1 PATCH: at 05:36

## 2018-06-22 RX ADMIN — Medication 1000 MILLIGRAM(S): at 20:05

## 2018-06-22 RX ADMIN — Medication 81 MILLIGRAM(S): at 13:21

## 2018-06-22 RX ADMIN — SODIUM CHLORIDE 75 MILLILITER(S): 9 INJECTION, SOLUTION INTRAVENOUS at 05:36

## 2018-06-22 RX ADMIN — Medication 1 PATCH: at 05:37

## 2018-06-22 RX ADMIN — HEPARIN SODIUM 5000 UNIT(S): 5000 INJECTION INTRAVENOUS; SUBCUTANEOUS at 05:36

## 2018-06-22 NOTE — OCCUPATIONAL THERAPY INITIAL EVALUATION ADULT - PERTINENT HX OF CURRENT PROBLEM, REHAB EVAL
52 M w/ with unknown PMHx, who was transferred from [facility] for [facility]. Per son, he visited him on Saturday (patient lives alone) and was acting "different". He gave him some water and came back today to check up on him. He was very lethargic, not speaking, and found down on the floor. He noticed rash on the patient's trunk and leg, decided to bring his father to [facility].

## 2018-06-22 NOTE — PROGRESS NOTE ADULT - SUBJECTIVE AND OBJECTIVE BOX
CC: Patient is a 53y old  Male who presents with a chief complaint of Patient is a 52y old  Male who presents with a chief complaint of AMS, found down (18 Jun 2018 09:34)    Interval History/ROS:    Allergies  Bactrim (Rash (Skin))    ANTIMICROBIALS:  piperacillin/tazobactam IVPB. 3.375 every 12 hours    PE:    Vital Signs Last 24 Hrs  T(C): 36.5 (22 Jun 2018 08:00), Max: 37.1 (21 Jun 2018 22:43)  T(F): 97.7 (22 Jun 2018 08:00), Max: 98.8 (21 Jun 2018 22:43)  HR: 72 (22 Jun 2018 12:47) (65 - 78)  BP: 144/98 (22 Jun 2018 12:47) (134/86 - 158/102)  BP(mean): --  RR: 18 (22 Jun 2018 08:00) (18 - 18)  SpO2: 98% (22 Jun 2018 08:00) (96% - 99%)    Gen: AOx3, NAD  CV: S1+S2 normal, no murmurs  Resp: Clear bilat, no resp distress  Abd: distended  Ext: LE edema  : No CVA tenderness  IV/Skin: No thrombophlebitis, rash improving  Neuro: no focal deficits      LABS:                          14.8   12.99 )-----------( 126      ( 22 Jun 2018 08:17 )             42.5       06-22    140  |  105  |  75<H>  ----------------------------<  90  4.3   |  17<L>  |  3.80<H>    Ca    8.7      22 Jun 2018 06:48  Phos  4.3     06-22  Mg     2.0     06-22    TPro  6.2  /  Alb  3.1<L>  /  TBili  0.5  /  DBili  x   /  AST  47<H>  /  ALT  111<H>  /  AlkPhos  75  06-22    MICROBIOLOGY:  v  .Urine Clean Catch (Midstream)  06-17-18   No growth  --  --      .Blood Blood  06-17-18   No growth to date.  --  --      .Blood Blood  06-17-18   No growth to date.  --  --      CMV IgG Antibody: <0.20 U/mL (06-19-18 @ 15:29)  Toxoplasma IgG Screen: <3.0 IU/mL (06-19-18 @ 15:29)    CMVPCR Log: NotDetec LogIU/mL (06-19 @ 15:36)  Rapid RVP Result: NotDetec (06-18 @ 00:24)  Rapid RVP Result: NotDetec (06-17 @ 09:43)      RADIOLOGY:    < from: Xray Abdomen 1 View PORTABLE -Urgent (06.22.18 @ 11:51) >  IMPRESSION:     Partial small bowel obstruction versus ileus as seen on the previous CT   from 6/19/2018.      < end of copied text > CC: Patient is a 53y old  Male who presents with a chief complaint of Patient is a 52y old  Male who presents with a chief complaint of AMS, found down (18 Jun 2018 09:34)    Interval History/ROS: Patient remains with distended abdomen and pressure. Denies fever, chills. Refusing NGT for decompression.    Allergies  Bactrim (Rash (Skin))    ANTIMICROBIALS:  piperacillin/tazobactam IVPB. 3.375 every 12 hours    PE:    Vital Signs Last 24 Hrs  T(C): 36.5 (22 Jun 2018 08:00), Max: 37.1 (21 Jun 2018 22:43)  T(F): 97.7 (22 Jun 2018 08:00), Max: 98.8 (21 Jun 2018 22:43)  HR: 72 (22 Jun 2018 12:47) (65 - 78)  BP: 144/98 (22 Jun 2018 12:47) (134/86 - 158/102)  BP(mean): --  RR: 18 (22 Jun 2018 08:00) (18 - 18)  SpO2: 98% (22 Jun 2018 08:00) (96% - 99%)    Gen: AOx3, NAD  CV: S1+S2 normal, no murmurs  Resp: Clear bilat, no resp distress  Abd: distended  Ext: LE edema  : No CVA tenderness  IV/Skin: No thrombophlebitis, rash improving  Neuro: no focal deficits      LABS:                          14.8   12.99 )-----------( 126      ( 22 Jun 2018 08:17 )             42.5       06-22    140  |  105  |  75<H>  ----------------------------<  90  4.3   |  17<L>  |  3.80<H>    Ca    8.7      22 Jun 2018 06:48  Phos  4.3     06-22  Mg     2.0     06-22    TPro  6.2  /  Alb  3.1<L>  /  TBili  0.5  /  DBili  x   /  AST  47<H>  /  ALT  111<H>  /  AlkPhos  75  06-22    MICROBIOLOGY:  v  .Urine Clean Catch (Midstream)  06-17-18   No growth  --  --      .Blood Blood  06-17-18   No growth to date.  --  --      .Blood Blood  06-17-18   No growth to date.  --  --      CMV IgG Antibody: <0.20 U/mL (06-19-18 @ 15:29)  Toxoplasma IgG Screen: <3.0 IU/mL (06-19-18 @ 15:29)    CMVPCR Log: NotDetec LogIU/mL (06-19 @ 15:36)  Rapid RVP Result: NotDetec (06-18 @ 00:24)  Rapid RVP Result: NotDetec (06-17 @ 09:43)      RADIOLOGY:    < from: Xray Abdomen 1 View PORTABLE -Urgent (06.22.18 @ 11:51) >  IMPRESSION:     Partial small bowel obstruction versus ileus as seen on the previous CT   from 6/19/2018.      < end of copied text >

## 2018-06-22 NOTE — CONSULT NOTE ADULT - ASSESSMENT
53 year old man with PMHx of recent scrotal abscess, who presents to [facility] for AMS x 3 days found on the floor found to have MCA infarct 2/2 cocaine use, rhabdomyolysis c/b acute renal failure transferred to [facility]-ICU for management. Now consulted for ileus.    1)Ileus  Patient with worsening abdominal distension with CTAP showing air-fluid levels in the small bowel up to the level of the terminal ileum without transition point. Most likely this is the setting of metabolic abnormality. 53 year old man with PMHx of recent scrotal abscess, who presents to [facility] for AMS x 3 days found on the floor found to have MCA infarct 2/2 cocaine use, rhabdomyolysis c/b acute renal failure transferred to [facility]ICU for management. Now consulted for ileus.    1)Ileus  Patient with worsening abdominal distension with CTAP showing air-fluid levels in the small bowel up to the level of the terminal ileum without transition point. Ddx: ileus 2/2 metabolic derangement vs. obstruction  2) MCA infarct in the setting of cocaine use  3) SHAQUILLE 2/2 rhabdomyalysis cb hyperkalemia s/p dialysis  4) Cocaine abuse    - please obtain CTAP with oral contrast  - please ambulate patient daily  - can advance to clear liquid diet as tolerated  - IVF  - no role of NGT at this time given patient has no N/V  - no utility of endoscopic decompression given dilation is primary in the small bowel  - monitor electrolytes daily  - rest of plan as per primary team  - GI will follow

## 2018-06-22 NOTE — PROGRESS NOTE ADULT - ASSESSMENT
ASSESSMENT: This is a 53yman with previously unknown medical history currently admitted for rhabdomyolysis, SHAQUILLE, subacute infarcts. Patient transferred from [place]. As per the chart the patient's son  he reported that the patient had exhibited unusual behavior on a recent visit. On a subsequent visit, the day of admission ; patient's son reportedly found the patient down on the floor  for an unknown period of time. Patient was not speaking at that time and was noted to have a petechial rash on trunk and legs.   At the time of admission patient was found to be in profound SHAQUILLE and Rhabdo. Urine toxicology tested positive for cocaine. CT head showed subacute infarct in L PCA and MCA distribution. Patient was admitted to the MICU For  HD (was dialyzed once) for hyperkalemia in setting of SHAQUILLE. He had troponin leak without ekg changes. He had significant LFT's which downtrended quickly, possibly due to ischemia. Of note he had an ER visit recently  for groin cyst that was drained and  was given "sulfa" antibiotic. ID and derm were consulted; consensus rash was likely drug induced but not DRESS; started on topical and systemic steroids. Patient is npo due to abdominal pain and persistent distention, mildly tachypneic on examination, discussed with DR. Fonseca from Primary Team patient is refusing NGT for decompression, defer to primary team. NEURO: CT Head on admission  demonstrated: subacute infarcts in left MCA and PCA; Subsequent MRI (18)Impression: Acute left MCA infarcts are identified with possible areas of petechial hemorrhage seen involving the left parietal region. Recommend Vessel imaging MRA without contrast and carotid dopplers.  TTE on 18, does not identify thrombus or PFO. Etiology likely contributing factor vasoconstriction from cocaine use. Patient will require maximization of risk factors for secondary stroke prevention. Continue close monitoring for neurologic deterioration. Gradual return to normotension, avoid hypotension, Titrate statin to LDL goal less than 70 consider resumption ststin when LFTs improve.  MRA Head and Neck w/o contrast and Carotid dopplers as discussed with Dr. Fonseca. Physical therapy/OT/Speech eval/treatment as tolerated. Plan of care discussed with patient verbalized understanding.      ANTITHROMBOTIC THERAPY: ASA for secondary stroke prevention    PULMONARY: Protecting airway. saturating well . Mild tachypnea, due to abd distention deferred to medical team#3 Dr Fonseca.    CARDIOVASCULAR: TTE No Thrombus, or PFO. Continue  cardiac monitoring                              SBP goal: Gradual return to normotension, avoid hypotension    GASTROINTESTINAL: Ulcer prophylaxis, dysphagia screen On hold as patient is npo due to pain and distention.       Diet: npo as of this time    RENAL: BUN/Cr  improvin/3.80, good urine output      Na Goal: Greater than 135     Gutierres: NO    HEMATOLOGY: H/H yes  stable, Platelets normal 126     DVT ppx: Heparin s.c [x] LMWH []     ID: afebrile, + leukocytosis  continues on antibiotics per ID.    DISPOSITION: Rehab or home depending on PT eval once stable and workup is complete       CORE MEASURES:        Admission NIHSS: 14     TPA: [] YES [x] NO      LDL/HDL: 109/49     Depression Screen: yes  discussed that patient appears depressed with primary team Dr Fonseca. May refer to Psych for evaluation.      Statin Therapy: On hold by primary due to increased LFTs     Dysphagia Screen: [] PASS [x] FAIL     Smoking [] YES [x] NO      Afib [] YES [x] NO     Stroke Education [x] YES [] NO ASSESSMENT: This is a 53yman with previously unknown medical history currently admitted for rhabdomyolysis, SHAQUILLE, subacute infarcts. Patient transferred from [place]. As per the chart the patient's son  he reported that the patient had exhibited unusual behavior on a recent visit. On a subsequent visit, the day of admission ; patient's son reportedly found the patient down on the floor  for an unknown period of time. Patient was not speaking at that time and was noted to have a petechial rash on trunk and legs.   At the time of admission patient was found to be in profound SHAQUILLE and Rhabdo. Urine toxicology tested positive for cocaine. CT head showed subacute infarct in L PCA and MCA distribution. Patient was admitted to the MICU For  HD (was dialyzed once) for hyperkalemia in setting of SHAQUILLE. He had troponin leak without ekg changes. He had significant LFT's which downtrended quickly, possibly due to ischemia. Of note he had an ER visit recently  for groin cyst that was drained and  was given "sulfa" antibiotic. ID and derm were consulted; consensus rash was likely drug induced but not DRESS; started on topical and systemic steroids. Patient is npo due to abdominal pain and persistent distention, mildly tachypneic on examination, discussed with DR. Fonseca from Primary Team patient is refusing NGT for decompression, defer to primary team. NEURO: CT Head on admission  demonstrated: subacute infarcts in left MCA and PCA; Subsequent MRI (18)Impression: Acute left MCA infarcts are identified with possible areas of petechial hemorrhage seen involving the left parietal region. Recommend Vessel imaging MRA without contrast and carotid dopplers.  TTE on 18, does not identify thrombus or PFO. Etiology likely contributing factor vasoconstriction from cocaine use. Patient will require maximization of risk factors for secondary stroke prevention. Continue close monitoring for neurologic deterioration. Gradual return to normotension, avoid hypotension, Titrate statin to LDL goal less than 70 consider resumption ststin when LFTs improve.  MRA Head and Neck w/o contrast and Carotid dopplers as discussed with Dr. Fonseca. Physical therapy/OT/Speech eval/treatment as tolerated. Plan of care discussed with patient verbalized understanding.      ANTITHROMBOTIC THERAPY: ASA for secondary stroke prevention    PULMONARY: Protecting airway. saturating well . Mild tachypnea, due to abd distention deferred to medical team#3 Dr Fonseca.    CARDIOVASCULAR: TTE No Thrombus, or PFO. Continue  cardiac monitoring                              SBP goal: Gradual return to normotension, avoid hypotension    GASTROINTESTINAL: Ulcer prophylaxis, dysphagia screen:  On hold as patient is npo due to pain and distention.       Diet: npo as of this time    RENAL: BUN/Cr  improvin/3.80, good urine output . Also continue on HD.     Na Goal: Greater than 135     Gutierres: NO    HEMATOLOGY: H/H yes  stable, Platelets normal 126     DVT ppx: Heparin s.c [x] LMWH []     ID: afebrile, + leukocytosis  continues on antibiotics per ID.    DISPOSITION: Rehab or home depending on PT eval once stable and workup is complete       CORE MEASURES:        Admission NIHSS: 14     TPA: [] YES [x] NO      LDL/HDL: 109/49     Depression Screen: yes  discussed that patient appears depressed with primary team Dr Fonseca. May refer to Psych for evaluation.      Statin Therapy: On hold by primary due to increased LFTs     Dysphagia Screen: [] PASS [x] FAIL     Smoking [x] YES [] NO      Afib [] YES [x] NO     Stroke Education [x] YES [] NO

## 2018-06-22 NOTE — CHART NOTE - NSCHARTNOTEFT_GEN_A_CORE
Patient seen by medical team. Patient refusing NG tube placement for decompression of his abdomen. Was explained that this might results in bowel perforation or infarction. Non the less still refuses.     Ravi Lucas  PGY-1 Resident Physician   Pager [phone] or [phone] from 7am to 7pm, after 7pm page night float

## 2018-06-22 NOTE — OCCUPATIONAL THERAPY INITIAL EVALUATION ADULT - PLANNED THERAPY INTERVENTIONS, OT EVAL
cognitive, visual perceptual/transfer training/strengthening/balance training/ADL retraining/bed mobility training

## 2018-06-22 NOTE — PROGRESS NOTE ADULT - ATTENDING COMMENTS
worsening abdominal distension in setting of ileus - pt continues to refuse NGT or OGT  still passing flatus and BM  appreciate surgery recs    Paige Cruz MD  Division of Hospital Medicine  Pager: [phone]  Office: [phone] worsening abdominal distension in setting of ileus - pt continues to refuse NGT or OGT  AXR demonstrated worsening SBO  still passing flatus and BM  appreciate surgery recs    Paige Cruz MD  Division of Hospital Medicine  Pager: [phone]  Office: [phone]

## 2018-06-22 NOTE — CONSULT NOTE ADULT - ATTENDING COMMENTS
Transferred from [place] [facility] 2 days ago for CVA and SHAQUILLE (on HD) secondary to rhabdo after cocaine overdose.  Has progressive abdominal distension and maroon-colored BM yesterday, but normal BM today.    afeb  AVSS without tachycardia  A+Ox3  NAD  abd soft / NT / significantly distended  normoactive bowel sounds  compartments soft in shoulders, gluteus, thighs, calfs    WBC = 17  lactate = 1.9  CPK = 6274  troponin T = 439    CT abd/pelvis - diffuse small bowel dilation, colon mostly collapsed, no significant gastric dilatation, small volume of ascites and mesenteric edema      Although cocaine overdose is known to cause punctate ischemia and perforation of the hollow viscus, his presentation suggests against this problem (no free air, no tachycardia, no abdominal tenderness, normoactive bowel sounds, normalized lactate). Also, there is no evidence of hollow viscus pathology on CT abd/pelvis 2 days ago.  I suspect that he has ileus secondary to acute renal failure or other medical issues.  -keep NPO until abdominal distension improves  -acute care surgery team will follow
VASCULAR NEUROLOGY ATTENDING  The patient is seen and examined the history and imaging are reviewed. I agree with the resident note unless otherwise noted. Suspect cardioembolism vs vasculopathy. Agree with MRI/A, ASA statin. Stroke labs. TTE to r/o thrombus. PT/OT/SS
Distention without pain  Passing flatus and small amount of BM  Nausea without vomiting  Imaging suggestive of small bowel ileus vs obstruction  No colonic dilation  Suspect SBO rather than ileus  Rec oral contrast only CT abdomen  Will follow up
I have seen this patient with the fellow and agree with their assessment and plan. In addition, in setting of left atrophic kidney. SHAQUILLE likely 2/2 rhabdomyolysis 2/2 cocaine use, or AIN from drug use given rash vs vaculitis from lavmisole sometimes mixed in inhaled and injected drugs. Unknown baseline sCr. P/w sCr to 5.9. Making urine. CPK trending down from 35k to 20k s/p 1L NS IVF. UTox positive for cocaine. No urgent indication for dialysis as no EKG changes and needs more fluids and monitor K. If no improvement in K and get's wose mental status wise and concern for other intoxication, we can consider dialysis. Consent obtained in chart. rest as per above.  d.w with son and ER attending  [facility] maxi Funk MD  Cell   Pager   Office

## 2018-06-22 NOTE — PROGRESS NOTE ADULT - SUBJECTIVE AND OBJECTIVE BOX
Ravi Lucas  PGY-1 Resident Physician   Pager [phone] or [phone] from 7am to 7pm, after 7pm    Patient is a 53y old  Male who presents with a chief complaint of Patient is a 52y old  Male who presents with a chief complaint of AMS, found down (2018 09:34)    SUBJECTIVE / OVERNIGHT EVENTS:  Patient overnight complained of abdominal pain.     MEDICATIONS  (STANDING):  aspirin enteric coated 81 milliGRAM(s) Oral daily  dextrose 5% + lactated ringers. 1000 milliLiter(s) (75 mL/Hr) IV Continuous <Continuous>  heparin  Injectable 5000 Unit(s) SubCutaneous every 8 hours  nicotine - 21 mG/24Hr(s) Patch 1 patch Transdermal daily  pantoprazole  Injectable 40 milliGRAM(s) IV Push every 12 hours  piperacillin/tazobactam IVPB. 3.375 Gram(s) IV Intermittent every 12 hours  simethicone 80 milliGRAM(s) Chew daily  triamcinolone 0.1% Cream 1 Application(s) Topical every 12 hours    MEDICATIONS  (PRN):    Allergies  Bactrim (Rash (Skin))  Intolerances    Vital Signs Last 24 Hrs  T(C): 36.6 (2018 04:00), Max: 37.1 (2018 22:43)  T(F): 97.9 (2018 04:00), Max: 98.8 (2018 22:43)  HR: 65 (2018 04:00) (65 - 78)  BP: 146/95 (2018 04:00) (134/86 - 158/102)  RR: 18 (2018 04:00) (18 - 18)  SpO2: 97% (2018 04:00) (96% - 99%)  Daily     CAPILLARY BLOOD GLUCOSE      I&O's Summary  2018 07:01  -  2018 07:00  --------------------------------------------------------  IN: 1000 mL / OUT: 2600 mL / NET: -1600 mL    PHYSICAL EXAM:  GENERAL: NAD, well-developed.   HEAD:  Atraumatic, Normocephalic  EYES: EOMI, PERRLA, conjunctiva and sclera clear  NECK: Supple, No JVD  CHEST/LUNG: Clear to auscultation bilaterally; No wheeze  HEART: Regular rate and rhythm; Normal S1 S2, No murmurs, rubs, or gallops  ABDOMEN: Soft, Nontender, distended; Bowel sounds decreased   EXTREMITIES:  2+ Peripheral Pulses, No clubbing, cyanosis, or edema  PSYCH: AAOx3  NEUROLOGY: Left UE weakness 3/5. Right UE 5/5.   SKIN: No rashes or lesions    LABS:             14.9   18.86 )-----------( 113      ( 2018 08:17 )             43.1     Hgb Trend: 14.9<--, 15.0<--, 15.0<--, 16.4<--, 15.3<--  -22    140  |  105  |  75<H>  ----------------------------<  90  4.3   |  17<L>  |  3.80<H>    Ca    8.7      2018 06:48  Phos  4.3       Mg     2.0         TPro  6.2  /  Alb  3.1<L>  /  TBili  0.5  /  DBili  x   /  AST  47<H>  /  ALT  111<H>  /  AlkPhos  75  -22  Creatinine Trend: 3.80<--, 4.43<--, 4.74<--, 4.57<--, 5.02<--, 5.04<--  LIVER FUNCTIONS - ( 2018 06:48 )  Alb: 3.1 g/dL / Pro: 6.2 g/dL / ALK PHOS: 75 U/L / ALT: 111 U/L / AST: 47 U/L / GGT: x           PT/INR - ( 2018 08:03 )   PT: 11.4 sec;   INR: 1.01 ratio         PTT - ( 2018 08:03 )  PTT:24.2 sec  CARDIAC MARKERS ( 2018 06:22 )  x     / x     / 1167 U/L / x     / 6.9 ng/mL  CARDIAC MARKERS ( 2018 17:48 )  x     / x     / 1627 U/L / x     / 7.7 ng/mL  CARDIAC MARKERS ( 2018 12:11 )  x     / x     / 1856 U/L / x     / x        Urinalysis Basic - ( 2018 11:22 )  Color: Yellow / Appearance: Clear / S.013 / pH: x  Gluc: x / Ketone: Negative  / Bili: Negative / Urobili: Negative   Blood: x / Protein: 30 mg/dL / Nitrite: Negative   Leuk Esterase: Negative / RBC: 5-10 /HPF / WBC 3-5 /HPF   Sq Epi: x / Non Sq Epi: x / Bacteria: x      RADIOLOGY & ADDITIONAL TESTS:    Imaging Personally Reviewed:    Consultant(s) Notes Reviewed:      Care Discussed with Consultants/Other Providers: Ravi Lucas  PGY-1 Resident Physician   Pager [phone] or [phone] from 7am to 7pm, after 7pm    Patient is a 53y old  Male who presents with a chief complaint of Patient is a 52y old  Male who presents with a chief complaint of AMS, found down (2018 09:34)    SUBJECTIVE / OVERNIGHT EVENTS:  Patient overnight complained of abdominal pain.     MEDICATIONS  (STANDING):  aspirin enteric coated 81 milliGRAM(s) Oral daily  dextrose 5% + lactated ringers. 1000 milliLiter(s) (75 mL/Hr) IV Continuous <Continuous>  heparin  Injectable 5000 Unit(s) SubCutaneous every 8 hours  nicotine - 21 mG/24Hr(s) Patch 1 patch Transdermal daily  pantoprazole  Injectable 40 milliGRAM(s) IV Push every 12 hours  piperacillin/tazobactam IVPB. 3.375 Gram(s) IV Intermittent every 12 hours  simethicone 80 milliGRAM(s) Chew daily  triamcinolone 0.1% Cream 1 Application(s) Topical every 12 hours    MEDICATIONS  (PRN):    Allergies  Bactrim (Rash (Skin))  Intolerances    Vital Signs Last 24 Hrs  T(C): 36.6 (2018 04:00), Max: 37.1 (2018 22:43)  T(F): 97.9 (2018 04:00), Max: 98.8 (2018 22:43)  HR: 65 (2018 04:00) (65 - 78)  BP: 146/95 (2018 04:00) (134/86 - 158/102)  RR: 18 (2018 04:00) (18 - 18)  SpO2: 97% (2018 04:00) (96% - 99%)  Daily     CAPILLARY BLOOD GLUCOSE      I&O's Summary  2018 07:01  -  2018 07:00  --------------------------------------------------------  IN: 1000 mL / OUT: 2600 mL / NET: -1600 mL    PHYSICAL EXAM:  GENERAL: NAD, well-developed.   HEAD:  Atraumatic, Normocephalic  EYES: EOMI, PERRLA, conjunctiva and sclera clear  NECK: Supple, No JVD  CHEST/LUNG: Clear to auscultation bilaterally; No wheeze  HEART: Regular rate and rhythm; Normal S1 S2, No murmurs, rubs, or gallops  ABDOMEN: Soft, Nontender, distended; Bowel sounds decreased   EXTREMITIES:  2+ Peripheral Pulses, No clubbing, cyanosis, or edema  PSYCH: AAOx3  NEUROLOGY: Left UE weakness 3/5. Right UE 5/5.   SKIN: No rashes or lesions    LABS:             14.9   18.86 )-----------( 113      ( 2018 08:17 )             43.1     Hgb Trend: 14.9<--, 15.0<--, 15.0<--, 16.4<--, 15.3<--  -22    140  |  105  |  75<H>  ----------------------------<  90  4.3   |  17<L>  |  3.80<H>    Ca    8.7      2018 06:48  Phos  4.3       Mg     2.0         TPro  6.2  /  Alb  3.1<L>  /  TBili  0.5  /  DBili  x   /  AST  47<H>  /  ALT  111<H>  /  AlkPhos  75  -22  Creatinine Trend: 3.80<--, 4.43<--, 4.74<--, 4.57<--, 5.02<--, 5.04<--  LIVER FUNCTIONS - ( 2018 06:48 )  Alb: 3.1 g/dL / Pro: 6.2 g/dL / ALK PHOS: 75 U/L / ALT: 111 U/L / AST: 47 U/L / GGT: x           PT/INR - ( 2018 08:03 )   PT: 11.4 sec;   INR: 1.01 ratio         PTT - ( 2018 08:03 )  PTT:24.2 sec  CARDIAC MARKERS ( 2018 06:22 )  x     / x     / 1167 U/L / x     / 6.9 ng/mL  CARDIAC MARKERS ( 2018 17:48 )  x     / x     / 1627 U/L / x     / 7.7 ng/mL  CARDIAC MARKERS ( 2018 12:11 )  x     / x     / 1856 U/L / x     / x        Urinalysis Basic - ( 2018 11:22 )  Color: Yellow / Appearance: Clear / S.013 / pH: x  Gluc: x / Ketone: Negative  / Bili: Negative / Urobili: Negative   Blood: x / Protein: 30 mg/dL / Nitrite: Negative   Leuk Esterase: Negative / RBC: 5-10 /HPF / WBC 3-5 /HPF   Sq Epi: x / Non Sq Epi: x / Bacteria: x    < from: CT Abdomen and Pelvis No Cont (18 @ 14:22) >  FINDINGS:    LOWER CHEST: Bilateral lower lobe linear atelectasis.    LIVER: Within normal limits.  BILE DUCTS: Normal caliber.  GALLBLADDER: Within normal limits.  SPLEEN: Within normal limits.  PANCREAS: Within normal limits.  ADRENALS: Right adrenal gland nodularity likely due to adenoma, as seen   on recent study. Stable low-density left adrenal thickening.  KIDNEYS/URETERS: Atrophic left kidney.    BLADDER: Contains a Gutierres catheter.  REPRODUCTIVE ORGANS: The prostate is within normal limits.    BOWEL: Near diffuse dilatation of small bowel to the level of the   terminal ileum, ileus favored over obstruction. Colonic diverticulosis.   Appendix is normal.  PERITONEUM: Small volume right upper quadrant ascites. Mesenteric edema.  VESSELS:  Mild atherosclerotic calcifications.  RETROPERITONEUM: No lymphadenopathy.    ABDOMINAL WALL: Left anterior thigh muscular lipoma.  BONES: Degenerative changes. Age indeterminant L2 compression fracture   deformity.    IMPRESSION:     New diffuse dilatation of fluid-filled small bowel loops without discrete   transition point. Ileus is favored over small bowel obstruction. Small   volume ascites and mesenteric edema are present.          RADIOLOGY & ADDITIONAL TESTS:    Imaging Personally Reviewed:    Consultant(s) Notes Reviewed:      Care Discussed with Consultants/Other Providers:

## 2018-06-22 NOTE — PROGRESS NOTE ADULT - SUBJECTIVE AND OBJECTIVE BOX
[facility] DIVISION OF KIDNEY DISEASES AND HYPERTENSION -- FOLLOW UP NOTE  --------------------------------------------------------------------------------  Chief Complaint:  SHAQUILLE    24 hour events/subjective:  Patient reports feeling well, feels like "a superhero."  Denies any complaints.        PAST HISTORY  --------------------------------------------------------------------------------  No significant changes to PMH, PSH, FHx, SHx, unless otherwise noted    ALLERGIES & MEDICATIONS  --------------------------------------------------------------------------------  Allergies    Bactrim (Rash (Skin))    Intolerances      Standing Inpatient Medications  aspirin enteric coated 81 milliGRAM(s) Oral daily  dextrose 5% + lactated ringers. 1000 milliLiter(s) IV Continuous <Continuous>  heparin  Injectable 5000 Unit(s) SubCutaneous every 8 hours  nicotine - 21 mG/24Hr(s) Patch 1 patch Transdermal daily  pantoprazole  Injectable 40 milliGRAM(s) IV Push every 12 hours  piperacillin/tazobactam IVPB. 3.375 Gram(s) IV Intermittent every 12 hours  simethicone 80 milliGRAM(s) Chew daily  triamcinolone 0.1% Cream 1 Application(s) Topical every 12 hours    PRN Inpatient Medications      REVIEW OF SYSTEMS  --------------------------------------------------------------------------------  Gen: No fevers/chills  Skin: No rashes  Head/Eyes/Ears/Mouth: No headache  Respiratory: No dyspnea  CV: No chest pain  GI: No abdominal pain  : No dysuria  MSK: No edema  Neuro: No dizziness/lightheadedness    VITALS/PHYSICAL EXAM  --------------------------------------------------------------------------------  T(C): 36.5 (06-22-18 @ 08:00), Max: 37.1 (06-21-18 @ 22:43)  HR: 72 (06-22-18 @ 08:00) (65 - 78)  BP: 142/78 (06-22-18 @ 08:00) (134/86 - 158/102)  RR: 18 (06-22-18 @ 08:00) (18 - 18)  SpO2: 98% (06-22-18 @ 08:00) (96% - 99%)  Wt(kg): --        06-21-18 @ 07:01  -  06-22-18 @ 07:00  --------------------------------------------------------  IN: 1000 mL / OUT: 2600 mL / NET: -1600 mL      Physical Exam:  	Gen: NAD, well-appearing  	HEENT: MMM  	Pulm: CTA B/L  	CV: RRR, S1S2; no rub  	Abd: nontender, + distended  	: No suprapubic tenderness  	UE:  no edema  	LE:  +mild edema  	Neuro: No focal deficits  	Psych: Normal affect and mood  	Skin: Warm    LABS/STUDIES  --------------------------------------------------------------------------------              14.8   12.99 >-----------<  126      [06-22-18 @ 08:17]              42.5     140  |  105  |  75  ----------------------------<  90      [06-22-18 @ 06:48]  4.3   |  17  |  3.80        Ca     8.7     [06-22-18 @ 06:48]      Mg     2.0     [06-22-18 @ 06:48]      Phos  4.3     [06-22-18 @ 06:48]    TPro  6.2  /  Alb  3.1  /  TBili  0.5  /  DBili  x   /  AST  47  /  ALT  111  /  AlkPhos  75  [06-22-18 @ 06:48]    PT/INR: PT 11.4 , INR 1.01       [06-21-18 @ 08:03]  PTT: 24.2       [06-21-18 @ 08:03]    CK 1167      [06-21-18 @ 06:22]    Creatinine Trend:  SCr 3.80 [06-22 @ 06:48]  SCr 4.43 [06-21 @ 06:22]  SCr 4.74 [06-20 @ 17:48]  SCr 4.57 [06-20 @ 12:11]  SCr 5.02 [06-20 @ 06:43]    Urinalysis - [06-20-18 @ 11:22]      Color Yellow / Appearance Clear / SG 1.013 / pH 5.5      Gluc 250 / Ketone Negative  / Bili Negative / Urobili Negative       Blood Moderate / Protein 30 / Leuk Est Negative / Nitrite Negative      RBC 5-10 / WBC 3-5 / Hyaline  / Gran  / Sq Epi  / Non Sq Epi  / Bacteria     Urine Creatinine 19      [06-17-18 @ 21:02]  Urine Protein 37      [06-17-18 @ 17:02]  Urine Sodium 119      [06-17-18 @ 21:02]  Urine Chloride 104      [06-17-18 @ 21:02]

## 2018-06-22 NOTE — PROGRESS NOTE ADULT - ASSESSMENT
53 year old man with PMHx of recent scrotal abscess, who presents to [place] for AMS x 3 days found with MCA infarct 2/2 cocaine use, rhabdomyolysis c/b acute renal failure transferred to [facility]-ICU for management. Hospital course complicated by full body drug rash likely 2/2 Bactrim that was given for a recent scrotal abscess. Patient developed hyperkalemia s/p dialyisis x 1. Leukocytosis possibly 2/2 steroid use, however on zosyn for aspiration pna coverage.

## 2018-06-22 NOTE — OCCUPATIONAL THERAPY INITIAL EVALUATION ADULT - TRANSFER TRAINING, PT EVAL
GOAL: Patient will be independent with functional transfer with least restrictive device  in two weeks

## 2018-06-22 NOTE — CONSULT NOTE ADULT - SUBJECTIVE AND OBJECTIVE BOX
Chief Complaint:  Patient is a 53y old  Male who presents with a chief complaint of Patient is a 52y old  Male who presents with a chief complaint of AMS, found down (18 Jun 2018 09:34)      HPI:  53 year old man with PMHx of recent scrotal abscess, who presents to [place] for AMS x 3 days found on the floor found to have MCA infarct 2/2 cocaine use, rhabdomyolysis c/b acute renal failure transferred to [facility]-ICU for management. Now consulted for ileus.    Hospital course complicated by full body drug rash likely 2/2 Bactrim that was given for a recent scrotal abscess. He was also found to have leukocytosis thought to be secondary to steroid use vs. aspiration PNA currently on zosyn. During admission, he was found to be hyperkalemic in the setting of rhabdo, started on dialysis. He was also found to be in liver failure with resulting thrombocytopenia.    During admission, patient having worsening ileus vs. partial SBO. Intially patient refused NGT. However, today, patient agreeable to NGT but could not pass 2/2 patient intolerance. ENT consulted who did laryngoscopy which saw "white changes at the voice box" either 2/2 fungus or malignancy.      Allergies:  Bactrim (Rash (Skin))      Home Medications:    Hospital Medications:  aspirin enteric coated 81 milliGRAM(s) Oral daily  dextrose 5% + lactated ringers. 1000 milliLiter(s) IV Continuous <Continuous>  heparin  Injectable 5000 Unit(s) SubCutaneous every 8 hours  nicotine - 21 mG/24Hr(s) Patch 1 patch Transdermal daily  pantoprazole  Injectable 40 milliGRAM(s) IV Push every 12 hours  simethicone 80 milliGRAM(s) Chew daily  triamcinolone 0.1% Cream 1 Application(s) Topical every 12 hours      PMHX/PSHX:  No pertinent past medical history  No significant past surgical history      Family history:  No pertinent family history in first degree relatives      Social History:     ROS:     General:  No wt loss, fevers, chills, night sweats, fatigue,   Eyes:  Good vision, no reported pain  ENT:  No sore throat, pain, runny nose, dysphagia  CV:  No pain, palpitations, hypo/hypertension  Resp:  No dyspnea, cough, tachypnea, wheezing  GI:  See HPI  :  No pain, bleeding, incontinence, nocturia  Muscle:  No pain, weakness  Neuro:  No weakness, tingling, memory problems  Psych:  No fatigue, insomnia, mood problems, depression  Endocrine:  No polyuria, polydipsia, cold/heat intolerance  Heme:  No petechiae, ecchymosis, easy bruisability  Skin:  No rash, edema      PHYSICAL EXAM:     GENERAL:  Appears stated age, well-groomed, well-nourished, no distress  HEENT:  NC/AT,  conjunctivae clear and pink,  no JVD  CHEST:  Full & symmetric excursion, no increased effort, breath sounds clear  HEART:  Regular rhythm, S1, S2, no murmur/rub/S3/S4, no abdominal bruit, no edema  ABDOMEN:  Soft, non-tender, non-distended, normoactive bowel sounds,  no masses ,  EXTREMITIES:  no cyanosis,clubbing or edema  SKIN:  No rash/erythema/ecchymoses/petechiae/wounds/abscess/warm/dry  NEURO:  Alert, oriented    Vital Signs:  Vital Signs Last 24 Hrs  T(C): 36.5 (22 Jun 2018 08:00), Max: 37.1 (21 Jun 2018 22:43)  T(F): 97.7 (22 Jun 2018 08:00), Max: 98.8 (21 Jun 2018 22:43)  HR: 72 (22 Jun 2018 12:50) (65 - 78)  BP: 144/98 (22 Jun 2018 12:50) (134/86 - 158/102)  BP(mean): --  RR: 18 (22 Jun 2018 08:00) (18 - 18)  SpO2: 98% (22 Jun 2018 08:00) (96% - 99%)  Daily     Daily     LABS:                        14.8   12.99 )-----------( 126      ( 22 Jun 2018 08:17 )             42.5     06-22    140  |  105  |  75<H>  ----------------------------<  90  4.3   |  17<L>  |  3.80<H>    Ca    8.7      22 Jun 2018 06:48  Phos  4.3     06-22  Mg     2.0     06-22    TPro  6.2  /  Alb  3.1<L>  /  TBili  0.5  /  DBili  x   /  AST  47<H>  /  ALT  111<H>  /  AlkPhos  75  06-22    LIVER FUNCTIONS - ( 22 Jun 2018 06:48 )  Alb: 3.1 g/dL / Pro: 6.2 g/dL / ALK PHOS: 75 U/L / ALT: 111 U/L / AST: 47 U/L / GGT: x           PT/INR - ( 21 Jun 2018 08:03 )   PT: 11.4 sec;   INR: 1.01 ratio         PTT - ( 21 Jun 2018 08:03 )  PTT:24.2 sec        Imaging:  < from: CT Abdomen and Pelvis No Cont (06.19.18 @ 14:22) >  FINDINGS:    LOWER CHEST: Bilateral lower lobe linear atelectasis.    LIVER: Within normal limits.  BILE DUCTS: Normal caliber.  GALLBLADDER: Within normal limits.  SPLEEN: Within normal limits.  PANCREAS: Within normal limits.  ADRENALS: Right adrenal gland nodularity likely due to adenoma, as seen   on recent study. Stable low-density left adrenal thickening.  KIDNEYS/URETERS: Atrophic left kidney.    BLADDER: Contains a Gutierres catheter.  REPRODUCTIVE ORGANS: The prostate is within normal limits.    BOWEL: Near diffuse dilatation of small bowel to the level of the   terminal ileum, ileus favored over obstruction. Colonic diverticulosis.   Appendix is normal.  PERITONEUM: Small volume right upper quadrant ascites. Mesenteric edema.  VESSELS:  Mild atherosclerotic calcifications.  RETROPERITONEUM: No lymphadenopathy.    ABDOMINAL WALL: Left anterior thigh muscular lipoma.  BONES: Degenerative changes. Age indeterminant L2 compression fracture   deformity.    IMPRESSION:     New diffuse dilatation of fluid-filled small bowel loops without discrete   transition point. Ileus is favored over small bowel obstruction. Small   volume ascites and mesenteric edema are present.    < end of copied text > Chief Complaint:  Patient is a 53y old  Male who presents with a chief complaint of Patient is a 52y old  Male who presents with a chief complaint of AMS, found down (18 Jun 2018 09:34)      HPI:  53 year old man with PMHx of recent scrotal abscess, who presents to [place] for AMS x 3 days found on the floor found to have MCA infarct 2/2 cocaine use, rhabdomyolysis c/b acute renal failure transferred to [facility]ICU for management. Now consulted for ileus.    Hospital course complicated by full body drug rash likely 2/2 Bactrim that was given for a recent scrotal abscess. He was also found to have leukocytosis thought to be secondary to steroid use vs. aspiration PNA currently on zosyn. During admission, he was found to be hyperkalemic in the setting of rhabdo, started on dialysis. He was also found to be in liver failure with resulting thrombocytopenia.    During admission, patient having worsening ileus vs. partial SBO. Intially patient refused NGT. However, today, patient agreeable to NGT but could not pass 2/2 patient intolerance. ENT consulted who did laryngoscopy which saw "white changes at the voice box" either 2/2 fungus or malignancy.    Patient denies any N/V. Says he has BMs daily.      Allergies:  Bactrim (Rash (Skin))      Home Medications:    Hospital Medications:  aspirin enteric coated 81 milliGRAM(s) Oral daily  dextrose 5% + lactated ringers. 1000 milliLiter(s) IV Continuous <Continuous>  heparin  Injectable 5000 Unit(s) SubCutaneous every 8 hours  nicotine - 21 mG/24Hr(s) Patch 1 patch Transdermal daily  pantoprazole  Injectable 40 milliGRAM(s) IV Push every 12 hours  simethicone 80 milliGRAM(s) Chew daily  triamcinolone 0.1% Cream 1 Application(s) Topical every 12 hours      PMHX/PSHX:  No pertinent past medical history  No significant past surgical history      Family history:  No pertinent family history in first degree relatives      Social History:     ROS:     General:  No wt loss, fevers, chills, night sweats, fatigue,   Eyes:  Good vision, no reported pain  ENT:  No sore throat, pain, runny nose, dysphagia  CV:  No pain, palpitations, hypo/hypertension  Resp:  No dyspnea, cough, tachypnea, wheezing  GI:  See HPI  :  No pain, bleeding, incontinence, nocturia  Muscle:  No pain, weakness  Neuro:  No weakness, tingling, memory problems  Psych:  No fatigue, insomnia, mood problems, depression  Endocrine:  No polyuria, polydipsia, cold/heat intolerance  Heme:  No petechiae, ecchymosis, easy bruisability  Skin:  No rash, edema      PHYSICAL EXAM:     GENERAL:  Appears stated age, well-groomed, well-nourished, no distress  HEENT:  NC/AT,  conjunctivae clear and pink,  no JVD  CHEST:  Full & symmetric excursion, no increased effort, breath sounds clear  HEART:  Regular rhythm, S1, S2, no murmur/rub/S3/S4, no abdominal bruit, no edema  ABDOMEN:  Soft, non-tender, non-distended, normoactive bowel sounds,  no masses ,  EXTREMITIES:  no cyanosis,clubbing or edema  SKIN:  No rash/erythema/ecchymoses/petechiae/wounds/abscess/warm/dry  NEURO:  Alert, oriented    Vital Signs:  Vital Signs Last 24 Hrs  T(C): 36.5 (22 Jun 2018 08:00), Max: 37.1 (21 Jun 2018 22:43)  T(F): 97.7 (22 Jun 2018 08:00), Max: 98.8 (21 Jun 2018 22:43)  HR: 72 (22 Jun 2018 12:50) (65 - 78)  BP: 144/98 (22 Jun 2018 12:50) (134/86 - 158/102)  BP(mean): --  RR: 18 (22 Jun 2018 08:00) (18 - 18)  SpO2: 98% (22 Jun 2018 08:00) (96% - 99%)  Daily     Daily     LABS:                        14.8   12.99 )-----------( 126      ( 22 Jun 2018 08:17 )             42.5     06-22    140  |  105  |  75<H>  ----------------------------<  90  4.3   |  17<L>  |  3.80<H>    Ca    8.7      22 Jun 2018 06:48  Phos  4.3     06-22  Mg     2.0     06-22    TPro  6.2  /  Alb  3.1<L>  /  TBili  0.5  /  DBili  x   /  AST  47<H>  /  ALT  111<H>  /  AlkPhos  75  06-22    LIVER FUNCTIONS - ( 22 Jun 2018 06:48 )  Alb: 3.1 g/dL / Pro: 6.2 g/dL / ALK PHOS: 75 U/L / ALT: 111 U/L / AST: 47 U/L / GGT: x           PT/INR - ( 21 Jun 2018 08:03 )   PT: 11.4 sec;   INR: 1.01 ratio         PTT - ( 21 Jun 2018 08:03 )  PTT:24.2 sec        Imaging:  < from: CT Abdomen and Pelvis No Cont (06.19.18 @ 14:22) >  FINDINGS:    LOWER CHEST: Bilateral lower lobe linear atelectasis.    LIVER: Within normal limits.  BILE DUCTS: Normal caliber.  GALLBLADDER: Within normal limits.  SPLEEN: Within normal limits.  PANCREAS: Within normal limits.  ADRENALS: Right adrenal gland nodularity likely due to adenoma, as seen   on recent study. Stable low-density left adrenal thickening.  KIDNEYS/URETERS: Atrophic left kidney.    BLADDER: Contains a Gutierres catheter.  REPRODUCTIVE ORGANS: The prostate is within normal limits.    BOWEL: Near diffuse dilatation of small bowel to the level of the   terminal ileum, ileus favored over obstruction. Colonic diverticulosis.   Appendix is normal.  PERITONEUM: Small volume right upper quadrant ascites. Mesenteric edema.  VESSELS:  Mild atherosclerotic calcifications.  RETROPERITONEUM: No lymphadenopathy.    ABDOMINAL WALL: Left anterior thigh muscular lipoma.  BONES: Degenerative changes. Age indeterminant L2 compression fracture   deformity.    IMPRESSION:     New diffuse dilatation of fluid-filled small bowel loops without discrete   transition point. Ileus is favored over small bowel obstruction. Small   volume ascites and mesenteric edema are present.    < end of copied text >

## 2018-06-22 NOTE — PROGRESS NOTE ADULT - PROBLEM SELECTOR PLAN 3
- Ileus likely secondary to ARF/CVA.   - Patient refusing NG tube placement.   - C/w simethicone daily  - Positive FOBT- on pantoprazole.

## 2018-06-22 NOTE — PROGRESS NOTE ADULT - ASSESSMENT
52 year old male with unknown PMHx (does not follow with Doctors), transferred from [facility] for ARF after son found him lethargic, not speaking, and down on the floor. He also noticed rash on the patient's trunk and leg, decided to bring his father to [facility].     Pt was recently at [facility] two weeks prior for a right scrotal cyst that drained on its own   Was given levaquin and a sulfa drug (?bactrim) for right scrotal cyst  Renal failure (unknown baseline) most likely from rhabdo, with elevated trops   Elevated liver enzymes trending down, positive cocaine test  s/p HD for hyperkalemia   Diffuse rash - Derm on board - most likely drug rash from bactrim - improving  Afebrile. RVP neg. Blood cxs NGTD.  CT head with subacute infarct  Abd distention with xr -> parital sbo vs ileus   Leukocytosis improving  MRI with acute left MCA infarcts  Abd XR remains with ileus vs SBO    Recommend:  -Discontinue zosyn  -CT A/P with adrenal adenoma - consider MRI when stable for further evaluation.  -Monitor for fevers      Jem Gomez MD  Pager [phone]  After 5pm/weekends call [phone] 52 year old male with unknown PMHx (does not follow with Doctors), transferred from [facility] for ARF after son found him lethargic, not speaking, and down on the floor. He also noticed rash on the patient's trunk and leg, decided to bring his father to [facility].     Pt was recently at [facility] two weeks prior for a right scrotal cyst that drained on its own   Was given levaquin and a sulfa drug (?bactrim) for right scrotal cyst  Renal failure (unknown baseline) most likely from rhabdo, with elevated trops   Elevated liver enzymes trending down, positive cocaine test  s/p HD for hyperkalemia   Diffuse rash - Derm on board - most likely drug rash from bactrim - improving  Afebrile. RVP neg. Blood cxs NGTD.  CT head with subacute infarct  Leukocytosis improving  MRI with acute left MCA infarcts  Abd XR remains with ileus vs SBO    Recommend:  -Discontinue zosyn  -Would monitor off abx  -Monitor for fevers  -Trend WBC  -Trend renal function  -LFTs improving  -Ileus vs SBO management per primary team    ID service available on the weekend. For questions, please call [phone].        Jem Gomez MD  Pager [phone]  After 5pm/weekends call [phone]

## 2018-06-22 NOTE — SWALLOW BEDSIDE ASSESSMENT ADULT - SWALLOW EVAL: DIAGNOSIS
Chart reviewed and events noted.  As per discussion with MD Carpio patient is not a candidate for PO at this time given new onset of severe abdominal pain and continued distention.  This service to sign off and will not actively follow.  Team to reconsult this service when medically appropriate.

## 2018-06-22 NOTE — PROGRESS NOTE ADULT - ATTENDING COMMENTS
Pt. seen, now s/p removal of RIJ non tunnelled dialysis catheter, has improving renal function and resolved hyperkalemia. CPK levels improving. Monitor BMP. Strict I/Os. Avoid nephrotoxins. Renally dose all medications.

## 2018-06-22 NOTE — CONSULT NOTE ADULT - SUBJECTIVE AND OBJECTIVE BOX
CC: NG tube placement \    HPI: Patient is a 53y old  Male who we are asked to evaluate the patient fo NG tube placement. PT with PMHx of cocaine and IV drug abuse admitted for rhabdomyolysis, SHAQUILLE, subacute infarcts now with new ileus and requiring NG tube placement. MICU attempted NG placement and pt had traumatic experience in which he has been refusing since. Pt is now having worsening of symptoms and agreed to attempt again. Pt denies any odynophagia, dysphagia, changes in voice, dyspnea, sob.     PAST MEDICAL & SURGICAL HISTORY:  No pertinent past medical history  No significant past surgical history    Allergies    Bactrim (Rash (Skin))    Intolerances      MEDICATIONS  (STANDING):  aspirin enteric coated 81 milliGRAM(s) Oral daily  dextrose 5% + lactated ringers. 1000 milliLiter(s) (75 mL/Hr) IV Continuous <Continuous>  heparin  Injectable 5000 Unit(s) SubCutaneous every 8 hours  nicotine - 21 mG/24Hr(s) Patch 1 patch Transdermal daily  pantoprazole  Injectable 40 milliGRAM(s) IV Push every 12 hours  simethicone 80 milliGRAM(s) Chew daily  triamcinolone 0.1% Cream 1 Application(s) Topical every 12 hours    MEDICATIONS  (PRN):      Social History: + cocaine and IV drug abuse, no tobacco, no etoh    ROS: ENT, GI, , CV, Pulm, Neuro, Psych, MS, Heme, Endo, Constitutional; all negative except as noted in HPI    Vital Signs Last 24 Hrs  T(C): 36.5 (22 Jun 2018 08:00), Max: 37.1 (21 Jun 2018 22:43)  T(F): 97.7 (22 Jun 2018 08:00), Max: 98.8 (21 Jun 2018 22:43)  HR: 72 (22 Jun 2018 12:50) (65 - 78)  BP: 144/98 (22 Jun 2018 12:50) (134/86 - 158/102)  BP(mean): --  RR: 18 (22 Jun 2018 08:00) (18 - 18)  SpO2: 98% (22 Jun 2018 08:00) (96% - 99%)                          14.8   12.99 )-----------( 126      ( 22 Jun 2018 08:17 )             42.5    06-22    140  |  105  |  75<H>  ----------------------------<  90  4.3   |  17<L>  |  3.80<H>    Ca    8.7      22 Jun 2018 06:48  Phos  4.3     06-22  Mg     2.0     06-22    TPro  6.2  /  Alb  3.1<L>  /  TBili  0.5  /  DBili  x   /  AST  47<H>  /  ALT  111<H>  /  AlkPhos  75  06-22   PT/INR - ( 21 Jun 2018 08:03 )   PT: 11.4 sec;   INR: 1.01 ratio         PTT - ( 21 Jun 2018 08:03 )  PTT:24.2 sec    PHYSICAL EXAM:  Gen: NAD, well-developed  Head: Normocephalic, Atraumatic  Face: no edema/erythema/fluctuance  Eyes: no scleral injection  Nose: Nares bilaterally patent, no discharge  Mouth: No Stridor / Drooling / Trismus.  Mucosa moist, tongue/uvula midline, oropharynx clear  Neck: Flat, supple, no lymphadenopathy, trachea midline, no masses  Resp: breathing easily, no stridor  CV: no peripheral edema/cyanosis    NG tube placement under fiberoptic guidance attempted. Pt was unable to tolerate NG tube pass the nasopharynx, pt refused multiple times after re-education of importance.     Nasal endoscopy- Using nasal endoscope #2 bilateral nasal cavities were thoroughly inspected. The scope was advanced on the floor of the nose to the nasopharynx, it was fully inspected. It was then passed between the middle and the inferior turbinates, followed by exam of the olfactory cleft to look for any polyps. The patient was seen to have white reticular changes on right septum with septal deviation and right middle turbinate hypertrophy, likely due to cocaine abuse. Left side appears normal.. Examination of the middle and superior meatus, sphenoethmoid recess B/l were seen to be normal.      Fiberoptic Indirect laryngoscopy:  (With Scope #2) NG tube placement/ substance abuse:  Supraglottis with white plaques with multiple foci in false VC, and redundant tissue presents, VC mobile and intact. Tongue base, posterior pharyngeal wall, vallecula, epiglottis normal.  No erythema, edema, pooling of secretions or masses. Airway patent, no foreign body visualized. No glottic/supraglottic edema. True vocal cords, arytenoids, vestibular folds, ventricles, pyriform sinuses, and aryepiglottic folds appear normal bilaterally. Vocal cords mobile with good contact b/l.      < from: Xray Abdomen 1 View PORTABLE -Urgent (06.22.18 @ 11:51) >  IMPRESSION:     Partial small bowel obstruction versus ileus as seen on the previous CT   from 6/19/2018.    < end of copied text >  < from: CT Abdomen and Pelvis No Cont (06.19.18 @ 14:22) >  IMPRESSION:     New diffuse dilatation of fluid-filled small bowel loops without discrete   transition point. Ileus is favored over small bowel obstruction. Small   volume ascites and mesenteric edema are present.    < end of copied text > CC: NG tube placement     HPI: Patient is a 53y old  Male who we are asked to evaluate the patient fo NG tube placement. PT with PMHx of cocaine and IV drug abuse admitted for rhabdomyolysis, SHAQUILLE, subacute infarcts now with new ileus and requiring NG tube placement. MICU attempted NG placement and pt had traumatic experience in which he has been refusing since. Pt is now having worsening of symptoms and agreed to attempt again. Pt denies any odynophagia, dysphagia, changes in voice, dyspnea, sob.     PAST MEDICAL & SURGICAL HISTORY:  No pertinent past medical history  No significant past surgical history    Allergies    Bactrim (Rash (Skin))    Intolerances      MEDICATIONS  (STANDING):  aspirin enteric coated 81 milliGRAM(s) Oral daily  dextrose 5% + lactated ringers. 1000 milliLiter(s) (75 mL/Hr) IV Continuous <Continuous>  heparin  Injectable 5000 Unit(s) SubCutaneous every 8 hours  nicotine - 21 mG/24Hr(s) Patch 1 patch Transdermal daily  pantoprazole  Injectable 40 milliGRAM(s) IV Push every 12 hours  simethicone 80 milliGRAM(s) Chew daily  triamcinolone 0.1% Cream 1 Application(s) Topical every 12 hours    MEDICATIONS  (PRN):      Social History: + cocaine and IV drug abuse, no tobacco, no etoh    ROS: ENT, GI, , CV, Pulm, Neuro, Psych, MS, Heme, Endo, Constitutional; all negative except as noted in HPI    Vital Signs Last 24 Hrs  T(C): 36.5 (22 Jun 2018 08:00), Max: 37.1 (21 Jun 2018 22:43)  T(F): 97.7 (22 Jun 2018 08:00), Max: 98.8 (21 Jun 2018 22:43)  HR: 72 (22 Jun 2018 12:50) (65 - 78)  BP: 144/98 (22 Jun 2018 12:50) (134/86 - 158/102)  BP(mean): --  RR: 18 (22 Jun 2018 08:00) (18 - 18)  SpO2: 98% (22 Jun 2018 08:00) (96% - 99%)                          14.8   12.99 )-----------( 126      ( 22 Jun 2018 08:17 )             42.5    06-22    140  |  105  |  75<H>  ----------------------------<  90  4.3   |  17<L>  |  3.80<H>    Ca    8.7      22 Jun 2018 06:48  Phos  4.3     06-22  Mg     2.0     06-22    TPro  6.2  /  Alb  3.1<L>  /  TBili  0.5  /  DBili  x   /  AST  47<H>  /  ALT  111<H>  /  AlkPhos  75  06-22   PT/INR - ( 21 Jun 2018 08:03 )   PT: 11.4 sec;   INR: 1.01 ratio         PTT - ( 21 Jun 2018 08:03 )  PTT:24.2 sec    PHYSICAL EXAM:  Gen: NAD, well-developed  Head: Normocephalic, Atraumatic  Face: no edema/erythema/fluctuance  Eyes: no scleral injection  Nose: Nares bilaterally patent, no discharge  Mouth: No Stridor / Drooling / Trismus.  Mucosa moist, tongue/uvula midline, oropharynx clear  Neck: Flat, supple, no lymphadenopathy, trachea midline, no masses  Resp: breathing easily, no stridor  CV: no peripheral edema/cyanosis    NG tube placement under fiberoptic guidance attempted. Pt was unable to tolerate NG tube pass the nasopharynx, pt refused multiple times after re-education of importance.     Nasal endoscopy- Using nasal endoscope #2 bilateral nasal cavities were thoroughly inspected. The scope was advanced on the floor of the nose to the nasopharynx, it was fully inspected. It was then passed between the middle and the inferior turbinates, followed by exam of the olfactory cleft to look for any polyps. The patient was seen to have white reticular changes on right septum with septal deviation and right middle turbinate hypertrophy, likely due to cocaine abuse. Left side appears normal.. Examination of the middle and superior meatus, sphenoethmoid recess B/l were seen to be normal.      Fiberoptic Indirect laryngoscopy:  (With Scope #2) NG tube placement/ substance abuse:  Supraglottis with white plaques with multiple foci in false VC, and redundant tissue presents, VC mobile and intact. Tongue base, posterior pharyngeal wall, vallecula, epiglottis normal.  No erythema, edema, pooling of secretions or masses. Airway patent, no foreign body visualized. No glottic/supraglottic edema. True vocal cords, arytenoids, vestibular folds, ventricles, pyriform sinuses, and aryepiglottic folds appear normal bilaterally. Vocal cords mobile with good contact b/l.      < from: Xray Abdomen 1 View PORTABLE -Urgent (06.22.18 @ 11:51) >  IMPRESSION:     Partial small bowel obstruction versus ileus as seen on the previous CT   from 6/19/2018.    < end of copied text >  < from: CT Abdomen and Pelvis No Cont (06.19.18 @ 14:22) >  IMPRESSION:     New diffuse dilatation of fluid-filled small bowel loops without discrete   transition point. Ileus is favored over small bowel obstruction. Small   volume ascites and mesenteric edema are present.    < end of copied text > HPI: Patient is a 53y old  Male who we are asked to evaluate the patient for nasal septal perforation and in need opf decompression with NG tube placement. PT with PMHx of cocaine and IV drug abuse admitted for rhabdomyolysis, SHAQUILLE, subacute infarcts now with new ileus and requiring NG tube placement. last used 2 weeks ago. MICU attempted NG placement and pt had traumatic experience in which he has been refusing since. Pt is now having worsening of symptoms and agreed to attempt again. Pt denies any odynophagia, changes in voice, dyspnea, sob.     PAST MEDICAL & SURGICAL HISTORY:  No pertinent past medical history  No significant past surgical history    Allergies    Bactrim (Rash (Skin))    Intolerances      MEDICATIONS  (STANDING):  aspirin enteric coated 81 milliGRAM(s) Oral daily  dextrose 5% + lactated ringers. 1000 milliLiter(s) (75 mL/Hr) IV Continuous <Continuous>  heparin  Injectable 5000 Unit(s) SubCutaneous every 8 hours  nicotine - 21 mG/24Hr(s) Patch 1 patch Transdermal daily  pantoprazole  Injectable 40 milliGRAM(s) IV Push every 12 hours  simethicone 80 milliGRAM(s) Chew daily  triamcinolone 0.1% Cream 1 Application(s) Topical every 12 hours    MEDICATIONS  (PRN):      Social History: + cocaine and IV drug abuse, no tobacco, no etoh    ROS: ENT, GI, , CV, Pulm, Neuro, Psych, MS, Heme, Endo, Constitutional; all negative except as noted in HPI    Vital Signs Last 24 Hrs  T(C): 36.5 (22 Jun 2018 08:00), Max: 37.1 (21 Jun 2018 22:43)  T(F): 97.7 (22 Jun 2018 08:00), Max: 98.8 (21 Jun 2018 22:43)  HR: 72 (22 Jun 2018 12:50) (65 - 78)  BP: 144/98 (22 Jun 2018 12:50) (134/86 - 158/102)  BP(mean): --  RR: 18 (22 Jun 2018 08:00) (18 - 18)  SpO2: 98% (22 Jun 2018 08:00) (96% - 99%)                          14.8   12.99 )-----------( 126      ( 22 Jun 2018 08:17 )             42.5    06-22    140  |  105  |  75<H>  ----------------------------<  90  4.3   |  17<L>  |  3.80<H>    Ca    8.7      22 Jun 2018 06:48  Phos  4.3     06-22  Mg     2.0     06-22    TPro  6.2  /  Alb  3.1<L>  /  TBili  0.5  /  DBili  x   /  AST  47<H>  /  ALT  111<H>  /  AlkPhos  75  06-22   PT/INR - ( 21 Jun 2018 08:03 )   PT: 11.4 sec;   INR: 1.01 ratio         PTT - ( 21 Jun 2018 08:03 )  PTT:24.2 sec    PHYSICAL EXAM:  Gen: NAD, well-developed  Head: Normocephalic, Atraumatic  Face: no edema/erythema/fluctuance  Eyes: no scleral injection  Nose: Nares bilaterally patent, no discharge  Mouth: No Stridor / Drooling / Trismus.  Mucosa moist, tongue/uvula midline, oropharynx clear  Neck: Flat, supple, no lymphadenopathy, trachea midline, no masses  Resp: breathing easily, no stridor  CV: no peripheral edema/cyanosis    NG tube placement under fiberoptic guidance attempted. Pt was unable to tolerate NG tube pass the nasopharynx, pt refused multiple times after re-education of importance.     Nasal endoscopy- Using nasal endoscope #2 bilateral nasal cavities were thoroughly inspected. The scope was advanced on the floor of the nose to the nasopharynx, it was fully inspected. It was then passed between the middle and the inferior turbinates, followed by exam of the olfactory cleft to look for any polyps. The patient was seen to have white reticular changes on right septum with septal deviation and right middle turbinate hypertrophy, likely due to cocaine abuse. Left side appears normal.. Examination of the middle and superior meatus, sphenoethmoid recess B/l were seen to be normal.      Fiberoptic Indirect laryngoscopy:  (With Scope #2) NG tube placement/ substance abuse:  Supraglottis with white plaques with multiple foci on b/l false VC, as well as diffuse redundant tissue presents, VC mobile and intact. Tongue base, posterior pharyngeal wall, vallecula, epiglottis normal.  No erythema, edema, pooling of secretions or masses. Airway patent, no foreign body visualized. No glottic/supraglottic edema. True vocal cords, arytenoids, vestibular folds, ventricles, pyriform sinuses, and aryepiglottic folds appear normal bilaterally. Vocal cords mobile with good contact b/l.      < from: Xray Abdomen 1 View PORTABLE -Urgent (06.22.18 @ 11:51) >  IMPRESSION:     Partial small bowel obstruction versus ileus as seen on the previous CT   from 6/19/2018.    < end of copied text >  < from: CT Abdomen and Pelvis No Cont (06.19.18 @ 14:22) >  IMPRESSION:     New diffuse dilatation of fluid-filled small bowel loops without discrete   transition point. Ileus is favored over small bowel obstruction. Small   volume ascites and mesenteric edema are present.    < end of copied text > HPI: Patient is a 53y old  Male who we are asked to evaluate the patient for nasal septal perforation and in need opf decompression with NG tube placement. PT with PMHx of cocaine and IV drug abuse admitted for rhabdomyolysis, SHAQUILLE, subacute infarcts now with new ileus and requiring NG tube placement. last used 2 weeks ago. MICU attempted NG placement and pt had traumatic experience in which he has been refusing since. Pt is now having worsening of symptoms and agreed to attempt again. Pt denies any odynophagia, changes in voice, dyspnea, sob.   does have some nasal congestion b/l x years, no bleeding, 	unsure if has a septal perforation     PAST MEDICAL & SURGICAL HISTORY:  No pertinent past medical history  No significant past surgical history    Allergies    Bactrim (Rash (Skin))    Intolerances      MEDICATIONS  (STANDING):  aspirin enteric coated 81 milliGRAM(s) Oral daily  dextrose 5% + lactated ringers. 1000 milliLiter(s) (75 mL/Hr) IV Continuous <Continuous>  heparin  Injectable 5000 Unit(s) SubCutaneous every 8 hours  nicotine - 21 mG/24Hr(s) Patch 1 patch Transdermal daily  pantoprazole  Injectable 40 milliGRAM(s) IV Push every 12 hours  simethicone 80 milliGRAM(s) Chew daily  triamcinolone 0.1% Cream 1 Application(s) Topical every 12 hours    MEDICATIONS  (PRN):      Social History: + cocaine and IV drug abuse, no tobacco, no etoh    ROS: ENT, GI, , CV, Pulm, Neuro, Psych, MS, Heme, Endo, Constitutional; all negative except as noted in HPI    Vital Signs Last 24 Hrs  T(C): 36.5 (22 Jun 2018 08:00), Max: 37.1 (21 Jun 2018 22:43)  T(F): 97.7 (22 Jun 2018 08:00), Max: 98.8 (21 Jun 2018 22:43)  HR: 72 (22 Jun 2018 12:50) (65 - 78)  BP: 144/98 (22 Jun 2018 12:50) (134/86 - 158/102)  BP(mean): --  RR: 18 (22 Jun 2018 08:00) (18 - 18)  SpO2: 98% (22 Jun 2018 08:00) (96% - 99%)                          14.8   12.99 )-----------( 126      ( 22 Jun 2018 08:17 )             42.5    06-22    140  |  105  |  75<H>  ----------------------------<  90  4.3   |  17<L>  |  3.80<H>    Ca    8.7      22 Jun 2018 06:48  Phos  4.3     06-22  Mg     2.0     06-22    TPro  6.2  /  Alb  3.1<L>  /  TBili  0.5  /  DBili  x   /  AST  47<H>  /  ALT  111<H>  /  AlkPhos  75  06-22   PT/INR - ( 21 Jun 2018 08:03 )   PT: 11.4 sec;   INR: 1.01 ratio         PTT - ( 21 Jun 2018 08:03 )  PTT:24.2 sec    PHYSICAL EXAM:  Gen: NAD, well-developed  Head: Normocephalic, Atraumatic  Face: no edema/erythema/fluctuance  Eyes: no scleral injection  Nose: Nares bilaterally patent, no discharge  Mouth: No Stridor / Drooling / Trismus.  Mucosa moist, tongue/uvula midline, oropharynx clear  Neck: Flat, supple, no lymphadenopathy, trachea midline, no masses  Resp: breathing easily, no stridor  CV: no peripheral edema/cyanosis    NG tube placement under fiberoptic guidance attempted. Pt was unable to tolerate NG tube pass the nasopharynx, pt refused multiple times after re-education of importance.     Nasal endoscopy- Dx - nasal congestion, Using nasal endoscope #2 bilateral nasal cavities were thoroughly inspected. The scope was advanced on the floor of the nose to the nasopharynx, it was fully inspected. It was then passed between the middle and the inferior turbinates, followed by exam of the olfactory cleft to look for any polyps. The patient was seen to have white reticular changes on right septum with septal deviation and right middle turbinate hypertrophy, likely due to cocaine abuse. Left side appears normal.. Examination of the middle and superior meatus, sphenoethmoid recess B/l were seen to be normal.      Fiberoptic Indirect laryngoscopy:  (With Scope #2) NG tube placement/ substance abuse:  Supraglottis with white plaques with multiple foci on b/l false VC, as well as diffuse redundant tissue presents, VC mobile and intact. Tongue base, posterior pharyngeal wall, vallecula, epiglottis normal.  No erythema, edema, pooling of secretions or masses. Airway patent, no foreign body visualized. No glottic/supraglottic edema. True vocal cords, arytenoids, vestibular folds, ventricles, pyriform sinuses, and aryepiglottic folds appear normal bilaterally. Vocal cords mobile with good contact b/l.      < from: Xray Abdomen 1 View PORTABLE -Urgent (06.22.18 @ 11:51) >  IMPRESSION:     Partial small bowel obstruction versus ileus as seen on the previous CT   from 6/19/2018.    < end of copied text >  < from: CT Abdomen and Pelvis No Cont (06.19.18 @ 14:22) >  IMPRESSION:     New diffuse dilatation of fluid-filled small bowel loops without discrete   transition point. Ileus is favored over small bowel obstruction. Small   volume ascites and mesenteric edema are present.    < end of copied text >

## 2018-06-22 NOTE — CONSULT NOTE ADULT - PROBLEM SELECTOR RECOMMENDATION 9
- IV Diflucan    - Cocaine cessation   - Patient should follow up in ENT office as an outpatient. May see Dr. Camp or Huang. Call [phone].   - Call ENT for any further issues - Diflucan IV if pt NPO     - Cocaine cessation   - Patient should follow up in ENT office as an outpatient. May see Dr. Camp or Huang. Call [phone].   - Call ENT for any further issues or if pt is willing to have NG placed - Diflucan IV if pt NPO     - may need tube placement under sedation if still required   - Cocaine cessation   - Patient should follow up in ENT office as an outpatient. May see Dr. Camp or Huang. Call [phone].   - Call ENT for any further issues or if pt is willing to have NG placed

## 2018-06-22 NOTE — CHART NOTE - NSCHARTNOTEFT_GEN_A_CORE
Was called to bedside overnight. Pt having worsening abdominal distension and pain reported to be severe, which is new. Given IV tylenol 1g overnight which partially relieved the pain. Small amount of bilious emesis also reported by RN earlier in the night. No respiratory distress. Vital signs stable. Patient continues to refuse NG tube placement for ileus. The RN had recommended NG tube placement for decompression, and I again asserted to patient that NG tube is needed; however, patient continues to refuse. I explained the risks of intestinal perforation and possible need for surgical intervention, should this progress. Pt is aware of risk.       Dolores Younger  PGY1 Internal Medicine  Pager #[phone] ([place] Float)

## 2018-06-22 NOTE — PROGRESS NOTE ADULT - SUBJECTIVE AND OBJECTIVE BOX
THE PATIENT WAS SEEN AND EXAMINED BY ME WITH THE HOUSESTAFF AND STROKE TEAM DURING MORNING ROUNDS.   HPI:  52 M w/ with unknown PMHx, who was transferred from [place] for ARF. Per son, he visited him on Saturday (patient lives alone) and was acting "different". He gave him some water and came back today to check up on him. He was very lethargic, not speaking, and found down on the floor. He noticed rash on the patient's trunk and leg, decided to bring his father to [place].   Pt has not been to a doctor before, no hx of renal disease. Pt was recently at [facility] a few weeks prior for cyst near the groin. Cyst was drained, but unclear if received abx. Labs were drawn at that time, son says renal function was within normal limits at that time.         SUBJECTIVE: No events overnight.  No new neurologic complaints.      aspirin enteric coated 81 milliGRAM(s) Oral daily  dextrose 5% + lactated ringers. 1000 milliLiter(s) IV Continuous <Continuous>  heparin  Injectable 5000 Unit(s) SubCutaneous every 8 hours  nicotine - 21 mG/24Hr(s) Patch 1 patch Transdermal daily  pantoprazole  Injectable 40 milliGRAM(s) IV Push every 12 hours  piperacillin/tazobactam IVPB. 3.375 Gram(s) IV Intermittent every 12 hours  simethicone 80 milliGRAM(s) Chew daily  triamcinolone 0.1% Cream 1 Application(s) Topical every 12 hours      PHYSICAL EXAM:   Vital Signs Last 24 Hrs  T(C): 36.5 (22 Jun 2018 08:00), Max: 37.1 (21 Jun 2018 22:43)  T(F): 97.7 (22 Jun 2018 08:00), Max: 98.8 (21 Jun 2018 22:43)  HR: 72 (22 Jun 2018 12:47) (65 - 78)  BP: 144/98 (22 Jun 2018 12:47) (134/86 - 158/102)  BP(mean): --  RR: 18 (22 Jun 2018 08:00) (18 - 18)  SpO2: 98% (22 Jun 2018 08:00) (96% - 99%)      General:  Well developed male. Resting in bed. Crying when discussing his recent stroke. Emotional support given. Stressed importance of reduction of risk factors for recurrent stroke, patient acknowledged understanding and was in agreement.  Appropriate able to follow commands. Appears mildly tachypneic due to abdominal distention, discussed with primary team DR. Carpio Team #3.  HEENT: Normocephalic, atraumatic.  EOM intact, visual fields full  Abdomen: Abdominal distention  noted, deferred to primary medical team. As per medical team despite repeated instruction as to NGT for decompression patient refusing tube.    Extremities: No edema    NEUROLOGICAL EXAM:  Mental status: Awake, alert, oriented x3. Speech is fluent. No aphasia, no neglect. Normal memory.   Cranial Nerves: Mild right sided facial droop.  No discernible nystagmus. No dysarthria. Tongue is  midline,  shoulder shrug intact bilaterally.  Motor exam: Normal tone. RUE drift. Right sided hemiparesis 4/5 RUE, 5/5 RLE, 5/5 LUE, 5/5 LLE.  Sensation: Intact to light touch   Coordination/ Gait: RUE dysmetria. Gait not assessed on bedrest.     LABS:                        14.8   12.99 )-----------( 126      ( 22 Jun 2018 08:17 )             42.5    06-22    140  |  105  |  75<H>  ----------------------------<  90  4.3   |  17<L>  |  3.80<H>    Ca    8.7      22 Jun 2018 06:48  Phos  4.3     06-22  Mg     2.0     06-22    TPro  6.2  /  Alb  3.1<L>  /  TBili  0.5  /  DBili  x   /  AST  47<H>  /  ALT  111<H>  /  AlkPhos  75  06-22  PT/INR - ( 21 Jun 2018 08:03 )   PT: 11.4 sec;   INR: 1.01 ratio         PTT - ( 21 Jun 2018 08:03 )  PTT:24.2 sec  Hemoglobin A1C, Whole Blood: 5.4 % (06-18 @ 05:47)      IMAGING: Reviewed by me.      MR Head No Cont (06.20.18 @ 23:47)   Impression: Acute left MCA infarcts are identified with possible areas of   petechial hemorrhage seen involving the left parietal region. Clinical   correlation and continued close interval follow-up until resolution is   recommended.      Impression:  TTE with Doppler (w/Cont) (06.20.18 @ 10:17)  Conclusions:  Technically difficult study.  1. Mitral valve not well visualized; valve leaflets appear  to open normally. Minimal mitral regurgitation.  2. Aortic valve not well visualized; valve leaflets appear  to open normally. No aortic valve regurgitation seen.  3. Endocardial visualization enhanced with intravenous  injection of echo contrast (Definity). Left ventricle  suboptimally visualized despite the use of intravenous echo  contrast; from limited views, grossly preserved left  ventricular systolic function.  4. The right ventricle is not well visualized; grossly  normal right ventricular systolic function.  *** No previous Echo exam.       CT Head No Cont (06.17.18 @ 09:59)   IMPRESSION:  Left MCA distribution and probable left posterior cerebral artery   distribution changes most consistent with subacute infarction. No   evidence for acute hemorrhage. MRI is recommended for further   characterization.

## 2018-06-22 NOTE — OCCUPATIONAL THERAPY INITIAL EVALUATION ADULT - ADL RETRAINING, OT EVAL
GOAL: Pt will perform LB dressing independently in two weeks GOAL: Patient will be independent with UB dressing within two weeks GOAL: Pt will be independent with toileting in two weeks

## 2018-06-22 NOTE — PROGRESS NOTE ADULT - PROBLEM SELECTOR PLAN 6
- SHAQUILLE on CKD, likely secondary to rhabdomyolysis vs hemodynamic instability. Left atrophic kidney.    - Renal recs appreciated.   - s/p dialysis for hyperkalemia.  - CTM on BMP  - Gutierres removed. TOV. - SHAQUILLE on CKD, likely secondary to rhabdomyolysis vs hemodynamic instability. Left atrophic kidney. CT abdomen no hydronephrosis. Adrenal adenoma seen.     - Renal recs appreciated.   - s/p dialysis for hyperkalemia.  - CTM on BMP  - Passed TOV.

## 2018-06-22 NOTE — PROGRESS NOTE ADULT - PROBLEM SELECTOR PLAN 4
- Likely in the setting of muscle break down 2/2 to being found down in the floor.   - CK downtrending.

## 2018-06-22 NOTE — PROGRESS NOTE ADULT - PROBLEM SELECTOR PLAN 1
- Sub-acute L MCA CVA 2/2 to cocaine use.   - Mental status improving, AOx2. RUE full strength. LUE weakness.   - c/w aspirin. Start statin once LFTs improve.  - C/w neuro checks  - MRI of the brain- consistent with CT scan findings.   - ANCA negative.  - Neurology follow up.

## 2018-06-22 NOTE — PROGRESS NOTE ADULT - PROBLEM SELECTOR PLAN 7
- Morbiliform rash likely 2/2 medication exposure. Possibly the "sulfa" abx pt received after a groin abscess draining.   - c/w triamcinolone 0.1% cream BID to AA prn itch  - Dermatology recs appreciated.  - Pending tick-borne disease, [place] neg, syphilis neg.

## 2018-06-23 LAB
ALBUMIN SERPL ELPH-MCNC: 2.9 G/DL — LOW (ref 3.3–5)
ALP SERPL-CCNC: 73 U/L — SIGNIFICANT CHANGE UP (ref 40–120)
ALT FLD-CCNC: 103 U/L — HIGH (ref 10–45)
ANION GAP SERPL CALC-SCNC: 14 MMOL/L — SIGNIFICANT CHANGE UP (ref 5–17)
ANISOCYTOSIS BLD QL: SLIGHT — SIGNIFICANT CHANGE UP
AST SERPL-CCNC: 37 U/L — SIGNIFICANT CHANGE UP (ref 10–40)
BASOPHILS # BLD AUTO: 0 K/UL — SIGNIFICANT CHANGE UP (ref 0–0.2)
BASOPHILS NFR BLD AUTO: 0 % — SIGNIFICANT CHANGE UP (ref 0–2)
BILIRUB SERPL-MCNC: 0.5 MG/DL — SIGNIFICANT CHANGE UP (ref 0.2–1.2)
BUN SERPL-MCNC: 55 MG/DL — HIGH (ref 7–23)
CALCIUM SERPL-MCNC: 8.8 MG/DL — SIGNIFICANT CHANGE UP (ref 8.4–10.5)
CHLORIDE SERPL-SCNC: 105 MMOL/L — SIGNIFICANT CHANGE UP (ref 96–108)
CO2 SERPL-SCNC: 19 MMOL/L — LOW (ref 22–31)
CREAT SERPL-MCNC: 2.61 MG/DL — HIGH (ref 0.5–1.3)
EOSINOPHIL # BLD AUTO: 0.11 K/UL — SIGNIFICANT CHANGE UP (ref 0–0.5)
EOSINOPHIL NFR BLD AUTO: 0.8 % — SIGNIFICANT CHANGE UP (ref 0–6)
GIANT PLATELETS BLD QL SMEAR: PRESENT — SIGNIFICANT CHANGE UP
GLUCOSE BLDC GLUCOMTR-MCNC: 93 MG/DL — SIGNIFICANT CHANGE UP (ref 70–99)
GLUCOSE SERPL-MCNC: 98 MG/DL — SIGNIFICANT CHANGE UP (ref 70–99)
HCT VFR BLD CALC: 44 % — SIGNIFICANT CHANGE UP (ref 39–50)
HCT VFR BLD CALC: 45.3 % — SIGNIFICANT CHANGE UP (ref 39–50)
HGB BLD-MCNC: 14.5 G/DL — SIGNIFICANT CHANGE UP (ref 13–17)
HGB BLD-MCNC: 15.3 G/DL — SIGNIFICANT CHANGE UP (ref 13–17)
LACTATE SERPL-SCNC: 0.9 MMOL/L — SIGNIFICANT CHANGE UP (ref 0.7–2)
LYMPHOCYTES # BLD AUTO: 1.34 K/UL — SIGNIFICANT CHANGE UP (ref 1–3.3)
LYMPHOCYTES # BLD AUTO: 9.8 % — LOW (ref 13–44)
MACROCYTES BLD QL: SLIGHT — SIGNIFICANT CHANGE UP
MAGNESIUM SERPL-MCNC: 1.9 MG/DL — SIGNIFICANT CHANGE UP (ref 1.6–2.6)
MANUAL SMEAR VERIFICATION: SIGNIFICANT CHANGE UP
MCHC RBC-ENTMCNC: 30.5 PG — SIGNIFICANT CHANGE UP (ref 27–34)
MCHC RBC-ENTMCNC: 32.6 PG — SIGNIFICANT CHANGE UP (ref 27–34)
MCHC RBC-ENTMCNC: 33 GM/DL — SIGNIFICANT CHANGE UP (ref 32–36)
MCHC RBC-ENTMCNC: 33.8 GM/DL — SIGNIFICANT CHANGE UP (ref 32–36)
MCV RBC AUTO: 92.4 FL — SIGNIFICANT CHANGE UP (ref 80–100)
MCV RBC AUTO: 96.4 FL — SIGNIFICANT CHANGE UP (ref 80–100)
MONOCYTES # BLD AUTO: 1.46 K/UL — HIGH (ref 0–0.9)
MONOCYTES NFR BLD AUTO: 10.7 % — SIGNIFICANT CHANGE UP (ref 2–14)
MYELOCYTES NFR BLD: 4.1 % — HIGH (ref 0–0)
NEUTROPHILS # BLD AUTO: 10.21 K/UL — HIGH (ref 1.8–7.4)
NEUTROPHILS NFR BLD AUTO: 70.5 % — SIGNIFICANT CHANGE UP (ref 43–77)
NEUTS BAND # BLD: 4.1 % — SIGNIFICANT CHANGE UP (ref 0–8)
OB PNL STL: POSITIVE
PHOSPHATE SERPL-MCNC: 3.5 MG/DL — SIGNIFICANT CHANGE UP (ref 2.5–4.5)
PLAT MORPH BLD: NORMAL — SIGNIFICANT CHANGE UP
PLATELET # BLD AUTO: 149 K/UL — LOW (ref 150–400)
PLATELET # BLD AUTO: 160 K/UL — SIGNIFICANT CHANGE UP (ref 150–400)
PLATELET COUNT - ESTIMATE: SIGNIFICANT CHANGE UP
POTASSIUM SERPL-MCNC: 4 MMOL/L — SIGNIFICANT CHANGE UP (ref 3.5–5.3)
POTASSIUM SERPL-SCNC: 4 MMOL/L — SIGNIFICANT CHANGE UP (ref 3.5–5.3)
PROT SERPL-MCNC: 5.9 G/DL — LOW (ref 6–8.3)
RBC # BLD: 4.7 M/UL — SIGNIFICANT CHANGE UP (ref 4.2–5.8)
RBC # BLD: 4.76 M/UL — SIGNIFICANT CHANGE UP (ref 4.2–5.8)
RBC # FLD: 12.8 % — SIGNIFICANT CHANGE UP (ref 10.3–14.5)
RBC # FLD: 14.1 % — SIGNIFICANT CHANGE UP (ref 10.3–14.5)
RBC BLD AUTO: ABNORMAL
SODIUM SERPL-SCNC: 138 MMOL/L — SIGNIFICANT CHANGE UP (ref 135–145)
WBC # BLD: 13.69 K/UL — HIGH (ref 3.8–10.5)
WBC # BLD: 16.5 K/UL — HIGH (ref 3.8–10.5)
WBC # FLD AUTO: 13.69 K/UL — HIGH (ref 3.8–10.5)
WBC # FLD AUTO: 16.5 K/UL — HIGH (ref 3.8–10.5)

## 2018-06-23 PROCEDURE — 99233 SBSQ HOSP IP/OBS HIGH 50: CPT | Mod: GC

## 2018-06-23 RX ORDER — FLUCONAZOLE 150 MG/1
100 TABLET ORAL ONCE
Qty: 0 | Refills: 0 | Status: COMPLETED | OUTPATIENT
Start: 2018-06-23 | End: 2018-06-23

## 2018-06-23 RX ORDER — FLUCONAZOLE 150 MG/1
100 TABLET ORAL EVERY 24 HOURS
Qty: 0 | Refills: 0 | Status: DISCONTINUED | OUTPATIENT
Start: 2018-06-24 | End: 2018-06-27

## 2018-06-23 RX ORDER — FLUCONAZOLE 150 MG/1
TABLET ORAL
Qty: 0 | Refills: 0 | Status: DISCONTINUED | OUTPATIENT
Start: 2018-06-23 | End: 2018-06-27

## 2018-06-23 RX ADMIN — Medication 1 APPLICATION(S): at 17:29

## 2018-06-23 RX ADMIN — Medication 1 PATCH: at 05:23

## 2018-06-23 RX ADMIN — SODIUM CHLORIDE 75 MILLILITER(S): 9 INJECTION, SOLUTION INTRAVENOUS at 12:56

## 2018-06-23 RX ADMIN — HEPARIN SODIUM 5000 UNIT(S): 5000 INJECTION INTRAVENOUS; SUBCUTANEOUS at 05:23

## 2018-06-23 RX ADMIN — SIMETHICONE 80 MILLIGRAM(S): 80 TABLET, CHEWABLE ORAL at 12:55

## 2018-06-23 RX ADMIN — PANTOPRAZOLE SODIUM 40 MILLIGRAM(S): 20 TABLET, DELAYED RELEASE ORAL at 05:23

## 2018-06-23 RX ADMIN — FLUCONAZOLE 50 MILLIGRAM(S): 150 TABLET ORAL at 15:24

## 2018-06-23 RX ADMIN — Medication 81 MILLIGRAM(S): at 12:55

## 2018-06-23 RX ADMIN — Medication 1 APPLICATION(S): at 06:44

## 2018-06-23 RX ADMIN — HEPARIN SODIUM 5000 UNIT(S): 5000 INJECTION INTRAVENOUS; SUBCUTANEOUS at 21:20

## 2018-06-23 RX ADMIN — PANTOPRAZOLE SODIUM 40 MILLIGRAM(S): 20 TABLET, DELAYED RELEASE ORAL at 17:29

## 2018-06-23 RX ADMIN — HEPARIN SODIUM 5000 UNIT(S): 5000 INJECTION INTRAVENOUS; SUBCUTANEOUS at 15:24

## 2018-06-23 RX ADMIN — SODIUM CHLORIDE 75 MILLILITER(S): 9 INJECTION, SOLUTION INTRAVENOUS at 17:29

## 2018-06-23 NOTE — PROGRESS NOTE ADULT - PROBLEM SELECTOR PLAN 6
- SHAQUILLE on CKD, likely secondary to rhabdomyolysis vs hemodynamic instability. Left atrophic kidney. CT abdomen no hydronephrosis. Adrenal adenoma seen.     - Renal recs appreciated.   - s/p dialysis for hyperkalemia.  - CTM on BMP  - Passed TOV.

## 2018-06-23 NOTE — PROGRESS NOTE ADULT - ASSESSMENT
53 year old man with PMHx of recent scrotal abscess, who presents to [place] for AMS x 3 days found with MCA infarct 2/2 cocaine use, rhabdomyolysis c/b acute renal failure transferred to [facility]ICU for management. Hospital course complicated by full body drug rash likely 2/2 Bactrim that was given for a recent scrotal abscess. Patient developed hyperkalemia s/p dialyisis x 1. Leukocytosis possibly 2/2 steroid use, however on zosyn for aspiration pna coverage now finished abx. Course complicated by partial ileus near small bowel, refusing NGT.

## 2018-06-23 NOTE — PROGRESS NOTE ADULT - SUBJECTIVE AND OBJECTIVE BOX
Jameel Summers MD  PGY-3 Internal Medicine Resident  [facility] Contact: [phone]  [facility] Contact: Pager #[phone]    Patient is a 53y old  Male who presents with a chief complaint of Patient is a 52y old  Male who presents with a chief complaint of AMS, found down (18 Jun 2018 09:34)    SUBJECTIVE / OVERNIGHT EVENTS:  Pt. passing small amount of stool    MEDICATIONS  (STANDING):  aspirin enteric coated 81 milliGRAM(s) Oral daily  dextrose 5% + lactated ringers. 1000 milliLiter(s) (75 mL/Hr) IV Continuous <Continuous>  heparin  Injectable 5000 Unit(s) SubCutaneous every 8 hours  nicotine - 21 mG/24Hr(s) Patch 1 patch Transdermal daily  pantoprazole  Injectable 40 milliGRAM(s) IV Push every 12 hours  simethicone 80 milliGRAM(s) Chew daily  triamcinolone 0.1% Cream 1 Application(s) Topical every 12 hours      Vital Signs Last 24 Hrs  T(C): 36.6 (23 Jun 2018 08:15), Max: 36.8 (22 Jun 2018 20:29)  T(F): 97.9 (23 Jun 2018 08:15), Max: 98.2 (22 Jun 2018 20:29)  HR: 76 (23 Jun 2018 08:15) (70 - 77)  BP: 118/76 (23 Jun 2018 08:15) (118/76 - 149/96)  BP(mean): --  RR: 18 (23 Jun 2018 08:15) (18 - 18)  SpO2: 96% (23 Jun 2018 08:15) (96% - 98%)    I&O's Summary    22 Jun 2018 07:01  -  23 Jun 2018 07:00  --------------------------------------------------------  IN: 1000 mL / OUT: 0 mL / NET: 1000 mL    PHYSICAL EXAM:  GENERAL: NAD, well-developed.   HEAD:  Atraumatic, Normocephalic  EYES: EOMI, PERRLA, conjunctiva and sclera clear  NECK: Supple, No JVD  CHEST/LUNG: Clear to auscultation bilaterally; No wheeze  HEART: Regular rate and rhythm; Normal S1 S2, No murmurs, rubs, or gallops  ABDOMEN: Soft, Nontender, distended; Bowel sounds decreased   EXTREMITIES:  2+ Peripheral Pulses, No clubbing, cyanosis, or edema  PSYCH: AAOx3  NEUROLOGY: Left UE weakness 3/5. Right UE 5/5.   SKIN: No rashes or lesions    LABS:                        14.8   12.99 )-----------( 126      ( 22 Jun 2018 08:17 )             42.5     Auto Eosinophil # 0.13  / Auto Eosinophil % 1.0   / Auto Neutrophil # 9.09  / Auto Neutrophil % 70.0  / BANDS % x        06-23    138  |  105  |  55<H>  ----------------------------<  98  4.0   |  19<L>  |  2.61<H>  06-22    140  |  105  |  75<H>  ----------------------------<  90  4.3   |  17<L>  |  3.80<H>    Ca    8.8      23 Jun 2018 06:33  Mg     1.9     06-23  Phos  3.5     06-23  TPro  5.9<L>  /  Alb  2.9<L>  /  TBili  0.5  /  DBili  x   /  AST  37  /  ALT  103<H>  /  AlkPhos  73  06-23  TPro  6.2  /  Alb  3.1<L>  /  TBili  0.5  /  DBili  x   /  AST  47<H>  /  ALT  111<H>  /  AlkPhos  75  06-22    Lactate, Blood: 0.9 mmol/L (06-23 @ 06:34)  Lactate, Blood: 2.3 mmol/L (06-17 @ 12:05)  Lactate, Blood: 2.7 mmol/L (06-17 @ 10:56)    < from: CT Abdomen and Pelvis No Cont (06.19.18 @ 14:22) >  FINDINGS:    LOWER CHEST: Bilateral lower lobe linear atelectasis.    LIVER: Within normal limits.  BILE DUCTS: Normal caliber.  GALLBLADDER: Within normal limits.  SPLEEN: Within normal limits.  PANCREAS: Within normal limits.  ADRENALS: Right adrenal gland nodularity likely due to adenoma, as seen   on recent study. Stable low-density left adrenal thickening.  KIDNEYS/URETERS: Atrophic left kidney.    BLADDER: Contains a Gutierres catheter.  REPRODUCTIVE ORGANS: The prostate is within normal limits.    BOWEL: Near diffuse dilatation of small bowel to the level of the   terminal ileum, ileus favored over obstruction. Colonic diverticulosis.   Appendix is normal.  PERITONEUM: Small volume right upper quadrant ascites. Mesenteric edema.  VESSELS:  Mild atherosclerotic calcifications.  RETROPERITONEUM: No lymphadenopathy.    ABDOMINAL WALL: Left anterior thigh muscular lipoma.  BONES: Degenerative changes. Age indeterminant L2 compression fracture   deformity.    IMPRESSION:     New diffuse dilatation of fluid-filled small bowel loops without discrete   transition point. Ileus is favored over small bowel obstruction. Small   volume ascites and mesenteric edema are present. Jameel Summers MD  PGY-3 Internal Medicine Resident  [facility] Contact: [phone]  [facility] Contact: Pager #[phone]    Patient is a 53y old  Male who presents with a chief complaint of Patient is a 52y old  Male who presents with a chief complaint of AMS, found down (18 Jun 2018 09:34)    SUBJECTIVE / OVERNIGHT EVENTS:  Pt. passing small amount of stool and able to pass gas  no vomiting  other no f/c feels OK    MEDICATIONS  (STANDING):  aspirin enteric coated 81 milliGRAM(s) Oral daily  dextrose 5% + lactated ringers. 1000 milliLiter(s) (75 mL/Hr) IV Continuous <Continuous>  heparin  Injectable 5000 Unit(s) SubCutaneous every 8 hours  nicotine - 21 mG/24Hr(s) Patch 1 patch Transdermal daily  pantoprazole  Injectable 40 milliGRAM(s) IV Push every 12 hours  simethicone 80 milliGRAM(s) Chew daily  triamcinolone 0.1% Cream 1 Application(s) Topical every 12 hours    Vital Signs Last 24 Hrs  T(C): 36.6 (23 Jun 2018 08:15), Max: 36.8 (22 Jun 2018 20:29)  T(F): 97.9 (23 Jun 2018 08:15), Max: 98.2 (22 Jun 2018 20:29)  HR: 76 (23 Jun 2018 08:15) (70 - 77)  BP: 118/76 (23 Jun 2018 08:15) (118/76 - 149/96)  BP(mean): --  RR: 18 (23 Jun 2018 08:15) (18 - 18)  SpO2: 96% (23 Jun 2018 08:15) (96% - 98%)    I&O's Summary    22 Jun 2018 07:01  -  23 Jun 2018 07:00  --------------------------------------------------------  IN: 1000 mL / OUT: 0 mL / NET: 1000 mL    PHYSICAL EXAM:  GENERAL: NAD, well-developed.   HEAD:  Atraumatic, Normocephalic  EYES: EOMI, PERRLA, conjunctiva and sclera clear  NECK: Supple, No JVD  CHEST/LUNG: Clear to auscultation bilaterally; No wheeze  HEART: Regular rate and rhythm; Normal S1 S2, No murmurs, rubs, or gallops  ABDOMEN: Soft, Nontender, distended; Bowel sounds decreased   EXTREMITIES:  2+ Peripheral Pulses, No clubbing, cyanosis, or edema  PSYCH: AAOx3  NEUROLOGY: Left UE weakness 3/5. Right UE 5/5.   SKIN: No rashes or lesions    LABS:                        14.8   12.99 )-----------( 126      ( 22 Jun 2018 08:17 )             42.5     Auto Eosinophil # 0.13  / Auto Eosinophil % 1.0   / Auto Neutrophil # 9.09  / Auto Neutrophil % 70.0  / BANDS % x        06-23    138  |  105  |  55<H>  ----------------------------<  98  4.0   |  19<L>  |  2.61<H>  06-22    140  |  105  |  75<H>  ----------------------------<  90  4.3   |  17<L>  |  3.80<H>    Ca    8.8      23 Jun 2018 06:33  Mg     1.9     06-23  Phos  3.5     06-23  TPro  5.9<L>  /  Alb  2.9<L>  /  TBili  0.5  /  DBili  x   /  AST  37  /  ALT  103<H>  /  AlkPhos  73  06-23  TPro  6.2  /  Alb  3.1<L>  /  TBili  0.5  /  DBili  x   /  AST  47<H>  /  ALT  111<H>  /  AlkPhos  75  06-22    Lactate, Blood: 0.9 mmol/L (06-23 @ 06:34)  Lactate, Blood: 2.3 mmol/L (06-17 @ 12:05)  Lactate, Blood: 2.7 mmol/L (06-17 @ 10:56)    < from: CT Abdomen and Pelvis No Cont (06.19.18 @ 14:22) >  FINDINGS:    LOWER CHEST: Bilateral lower lobe linear atelectasis.    LIVER: Within normal limits.  BILE DUCTS: Normal caliber.  GALLBLADDER: Within normal limits.  SPLEEN: Within normal limits.  PANCREAS: Within normal limits.  ADRENALS: Right adrenal gland nodularity likely due to adenoma, as seen   on recent study. Stable low-density left adrenal thickening.  KIDNEYS/URETERS: Atrophic left kidney.    BLADDER: Contains a Gutierres catheter.  REPRODUCTIVE ORGANS: The prostate is within normal limits.    BOWEL: Near diffuse dilatation of small bowel to the level of the   terminal ileum, ileus favored over obstruction. Colonic diverticulosis.   Appendix is normal.  PERITONEUM: Small volume right upper quadrant ascites. Mesenteric edema.  VESSELS:  Mild atherosclerotic calcifications.  RETROPERITONEUM: No lymphadenopathy.    ABDOMINAL WALL: Left anterior thigh muscular lipoma.  BONES: Degenerative changes. Age indeterminant L2 compression fracture   deformity.    IMPRESSION:     New diffuse dilatation of fluid-filled small bowel loops without discrete   transition point. Ileus is favored over small bowel obstruction. Small   volume ascites and mesenteric edema are present.

## 2018-06-23 NOTE — PROGRESS NOTE ADULT - PROBLEM SELECTOR PLAN 2
- Leukocytosis with bandemia concerning infection likely for aspiration however improving. Monitoring off abx.   - Cultures NGTD.

## 2018-06-23 NOTE — PROGRESS NOTE ADULT - PROBLEM SELECTOR PLAN 3
- Ileus likely secondary to ARF/CVA.   - Patient refusing NG tube placement.   - C/w simethicone daily  - Positive FOBT- on pantoprazole.  - ENT following - seen with ?thrush vs leukoplakia vs. cocaine induced nasal septal changes. Would start diflucan however given LFT rise, will hold off for now. - Ileus likely secondary to ARF/CVA.   - Patient refusing NG tube placement.   - C/w simethicone daily  - Positive FOBT- on pantoprazole.  - ENT following - seen with ?thrush vs leukoplakia vs. cocaine induced nasal septal changes. Would start diflucan but will need to monitor LFTs.

## 2018-06-23 NOTE — PROGRESS NOTE ADULT - ATTENDING COMMENTS
Patient seen and examined, presenting with Ileus, SHAQUILLE and acute CVA in the setting of Cocaine use, will repeat imaging- CT abdomen in 24-48 hours,   keep patient on iv fluids and monitor cbc, cpk levels for now.

## 2018-06-24 LAB
ANION GAP SERPL CALC-SCNC: 10 MMOL/L — SIGNIFICANT CHANGE UP (ref 5–17)
ANION GAP SERPL CALC-SCNC: 16 MMOL/L — SIGNIFICANT CHANGE UP (ref 5–17)
BUN SERPL-MCNC: 36 MG/DL — HIGH (ref 7–23)
BUN SERPL-MCNC: 40 MG/DL — HIGH (ref 7–23)
CALCIUM SERPL-MCNC: 8.7 MG/DL — SIGNIFICANT CHANGE UP (ref 8.4–10.5)
CALCIUM SERPL-MCNC: 8.9 MG/DL — SIGNIFICANT CHANGE UP (ref 8.4–10.5)
CHLORIDE SERPL-SCNC: 106 MMOL/L — SIGNIFICANT CHANGE UP (ref 96–108)
CHLORIDE SERPL-SCNC: 107 MMOL/L — SIGNIFICANT CHANGE UP (ref 96–108)
CK SERPL-CCNC: 73 U/L — SIGNIFICANT CHANGE UP (ref 30–200)
CO2 SERPL-SCNC: 18 MMOL/L — LOW (ref 22–31)
CO2 SERPL-SCNC: 21 MMOL/L — LOW (ref 22–31)
CREAT SERPL-MCNC: 2.6 MG/DL — HIGH (ref 0.5–1.3)
CREAT SERPL-MCNC: 3.08 MG/DL — HIGH (ref 0.5–1.3)
GLUCOSE BLDC GLUCOMTR-MCNC: 92 MG/DL — SIGNIFICANT CHANGE UP (ref 70–99)
GLUCOSE SERPL-MCNC: 102 MG/DL — HIGH (ref 70–99)
GLUCOSE SERPL-MCNC: 84 MG/DL — SIGNIFICANT CHANGE UP (ref 70–99)
HCT VFR BLD CALC: 40.2 % — SIGNIFICANT CHANGE UP (ref 39–50)
HCT VFR BLD CALC: 41.1 % — SIGNIFICANT CHANGE UP (ref 39–50)
HGB BLD-MCNC: 13.6 G/DL — SIGNIFICANT CHANGE UP (ref 13–17)
HGB BLD-MCNC: 13.8 G/DL — SIGNIFICANT CHANGE UP (ref 13–17)
MAGNESIUM SERPL-MCNC: 1.6 MG/DL — SIGNIFICANT CHANGE UP (ref 1.6–2.6)
MAGNESIUM SERPL-MCNC: 1.7 MG/DL — SIGNIFICANT CHANGE UP (ref 1.6–2.6)
MCHC RBC-ENTMCNC: 32.5 PG — SIGNIFICANT CHANGE UP (ref 27–34)
MCHC RBC-ENTMCNC: 32.5 PG — SIGNIFICANT CHANGE UP (ref 27–34)
MCHC RBC-ENTMCNC: 33.6 GM/DL — SIGNIFICANT CHANGE UP (ref 32–36)
MCHC RBC-ENTMCNC: 33.7 GM/DL — SIGNIFICANT CHANGE UP (ref 32–36)
MCV RBC AUTO: 96.4 FL — SIGNIFICANT CHANGE UP (ref 80–100)
MCV RBC AUTO: 96.9 FL — SIGNIFICANT CHANGE UP (ref 80–100)
PHOSPHATE SERPL-MCNC: 2.4 MG/DL — LOW (ref 2.5–4.5)
PHOSPHATE SERPL-MCNC: 4.1 MG/DL — SIGNIFICANT CHANGE UP (ref 2.5–4.5)
PLATELET # BLD AUTO: 170 K/UL — SIGNIFICANT CHANGE UP (ref 150–400)
PLATELET # BLD AUTO: 171 K/UL — SIGNIFICANT CHANGE UP (ref 150–400)
POTASSIUM SERPL-MCNC: 4.3 MMOL/L — SIGNIFICANT CHANGE UP (ref 3.5–5.3)
POTASSIUM SERPL-MCNC: 4.5 MMOL/L — SIGNIFICANT CHANGE UP (ref 3.5–5.3)
POTASSIUM SERPL-SCNC: 4.3 MMOL/L — SIGNIFICANT CHANGE UP (ref 3.5–5.3)
POTASSIUM SERPL-SCNC: 4.5 MMOL/L — SIGNIFICANT CHANGE UP (ref 3.5–5.3)
RBC # BLD: 4.17 M/UL — LOW (ref 4.2–5.8)
RBC # BLD: 4.25 M/UL — SIGNIFICANT CHANGE UP (ref 4.2–5.8)
RBC # FLD: 12.7 % — SIGNIFICANT CHANGE UP (ref 10.3–14.5)
RBC # FLD: 12.7 % — SIGNIFICANT CHANGE UP (ref 10.3–14.5)
SODIUM SERPL-SCNC: 138 MMOL/L — SIGNIFICANT CHANGE UP (ref 135–145)
SODIUM SERPL-SCNC: 140 MMOL/L — SIGNIFICANT CHANGE UP (ref 135–145)
WBC # BLD: 11.4 K/UL — HIGH (ref 3.8–10.5)
WBC # BLD: 15.1 K/UL — HIGH (ref 3.8–10.5)
WBC # FLD AUTO: 11.4 K/UL — HIGH (ref 3.8–10.5)
WBC # FLD AUTO: 15.1 K/UL — HIGH (ref 3.8–10.5)

## 2018-06-24 PROCEDURE — 99233 SBSQ HOSP IP/OBS HIGH 50: CPT | Mod: GC

## 2018-06-24 RX ORDER — ASPIRIN/CALCIUM CARB/MAGNESIUM 324 MG
1 TABLET ORAL
Qty: 0 | Refills: 0 | DISCHARGE
Start: 2018-06-24

## 2018-06-24 RX ORDER — SODIUM,POTASSIUM PHOSPHATES 278-250MG
1 POWDER IN PACKET (EA) ORAL
Qty: 0 | Refills: 0 | Status: COMPLETED | OUTPATIENT
Start: 2018-06-24 | End: 2018-06-25

## 2018-06-24 RX ADMIN — PANTOPRAZOLE SODIUM 40 MILLIGRAM(S): 20 TABLET, DELAYED RELEASE ORAL at 05:04

## 2018-06-24 RX ADMIN — Medication 1 APPLICATION(S): at 05:05

## 2018-06-24 RX ADMIN — Medication 1 PATCH: at 05:04

## 2018-06-24 RX ADMIN — HEPARIN SODIUM 5000 UNIT(S): 5000 INJECTION INTRAVENOUS; SUBCUTANEOUS at 16:04

## 2018-06-24 RX ADMIN — HEPARIN SODIUM 5000 UNIT(S): 5000 INJECTION INTRAVENOUS; SUBCUTANEOUS at 05:04

## 2018-06-24 RX ADMIN — HEPARIN SODIUM 5000 UNIT(S): 5000 INJECTION INTRAVENOUS; SUBCUTANEOUS at 21:36

## 2018-06-24 RX ADMIN — SIMETHICONE 80 MILLIGRAM(S): 80 TABLET, CHEWABLE ORAL at 11:51

## 2018-06-24 RX ADMIN — Medication 1 APPLICATION(S): at 17:23

## 2018-06-24 RX ADMIN — FLUCONAZOLE 50 MILLIGRAM(S): 150 TABLET ORAL at 16:04

## 2018-06-24 RX ADMIN — Medication 1 TABLET(S): at 17:23

## 2018-06-24 RX ADMIN — PANTOPRAZOLE SODIUM 40 MILLIGRAM(S): 20 TABLET, DELAYED RELEASE ORAL at 17:23

## 2018-06-24 RX ADMIN — Medication 1 PATCH: at 05:03

## 2018-06-24 RX ADMIN — Medication 81 MILLIGRAM(S): at 11:51

## 2018-06-24 RX ADMIN — Medication 1 TABLET(S): at 11:51

## 2018-06-24 RX ADMIN — Medication 1 TABLET(S): at 21:36

## 2018-06-24 RX ADMIN — SODIUM CHLORIDE 100 MILLILITER(S): 9 INJECTION, SOLUTION INTRAVENOUS at 11:51

## 2018-06-24 NOTE — PROGRESS NOTE ADULT - PROBLEM SELECTOR PLAN 1
- Sub-acute L MCA CVA 2/2 to cocaine use.   - Mental status improving, AOx2. RUE full strength. LUE weakness.   - c/w aspirin. Start statin once LFTs improve.  - C/w neuro checks  - MRI of the brain- consistent with CT scan findings.   - ANCA negative.  - Neurology following. yes

## 2018-06-24 NOTE — PROGRESS NOTE ADULT - SUBJECTIVE AND OBJECTIVE BOX
Ravi Lucas  PGY-1 Resident Physician   Pager [phone] or [phone] from 7am to 7pm, after 7pm    Patient is a 53y old  Male who presents with a chief complaint of Patient is a 52y old  Male who presents with a chief complaint of AMS, found down (18 Jun 2018 09:34)    SUBJECTIVE / OVERNIGHT EVENTS:  No acute overnight events. Patient was sinus rhythm overnight. Patient denied fever, chills, sob, chest pain, abdominal pain. Patient endorsed having normal brown bowel movements.     MEDICATIONS  (STANDING):  aspirin enteric coated 81 milliGRAM(s) Oral daily  dextrose 5% + lactated ringers. 1000 milliLiter(s) (100 mL/Hr) IV Continuous <Continuous>  fluconAZOLE IVPB      fluconAZOLE IVPB 100 milliGRAM(s) IV Intermittent every 24 hours  heparin  Injectable 5000 Unit(s) SubCutaneous every 8 hours  nicotine - 21 mG/24Hr(s) Patch 1 patch Transdermal daily  pantoprazole  Injectable 40 milliGRAM(s) IV Push every 12 hours  potassium acid phosphate/sodium acid phosphate tablet (K-PHOS No. 2) 1 Tablet(s) Oral four times a day with meals  simethicone 80 milliGRAM(s) Chew daily  triamcinolone 0.1% Cream 1 Application(s) Topical every 12 hours    MEDICATIONS  (PRN):    Allergies  Bactrim (Rash (Skin))  Intolrances    Vital Signs Last 24 Hrs  T(C): 36.7 (24 Jun 2018 07:56), Max: 37.1 (23 Jun 2018 13:22)  T(F): 98 (24 Jun 2018 07:56), Max: 98.7 (23 Jun 2018 13:22)  HR: 75 (24 Jun 2018 07:56) (61 - 91)  BP: 106/65 (24 Jun 2018 07:56) (106/65 - 163/69)  RR: 18 (24 Jun 2018 07:56) (18 - 19)  SpO2: 97% (24 Jun 2018 07:56) (95% - 98%)  Daily       POCT Blood Glucose.: 92 mg/dL (24 Jun 2018 07:39)    I&O's Summary    23 Jun 2018 07:01  -  24 Jun 2018 07:00  --------------------------------------------------------  IN: 3820 mL / OUT: 1800 mL / NET: 2020 mL    24 Jun 2018 07:01  -  24 Jun 2018 12:29  --------------------------------------------------------  IN: 480 mL / OUT: 560 mL / NET: -80 mL    PHYSICAL EXAM:  GENERAL: NAD, well-developed.   HEAD:  Atraumatic, Normocephalic  EYES: EOMI, PERRLA, conjunctiva and sclera clear  NECK: Supple, No JVD  CHEST/LUNG: Clear to auscultation bilaterally; No wheeze  HEART: Regular rate and rhythm; Normal S1 S2, No murmurs, rubs, or gallops  ABDOMEN: Soft, Nontender, distended; Bowel sounds decreased   EXTREMITIES:  2+ Peripheral Pulses, No clubbing, cyanosis, or edema  PSYCH: AAOx3  NEUROLOGY: Left UE weakness 3/5. Right UE 5/5.   SKIN: No rashes or lesions    LABS:                        13.6   15.1  )-----------( 171      ( 24 Jun 2018 05:44 )             40.2     Hgb Trend: 13.6<--, 15.3<--, 14.5<--, 14.8<--, 14.9<--  06-24    138  |  107  |  40<H>  ----------------------------<  102<H>  4.3   |  21<L>  |  3.08<H>    Ca    8.7      24 Jun 2018 05:43  Phos  2.4     06-24  Mg     1.6     06-24    TPro  5.9<L>  /  Alb  2.9<L>  /  TBili  0.5  /  DBili  x   /  AST  37  /  ALT  103<H>  /  AlkPhos  73  06-23    Creatinine Trend: 3.08<--, 2.61<--, 3.80<--, 4.43<--, 4.74<--, 4.57<--  LIVER FUNCTIONS - ( 23 Jun 2018 06:33 )  Alb: 2.9 g/dL / Pro: 5.9 g/dL / ALK PHOS: 73 U/L / ALT: 103 U/L / AST: 37 U/L / GGT: x           RADIOLOGY & ADDITIONAL TESTS:    Imaging Personally Reviewed:    Consultant(s) Notes Reviewed:      Care Discussed with Consultants/Other Providers:

## 2018-06-24 NOTE — PROGRESS NOTE ADULT - PROBLEM SELECTOR PLAN 10
DIET - Clear liquid   - DVT prophylaxis - heparin sub-q   - Aspiration precautions  - Fall precautions.

## 2018-06-24 NOTE — PROGRESS NOTE ADULT - ASSESSMENT
53 year old man with PMHx of recent scrotal abscess, who presents to [place] for AMS x 3 days found with MCA infarct 2/2 cocaine use, rhabdomyolysis c/b acute renal failure transferred to [facility]-ICU for management. Hospital course complicated by full body drug rash likely 2/2 Bactrim that was given for a recent scrotal abscess. Patient developed hyperkalemia s/p dialyisis x 1. Leukocytosis possibly 2/2 steroid use, however on zosyn for aspiration pna coverage now finished abx. Course complicated by ileus.

## 2018-06-24 NOTE — PROGRESS NOTE ADULT - ATTENDING COMMENTS
Patient seen and examined, presenting with Ileus, SHAQUILLE and acute CVA in the setting of Cocaine use.   Keep patient on iv fluids and monitor cbc, cpk levels for now.    Patient tolerating diet, GI follow up for melena.

## 2018-06-24 NOTE — PROGRESS NOTE ADULT - PROBLEM SELECTOR PLAN 3
- Ileus likely secondary to ARF/CVA.   - Patient refusing NG tube placement.   - C/w simethicone daily  - Positive FOBT- on pantoprazole.  - ENT following - seen with ?thrush vs leukoplakia vs. cocaine induced nasal septal changes. Would start diflucan but will need to monitor LFTs.  - Patient with episode of melena. GI follow up for possible scope.

## 2018-06-24 NOTE — PROGRESS NOTE ADULT - PROBLEM SELECTOR PLAN 2
- Improving.   - Leukocytosis with bandemia concerning infection likely for aspiration however improving. Monitoring off abx.   - Cultures NGTD.

## 2018-06-24 NOTE — PROGRESS NOTE ADULT - PROBLEM SELECTOR PLAN 6
- SHAQUILLE on CKD, likely secondary to rhabdomyolysis vs hemodynamic instability. Left atrophic kidney. CT abdomen no hydronephrosis. Adrenal adenoma seen.     - Renal recs appreciated.   - s/p dialysis for hyperkalemia.  - CTM on BMP  - Passed TOV. - Increasing trend, repeat labs at 2pm   - SHAQUILLE on CKD, likely secondary to rhabdomyolysis vs hemodynamic instability. Left atrophic kidney. CT abdomen no hydronephrosis. Adrenal adenoma seen.     - Renal recs appreciated.   - s/p dialysis for hyperkalemia.  - CTM on BMP  - Passed TOV.

## 2018-06-25 DIAGNOSIS — R74.0 NONSPECIFIC ELEVATION OF LEVELS OF TRANSAMINASE AND LACTIC ACID DEHYDROGENASE [LDH]: ICD-10-CM

## 2018-06-25 DIAGNOSIS — R69 ILLNESS, UNSPECIFIED: ICD-10-CM

## 2018-06-25 LAB
ALBUMIN SERPL ELPH-MCNC: 3 G/DL — LOW (ref 3.3–5)
ALP SERPL-CCNC: 74 U/L — SIGNIFICANT CHANGE UP (ref 40–120)
ALT FLD-CCNC: 190 U/L — HIGH (ref 10–45)
ANION GAP SERPL CALC-SCNC: 14 MMOL/L — SIGNIFICANT CHANGE UP (ref 5–17)
AST SERPL-CCNC: 82 U/L — HIGH (ref 10–40)
BASOPHILS # BLD AUTO: 0 K/UL — SIGNIFICANT CHANGE UP (ref 0–0.2)
BASOPHILS NFR BLD AUTO: 0 % — SIGNIFICANT CHANGE UP (ref 0–2)
BILIRUB SERPL-MCNC: 0.6 MG/DL — SIGNIFICANT CHANGE UP (ref 0.2–1.2)
BUN SERPL-MCNC: 29 MG/DL — HIGH (ref 7–23)
CALCIUM SERPL-MCNC: 8.9 MG/DL — SIGNIFICANT CHANGE UP (ref 8.4–10.5)
CHLORIDE SERPL-SCNC: 104 MMOL/L — SIGNIFICANT CHANGE UP (ref 96–108)
CO2 SERPL-SCNC: 19 MMOL/L — LOW (ref 22–31)
CREAT SERPL-MCNC: 2.7 MG/DL — HIGH (ref 0.5–1.3)
EOSINOPHIL # BLD AUTO: 0.12 K/UL — SIGNIFICANT CHANGE UP (ref 0–0.5)
EOSINOPHIL NFR BLD AUTO: 1 % — SIGNIFICANT CHANGE UP (ref 0–6)
GLUCOSE SERPL-MCNC: 97 MG/DL — SIGNIFICANT CHANGE UP (ref 70–99)
HCT VFR BLD CALC: 38.1 % — LOW (ref 39–50)
HGB BLD-MCNC: 12.8 G/DL — LOW (ref 13–17)
LYMPHOCYTES # BLD AUTO: 1.35 K/UL — SIGNIFICANT CHANGE UP (ref 1–3.3)
LYMPHOCYTES # BLD AUTO: 11 % — LOW (ref 13–44)
MAGNESIUM SERPL-MCNC: 1.4 MG/DL — LOW (ref 1.6–2.6)
MANUAL SMEAR VERIFICATION: SIGNIFICANT CHANGE UP
MCHC RBC-ENTMCNC: 30.9 PG — SIGNIFICANT CHANGE UP (ref 27–34)
MCHC RBC-ENTMCNC: 33.6 GM/DL — SIGNIFICANT CHANGE UP (ref 32–36)
MCV RBC AUTO: 92 FL — SIGNIFICANT CHANGE UP (ref 80–100)
METAMYELOCYTES # FLD: 1 % — HIGH (ref 0–0)
MONOCYTES # BLD AUTO: 0.74 K/UL — SIGNIFICANT CHANGE UP (ref 0–0.9)
MONOCYTES NFR BLD AUTO: 6 % — SIGNIFICANT CHANGE UP (ref 2–14)
NEUTROPHILS # BLD AUTO: 9.97 K/UL — HIGH (ref 1.8–7.4)
NEUTROPHILS NFR BLD AUTO: 80 % — HIGH (ref 43–77)
NEUTS BAND # BLD: 1 % — SIGNIFICANT CHANGE UP (ref 0–8)
NRBC # BLD: 0 /100 — SIGNIFICANT CHANGE UP (ref 0–0)
PHOSPHATE SERPL-MCNC: 3.6 MG/DL — SIGNIFICANT CHANGE UP (ref 2.5–4.5)
PLAT MORPH BLD: NORMAL — SIGNIFICANT CHANGE UP
PLATELET # BLD AUTO: 192 K/UL — SIGNIFICANT CHANGE UP (ref 150–400)
POTASSIUM SERPL-MCNC: 4.3 MMOL/L — SIGNIFICANT CHANGE UP (ref 3.5–5.3)
POTASSIUM SERPL-SCNC: 4.3 MMOL/L — SIGNIFICANT CHANGE UP (ref 3.5–5.3)
PROT SERPL-MCNC: 5.7 G/DL — LOW (ref 6–8.3)
RBC # BLD: 4.14 M/UL — LOW (ref 4.2–5.8)
RBC # FLD: 14.6 % — HIGH (ref 10.3–14.5)
RBC BLD AUTO: SIGNIFICANT CHANGE UP
SODIUM SERPL-SCNC: 137 MMOL/L — SIGNIFICANT CHANGE UP (ref 135–145)
WBC # BLD: 12.31 K/UL — HIGH (ref 3.8–10.5)
WBC # FLD AUTO: 12.31 K/UL — HIGH (ref 3.8–10.5)

## 2018-06-25 PROCEDURE — 99232 SBSQ HOSP IP/OBS MODERATE 35: CPT

## 2018-06-25 PROCEDURE — 99233 SBSQ HOSP IP/OBS HIGH 50: CPT | Mod: GC

## 2018-06-25 PROCEDURE — 93880 EXTRACRANIAL BILAT STUDY: CPT | Mod: 26

## 2018-06-25 PROCEDURE — 99232 SBSQ HOSP IP/OBS MODERATE 35: CPT | Mod: GC

## 2018-06-25 RX ORDER — MAGNESIUM SULFATE 500 MG/ML
1 VIAL (ML) INJECTION ONCE
Qty: 0 | Refills: 0 | Status: COMPLETED | OUTPATIENT
Start: 2018-06-25 | End: 2018-06-25

## 2018-06-25 RX ADMIN — Medication 81 MILLIGRAM(S): at 12:01

## 2018-06-25 RX ADMIN — Medication 1 PATCH: at 05:11

## 2018-06-25 RX ADMIN — Medication 100 GRAM(S): at 12:01

## 2018-06-25 RX ADMIN — SIMETHICONE 80 MILLIGRAM(S): 80 TABLET, CHEWABLE ORAL at 12:01

## 2018-06-25 RX ADMIN — Medication 1 TABLET(S): at 12:01

## 2018-06-25 RX ADMIN — PANTOPRAZOLE SODIUM 40 MILLIGRAM(S): 20 TABLET, DELAYED RELEASE ORAL at 05:12

## 2018-06-25 RX ADMIN — SODIUM CHLORIDE 100 MILLILITER(S): 9 INJECTION, SOLUTION INTRAVENOUS at 02:39

## 2018-06-25 RX ADMIN — FLUCONAZOLE 50 MILLIGRAM(S): 150 TABLET ORAL at 13:28

## 2018-06-25 RX ADMIN — HEPARIN SODIUM 5000 UNIT(S): 5000 INJECTION INTRAVENOUS; SUBCUTANEOUS at 13:28

## 2018-06-25 RX ADMIN — Medication 1 APPLICATION(S): at 05:12

## 2018-06-25 RX ADMIN — PANTOPRAZOLE SODIUM 40 MILLIGRAM(S): 20 TABLET, DELAYED RELEASE ORAL at 17:20

## 2018-06-25 RX ADMIN — Medication 1 PATCH: at 05:12

## 2018-06-25 RX ADMIN — HEPARIN SODIUM 5000 UNIT(S): 5000 INJECTION INTRAVENOUS; SUBCUTANEOUS at 05:12

## 2018-06-25 RX ADMIN — SODIUM CHLORIDE 100 MILLILITER(S): 9 INJECTION, SOLUTION INTRAVENOUS at 14:22

## 2018-06-25 RX ADMIN — HEPARIN SODIUM 5000 UNIT(S): 5000 INJECTION INTRAVENOUS; SUBCUTANEOUS at 21:27

## 2018-06-25 NOTE — PROGRESS NOTE ADULT - SUBJECTIVE AND OBJECTIVE BOX
Patient is a 53y old  Male who presents with a chief complaint of 52y old  Male who presents with a chief complaint of AMS, found down (18 Jun 2018 09:34)      SUBJECTIVE / OVERNIGHT EVENTS: Pt denies overnight events. Denies chest pain, nausea and vomiting. Tolerating clear liquid diet    MEDICATIONS  (STANDING):  aspirin enteric coated 81 milliGRAM(s) Oral daily  dextrose 5% + lactated ringers. 1000 milliLiter(s) (100 mL/Hr) IV Continuous <Continuous>  fluconAZOLE IVPB      fluconAZOLE IVPB 100 milliGRAM(s) IV Intermittent every 24 hours  heparin  Injectable 5000 Unit(s) SubCutaneous every 8 hours  nicotine - 21 mG/24Hr(s) Patch 1 patch Transdermal daily  pantoprazole  Injectable 40 milliGRAM(s) IV Push every 12 hours  simethicone 80 milliGRAM(s) Chew daily  triamcinolone 0.1% Cream 1 Application(s) Topical every 12 hours    MEDICATIONS  (PRN):      Vital Signs Last 24 Hrs  T(C): 37 (25 Jun 2018 12:04), Max: 37.2 (25 Jun 2018 04:12)  T(F): 98.6 (25 Jun 2018 12:04), Max: 99 (25 Jun 2018 04:12)  HR: 91 (25 Jun 2018 12:04) (84 - 91)  BP: 138/83 (25 Jun 2018 12:04) (116/73 - 138/83)  BP(mean): --  RR: 18 (25 Jun 2018 12:04) (18 - 18)  SpO2: 98% (25 Jun 2018 12:04) (98% - 100%)  CAPILLARY BLOOD GLUCOSE        I&O's Summary    24 Jun 2018 07:01  -  25 Jun 2018 07:00  --------------------------------------------------------  IN: 3740 mL / OUT: 1960 mL / NET: 1780 mL    25 Jun 2018 07:01  -  25 Jun 2018 14:27  --------------------------------------------------------  IN: 440 mL / OUT: 401 mL / NET: 39 mL        PHYSICAL EXAM:  GENERAL: NAD, well-developed  HEAD:  Atraumatic, Normocephalic  EYES: EOMI, PERRLA, conjunctiva and sclera clear  NECK: Supple, No JVD  CHEST/LUNG: Clear to auscultation bilaterally; No wheeze  HEART: Regular rate and rhythm; No murmurs, rubs, or gallops  ABDOMEN: Soft, Nontender, Nondistended; Bowel sounds present  EXTREMITIES:  2+ Peripheral Pulses, No clubbing, cyanosis, or edema  PSYCH: AAOx3  NEUROLOGY: non-focal  SKIN: No rashes or lesions    LABS:                        12.8   12.31 )-----------( 192      ( 25 Jun 2018 08:08 )             38.1     06-25    137  |  104  |  29<H>  ----------------------------<  97  4.3   |  19<L>  |  2.70<H>    Ca    8.9      25 Jun 2018 05:51  Phos  3.6     06-25  Mg     1.4     06-25    TPro  5.7<L>  /  Alb  3.0<L>  /  TBili  0.6  /  DBili  x   /  AST  82<H>  /  ALT  190<H>  /  AlkPhos  74  06-25      CARDIAC MARKERS ( 24 Jun 2018 15:51 )  x     / x     / 73 U/L / x     / x              RADIOLOGY & ADDITIONAL TESTS:    Imaging Personally Reviewed:    Consultant(s) Notes Reviewed:      Care Discussed with Consultants/Other Providers: Patient is a 53y old  Male who presents with a chief complaint of AMS (18 Jun 2018 09:34).      SUBJECTIVE / OVERNIGHT EVENTS: Pt denies overnight events. Denies chest pain, nausea and vomiting. Tolerating clear liquid diet. Reports sleeping well yesterday night. Feels bloated and reports LT UE weakness, numbness, and tingling.     MEDICATIONS  (STANDING):  aspirin enteric coated 81 milliGRAM(s) Oral daily  dextrose 5% + lactated ringers. 1000 milliLiter(s) (100 mL/Hr) IV Continuous <Continuous>  fluconAZOLE IVPB      fluconAZOLE IVPB 100 milliGRAM(s) IV Intermittent every 24 hours  heparin  Injectable 5000 Unit(s) SubCutaneous every 8 hours  nicotine - 21 mG/24Hr(s) Patch 1 patch Transdermal daily  pantoprazole  Injectable 40 milliGRAM(s) IV Push every 12 hours  simethicone 80 milliGRAM(s) Chew daily  triamcinolone 0.1% Cream 1 Application(s) Topical every 12 hours    MEDICATIONS  (PRN):      Vital Signs Last 24 Hrs  T(C): 37 (25 Jun 2018 12:04), Max: 37.2 (25 Jun 2018 04:12)  T(F): 98.6 (25 Jun 2018 12:04), Max: 99 (25 Jun 2018 04:12)  HR: 91 (25 Jun 2018 12:04) (84 - 91)  BP: 138/83 (25 Jun 2018 12:04) (116/73 - 138/83)  BP(mean): --  RR: 18 (25 Jun 2018 12:04) (18 - 18)  SpO2: 98% (25 Jun 2018 12:04) (98% - 100%)  CAPILLARY BLOOD GLUCOSE        I&O's Summary    24 Jun 2018 07:01  -  25 Jun 2018 07:00  --------------------------------------------------------  IN: 3740 mL / OUT: 1960 mL / NET: 1780 mL    25 Jun 2018 07:01  -  25 Jun 2018 14:27  --------------------------------------------------------  IN: 440 mL / OUT: 401 mL / NET: 39 mL        PHYSICAL EXAM:  GENERAL: NAD, well-developed. Appropriately responds to questions  HEAD:  Atraumatic, Normocephalic  EYES: EOMI, PERRLA, sclera clear  NECK: Supple, No JVD  CHEST/LUNG: Clear to auscultation bilaterally; No wheeze  HEART: Regular rate and rhythm; No murmurs, rubs, or gallops  ABDOMEN: Soft, Nontender; Bowel sounds present  EXTREMITIES:  2+ Peripheral Pulses,  PSYCH: AAOx3  NEUROLOGY: non-focal  SKIN: No rashes or lesions    LABS:                        12.8   12.31 )-----------( 192      ( 25 Jun 2018 08:08 )             38.1     06-25    137  |  104  |  29<H>  ----------------------------<  97  4.3   |  19<L>  |  2.70<H>    Ca    8.9      25 Jun 2018 05:51  Phos  3.6     06-25  Mg     1.4     06-25    TPro  5.7<L>  /  Alb  3.0<L>  /  TBili  0.6  /  DBili  x   /  AST  82<H>  /  ALT  190<H>  /  AlkPhos  74  06-25      CARDIAC MARKERS ( 24 Jun 2018 15:51 )  x     / x     / 73 U/L / x     / x              RADIOLOGY & ADDITIONAL TESTS:    1) Duplex Carotid b/l- 6/25/2018    IMPRESSION: No hemodynamically significant carotid artery stenoses.    Measurement of carotid stenosis is based on velocity parameters that   correlate the residual internal carotid diameter with that of the more   distal vessel in accordance with a method such as the [place] American   Symptomatic Carotid Endarterectomy Trial (NASCET).    Imaging Personally Reviewed:    Consultant(s) Notes Reviewed:      Care Discussed with Consultants/Other Providers:

## 2018-06-25 NOTE — PROGRESS NOTE ADULT - PROBLEM SELECTOR PLAN 1
SHAQUILLE on CKD.  CT imaging shows left atrophic kidney.  Suspect SHAQUILLE from rhabdo + hemodynamic injury from cocaine abuse. Scr improving and non-oliguric.  -PTH elevated (140, consistent with CKD)  -no urgent HD indications at this time, improving renal function  -monitor creatinine trend and urine output  -avoid nephrotoxins, ace/arb, imaging contrast  -creatinine appears to have stabilied with eGFR in CKD 4 range.  Patient would benefit from follow-up with nephrology for long term management of advanced CKD.  Can follow-up with [facility], [phone] within one month of discharge. -SHAQUILLE on CKD, with CT imaging shows left atrophic kidney.    -Suspect SHAQUILLE from rhabdo + hemodynamic injury from cocaine abuse.   -Scr improving and non-oliguric.  -PTH elevated (140, consistent with CKD)  -monitor creatinine trend and urine output  -avoid nephrotoxins, ace/arb, imaging contrast

## 2018-06-25 NOTE — PROGRESS NOTE ADULT - PROBLEM SELECTOR PLAN 6
- Presented with rhabdo (elevated CK) in the setting of muscle break down 2/2 to being found down in the floor. CT showed LT atrophic kidney, confirmed with renal scan.  - K level stable at 4.3. BUN trended down to 29 and Cr trended up to 2.70  - Monitor BUN/Cr. Repeat labs at 2pm   - SHAQUILLE on CKD, likely secondary to rhabdomyolysis vs hemodynamic instability.   - CK downtrending.  - Renal recs appreciated.   - s/p dialysis for hyperkalemia.  - CTM on BMP

## 2018-06-25 NOTE — PROGRESS NOTE ADULT - PROBLEM SELECTOR PLAN 3
- Elevated AST (82)/ALT (190)- either ischemic 2/2 cocaine use vs fluconazole vs infectious (wbc trended up)  -Trend LFT's  - Hold statins

## 2018-06-25 NOTE — PROGRESS NOTE ADULT - PROBLEM SELECTOR PLAN 7
- Morbiliform rash likely 2/2 medication exposure. Possibly the "sulfa" abx pt received after a groin abscess draining.   - c/w triamcinolone 0.1% cream BID to AA prn itch  - Dermatology recs appreciated.

## 2018-06-25 NOTE — PROGRESS NOTE ADULT - PROBLEM SELECTOR PLAN 2
- Clinically improved. Ileus likely secondary to ARF/CVA.   - C/w simethicone daily  - Positive FOBT- on pantoprazole.  - GI recs- endoscopic decompression has limited utility given that the dilation is primary in the small bowel

## 2018-06-25 NOTE — PROGRESS NOTE ADULT - PROBLEM SELECTOR PLAN 1
- CT head showed L MCA CVA 2/2 to cocaine use. MRI of the brain- consistent with CT scan findings.   - c/w aspirin. Start statin once LFTs improve.  - C/w neuro checks  - Neurology following  - F/u speech and swallow evaluation

## 2018-06-25 NOTE — PROGRESS NOTE ADULT - SUBJECTIVE AND OBJECTIVE BOX
[facility] DIVISION OF KIDNEY DISEASES AND HYPERTENSION -- FOLLOW UP NOTE  --------------------------------------------------------------------------------  Chief Complaint:  SHAQUILLE on suspected CKD    24 hour events/subjective:  Patient reports feeling well, denies any complaints, pending GI testing  but would like to be discharged soon.        PAST HISTORY  --------------------------------------------------------------------------------  No significant changes to PMH, PSH, FHx, SHx, unless otherwise noted    ALLERGIES & MEDICATIONS  --------------------------------------------------------------------------------  Allergies    Bactrim (Rash (Skin))    Intolerances      Standing Inpatient Medications  aspirin enteric coated 81 milliGRAM(s) Oral daily  dextrose 5% + lactated ringers. 1000 milliLiter(s) IV Continuous <Continuous>  fluconAZOLE IVPB      fluconAZOLE IVPB 100 milliGRAM(s) IV Intermittent every 24 hours  heparin  Injectable 5000 Unit(s) SubCutaneous every 8 hours  nicotine - 21 mG/24Hr(s) Patch 1 patch Transdermal daily  pantoprazole  Injectable 40 milliGRAM(s) IV Push every 12 hours  potassium acid phosphate/sodium acid phosphate tablet (K-PHOS No. 2) 1 Tablet(s) Oral four times a day with meals  simethicone 80 milliGRAM(s) Chew daily  triamcinolone 0.1% Cream 1 Application(s) Topical every 12 hours    PRN Inpatient Medications      REVIEW OF SYSTEMS  --------------------------------------------------------------------------------  Gen: No fevers/chills  Skin: No rashes  Head/Eyes/Ears/Mouth: No headache  Respiratory: No dyspnea  CV: No chest pain  GI: No abdominal pain  : No dysuria  MSK: no edema  Neuro: No dizziness/lightheadedness    VITALS/PHYSICAL EXAM  --------------------------------------------------------------------------------  T(C): 37 (06-25-18 @ 08:17), Max: 37.2 (06-25-18 @ 04:12)  HR: 84 (06-25-18 @ 08:17) (78 - 89)  BP: 118/76 (06-25-18 @ 08:17) (115/74 - 127/78)  RR: 18 (06-25-18 @ 08:17) (18 - 18)  SpO2: 100% (06-25-18 @ 08:17) (98% - 100%)  Wt(kg): --        06-24-18 @ 07:01  -  06-25-18 @ 07:00  --------------------------------------------------------  IN: 3740 mL / OUT: 1960 mL / NET: 1780 mL    06-25-18 @ 07:01  -  06-25-18 @ 10:10  --------------------------------------------------------  IN: 0 mL / OUT: 1 mL / NET: -1 mL      Physical Exam:  	Gen: NAD  	HEENT: MMM  	Pulm: CTA B/L  	CV: RRR, S1S2; no rub  	Abd: soft, nontender  	: No suprapubic tenderness  	UE:  no edema  	LE:  + mild edema  	Neuro: No focal deficits  	Psych: Normal affect and mood  	Skin: Warm    LABS/STUDIES  --------------------------------------------------------------------------------              12.8   12.31 >-----------<  192      [06-25-18 @ 08:08]              38.1     137  |  104  |  29  ----------------------------<  97      [06-25-18 @ 05:51]  4.3   |  19  |  2.70        Ca     8.9     [06-25-18 @ 05:51]      Mg     1.4     [06-25-18 @ 05:51]      Phos  3.6     [06-25-18 @ 05:51]    TPro  5.7  /  Alb  3.0  /  TBili  0.6  /  DBili  x   /  AST  82  /  ALT  190  /  AlkPhos  74  [06-25-18 @ 05:51]        CK 73      [06-24-18 @ 15:51]    Creatinine Trend:  SCr 2.70 [06-25 @ 05:51]  SCr 2.60 [06-24 @ 15:51]  SCr 3.08 [06-24 @ 05:43]  SCr 2.61 [06-23 @ 06:33]  SCr 3.80 [06-22 @ 06:48]

## 2018-06-25 NOTE — PROGRESS NOTE ADULT - ATTENDING COMMENTS
I was physically present for the key portions of the evaluation and management (E/M) service provided.  I agree with the above history, physical, and plan which I have reviewed and edited where appropriate. Pt clinically stable, awaiting s/s evaluation for swallow function and diet modification. Has ileus which is now resolved and patient has been tolerating clear liquid diet with liquid stool BM's and gas.

## 2018-06-25 NOTE — PROGRESS NOTE ADULT - ASSESSMENT
53 year old man with PMHx of recent scrotal abscess, who presents to [place] for AMS x 3 days found with MCA infarct 2/2 cocaine use, rhabdomyolysis c/b acute renal failure transferred to [facility]-ICU for management. Course complicated by ileus, which has clinically improved.

## 2018-06-25 NOTE — PROGRESS NOTE ADULT - PROBLEM SELECTOR PLAN 10
DIET - Clear liquid. Advance diet as tolerated  - DVT prophylaxis - heparin sub-q   - Aspiration precautions  - Fall precautions.

## 2018-06-25 NOTE — PROGRESS NOTE ADULT - PROBLEM SELECTOR PLAN 2
Suspected due to diminished renal excretion 2/2 SHAQUILLE and increasing potassium release 2/2 rhabdo.  -Resolved post HD  -monitor serum potassium Suspected due to diminished renal excretion 2/2 SHAQUILLE and increasing potassium release 2/2 rhabdo.  -Resolved post HD  -monitor serum potassium      Patient needs follow-up with nephrology. Can follow-up with [facility], [phone] within 2 weeks of discharge.

## 2018-06-25 NOTE — PROGRESS NOTE ADULT - SUBJECTIVE AND OBJECTIVE BOX
CC: Patient is a 53y old  Male who presents with a chief complaint of 52y old  Male who presents with a chief complaint of AMS, found down (18 Jun 2018 09:34)    Interval History/ROS: Patient feeling better. Remains with distended abdomen. Found to have laryngeal lesions and started on fluconazole for suspected fungal infection.     Allergies  Bactrim (Rash (Skin))    ANTIMICROBIALS:  fluconAZOLE IVPB    fluconAZOLE IVPB 100 every 24 hours    PE:    Vital Signs Last 24 Hrs  T(C): 37 (25 Jun 2018 12:04), Max: 37.2 (25 Jun 2018 04:12)  T(F): 98.6 (25 Jun 2018 12:04), Max: 99 (25 Jun 2018 04:12)  HR: 91 (25 Jun 2018 12:04) (84 - 91)  BP: 138/83 (25 Jun 2018 12:04) (116/73 - 138/83)  BP(mean): --  RR: 18 (25 Jun 2018 12:04) (18 - 18)  SpO2: 98% (25 Jun 2018 12:04) (98% - 100%)    Gen: AOx3, NAD, non-toxic  CV: S1+S2 normal, no murmurs  Resp: Clear bilat, no resp distress  Abd: distended  : No Gutierres  IV/Skin: No thrombophlebitis  Neuro: no focal deficits    LABS:                          12.8   12.31 )-----------( 192      ( 25 Jun 2018 08:08 )             38.1       06-25    137  |  104  |  29<H>  ----------------------------<  97  4.3   |  19<L>  |  2.70<H>    Ca    8.9      25 Jun 2018 05:51  Phos  3.6     06-25  Mg     1.4     06-25    TPro  5.7<L>  /  Alb  3.0<L>  /  TBili  0.6  /  DBili  x   /  AST  82<H>  /  ALT  190<H>  /  AlkPhos  74  06-25    MICROBIOLOGY:  v  .Urine Clean Catch (Midstream)  06-17-18   No growth  --  --    .Blood Blood  06-17-18   No growth at 5 days.  --  --    .Blood Blood  06-17-18   No growth at 5 days.  --  --    CMV IgG Antibody: <0.20 U/mL (06-19-18 @ 15:29)  Toxoplasma IgG Screen: <3.0 IU/mL (06-19-18 @ 15:29)    CMVPCR Log: NotDetec LogIU/mL (06-19 @ 15:36)    RADIOLOGY:    < from: CT Abdomen and Pelvis w/ Oral Cont (06.22.18 @ 22:04) >  IMPRESSION:   1.  Small bowel obstruction with a gradual transition in the right lower   quadrant.      < end of copied text >

## 2018-06-25 NOTE — PROGRESS NOTE ADULT - PROBLEM SELECTOR PLAN 4
- WBC trended up to 12.31  - Leukocytosis with bandemia concerning infection likely for aspiration however improving. Monitoring off abx.   - Cultures NGTD.

## 2018-06-26 LAB
ALBUMIN SERPL ELPH-MCNC: 2.7 G/DL — LOW (ref 3.3–5)
ALP SERPL-CCNC: 75 U/L — SIGNIFICANT CHANGE UP (ref 40–120)
ALT FLD-CCNC: 155 U/L — HIGH (ref 10–45)
ANION GAP SERPL CALC-SCNC: 14 MMOL/L — SIGNIFICANT CHANGE UP (ref 5–17)
AST SERPL-CCNC: 38 U/L — SIGNIFICANT CHANGE UP (ref 10–40)
BILIRUB SERPL-MCNC: 0.5 MG/DL — SIGNIFICANT CHANGE UP (ref 0.2–1.2)
BUN SERPL-MCNC: 24 MG/DL — HIGH (ref 7–23)
CALCIUM SERPL-MCNC: 8.9 MG/DL — SIGNIFICANT CHANGE UP (ref 8.4–10.5)
CHLORIDE SERPL-SCNC: 103 MMOL/L — SIGNIFICANT CHANGE UP (ref 96–108)
CO2 SERPL-SCNC: 21 MMOL/L — LOW (ref 22–31)
CREAT SERPL-MCNC: 2.41 MG/DL — HIGH (ref 0.5–1.3)
GLUCOSE SERPL-MCNC: 96 MG/DL — SIGNIFICANT CHANGE UP (ref 70–99)
HCT VFR BLD CALC: 36.9 % — LOW (ref 39–50)
HGB BLD-MCNC: 11.7 G/DL — LOW (ref 13–17)
MCHC RBC-ENTMCNC: 29.5 PG — SIGNIFICANT CHANGE UP (ref 27–34)
MCHC RBC-ENTMCNC: 31.7 GM/DL — LOW (ref 32–36)
MCV RBC AUTO: 92.9 FL — SIGNIFICANT CHANGE UP (ref 80–100)
PLATELET # BLD AUTO: 220 K/UL — SIGNIFICANT CHANGE UP (ref 150–400)
POTASSIUM SERPL-MCNC: 4.4 MMOL/L — SIGNIFICANT CHANGE UP (ref 3.5–5.3)
POTASSIUM SERPL-SCNC: 4.4 MMOL/L — SIGNIFICANT CHANGE UP (ref 3.5–5.3)
PROT SERPL-MCNC: 5.5 G/DL — LOW (ref 6–8.3)
RBC # BLD: 3.97 M/UL — LOW (ref 4.2–5.8)
RBC # FLD: 14.1 % — SIGNIFICANT CHANGE UP (ref 10.3–14.5)
SODIUM SERPL-SCNC: 138 MMOL/L — SIGNIFICANT CHANGE UP (ref 135–145)
WBC # BLD: 8.53 K/UL — SIGNIFICANT CHANGE UP (ref 3.8–10.5)
WBC # FLD AUTO: 8.53 K/UL — SIGNIFICANT CHANGE UP (ref 3.8–10.5)

## 2018-06-26 PROCEDURE — 99222 1ST HOSP IP/OBS MODERATE 55: CPT

## 2018-06-26 PROCEDURE — 99232 SBSQ HOSP IP/OBS MODERATE 35: CPT | Mod: GC

## 2018-06-26 PROCEDURE — 99232 SBSQ HOSP IP/OBS MODERATE 35: CPT

## 2018-06-26 PROCEDURE — 99233 SBSQ HOSP IP/OBS HIGH 50: CPT | Mod: GC

## 2018-06-26 RX ADMIN — PANTOPRAZOLE SODIUM 40 MILLIGRAM(S): 20 TABLET, DELAYED RELEASE ORAL at 17:13

## 2018-06-26 RX ADMIN — SIMETHICONE 80 MILLIGRAM(S): 80 TABLET, CHEWABLE ORAL at 12:18

## 2018-06-26 RX ADMIN — Medication 1 PATCH: at 05:22

## 2018-06-26 RX ADMIN — Medication 1 PATCH: at 05:30

## 2018-06-26 RX ADMIN — HEPARIN SODIUM 5000 UNIT(S): 5000 INJECTION INTRAVENOUS; SUBCUTANEOUS at 15:05

## 2018-06-26 RX ADMIN — FLUCONAZOLE 50 MILLIGRAM(S): 150 TABLET ORAL at 15:04

## 2018-06-26 RX ADMIN — SODIUM CHLORIDE 100 MILLILITER(S): 9 INJECTION, SOLUTION INTRAVENOUS at 21:40

## 2018-06-26 RX ADMIN — HEPARIN SODIUM 5000 UNIT(S): 5000 INJECTION INTRAVENOUS; SUBCUTANEOUS at 21:40

## 2018-06-26 RX ADMIN — Medication 81 MILLIGRAM(S): at 12:18

## 2018-06-26 RX ADMIN — HEPARIN SODIUM 5000 UNIT(S): 5000 INJECTION INTRAVENOUS; SUBCUTANEOUS at 05:23

## 2018-06-26 RX ADMIN — Medication 1 APPLICATION(S): at 05:30

## 2018-06-26 RX ADMIN — PANTOPRAZOLE SODIUM 40 MILLIGRAM(S): 20 TABLET, DELAYED RELEASE ORAL at 05:22

## 2018-06-26 RX ADMIN — SODIUM CHLORIDE 100 MILLILITER(S): 9 INJECTION, SOLUTION INTRAVENOUS at 12:18

## 2018-06-26 NOTE — PROGRESS NOTE ADULT - PROBLEM SELECTOR PLAN 1
SHAQUILLE on CKD, with CT imaging shows left atrophic kidney.    -Suspect SHAQUILLE from rhabdo + hemodynamic injury from cocaine abuse.   -Scr improving and non-oliguric.  -PTH elevated (140, consistent with CKD)  -monitor creatinine trend and urine output  -avoid nephrotoxins, ace/arb, imaging contrast SHAQUILLE on CKD, with CT imaging showing left atrophic kidney.    -SHAQUILLE from rhabdo + hemodynamic injury from cocaine abuse.   -Scr now improving and non-oliguric.  -SHAQUILLE resolving.  -monitor creatinine trend and urine output  -avoid nephrotoxins, ace/arb, imaging contrast

## 2018-06-26 NOTE — PROGRESS NOTE ADULT - SUBJECTIVE AND OBJECTIVE BOX
Patient is a 53y old  Male who presents with a chief complaint of 52y old  Male who presents with a chief complaint of AMS, found down (18 Jun 2018 09:34)      SUBJECTIVE / OVERNIGHT EVENTS: Reports abdominal discomfort, which he attributes to feeling bloating. Ambulated yesterday night. Last BM was two hours ago and it was watery in consistency. No nausea, vomiting, and fever reported.     MEDICATIONS  (STANDING):  aspirin enteric coated 81 milliGRAM(s) Oral daily  dextrose 5% + lactated ringers. 1000 milliLiter(s) (100 mL/Hr) IV Continuous <Continuous>  fluconAZOLE IVPB      fluconAZOLE IVPB 100 milliGRAM(s) IV Intermittent every 24 hours  heparin  Injectable 5000 Unit(s) SubCutaneous every 8 hours  nicotine - 21 mG/24Hr(s) Patch 1 patch Transdermal daily  pantoprazole  Injectable 40 milliGRAM(s) IV Push every 12 hours  simethicone 80 milliGRAM(s) Chew daily  triamcinolone 0.1% Cream 1 Application(s) Topical every 12 hours    MEDICATIONS  (PRN):      Vital Signs Last 24 Hrs  T(C): 36.6 (26 Jun 2018 08:20), Max: 37 (25 Jun 2018 12:04)  T(F): 97.8 (26 Jun 2018 08:20), Max: 98.6 (25 Jun 2018 12:04)  HR: 85 (26 Jun 2018 08:20) (77 - 91)  BP: 104/68 (26 Jun 2018 08:20) (104/68 - 138/83)  BP(mean): --  RR: 18 (26 Jun 2018 08:20) (18 - 18)  SpO2: 100% (26 Jun 2018 08:20) (97% - 100%)  CAPILLARY BLOOD GLUCOSE        I&O's Summary    25 Jun 2018 07:01  -  26 Jun 2018 07:00  --------------------------------------------------------  IN: 3040 mL / OUT: 2141 mL / NET: 899 mL    26 Jun 2018 07:01  -  26 Jun 2018 11:46  --------------------------------------------------------  IN: 245 mL / OUT: 350 mL / NET: -105 mL        PHYSICAL EXAM:  GENERAL: NAD. Appropriately responding to questions  HEENT: PERRLA  NECK: Supple,   CHEST/LUNG: CTA b/l  HEART: RRR, normal S1S2  ABDOMEN: Soft, Nontender, mildly distended; Bowel sounds present  EXTREMITIES:  2+ Peripheral Pulses. 1+ pitting edema  PSYCH: AAOx3  NEUROLOGY: non-focal  SKIN: No rashes or lesions    LABS:                        11.7   8.53  )-----------( 220      ( 26 Jun 2018 08:00 )             36.9     06-26    138  |  103  |  24<H>  ----------------------------<  96  4.4   |  21<L>  |  2.41<H>    Ca    8.9      26 Jun 2018 06:23  Phos  3.6     06-25  Mg     1.4     06-25    TPro  5.5<L>  /  Alb  2.7<L>  /  TBili  0.5  /  DBili  x   /  AST  38  /  ALT  155<H>  /  AlkPhos  75  06-26      CARDIAC MARKERS ( 24 Jun 2018 15:51 )  x     / x     / 73 U/L / x     / x              RADIOLOGY & ADDITIONAL TESTS:    Imaging Personally Reviewed:    Consultant(s) Notes Reviewed:      Care Discussed with Consultants/Other Providers:

## 2018-06-26 NOTE — PROGRESS NOTE ADULT - ATTENDING COMMENTS
I was physically present for the key portions of the evaluation and management (E/M) service provided.  I agree with the above history, physical, and plan which I have reviewed and edited where appropriate. Pt clinically stable- despite CT evidence of SBO, pt has been tolerating clear liquids, passing gas, having liquid BM's and has soft and nontender abd with normoactive bowel sounds. S/S eval cleared patient to advance diet. Will continue fluconazole for thrush seen by ENT as LFT's improving. Will reinstate statin prior to discharge if LFT's continue to improve. Will monitor Cr, patient to have nephrology f/u upon discharge.

## 2018-06-26 NOTE — PROGRESS NOTE ADULT - PROBLEM SELECTOR PLAN 2
- Clinically improved.  - C/w simethicone and Pantoprazole  - GI recs- endoscopic decompression has limited utility given that the dilation is primary in the small bowel

## 2018-06-26 NOTE — CONSULT NOTE ADULT - PROVIDER SPECIALTY LIST ADULT
Cardiology
Dental
Dermatology
ENT
Gastroenterology
Infectious Disease
Physiatry
Trauma Surgery
Neurology
Nephrology

## 2018-06-26 NOTE — CHART NOTE - NSCHARTNOTEFT_GEN_A_CORE
Nutrition Follow-up  Chart reviewed, events noted.   Pt presented to [place] for AMS x 3 days found with MCA infarct 2/2 cocaine use, rhabdomyolysis c/b acute renal failure transferred to [facility]-ICU for management. Course complicated by ileus, which has clinically improved.   Source: Patient [x]    Family [ ]     other [x] Comprehensive review of medical records     Diet : clear liquid diet       Patient reports his appetite is very good and is awaiting solid foods. Pt reports tolerating clear liquid diet with good po intake, no N+V. Pt reports having liquid BMs.          Enteral /Parenteral Nutrition:   n/a     Current Weight: last wt: 6/20: 258.1 pounds, noted lowest bed wt of 255.7 pounds on 6/19- recommend confirming current wt as feasible.   % Weight Change    Pertinent Medications: MEDICATIONS  (STANDING):  aspirin enteric coated 81 milliGRAM(s) Oral daily  dextrose 5% + lactated ringers. 1000 milliLiter(s) (100 mL/Hr) IV Continuous <Continuous>  fluconAZOLE IVPB      fluconAZOLE IVPB 100 milliGRAM(s) IV Intermittent every 24 hours  heparin  Injectable 5000 Unit(s) SubCutaneous every 8 hours  nicotine - 21 mG/24Hr(s) Patch 1 patch Transdermal daily  pantoprazole  Injectable 40 milliGRAM(s) IV Push every 12 hours  simethicone 80 milliGRAM(s) Chew daily  triamcinolone 0.1% Cream 1 Application(s) Topical every 12 hours    MEDICATIONS  (PRN):    Pertinent Labs:  06-26 Na138 mmol/L Glu 96 mg/dL K+ 4.4 mmol/L Cr  2.41 mg/dL<H> BUN 24 mg/dL<H> 06-25 Phos 3.6 mg/dL 06-26 Alb 2.7 g/dL<L> 06-18 [id number] 5.4 % 06-18 Chol 176 mg/dL  mg/dL HDL 49 mg/dL Trig 88 mg/dL      Skin: 1+ generalized, 2+ right arm edema, no pressure injury     Estimated Needs:   [x] no change since previous assessment  [ ] recalculated:       Previous Nutrition Diagnosis:     [x] Inadequate Protein-Energy Intake        Nutrition Diagnosis is [x] ongoing- plan to advance diet and swallow assessment          New Nutrition Diagnosis: [x] not applicable     Interventions: Advance diet as tolerated as per discussion with team 3 plan, pt awaiting swallow assessment, consider full liquid diet to provide additional protein-energy until assessment can be completed.     Recommend  1) Continue to advance diet as tolerated. Defer texture/consistency to SLP/MD within goals of care   2) Encourage po intake with nutrient dense foods.   3) Provide food preferences as able.   4) Monitor weight, lab values, skin, po intake and GI tolerance.      Monitoring and Evaluation:   follow up per protocol  RD to remain available for further nutritional interventions as indicated.   Annelise Thapa, MS, RD, CDN #[phone]

## 2018-06-26 NOTE — PROGRESS NOTE ADULT - PROBLEM SELECTOR PLAN 4
- Morbiliform rash likely 2/2 medication exposure.    - c/w triamcinolone 0.1% cream BID to AA prn itch  - Awaiting Dermatology recs - c/w triamcinolone 0.1% cream BID to AA prn itch

## 2018-06-26 NOTE — CONSULT NOTE ADULT - CONSULT REQUESTED DATE/TIME
18-Jun-2018 15:14
18-Jun-2018 15:19
18-Jun-2018 17:43
19-Jun-2018
20-Jun-2018 16:04
22-Jun-2018 15:30
22-Jun-2018 15:35
26-Jun-2018 10:30
17-Jun-2018 22:14
17-Jun-2018

## 2018-06-26 NOTE — SWALLOW BEDSIDE ASSESSMENT ADULT - COMMENTS
As per MD Esquivel and GI patient is cleared for all trials of PO from a GI perspective. utilized thin liquid wash independently to ensure oral clearance; no overt s/s of laryngeal penetration/aspiration noted no overt s/s of laryngeal penetration/aspiration noted

## 2018-06-26 NOTE — SWALLOW BEDSIDE ASSESSMENT ADULT - ASR SWALLOW RECOMMEND DIAG
Would defer at this time as pt not able to tolerate PO without c/o abdominal pain/discomfort
If concerned for silent aspiration objective swallow study may be ordered.

## 2018-06-26 NOTE — PROGRESS NOTE ADULT - ASSESSMENT
53 year old man with PMHx of scrotal abscess, MCA infarct 2/2 cocaine use, rhabdomyolysis c/b acute renal failure transferred to [facility]ICU for management of AMS and ARF. Course complicated by ileus, which has clinically improved. In addition, he was found to have hyperkalemia and SHAQUILLE; which is co-managed by nephrology. Neuro checks Q4 revealed RT arm drift.

## 2018-06-26 NOTE — PROGRESS NOTE ADULT - PROBLEM SELECTOR PLAN 1
- CT head showed L MCA CVA. MRI of the brain- consistent with CT scan findings  - Aspirin  - LFT's downtrending. Will add statin once the levels are steady  - C/w neuro checks Q4H  - Neurology following  - F/u speech and swallow evaluation

## 2018-06-26 NOTE — PROGRESS NOTE ADULT - SUBJECTIVE AND OBJECTIVE BOX
[facility] DIVISION OF KIDNEY DISEASES AND HYPERTENSION -- FOLLOW UP NOTE  --------------------------------------------------------------------------------  Chief Complaint:  SHAQUILLE    24 hour events/subjective:  Patient reports feeling well today, reports some cramping in lower extremities.        PAST HISTORY  --------------------------------------------------------------------------------  No significant changes to PMH, PSH, FHx, SHx, unless otherwise noted    ALLERGIES & MEDICATIONS  --------------------------------------------------------------------------------  Allergies    Bactrim (Rash (Skin))    Intolerances      Standing Inpatient Medications  aspirin enteric coated 81 milliGRAM(s) Oral daily  dextrose 5% + lactated ringers. 1000 milliLiter(s) IV Continuous <Continuous>  fluconAZOLE IVPB      fluconAZOLE IVPB 100 milliGRAM(s) IV Intermittent every 24 hours  heparin  Injectable 5000 Unit(s) SubCutaneous every 8 hours  nicotine - 21 mG/24Hr(s) Patch 1 patch Transdermal daily  pantoprazole  Injectable 40 milliGRAM(s) IV Push every 12 hours  simethicone 80 milliGRAM(s) Chew daily  triamcinolone 0.1% Cream 1 Application(s) Topical every 12 hours    PRN Inpatient Medications      REVIEW OF SYSTEMS  --------------------------------------------------------------------------------  Gen: No fevers/chills  Skin: No rashes  Head/Eyes/Ears/Mouth: No headache  Respiratory: No dyspnea  CV: No chest pain  GI: No abdominal pain  : No dysuria  MSK: No edema  Neuro: No dizziness/lightheadedness    VITALS/PHYSICAL EXAM  --------------------------------------------------------------------------------  T(C): 36.6 (06-26-18 @ 08:20), Max: 37 (06-25-18 @ 12:04)  HR: 85 (06-26-18 @ 08:20) (77 - 91)  BP: 104/68 (06-26-18 @ 08:20) (104/68 - 138/83)  RR: 18 (06-26-18 @ 08:20) (18 - 18)  SpO2: 100% (06-26-18 @ 08:20) (97% - 100%)  Wt(kg): --        06-25-18 @ 07:01  -  06-26-18 @ 07:00  --------------------------------------------------------  IN: 3040 mL / OUT: 2141 mL / NET: 899 mL    06-26-18 @ 07:01  -  06-26-18 @ 09:44  --------------------------------------------------------  IN: 245 mL / OUT: 350 mL / NET: -105 mL      Physical Exam:  	Gen: NAD  	HEENT: MMM  	Pulm: CTA B/L  	CV: RRR, S1S2; no rub  	Abd: +BS, soft, nontender/nondistended  	: No suprapubic tenderness  	UE:  no edema  	LE:  +mild pitting edema  	Neuro: No focal deficits  	Psych: Normal affect and mood  	Skin: Warm    LABS/STUDIES  --------------------------------------------------------------------------------              11.7   8.53  >-----------<  220      [06-26-18 @ 08:00]              36.9     138  |  103  |  24  ----------------------------<  96      [06-26-18 @ 06:23]  4.4   |  21  |  2.41        Ca     8.9     [06-26-18 @ 06:23]      Mg     1.4     [06-25-18 @ 05:51]      Phos  3.6     [06-25-18 @ 05:51]    TPro  5.5  /  Alb  2.7  /  TBili  0.5  /  DBili  x   /  AST  38  /  ALT  155  /  AlkPhos  75  [06-26-18 @ 06:23]        CK 73      [06-24-18 @ 15:51]    Creatinine Trend:  SCr 2.41 [06-26 @ 06:23]  SCr 2.70 [06-25 @ 05:51]  SCr 2.60 [06-24 @ 15:51]  SCr 3.08 [06-24 @ 05:43]  SCr 2.61 [06-23 @ 06:33]

## 2018-06-26 NOTE — PROGRESS NOTE ADULT - PROBLEM SELECTOR PLAN 2
Suspected due to diminished renal excretion 2/2 SHAQUILLE and increasing potassium release 2/2 rhabdo.  -Resolved post HD  -monitor serum potassium      Patient needs follow-up with nephrology. Can follow-up with [facility], [phone] within 2 weeks of discharge.

## 2018-06-26 NOTE — SWALLOW BEDSIDE ASSESSMENT ADULT - SWALLOW EVAL: CURRENT DIET
NPO
Clear liquid diet as per MD hills
NPO except meds with ice chips/sips of water as per MD order
NPO except meds with ice chips/sips of water as per MD order

## 2018-06-26 NOTE — SWALLOW BEDSIDE ASSESSMENT ADULT - NS SPL SWALLOW CLINIC TRIAL FT
no overt s/s of laryngeal penetration/aspiration noted
no overt s/s of laryngeal penetration/aspiration noted

## 2018-06-26 NOTE — SWALLOW BEDSIDE ASSESSMENT ADULT - SLP GENERAL OBSERVATIONS
Patient A&Ox4, able to follow commands, vocal quality judged to be mildly hoarse which patient endorsed as baseline, no c/o abdominal pain/discomfort at this time.  Stated he has been tolerating clear liquid diet with no coughing/throat clearing/discomfort.

## 2018-06-26 NOTE — PROGRESS NOTE ADULT - PROBLEM SELECTOR PLAN 7
- Resolved  - Monitor BUN/Cr. Repeat labs at 2pm   - Renal recs appreciated. Avoid nephrotoxins, ACE/ARB, and imaging contrast

## 2018-06-26 NOTE — SWALLOW BEDSIDE ASSESSMENT ADULT - SWALLOW EVAL: DIAGNOSIS
Patient presents with overtly functional tamika-pharyngeal swallow sequence for all PO trials administered.  No overt s/s of laryngeal penetration/aspiration noted across consistencies.

## 2018-06-26 NOTE — SWALLOW BEDSIDE ASSESSMENT ADULT - ASR SWALLOW ASPIRATION MONITOR
change of breathing pattern/cough/Monitor for s/s aspiration/laryngeal penetration. If noted:  D/C p.o. intake, provide non-oral nutrition/hydration/meds, and contact this service @ [phone]/juwan voice/fever/throat clearing/pneumonia/upper respiratory infection
Monitor for s/s aspiration/laryngeal penetration. If noted:  D/C p.o. intake, provide non-oral nutrition/hydration/meds, and contact this service @ [phone]/change of breathing pattern/gurgly voice/upper respiratory infection/cough/fever/throat clearing/pneumonia

## 2018-06-26 NOTE — CONSULT NOTE ADULT - SUBJECTIVE AND OBJECTIVE BOX
Cc: Difficulty walking, cognitive deficits  HPI:  52 M w/was transferred from [place] for ARF. Son found him lethargic, not speaking, and found down on the floor. He noticed rash on the patient's trunk and leg, decided to bring his father to [place].   Patient found to be in ARF with Rhabdo, + cocaine in urine.  Imaging noted L MCA CVA.  Patient underwent hemodialysis in the MICU.  Also had elevated LFTs which improved.      REVIEW OF SYSTEMS: No chest pain, shortness of breath, nausea, vomiting or diarhea; other ROS neg     PAST MEDICAL & SURGICAL HISTORY  No pertinent past medical history  No significant past surgical history    FUNCTIONAL HISTORY:   Lives alone in apartment with no VIRA.  PTA Independent with ambulation and ADLs    FAMILY HISTORY   No pertinent family history in first degree relatives    RECENT LABS/IMAGING  CBC Full  -  ( 26 Jun 2018 08:00 )  WBC Count : 8.53 K/uL  Hemoglobin : 11.7 g/dL  Hematocrit : 36.9 %  Platelet Count - Automated : 220 K/uL  Mean Cell Volume : 92.9 fl  Mean Cell Hemoglobin : 29.5 pg  Mean Cell Hemoglobin Concentration : 31.7 gm/dL  Auto Neutrophil # : x  Auto Lymphocyte # : x  Auto Monocyte # : x  Auto Eosinophil # : x  Auto Basophil # : x  Auto Neutrophil % : x  Auto Lymphocyte % : x  Auto Monocyte % : x  Auto Eosinophil % : x  Auto Basophil % : x    06-26    138  |  103  |  24<H>  ----------------------------<  96  4.4   |  21<L>  |  2.41<H>    Ca    8.9      26 Jun 2018 06:23  Phos  3.6     06-25  Mg     1.4     06-25    TPro  5.5<L>  /  Alb  2.7<L>  /  TBili  0.5  /  DBili  x   /  AST  38  /  ALT  155<H>  /  AlkPhos  75  06-26        VITALS  T(C): 36.6 (06-26-18 @ 08:20), Max: 37 (06-25-18 @ 12:04)  HR: 85 (06-26-18 @ 08:20) (77 - 91)  BP: 104/68 (06-26-18 @ 08:20) (104/68 - 138/83)  RR: 18 (06-26-18 @ 08:20) (18 - 18)  SpO2: 100% (06-26-18 @ 08:20) (97% - 100%)  Wt(kg): --    ALLERGIES  Bactrim (Rash (Skin))      MEDICATIONS   aspirin enteric coated 81 milliGRAM(s) Oral daily  dextrose 5% + lactated ringers. 1000 milliLiter(s) IV Continuous <Continuous>  fluconAZOLE IVPB      fluconAZOLE IVPB 100 milliGRAM(s) IV Intermittent every 24 hours  heparin  Injectable 5000 Unit(s) SubCutaneous every 8 hours  nicotine - 21 mG/24Hr(s) Patch 1 patch Transdermal daily  pantoprazole  Injectable 40 milliGRAM(s) IV Push every 12 hours  simethicone 80 milliGRAM(s) Chew daily  triamcinolone 0.1% Cream 1 Application(s) Topical every 12 hours    PHYSICAL EXAM  Constitutional - NAD, Comfortable  HEENT - NCAT, EOMI  Neck - Supple, No limited ROM  Chest - CTA bilaterally, No wheeze, No rhonchi, No crackles  Cardiovascular - RRR, S1S2, No murmurs  Abdomen - BS+, Soft, NTND  Extremities - No C/C/E, No calf tenderness   Neurologic Exam -                    Cognitive - Awake, Alert, AAO to self, place, date, year, situation     Communication - Fluent, No dysarthria, no aphasia     Cranial Nerves - CN 2-12 intact     Motor - No focal deficits                       Sensory - Intact to LT     Reflexes - DTR Intact, No primitive reflexive     Balance - WNL Static  Psychiatric - Mood stable, Affect WNL      Impression:  53 yo with CVA with gait dysfunction and debility due to rhabdo/ARF which is improving.      Plan:  PT- ROM, Bed Mob, Transfers, Amb w AD and bracing as needed  OT- ADLs, bracing  SLP- Dysphagia eval and treat  Prec- Falls, Cardiac  DVT Prophylaxis  Skin- Turn q2 h  Dispo- Cc: Difficulty walking, cognitive deficits  HPI:  52 M w/was transferred from [place] for ARF. Son found him lethargic, not speaking, and found down on the floor. He noticed rash on the patient's trunk and leg, decided to bring his father to [place].   Patient found to be in ARF with Rhabdo, + cocaine in urine.  Imaging noted L MCA CVA.  Patient underwent hemodialysis in the MICU.  Also had elevated LFTs which improved.      REVIEW OF SYSTEMS: Right weakness which is improved; resolved speech difficulties; No chest pain, shortness of breath, nausea, vomiting or diarhea; other ROS neg     PAST MEDICAL & SURGICAL HISTORY  No pertinent past medical history  No significant past surgical history    FUNCTIONAL HISTORY:   Lives alone in apartment with no VIRA.  PTA Independent with ambulation and ADLs    FAMILY HISTORY   No pertinent family history in first degree relatives    RECENT LABS/IMAGING  CBC Full  -  ( 26 Jun 2018 08:00 )  WBC Count : 8.53 K/uL  Hemoglobin : 11.7 g/dL  Hematocrit : 36.9 %  Platelet Count - Automated : 220 K/uL  Mean Cell Volume : 92.9 fl  Mean Cell Hemoglobin : 29.5 pg  Mean Cell Hemoglobin Concentration : 31.7 gm/dL  Auto Neutrophil # : x  Auto Lymphocyte # : x  Auto Monocyte # : x  Auto Eosinophil # : x  Auto Basophil # : x  Auto Neutrophil % : x  Auto Lymphocyte % : x  Auto Monocyte % : x  Auto Eosinophil % : x  Auto Basophil % : x    06-26    138  |  103  |  24<H>  ----------------------------<  96  4.4   |  21<L>  |  2.41<H>    Ca    8.9      26 Jun 2018 06:23  Phos  3.6     06-25  Mg     1.4     06-25    TPro  5.5<L>  /  Alb  2.7<L>  /  TBili  0.5  /  DBili  x   /  AST  38  /  ALT  155<H>  /  AlkPhos  75  06-26        VITALS  T(C): 36.6 (06-26-18 @ 08:20), Max: 37 (06-25-18 @ 12:04)  HR: 85 (06-26-18 @ 08:20) (77 - 91)  BP: 104/68 (06-26-18 @ 08:20) (104/68 - 138/83)  RR: 18 (06-26-18 @ 08:20) (18 - 18)  SpO2: 100% (06-26-18 @ 08:20) (97% - 100%)  Wt(kg): --    ALLERGIES  Bactrim (Rash (Skin))      MEDICATIONS   aspirin enteric coated 81 milliGRAM(s) Oral daily  dextrose 5% + lactated ringers. 1000 milliLiter(s) IV Continuous <Continuous>  fluconAZOLE IVPB      fluconAZOLE IVPB 100 milliGRAM(s) IV Intermittent every 24 hours  heparin  Injectable 5000 Unit(s) SubCutaneous every 8 hours  nicotine - 21 mG/24Hr(s) Patch 1 patch Transdermal daily  pantoprazole  Injectable 40 milliGRAM(s) IV Push every 12 hours  simethicone 80 milliGRAM(s) Chew daily  triamcinolone 0.1% Cream 1 Application(s) Topical every 12 hours    PHYSICAL EXAM  Constitutional - NAD, Comfortable  HEENT - NCAT, EOMI  Extremities - No C/C/E, No calf tenderness   Neurologic Exam -                    Cognitive - Awake, Alert, AAO to self, place, date, year, situation     Communication - Fluent, No dysarthria, no aphasia     Motor - RUE 2/5 FF/WE, 4/5 EF/Sh Abd; LUE and Bl LEs- 5/5                     Psychiatric - Mood stable, Affect WNL    Ambulating without AD with good balance    Impression:  51 yo with CVA with gait dysfunction and debility due to rhabdo/ARF which is improving.      Plan:  PT- Ambulation/Balance  OT- ADLs  SLP- Dysphagia eval and treat  Prec- Falls  DVT Prophylaxis- Heparin SQ  Dispo- Patient doing well. Should be able to go home with outpt therapies. D/W Social Work and Patient.

## 2018-06-26 NOTE — PROGRESS NOTE ADULT - PROBLEM SELECTOR PLAN 8
DIET - Clear liquid. Advance diet as tolerated  - DVT prophylaxis - heparin sub-q   - Aspiration precautions  - Physiatry- Recommend home with outpatient therapies.   - Disposition- Awaiting swallow and eval  - Fall precautions.

## 2018-06-27 VITALS
RESPIRATION RATE: 18 BRPM | OXYGEN SATURATION: 98 % | DIASTOLIC BLOOD PRESSURE: 70 MMHG | HEART RATE: 85 BPM | SYSTOLIC BLOOD PRESSURE: 107 MMHG

## 2018-06-27 LAB
ALBUMIN SERPL ELPH-MCNC: 3.1 G/DL — LOW (ref 3.3–5)
ALP SERPL-CCNC: 82 U/L — SIGNIFICANT CHANGE UP (ref 40–120)
ALT FLD-CCNC: 135 U/L — HIGH (ref 10–45)
ANION GAP SERPL CALC-SCNC: 15 MMOL/L — SIGNIFICANT CHANGE UP (ref 5–17)
AST SERPL-CCNC: 34 U/L — SIGNIFICANT CHANGE UP (ref 10–40)
BILIRUB SERPL-MCNC: 0.6 MG/DL — SIGNIFICANT CHANGE UP (ref 0.2–1.2)
BUN SERPL-MCNC: 20 MG/DL — SIGNIFICANT CHANGE UP (ref 7–23)
CALCIUM SERPL-MCNC: 9.5 MG/DL — SIGNIFICANT CHANGE UP (ref 8.4–10.5)
CHLORIDE SERPL-SCNC: 104 MMOL/L — SIGNIFICANT CHANGE UP (ref 96–108)
CO2 SERPL-SCNC: 21 MMOL/L — LOW (ref 22–31)
CREAT SERPL-MCNC: 2.35 MG/DL — HIGH (ref 0.5–1.3)
GLUCOSE SERPL-MCNC: 90 MG/DL — SIGNIFICANT CHANGE UP (ref 70–99)
POTASSIUM SERPL-MCNC: 4.4 MMOL/L — SIGNIFICANT CHANGE UP (ref 3.5–5.3)
POTASSIUM SERPL-SCNC: 4.4 MMOL/L — SIGNIFICANT CHANGE UP (ref 3.5–5.3)
PROT SERPL-MCNC: 6.2 G/DL — SIGNIFICANT CHANGE UP (ref 6–8.3)
SODIUM SERPL-SCNC: 140 MMOL/L — SIGNIFICANT CHANGE UP (ref 135–145)

## 2018-06-27 PROCEDURE — C8929: CPT

## 2018-06-27 PROCEDURE — 96374 THER/PROPH/DIAG INJ IV PUSH: CPT

## 2018-06-27 PROCEDURE — 80053 COMPREHEN METABOLIC PANEL: CPT

## 2018-06-27 PROCEDURE — 86036 ANCA SCREEN EACH ANTIBODY: CPT

## 2018-06-27 PROCEDURE — 87798 DETECT AGENT NOS DNA AMP: CPT

## 2018-06-27 PROCEDURE — 87633 RESP VIRUS 12-25 TARGETS: CPT

## 2018-06-27 PROCEDURE — 82550 ASSAY OF CK (CPK): CPT

## 2018-06-27 PROCEDURE — 76775 US EXAM ABDO BACK WALL LIM: CPT

## 2018-06-27 PROCEDURE — 82947 ASSAY GLUCOSE BLOOD QUANT: CPT

## 2018-06-27 PROCEDURE — 83605 ASSAY OF LACTIC ACID: CPT

## 2018-06-27 PROCEDURE — 84484 ASSAY OF TROPONIN QUANT: CPT

## 2018-06-27 PROCEDURE — 96375 TX/PRO/DX INJ NEW DRUG ADDON: CPT

## 2018-06-27 PROCEDURE — 87340 HEPATITIS B SURFACE AG IA: CPT

## 2018-06-27 PROCEDURE — 97116 GAIT TRAINING THERAPY: CPT

## 2018-06-27 PROCEDURE — 86901 BLOOD TYPING SEROLOGIC RH(D): CPT

## 2018-06-27 PROCEDURE — 99239 HOSP IP/OBS DSCHRG MGMT >30: CPT

## 2018-06-27 PROCEDURE — 97162 PT EVAL MOD COMPLEX 30 MIN: CPT

## 2018-06-27 PROCEDURE — 86663 EPSTEIN-BARR ANTIBODY: CPT

## 2018-06-27 PROCEDURE — 82435 ASSAY OF BLOOD CHLORIDE: CPT

## 2018-06-27 PROCEDURE — 87486 CHLMYD PNEUM DNA AMP PROBE: CPT

## 2018-06-27 PROCEDURE — 86900 BLOOD TYPING SEROLOGIC ABO: CPT

## 2018-06-27 PROCEDURE — 72125 CT NECK SPINE W/O DYE: CPT

## 2018-06-27 PROCEDURE — 74018 RADEX ABDOMEN 1 VIEW: CPT

## 2018-06-27 PROCEDURE — 86704 HEP B CORE ANTIBODY TOTAL: CPT

## 2018-06-27 PROCEDURE — 71045 X-RAY EXAM CHEST 1 VIEW: CPT

## 2018-06-27 PROCEDURE — 82272 OCCULT BLD FECES 1-3 TESTS: CPT

## 2018-06-27 PROCEDURE — 87389 HIV-1 AG W/HIV-1&-2 AB AG IA: CPT

## 2018-06-27 PROCEDURE — 73090 X-RAY EXAM OF FOREARM: CPT

## 2018-06-27 PROCEDURE — 87581 M.PNEUMON DNA AMP PROBE: CPT

## 2018-06-27 PROCEDURE — 83735 ASSAY OF MAGNESIUM: CPT

## 2018-06-27 PROCEDURE — G0515: CPT

## 2018-06-27 PROCEDURE — 86803 HEPATITIS C AB TEST: CPT

## 2018-06-27 PROCEDURE — 82803 BLOOD GASES ANY COMBINATION: CPT

## 2018-06-27 PROCEDURE — 86665 EPSTEIN-BARR CAPSID VCA: CPT

## 2018-06-27 PROCEDURE — 92610 EVALUATE SWALLOWING FUNCTION: CPT

## 2018-06-27 PROCEDURE — 86777 TOXOPLASMA ANTIBODY: CPT

## 2018-06-27 PROCEDURE — 83930 ASSAY OF BLOOD OSMOLALITY: CPT

## 2018-06-27 PROCEDURE — 85730 THROMBOPLASTIN TIME PARTIAL: CPT

## 2018-06-27 PROCEDURE — 80048 BASIC METABOLIC PNL TOTAL CA: CPT

## 2018-06-27 PROCEDURE — 99285 EMERGENCY DEPT VISIT HI MDM: CPT | Mod: 25

## 2018-06-27 PROCEDURE — 86666 EHRLICHIA ANTIBODY: CPT

## 2018-06-27 PROCEDURE — 85652 RBC SED RATE AUTOMATED: CPT

## 2018-06-27 PROCEDURE — 80061 LIPID PANEL: CPT

## 2018-06-27 PROCEDURE — 74176 CT ABD & PELVIS W/O CONTRAST: CPT

## 2018-06-27 PROCEDURE — 99233 SBSQ HOSP IP/OBS HIGH 50: CPT | Mod: GC

## 2018-06-27 PROCEDURE — 86644 CMV ANTIBODY: CPT

## 2018-06-27 PROCEDURE — 70551 MRI BRAIN STEM W/O DYE: CPT

## 2018-06-27 PROCEDURE — 86757 RICKETTSIA ANTIBODY: CPT

## 2018-06-27 PROCEDURE — 86160 COMPLEMENT ANTIGEN: CPT

## 2018-06-27 PROCEDURE — 82436 ASSAY OF URINE CHLORIDE: CPT

## 2018-06-27 PROCEDURE — 93971 EXTREMITY STUDY: CPT

## 2018-06-27 PROCEDURE — 83970 ASSAY OF PARATHORMONE: CPT

## 2018-06-27 PROCEDURE — 87086 URINE CULTURE/COLONY COUNT: CPT

## 2018-06-27 PROCEDURE — 86850 RBC ANTIBODY SCREEN: CPT

## 2018-06-27 PROCEDURE — 81001 URINALYSIS AUTO W/SCOPE: CPT

## 2018-06-27 PROCEDURE — 87040 BLOOD CULTURE FOR BACTERIA: CPT

## 2018-06-27 PROCEDURE — 93005 ELECTROCARDIOGRAM TRACING: CPT

## 2018-06-27 PROCEDURE — 70450 CT HEAD/BRAIN W/O DYE: CPT

## 2018-06-27 PROCEDURE — 83036 HEMOGLOBIN GLYCOSYLATED A1C: CPT

## 2018-06-27 PROCEDURE — 99261: CPT

## 2018-06-27 PROCEDURE — 82330 ASSAY OF CALCIUM: CPT

## 2018-06-27 PROCEDURE — 93880 EXTRACRANIAL BILAT STUDY: CPT

## 2018-06-27 PROCEDURE — 80307 DRUG TEST PRSMV CHEM ANLYZR: CPT

## 2018-06-27 PROCEDURE — 82310 ASSAY OF CALCIUM: CPT

## 2018-06-27 PROCEDURE — 86778 TOXOPLASMA ANTIBODY IGM: CPT

## 2018-06-27 PROCEDURE — 86664 EPSTEIN-BARR NUCLEAR ANTIGEN: CPT

## 2018-06-27 PROCEDURE — 36415 COLL VENOUS BLD VENIPUNCTURE: CPT

## 2018-06-27 PROCEDURE — 82570 ASSAY OF URINE CREATININE: CPT

## 2018-06-27 PROCEDURE — 86720 LEPTOSPIRA ANTIBODY: CPT

## 2018-06-27 PROCEDURE — 86780 TREPONEMA PALLIDUM: CPT

## 2018-06-27 PROCEDURE — 97166 OT EVAL MOD COMPLEX 45 MIN: CPT

## 2018-06-27 PROCEDURE — 84156 ASSAY OF PROTEIN URINE: CPT

## 2018-06-27 PROCEDURE — 86705 HEP B CORE ANTIBODY IGM: CPT

## 2018-06-27 PROCEDURE — 97530 THERAPEUTIC ACTIVITIES: CPT

## 2018-06-27 PROCEDURE — 73130 X-RAY EXAM OF HAND: CPT

## 2018-06-27 PROCEDURE — 71250 CT THORAX DX C-: CPT

## 2018-06-27 PROCEDURE — 83690 ASSAY OF LIPASE: CPT

## 2018-06-27 PROCEDURE — 84100 ASSAY OF PHOSPHORUS: CPT

## 2018-06-27 PROCEDURE — 86645 CMV ANTIBODY IGM: CPT

## 2018-06-27 PROCEDURE — 82553 CREATINE MB FRACTION: CPT

## 2018-06-27 PROCEDURE — 84132 ASSAY OF SERUM POTASSIUM: CPT

## 2018-06-27 PROCEDURE — 84295 ASSAY OF SERUM SODIUM: CPT

## 2018-06-27 PROCEDURE — 86140 C-REACTIVE PROTEIN: CPT

## 2018-06-27 PROCEDURE — 85610 PROTHROMBIN TIME: CPT

## 2018-06-27 PROCEDURE — 86225 DNA ANTIBODY NATIVE: CPT

## 2018-06-27 PROCEDURE — 73110 X-RAY EXAM OF WRIST: CPT

## 2018-06-27 PROCEDURE — 85027 COMPLETE CBC AUTOMATED: CPT

## 2018-06-27 PROCEDURE — 84300 ASSAY OF URINE SODIUM: CPT

## 2018-06-27 PROCEDURE — 85014 HEMATOCRIT: CPT

## 2018-06-27 PROCEDURE — 82962 GLUCOSE BLOOD TEST: CPT

## 2018-06-27 PROCEDURE — 94640 AIRWAY INHALATION TREATMENT: CPT

## 2018-06-27 RX ORDER — FLUCONAZOLE 150 MG/1
2 TABLET ORAL
Qty: 20 | Refills: 0
Start: 2018-06-27 | End: 2018-07-06

## 2018-06-27 RX ORDER — ASPIRIN/CALCIUM CARB/MAGNESIUM 324 MG
1 TABLET ORAL
Qty: 30 | Refills: 0
Start: 2018-06-27 | End: 2018-07-26

## 2018-06-27 RX ORDER — ATORVASTATIN CALCIUM 80 MG/1
1 TABLET, FILM COATED ORAL
Qty: 30 | Refills: 0
Start: 2018-06-27 | End: 2018-07-26

## 2018-06-27 RX ADMIN — Medication 1 APPLICATION(S): at 18:35

## 2018-06-27 RX ADMIN — SIMETHICONE 80 MILLIGRAM(S): 80 TABLET, CHEWABLE ORAL at 11:57

## 2018-06-27 RX ADMIN — FLUCONAZOLE 50 MILLIGRAM(S): 150 TABLET ORAL at 16:31

## 2018-06-27 RX ADMIN — PANTOPRAZOLE SODIUM 40 MILLIGRAM(S): 20 TABLET, DELAYED RELEASE ORAL at 18:35

## 2018-06-27 RX ADMIN — Medication 1 PATCH: at 05:42

## 2018-06-27 RX ADMIN — Medication 81 MILLIGRAM(S): at 11:57

## 2018-06-27 RX ADMIN — HEPARIN SODIUM 5000 UNIT(S): 5000 INJECTION INTRAVENOUS; SUBCUTANEOUS at 15:31

## 2018-06-27 RX ADMIN — PANTOPRAZOLE SODIUM 40 MILLIGRAM(S): 20 TABLET, DELAYED RELEASE ORAL at 05:43

## 2018-06-27 RX ADMIN — HEPARIN SODIUM 5000 UNIT(S): 5000 INJECTION INTRAVENOUS; SUBCUTANEOUS at 05:43

## 2018-06-27 NOTE — PROGRESS NOTE ADULT - SUBJECTIVE AND OBJECTIVE BOX
[facility] DIVISION OF KIDNEY DISEASES AND HYPERTENSION -- FOLLOW UP NOTE  --------------------------------------------------------------------------------  Chief Complaint:  SHAQUILLE    24 hour events/subjective:  Patient reports feeling well, continues to have loose bowel movements.        PAST HISTORY  --------------------------------------------------------------------------------  No significant changes to PMH, PSH, FHx, SHx, unless otherwise noted    ALLERGIES & MEDICATIONS  --------------------------------------------------------------------------------  Allergies    Bactrim (Rash (Skin))    Intolerances      Standing Inpatient Medications  aspirin enteric coated 81 milliGRAM(s) Oral daily  dextrose 5% + lactated ringers. 1000 milliLiter(s) IV Continuous <Continuous>  fluconAZOLE IVPB      fluconAZOLE IVPB 100 milliGRAM(s) IV Intermittent every 24 hours  heparin  Injectable 5000 Unit(s) SubCutaneous every 8 hours  nicotine - 21 mG/24Hr(s) Patch 1 patch Transdermal daily  pantoprazole  Injectable 40 milliGRAM(s) IV Push every 12 hours  simethicone 80 milliGRAM(s) Chew daily  triamcinolone 0.1% Cream 1 Application(s) Topical every 12 hours    PRN Inpatient Medications      REVIEW OF SYSTEMS  --------------------------------------------------------------------------------  Gen: No fevers/chills  Skin: No rashes  Head/Eyes/Ears/Mouth: No headache  Respiratory: No dyspnea  CV: No chest pain  GI: No abdominal pain, + loose bowel movements  : No dysuria  MSK: No edema  Neuro: No dizziness/lightheadedness    VITALS/PHYSICAL EXAM  --------------------------------------------------------------------------------  T(C): 36.9 (06-27-18 @ 12:00), Max: 37.2 (06-26-18 @ 20:58)  HR: 85 (06-27-18 @ 12:43) (80 - 96)  BP: 107/70 (06-27-18 @ 12:43) (107/70 - 127/80)  RR: 18 (06-27-18 @ 12:43) (18 - 18)  SpO2: 98% (06-27-18 @ 12:43) (96% - 100%)  Wt(kg): --        06-26-18 @ 07:01  -  06-27-18 @ 07:00  --------------------------------------------------------  IN: 2935 mL / OUT: 1750 mL / NET: 1185 mL    06-27-18 @ 07:01  -  06-27-18 @ 13:53  --------------------------------------------------------  IN: 440 mL / OUT: 200 mL / NET: 240 mL      Physical Exam:  	Gen: NAD  	HEENT: MMM  	Pulm: CTA B/L  	CV: RRR, S1S2; no rub  	Abd: +BS, soft, nontender/nondistended  	: No suprapubic tenderness  	UE:  no edema  	LE:  +mild edema  	Neuro: No focal deficits  	Psych: Normal affect and mood  	Skin: Warm    LABS/STUDIES  --------------------------------------------------------------------------------              11.7   8.53  >-----------<  220      [06-26-18 @ 08:00]              36.9     140  |  104  |  20  ----------------------------<  90      [06-27-18 @ 06:45]  4.4   |  21  |  2.35        Ca     9.5     [06-27-18 @ 06:45]    TPro  6.2  /  Alb  3.1  /  TBili  0.6  /  DBili  x   /  AST  34  /  ALT  135  /  AlkPhos  82  [06-27-18 @ 06:45]    Creatinine Trend:  SCr 2.35 [06-27 @ 06:45]  SCr 2.41 [06-26 @ 06:23]  SCr 2.70 [06-25 @ 05:51]  SCr 2.60 [06-24 @ 15:51]  SCr 3.08 [06-24 @ 05:43]

## 2018-06-27 NOTE — PROGRESS NOTE ADULT - PROVIDER SPECIALTY LIST ADULT
Infectious Disease
Internal Medicine
MICU
Nephrology
Neurology
Trauma Surgery
Infectious Disease
Nephrology
Internal Medicine

## 2018-06-27 NOTE — PROGRESS NOTE ADULT - ASSESSMENT
52 year old male with unknown PMHx (does not follow with Doctors), transferred from [facility] for ARF after son found him lethargic, not speaking, and down on the floor. He also noticed rash on the patient's trunk and leg, decided to bring his father to [facility].     Pt was recently at [facility] two weeks prior for a right scrotal cyst that drained on its own   Was given levaquin and a sulfa drug (?bactrim) for right scrotal cyst  Renal failure (unknown baseline) most likely from rhabdo, with elevated trops   Elevated liver enzymes trending down, positive cocaine test  s/p HD for hyperkalemia   Diffuse rash - Derm on board - most likely drug rash from bactrim - improving  Afebrile. RVP neg. Blood cxs NGTD.  CT head with subacute infarct  Leukocytosis   MRI with acute left MCA infarcts  CT with SBO  Watery diarrhea  ENT with laryngeal lesions suspect fungal  Elevated LFT improving    Recommend:  -Continue fluconazole - anticipate a total 14-day course.  -Monitor LFT - if continues to rise, then would change fluconazole to micafungin.   -Trend WBC  -Trend renal function  -Trend LFTs  -SBO management per primary team  -If diarrhea persists, check c. diff. 52 year old male with unknown PMHx (does not follow with Doctors), transferred from [facility] for ARF after son found him lethargic, not speaking, and down on the floor. He also noticed rash on the patient's trunk and leg, decided to bring his father to [facility].     Pt was recently at [facility] two weeks prior for a right scrotal cyst that drained on its own   Was given levaquin and a sulfa drug (?bactrim) for right scrotal cyst  Renal failure (unknown baseline) most likely from rhabdo, with elevated trops   Elevated liver enzymes trending down, positive cocaine test  s/p HD for hyperkalemia   Diffuse rash - Derm on board - most likely drug rash from bactrim - improving  Afebrile. RVP neg. Blood cxs NGTD.  CT head with subacute infarct  Leukocytosis   MRI with acute left MCA infarcts  CT with SBO  Watery diarrhea  ENT with laryngeal lesions suspect fungal  Elevated LFT improving    Recommend:  -Continue fluconazole - anticipate a total 14-day course.  -Monitor LFT - if continues to rise, then would change fluconazole to micafungin.   -Trend WBC  -Trend renal function  -Trend LFTs  -SBO management per primary team

## 2018-06-27 NOTE — PROGRESS NOTE ADULT - PROBLEM SELECTOR PROBLEM 1
Ischemic brain injury
Ischemic brain injury
SHAQUILLE (acute kidney injury)
Ischemic brain injury
SHAQUILLE (acute kidney injury)
Ischemic brain injury
Ischemic brain injury

## 2018-06-27 NOTE — PROGRESS NOTE ADULT - ATTENDING COMMENTS
I was physically present for the key portions of the evaluation and management (E/M) service provided.  I agree with the above history, physical, and plan which I have reviewed and edited where appropriate. Pt clinically stable for discharge to home with home PT. Tolerating regular diet, having BM's/passing gas, SHAQUILLE improving will f/u with nephrology as OP, and to start statin upon discharge now that LFTs have improved. Will also complete course of fluconazole for thrush.    Total discharge time: 44 minutes.

## 2018-06-27 NOTE — PROGRESS NOTE ADULT - PROBLEM SELECTOR PLAN 6
- Likely in the setting of acute renal failure.   - c/w telemetry   - Cardiology recs appreciated. - Stable

## 2018-06-27 NOTE — PROGRESS NOTE ADULT - PROBLEM SELECTOR PLAN 2
- Clinically improved.  - C/w simethicone and Pantoprazole  - GI recs- endoscopic decompression has limited utility given that the dilation is primary in the small bowel - Clinically improved.  - C/w simethicone and Pantoprazole

## 2018-06-27 NOTE — PROGRESS NOTE ADULT - PROBLEM SELECTOR PROBLEM 6
Cardiac enzymes elevated
SHAQUILLE (acute kidney injury)
SHAQUILLE (acute kidney injury)
Cardiac enzymes elevated
SHAQUILLE (acute kidney injury)

## 2018-06-27 NOTE — PROGRESS NOTE ADULT - PROBLEM SELECTOR PLAN 3
- AST (38)/ALT (155) trending now  -Trend LFT's  - Hold statins - AST (135)/ALT (30) trending now  - can resume statins

## 2018-06-27 NOTE — PROGRESS NOTE ADULT - PROBLEM SELECTOR PROBLEM 5
Cardiac enzymes elevated
Substance use disorder
Cardiac enzymes elevated
Cardiac enzymes elevated
Substance use disorder

## 2018-06-27 NOTE — PROGRESS NOTE ADULT - PROBLEM SELECTOR PLAN 1
- CT head showed L MCA CVA. MRI of the brain- consistent with CT scan findings  - Aspirin  - LFT's downtrending. Will add statin once the levels are steady  - C/w neuro checks Q4H  - Neurology following  - F/u speech and swallow evaluation - Aspirin  - LFT's downtrending and stable. Will add crestor  - Diet advanced to regular - Aspirin  - LFT's downtrending and stable. Will add high intensity statin given CVA.  - Diet advanced to regular

## 2018-06-27 NOTE — PROGRESS NOTE ADULT - SUBJECTIVE AND OBJECTIVE BOX
Dr. Ramakrishna Green  Internal Medicine PGY-1   Pager# [phone]    Patient is a 53y old  Male who presents with a chief complaint of 52y old  Male who presents with a chief complaint of AMS, found down (18 Jun 2018 09:34)      SUBJECTIVE / OVERNIGHT EVENTS: No acute overnight events reported. Pt slept well. Had four bowel movements yesterday and they were liquid in consistency. Denies n/v/h/d. Reports that his thrush is getting better. Ambulates independently. Reports frequent urination and suspects its from liquid diet.    MEDICATIONS  (STANDING):  aspirin enteric coated 81 milliGRAM(s) Oral daily  dextrose 5% + lactated ringers. 1000 milliLiter(s) (100 mL/Hr) IV Continuous <Continuous>  fluconAZOLE IVPB      fluconAZOLE IVPB 100 milliGRAM(s) IV Intermittent every 24 hours  heparin  Injectable 5000 Unit(s) SubCutaneous every 8 hours  nicotine - 21 mG/24Hr(s) Patch 1 patch Transdermal daily  pantoprazole  Injectable 40 milliGRAM(s) IV Push every 12 hours  simethicone 80 milliGRAM(s) Chew daily  triamcinolone 0.1% Cream 1 Application(s) Topical every 12 hours    MEDICATIONS  (PRN):      Vital Signs Last 24 Hrs  T(C): 36.7 (27 Jun 2018 08:27), Max: 37.2 (26 Jun 2018 20:58)  T(F): 98 (27 Jun 2018 08:27), Max: 98.9 (26 Jun 2018 20:58)  HR: 94 (27 Jun 2018 08:27) (80 - 94)  BP: 127/80 (27 Jun 2018 08:27) (108/65 - 127/80)  BP(mean): --  RR: 18 (27 Jun 2018 08:27) (18 - 18)  SpO2: 98% (27 Jun 2018 08:27) (96% - 98%)  CAPILLARY BLOOD GLUCOSE        I&O's Summary    26 Jun 2018 07:01  -  27 Jun 2018 07:00  --------------------------------------------------------  IN: 2935 mL / OUT: 1750 mL / NET: 1185 mL    27 Jun 2018 07:01  -  27 Jun 2018 09:08  --------------------------------------------------------  IN: 220 mL / OUT: 0 mL / NET: 220 mL        PHYSICAL EXAM:  GENERAL: NAD. Appropriately rsponds to question  HEAD:  Atraumatic, Normocephalic  EYES: EOMI, PERRLA, conjunctiva and sclera clear  NECK: Supple, No JVD  CHEST/LUNG: Clear to auscultation bilaterally; No wheeze  HEART: Regular rate and rhythm; No murmurs, rubs, or gallops  ABDOMEN: Soft, Nontender, Nondistended; Bowel sounds present  EXTREMITIES:  2+ Peripheral Pulses, No clubbing, cyanosis, or edema  PSYCH: AAOx3  NEUROLOGY: non-focal  SKIN: No rashes or lesions    LABS:                        11.7   8.53  )-----------( 220      ( 26 Jun 2018 08:00 )             36.9     06-27    140  |  104  |  20  ----------------------------<  90  4.4   |  21<L>  |  2.35<H>    Ca    9.5      27 Jun 2018 06:45    TPro  6.2  /  Alb  3.1<L>  /  TBili  0.6  /  DBili  x   /  AST  34  /  ALT  135<H>  /  AlkPhos  82  06-27              RADIOLOGY & ADDITIONAL TESTS:    Imaging Personally Reviewed:    Consultant(s) Notes Reviewed:      Care Discussed with Consultants/Other Providers: Dr. Ramakrishna Green  Internal Medicine PGY-1   Pager# [phone]    Patient is a 53y old  Male who presents with a chief complaint of 52y old  Male who presents with a chief complaint of AMS, found down (18 Jun 2018 09:34)      SUBJECTIVE / OVERNIGHT EVENTS: No acute overnight events reported. Pt slept well. Had four bowel movements yesterday and they were liquid in consistency. Denies n/v/h/d. Reports that his thrush is getting better. Ambulates independently. Reports frequent urination and suspects its from liquid diet.    MEDICATIONS  (STANDING):  aspirin enteric coated 81 milliGRAM(s) Oral daily  dextrose 5% + lactated ringers. 1000 milliLiter(s) (100 mL/Hr) IV Continuous <Continuous>  fluconAZOLE IVPB      fluconAZOLE IVPB 100 milliGRAM(s) IV Intermittent every 24 hours  heparin  Injectable 5000 Unit(s) SubCutaneous every 8 hours  nicotine - 21 mG/24Hr(s) Patch 1 patch Transdermal daily  pantoprazole  Injectable 40 milliGRAM(s) IV Push every 12 hours  simethicone 80 milliGRAM(s) Chew daily  triamcinolone 0.1% Cream 1 Application(s) Topical every 12 hours    MEDICATIONS  (PRN):      Vital Signs Last 24 Hrs  T(C): 36.7 (27 Jun 2018 08:27), Max: 37.2 (26 Jun 2018 20:58)  T(F): 98 (27 Jun 2018 08:27), Max: 98.9 (26 Jun 2018 20:58)  HR: 94 (27 Jun 2018 08:27) (80 - 94)  BP: 127/80 (27 Jun 2018 08:27) (108/65 - 127/80)  BP(mean): --  RR: 18 (27 Jun 2018 08:27) (18 - 18)  SpO2: 98% (27 Jun 2018 08:27) (96% - 98%)  CAPILLARY BLOOD GLUCOSE        I&O's Summary    26 Jun 2018 07:01  -  27 Jun 2018 07:00  --------------------------------------------------------  IN: 2935 mL / OUT: 1750 mL / NET: 1185 mL    27 Jun 2018 07:01  -  27 Jun 2018 09:08  --------------------------------------------------------  IN: 220 mL / OUT: 0 mL / NET: 220 mL        PHYSICAL EXAM:  GENERAL: NAD. Appropriately rsponds to question  HEAD:  Atraumatic  EYES: EOMI, PERRLA, conjunctiva and sclera clear  NECK: Supple  CHEST/LUNG: Clear to auscultation bilaterally; No wheeze  HEART: Regular rate and rhythm; No murmurs, rubs, or gallops  ABDOMEN: Soft, Nontender, Nondistended; Bowel sounds present  EXTREMITIES:  2+ Peripheral Pulses, No clubbing, cyanosis, or edema  PSYCH: AAOx3  NEUROLOGY: non-focal  SKIN: No rashes or lesions    LABS:                        11.7   8.53  )-----------( 220      ( 26 Jun 2018 08:00 )             36.9     06-27    140  |  104  |  20  ----------------------------<  90  4.4   |  21<L>  |  2.35<H>    Ca    9.5      27 Jun 2018 06:45    TPro  6.2  /  Alb  3.1<L>  /  TBili  0.6  /  DBili  x   /  AST  34  /  ALT  135<H>  /  AlkPhos  82  06-27              RADIOLOGY & ADDITIONAL TESTS:    Imaging Personally Reviewed:    Consultant(s) Notes Reviewed:      Care Discussed with Consultants/Other Providers:

## 2018-06-27 NOTE — PROGRESS NOTE ADULT - PROBLEM SELECTOR PLAN 7
- Resolved  - Monitor BUN/Cr. Repeat labs at 2pm   - Renal recs appreciated. Avoid nephrotoxins, ACE/ARB, and imaging contrast - Resolved  - Renal recs appreciated. Avoid nephrotoxins, ACE/ARB, and imaging contrast

## 2018-06-27 NOTE — PROGRESS NOTE ADULT - PROBLEM SELECTOR PLAN 8
DIET - Clear liquid. Advance diet as tolerated  - DVT prophylaxis - heparin sub-q   - Aspiration precautions  - Physiatry- Recommend home with outpatient therapies.   - Disposition- Awaiting swallow and eval  - Fall precautions. DIET - Diet advanced  - DVT prophylaxis - heparin sub-q   - Aspiration precautions  - Physiatry- Recommend home with outpatient therapies.   - Disposition- Awaiting swallow and eval  - Fall precautions.

## 2018-06-27 NOTE — PROGRESS NOTE ADULT - SUBJECTIVE AND OBJECTIVE BOX
CC: Patient is a 53y old  Male who presents with a chief complaint of 52y old  Male who presents with a chief complaint of AMS, found down (18 Jun 2018 09:34)    Interval History/ROS: Patient feeling well. Sitting at side of bed. No new complaints. Denies fever, chills. Abd distention improving. Having watery bowel movements - 5-6 episodes yesterday.    Allergies  Bactrim (Rash (Skin))    ANTIMICROBIALS:  fluconAZOLE IVPB    fluconAZOLE IVPB 100 every 24 hours    PE:    Vital Signs Last 24 Hrs  T(C): 36.7 (27 Jun 2018 08:27), Max: 37.2 (26 Jun 2018 20:58)  T(F): 98 (27 Jun 2018 08:27), Max: 98.9 (26 Jun 2018 20:58)  HR: 94 (27 Jun 2018 08:27) (80 - 94)  BP: 127/80 (27 Jun 2018 08:27) (108/65 - 127/80)  BP(mean): --  RR: 18 (27 Jun 2018 08:27) (18 - 18)  SpO2: 98% (27 Jun 2018 08:27) (96% - 98%)    Gen: AOx3, NAD, non-toxic  CV: S1+S2 normal, no murmurs  Resp: Clear bilat, no resp distress  Abd: distended  : No Gutierres  IV/Skin: No thrombophlebitis  Neuro: no focal deficits      LABS:                          11.7   8.53  )-----------( 220      ( 26 Jun 2018 08:00 )             36.9       06-27    140  |  104  |  20  ----------------------------<  90  4.4   |  21<L>  |  2.35<H>    Ca    9.5      27 Jun 2018 06:45    TPro  6.2  /  Alb  3.1<L>  /  TBili  0.6  /  DBili  x   /  AST  34  /  ALT  135<H>  /  AlkPhos  82  06-27    MICROBIOLOGY:  v  .Urine Clean Catch (Midstream)  06-17-18   No growth  --  --      .Blood Blood  06-17-18   No growth at 5 days.  --  --      .Blood Blood  06-17-18   No growth at 5 days.  --  --    CMV IgG Antibody: <0.20 U/mL (06-19-18 @ 15:29)  Toxoplasma IgG Screen: <3.0 IU/mL (06-19-18 @ 15:29)    RADIOLOGY:    No new images.

## 2018-06-27 NOTE — PROGRESS NOTE ADULT - ASSESSMENT
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 Course complicated by ileus, which has clinically improved. In addition, he was found to have hyperkalemia and SHAQUILLE; which is co-managed by nephrology. Neuro checks Q4 revealed RT arm drift. 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 Reports feeling better overall and is medically stable for discharge.

## 2018-06-27 NOTE — PROGRESS NOTE ADULT - PROBLEM SELECTOR PLAN 1
SHAQUILLE on CKD, with CT imaging showing left atrophic kidney.    -SHAQUILLE from rhabdo + hemodynamic injury from cocaine abuse.   -Scr now improving and non-oliguric.  -SHAQUILLE resolving.  -monitor creatinine trend and urine output  -avoid nephrotoxins, ace/arb, imaging contrast

## 2018-06-27 NOTE — PROGRESS NOTE ADULT - PROBLEM SELECTOR PLAN 2
Suspected due to diminished renal excretion 2/2 SHAQUILLE and increasing potassium release 2/2 rhabdo.  -Resolved post HD  -monitor serum potassium      Patient needs follow-up with nephrology. Can follow-up with [facility], [phone] within 2 weeks of discharge.

## 2018-07-01 ENCOUNTER — EMERGENCY (EMERGENCY)
Facility: HOSPITAL | Age: 53
LOS: 1 days | Discharge: HOME | End: 2018-07-01
Attending: EMERGENCY MEDICINE
Payer: MEDICAID

## 2018-07-01 ENCOUNTER — OUTPATIENT (OUTPATIENT)
Dept: OUTPATIENT SERVICES | Facility: HOSPITAL | Age: 53
LOS: 1 days | End: 2018-07-01
Payer: MEDICAID

## 2018-07-01 VITALS
TEMPERATURE: 98 F | RESPIRATION RATE: 18 BRPM | OXYGEN SATURATION: 96 % | SYSTOLIC BLOOD PRESSURE: 125 MMHG | DIASTOLIC BLOOD PRESSURE: 83 MMHG | HEART RATE: 86 BPM

## 2018-07-01 LAB
ALBUMIN SERPL ELPH-MCNC: 3.1 G/DL — LOW (ref 3.3–5)
ALP SERPL-CCNC: 85 U/L — SIGNIFICANT CHANGE UP (ref 40–120)
ALT FLD-CCNC: 76 U/L — HIGH (ref 10–45)
ANION GAP SERPL CALC-SCNC: 15 MMOL/L — SIGNIFICANT CHANGE UP (ref 5–17)
APPEARANCE UR: CLEAR — SIGNIFICANT CHANGE UP
AST SERPL-CCNC: 23 U/L — SIGNIFICANT CHANGE UP (ref 10–40)
BASE EXCESS BLDV CALC-SCNC: -3.3 MMOL/L — LOW (ref -2–2)
BASOPHILS # BLD AUTO: 0.1 K/UL — SIGNIFICANT CHANGE UP (ref 0–0.2)
BASOPHILS NFR BLD AUTO: 1 % — SIGNIFICANT CHANGE UP (ref 0–2)
BILIRUB SERPL-MCNC: 0.4 MG/DL — SIGNIFICANT CHANGE UP (ref 0.2–1.2)
BILIRUB UR-MCNC: NEGATIVE — SIGNIFICANT CHANGE UP
BUN SERPL-MCNC: 23 MG/DL — SIGNIFICANT CHANGE UP (ref 7–23)
CA-I SERPL-SCNC: 1.22 MMOL/L — SIGNIFICANT CHANGE UP (ref 1.12–1.3)
CALCIUM SERPL-MCNC: 8.9 MG/DL — SIGNIFICANT CHANGE UP (ref 8.4–10.5)
CHLORIDE BLDV-SCNC: 111 MMOL/L — HIGH (ref 96–108)
CHLORIDE SERPL-SCNC: 103 MMOL/L — SIGNIFICANT CHANGE UP (ref 96–108)
CO2 BLDV-SCNC: 22 MMOL/L — SIGNIFICANT CHANGE UP (ref 22–30)
CO2 SERPL-SCNC: 18 MMOL/L — LOW (ref 22–31)
COLOR SPEC: YELLOW — SIGNIFICANT CHANGE UP
COMMENT - URINE: SIGNIFICANT CHANGE UP
CREAT SERPL-MCNC: 2.25 MG/DL — HIGH (ref 0.5–1.3)
DIFF PNL FLD: NEGATIVE — SIGNIFICANT CHANGE UP
EOSINOPHIL # BLD AUTO: 0.3 K/UL — SIGNIFICANT CHANGE UP (ref 0–0.5)
EOSINOPHIL NFR BLD AUTO: 3 % — SIGNIFICANT CHANGE UP (ref 0–6)
EPI CELLS # UR: SIGNIFICANT CHANGE UP /HPF
GAS PNL BLDV: 134 MMOL/L — LOW (ref 136–145)
GAS PNL BLDV: SIGNIFICANT CHANGE UP
GAS PNL BLDV: SIGNIFICANT CHANGE UP
GLUCOSE BLDV-MCNC: 95 MG/DL — SIGNIFICANT CHANGE UP (ref 70–99)
GLUCOSE SERPL-MCNC: 101 MG/DL — HIGH (ref 70–99)
GLUCOSE UR QL: NEGATIVE — SIGNIFICANT CHANGE UP
HCO3 BLDV-SCNC: 21 MMOL/L — SIGNIFICANT CHANGE UP (ref 21–29)
HCT VFR BLD CALC: 39.9 % — SIGNIFICANT CHANGE UP (ref 39–50)
HCT VFR BLDA CALC: 40 % — SIGNIFICANT CHANGE UP (ref 39–50)
HGB BLD CALC-MCNC: 13 G/DL — SIGNIFICANT CHANGE UP (ref 13–17)
HGB BLD-MCNC: 13.4 G/DL — SIGNIFICANT CHANGE UP (ref 13–17)
HYALINE CASTS # UR AUTO: ABNORMAL
KETONES UR-MCNC: NEGATIVE — SIGNIFICANT CHANGE UP
LACTATE BLDV-MCNC: 0.9 MMOL/L — SIGNIFICANT CHANGE UP (ref 0.7–2)
LEUKOCYTE ESTERASE UR-ACNC: NEGATIVE — SIGNIFICANT CHANGE UP
LYMPHOCYTES # BLD AUTO: 1.4 K/UL — SIGNIFICANT CHANGE UP (ref 1–3.3)
LYMPHOCYTES # BLD AUTO: 21 % — SIGNIFICANT CHANGE UP (ref 13–44)
MCHC RBC-ENTMCNC: 32.6 PG — SIGNIFICANT CHANGE UP (ref 27–34)
MCHC RBC-ENTMCNC: 33.5 GM/DL — SIGNIFICANT CHANGE UP (ref 32–36)
MCV RBC AUTO: 97.4 FL — SIGNIFICANT CHANGE UP (ref 80–100)
MONOCYTES # BLD AUTO: 1.5 K/UL — HIGH (ref 0–0.9)
MONOCYTES NFR BLD AUTO: 16 % — HIGH (ref 2–14)
NEUTROPHILS # BLD AUTO: 3.8 K/UL — SIGNIFICANT CHANGE UP (ref 1.8–7.4)
NEUTROPHILS NFR BLD AUTO: 54 % — SIGNIFICANT CHANGE UP (ref 43–77)
NEUTS BAND # BLD: 5 % — SIGNIFICANT CHANGE UP (ref 0–8)
NITRITE UR-MCNC: NEGATIVE — SIGNIFICANT CHANGE UP
OTHER CELLS CSF MANUAL: 10 ML/DL — LOW (ref 18–22)
PCO2 BLDV: 39 MMHG — SIGNIFICANT CHANGE UP (ref 35–50)
PH BLDV: 7.36 — SIGNIFICANT CHANGE UP (ref 7.35–7.45)
PH UR: 5.5 — SIGNIFICANT CHANGE UP (ref 5–8)
PLAT MORPH BLD: NORMAL — SIGNIFICANT CHANGE UP
PLATELET # BLD AUTO: 366 K/UL — SIGNIFICANT CHANGE UP (ref 150–400)
PO2 BLDV: 34 MMHG — SIGNIFICANT CHANGE UP (ref 25–45)
POTASSIUM BLDV-SCNC: 4.2 MMOL/L — SIGNIFICANT CHANGE UP (ref 3.5–5.3)
POTASSIUM SERPL-MCNC: 4.1 MMOL/L — SIGNIFICANT CHANGE UP (ref 3.5–5.3)
POTASSIUM SERPL-SCNC: 4.1 MMOL/L — SIGNIFICANT CHANGE UP (ref 3.5–5.3)
PROT SERPL-MCNC: 6.7 G/DL — SIGNIFICANT CHANGE UP (ref 6–8.3)
PROT UR-MCNC: SIGNIFICANT CHANGE UP
RBC # BLD: 4.1 M/UL — LOW (ref 4.2–5.8)
RBC # FLD: 12.3 % — SIGNIFICANT CHANGE UP (ref 10.3–14.5)
RBC BLD AUTO: SIGNIFICANT CHANGE UP
RBC CASTS # UR COMP ASSIST: SIGNIFICANT CHANGE UP /HPF (ref 0–2)
SAO2 % BLDV: 59 % — LOW (ref 67–88)
SODIUM SERPL-SCNC: 136 MMOL/L — SIGNIFICANT CHANGE UP (ref 135–145)
SP GR SPEC: 1.01 — SIGNIFICANT CHANGE UP (ref 1.01–1.02)
UROBILINOGEN FLD QL: NEGATIVE — SIGNIFICANT CHANGE UP
WBC # BLD: 6.8 K/UL — SIGNIFICANT CHANGE UP (ref 3.8–10.5)
WBC # FLD AUTO: 6.8 K/UL — SIGNIFICANT CHANGE UP (ref 3.8–10.5)
WBC UR QL: SIGNIFICANT CHANGE UP /HPF (ref 0–5)

## 2018-07-01 PROCEDURE — 70450 CT HEAD/BRAIN W/O DYE: CPT | Mod: 26

## 2018-07-01 PROCEDURE — 99284 EMERGENCY DEPT VISIT MOD MDM: CPT

## 2018-07-01 RX ORDER — METOCLOPRAMIDE HCL 10 MG
10 TABLET ORAL ONCE
Qty: 0 | Refills: 0 | Status: COMPLETED | OUTPATIENT
Start: 2018-07-01 | End: 2018-07-01

## 2018-07-01 RX ORDER — SODIUM CHLORIDE 9 MG/ML
1000 INJECTION INTRAMUSCULAR; INTRAVENOUS; SUBCUTANEOUS ONCE
Qty: 0 | Refills: 0 | Status: COMPLETED | OUTPATIENT
Start: 2018-07-01 | End: 2018-07-01

## 2018-07-01 RX ORDER — ACETAMINOPHEN 500 MG
1000 TABLET ORAL ONCE
Qty: 0 | Refills: 0 | Status: COMPLETED | OUTPATIENT
Start: 2018-07-01 | End: 2018-07-01

## 2018-07-01 RX ADMIN — Medication 10 MILLIGRAM(S): at 22:41

## 2018-07-01 RX ADMIN — SODIUM CHLORIDE 1000 MILLILITER(S): 9 INJECTION INTRAMUSCULAR; INTRAVENOUS; SUBCUTANEOUS at 22:12

## 2018-07-01 RX ADMIN — Medication 400 MILLIGRAM(S): at 22:12

## 2018-07-01 RX ADMIN — Medication 1000 MILLIGRAM(S): at 23:01

## 2018-07-01 NOTE — ED PROVIDER NOTE - MEDICAL DECISION MAKING DETAILS
ATTG: : headache with recent stroke, and rhabdo. will check ct head, check labs, check urine, check neuro eval. pain medication and re eval for dispo.

## 2018-07-01 NOTE — CONSULT NOTE ADULT - ASSESSMENT
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 Now complaining of HA for past 2 days, intermittent (lasting up to a minute), multiple times a day, b/l frontal, difficult to characterize, 4-7/10, non-radiating, associated with photo/phonophobia. Currently no HA. Notes multiple episodes of diarrhea a day. Exam significant for chronic right sided weakness/numbness, RHH and right sided hyperreflexia/upgoing right toe. Head CT shows no acute pathology. HA sounds migrainous in nature, likely related to/exacerbated by diffuse diarrhea.    Recommendations:  - HA is now resolved, no need for further acute treatment  - IV hydration as per primary team  - Assess for possible underlying infection given diffuse diarrhea  - f/u neurology as outpatient, can call [phone], for [facility] appointment

## 2018-07-01 NOTE — ED PROVIDER NOTE - CARE PLAN
Principal Discharge DX:	Headache  Assessment and plan of treatment:	1. Follow up with your primary care physician within 2-3days for reevaluation.  2.  Return to the Emergency Department for worsening, progressive or any other concerning symptoms.  Secondary Diagnosis:	Dehydration

## 2018-07-01 NOTE — ED PROVIDER NOTE - OBJECTIVE STATEMENT
54yo male PMH CVA with RUE weakness2 weeks ago treated at [facility] presenting with headache that is located at top of head, sharp, lasting a few seconds. Patient's son found 52yo male PMH CVA with RUE weakness2 weeks ago treated at [facility] presenting with headache that is located at top of head, pressure like, associated with dizziness. Patient denies any new neuro deficits. Feels like he is dehydrated. No fevers or chills. No shortness of breath, no nausea or vomiting. Patient states he has been having liquid stools for over 2 weeks and has only recently reincorporated solids back into his diet 2 days ago.

## 2018-07-01 NOTE — ED PROVIDER NOTE - PHYSICAL EXAMINATION
Gen: NAD  Head: NCAT  HEENT: EOMI, oral mucosa dry, normal conjunctiva  Lung: CTAB, no respiratory distress, no wheezing, rales, rhonchi  CV: normal s1/s2, rrr, no murmurs, Normal perfusion  Abd: soft, NTND  MSK: No edema, no visible deformities, full range of motion in all 4 extremities  Neuro: No focal neurologic deficits  Skin: No rash

## 2018-07-01 NOTE — ED ADULT TRIAGE NOTE - CHIEF COMPLAINT QUOTE
brought in by ems for severe headache that started this afternoon. patient recently dc from hospital for cva, now w headache, dizziness.   Evaluated by MD at the bedside. patient to go straight to ct for imaging

## 2018-07-01 NOTE — ED PROVIDER NOTE - ATTENDING CONTRIBUTION TO CARE
54 y/o m with pmhx CVA diagnosed here at [place] on June 17th. Patient was transferred from [facility] for evalaution after found to have ARF, rhabdo, UTI, and CVA. Initial patient was found by sone with a change in mental state, found on the floor. today with no fever no chills. continued headache on top of head which is new. no new weakness or numbness but has residual right side upper ext weakness. on his last admission patient was transferred from [place] because he had elevated CK suggested rhabdomyolysis c/b acute renal failure and transferred to [facility]-ICU for management.  no change in vision no change in hearing. no other complaints. came by EMS.  Gen.  no acute distress  HEENT:  PERRL EOMI symmetrical smile.   Lungs:  b/l bs  CVS: S1S2   Abd;  soft no tend  Ext: no edema.   Neuro: awake, alert, oriented to person place and time. clear comprehensible speech.  MSK:

## 2018-07-01 NOTE — CONSULT NOTE ADULT - SUBJECTIVE AND OBJECTIVE BOX
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 Pt now presents for complaint of HA for the past 2 days, intermittent (lasting up to a minute), occurring multiple times a day, b/l frontal, difficult to characterize, severity ranges from 4-7/10, non-radiating, associated with photo/phonophobia. Currently no HA. Pt also notes that ever since discharge he has been having multiple episodes of diarrhea a day. Otherwise pt denies any recent fevers, chills, dizziness, vision changes, tingling, new weakness/numbness, CP, SOB, cough, nausea, vomiting, abdominal pain, changes in urination.      MEDICATIONS  (STANDING):    MEDICATIONS  (PRN):    PAST MEDICAL & SURGICAL HISTORY:  Drug abuse  CVA (cerebral vascular accident)  No significant past surgical history    FAMILY HISTORY:  No pertinent family history in first degree relatives    Allergies    Bactrim (Rash (Skin))    Intolerances    SHx - No smoking, No ETOH, No drug abuse    Review of Systems:  See HPI.    Vital Signs Last 24 Hrs  T(C): 36.9 (01 Jul 2018 21:59), Max: 36.9 (01 Jul 2018 21:45)  T(F): 98.4 (01 Jul 2018 21:59), Max: 98.4 (01 Jul 2018 21:45)  HR: 73 (01 Jul 2018 23:00) (73 - 86)  BP: 109/75 (01 Jul 2018 23:00) (109/75 - 125/83)  BP(mean): --  RR: 17 (01 Jul 2018 23:00) (17 - 18)  SpO2: 97% (01 Jul 2018 23:00) (96% - 97%)      General Exam:   General appearance: No acute distress    Neurological Exam:  Mental Status: Orientated to self, date and place.  Attention intact.  No dysarthria. Speech fluent.  Cranial Nerves: PERRL, EOMI, RHH (chornic), no nystagmus. CN V1-3 decreased to light touch on the right. Mild right lower facial asymmetry.  Hearing intact to finger rub bilaterally.  Tongue, uvula and palate midline.  Sternocleidomastoid and Trapezius intact bilaterally.    Motor:   Tone: normal.                  Strength:     [] Upper extremity                      Delt       Bicep    Tricep                                                  R         4-/5        4-/5        4/5       3/5                                               L          5/5        5/5        5/5       5/5  [] Lower extremity                       HF          KE          KF        DF         PF                                               R        4/5        4/5        4/5       4/5       4/5                                               L         5/5        5/5       5/5       5/5        5/5  Pronator drift: RUE  Dysmetria: None to finger-nose-finger b/l  No truncal ataxia.    Tremor: No resting, postural or action tremor.  No myoclonus.    Sensation: decreased to light touch on the left side, no extinction    Deep Tendon Reflexes:              Biceps          BR        Patellar        Ankle         Babinski                                         R       2+               2+        2+               1+            upgoing                                         L        1+               1+        1+               0              downgoing      07-01    136  |  103  |  23  ----------------------------<  101<H>  4.1   |  18<L>  |  2.25<H>    Ca    8.9      01 Jul 2018 22:12    TPro  6.7  /  Alb  3.1<L>  /  TBili  0.4  /  DBili  x   /  AST  23  /  ALT  76<H>  /  AlkPhos  85  07-01               13.4   6.8   )-----------( 366      ( 01 Jul 2018 22:12 )             39.9     Radiology:  < from: CT Head No Cont (07.01.18 @ 21:54) >  Unchanged low density involving the left MCA distribution involving the   left parietal and occipital regions are unchanged.    No new areas of hemorrhage.

## 2018-07-01 NOTE — CONSULT NOTE ADULT - ATTENDING COMMENTS
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 Cam back today for repeated stabbing headaches with no focal neurological deficits and improved by time of resident exam.  To f/u as outpatient.

## 2018-07-01 NOTE — ED ADULT NURSE NOTE - OBJECTIVE STATEMENT
53 y.o M presents to the ED via EMS from home c/o headache. As per EMS the patient had a recent CVA and was discharged 3 days ago and had a headache since yesterday; EMS states that prior to CVA, the patient smoked crack. Patient is awake, alert and speaking coherently- states that he had a headache in the front part of his head since yesterday that has not been resolved with 2 tablets of Advil; patient confirmed past drug abuse and states that he has had right sided deficits since the CVA. Patient presents A&Ox3 and afebrile; + headache and +perrl 2 mm, R upper and lower extremity weak, denies dizziness, denies visual changes, denies photophobia and diplopia, denies chest pain and SOB; abdomen soft, nontender and nondistended, denies n/v/d. 53 y.o M presents to the ED via EMS from home c/o headache. As per EMS the patient had a recent CVA and was discharged 3 days ago and had a headache since yesterday; EMS states that prior to CVA, the patient smoked crack. Patient is awake, alert and speaking coherently- states that he had a headache in the front part of his head since yesterday that has not been resolved with 2 tablets of Advil; patient confirmed past drug abuse and states that he has had right sided deficits since the CVA. Patient presents A&Ox3 and afebrile; + headache and +perrl 2 mm, R upper and lower extremity weak, denies dizziness, denies visual changes, denies photophobia and diplopia, denies chest pain and SOB; abdomen soft, nontender and nondistended, denies n/v/d. Cardiac monitoring initiated- simus rhythm on the monitor.

## 2018-07-02 VITALS
SYSTOLIC BLOOD PRESSURE: 111 MMHG | RESPIRATION RATE: 16 BRPM | DIASTOLIC BLOOD PRESSURE: 70 MMHG | OXYGEN SATURATION: 95 % | HEART RATE: 73 BPM

## 2018-07-02 PROCEDURE — 80053 COMPREHEN METABOLIC PANEL: CPT

## 2018-07-02 PROCEDURE — 84295 ASSAY OF SERUM SODIUM: CPT

## 2018-07-02 PROCEDURE — 85027 COMPLETE CBC AUTOMATED: CPT

## 2018-07-02 PROCEDURE — 82947 ASSAY GLUCOSE BLOOD QUANT: CPT

## 2018-07-02 PROCEDURE — 84132 ASSAY OF SERUM POTASSIUM: CPT

## 2018-07-02 PROCEDURE — 96375 TX/PRO/DX INJ NEW DRUG ADDON: CPT

## 2018-07-02 PROCEDURE — 82435 ASSAY OF BLOOD CHLORIDE: CPT

## 2018-07-02 PROCEDURE — 81001 URINALYSIS AUTO W/SCOPE: CPT

## 2018-07-02 PROCEDURE — 82803 BLOOD GASES ANY COMBINATION: CPT

## 2018-07-02 PROCEDURE — 70450 CT HEAD/BRAIN W/O DYE: CPT

## 2018-07-02 PROCEDURE — 83605 ASSAY OF LACTIC ACID: CPT

## 2018-07-02 PROCEDURE — 82330 ASSAY OF CALCIUM: CPT

## 2018-07-02 PROCEDURE — 82962 GLUCOSE BLOOD TEST: CPT

## 2018-07-02 PROCEDURE — 99284 EMERGENCY DEPT VISIT MOD MDM: CPT | Mod: 25

## 2018-07-02 PROCEDURE — 96374 THER/PROPH/DIAG INJ IV PUSH: CPT

## 2018-07-02 PROCEDURE — 85014 HEMATOCRIT: CPT

## 2018-07-02 PROCEDURE — 82565 ASSAY OF CREATININE: CPT

## 2018-07-02 RX ORDER — SODIUM CHLORIDE 9 MG/ML
1000 INJECTION INTRAMUSCULAR; INTRAVENOUS; SUBCUTANEOUS ONCE
Qty: 0 | Refills: 0 | Status: COMPLETED | OUTPATIENT
Start: 2018-07-02 | End: 2018-07-02

## 2018-07-02 RX ADMIN — SODIUM CHLORIDE 1000 MILLILITER(S): 9 INJECTION INTRAMUSCULAR; INTRAVENOUS; SUBCUTANEOUS at 01:01

## 2018-07-02 NOTE — ED ADULT NURSE REASSESSMENT NOTE - NS ED NURSE REASSESS COMMENT FT1
Patient sleeping comfortably- son and girlfriend at the bedside. VSS. Sinus rhythm remains on the cardiac monitor.

## 2018-07-03 ENCOUNTER — INPATIENT (INPATIENT)
Facility: HOSPITAL | Age: 53
LOS: 8 days | Discharge: NURSING, REHAB OR LONG-TERM CARE | DRG: 683 | End: 2018-07-12
Attending: INTERNAL MEDICINE | Admitting: INTERNAL MEDICINE
Payer: MEDICAID

## 2018-07-03 VITALS
HEART RATE: 97 BPM | TEMPERATURE: 98 F | HEIGHT: 71 IN | WEIGHT: 229.94 LBS | SYSTOLIC BLOOD PRESSURE: 115 MMHG | RESPIRATION RATE: 16 BRPM | DIASTOLIC BLOOD PRESSURE: 70 MMHG | OXYGEN SATURATION: 96 %

## 2018-07-03 DIAGNOSIS — I63.9 CEREBRAL INFARCTION, UNSPECIFIED: ICD-10-CM

## 2018-07-03 DIAGNOSIS — R53.1 WEAKNESS: ICD-10-CM

## 2018-07-03 DIAGNOSIS — F19.10 OTHER PSYCHOACTIVE SUBSTANCE ABUSE, UNCOMPLICATED: ICD-10-CM

## 2018-07-03 DIAGNOSIS — N18.4 CHRONIC KIDNEY DISEASE, STAGE 4 (SEVERE): ICD-10-CM

## 2018-07-03 DIAGNOSIS — R19.7 DIARRHEA, UNSPECIFIED: ICD-10-CM

## 2018-07-03 DIAGNOSIS — Z29.9 ENCOUNTER FOR PROPHYLACTIC MEASURES, UNSPECIFIED: ICD-10-CM

## 2018-07-03 DIAGNOSIS — E78.5 HYPERLIPIDEMIA, UNSPECIFIED: ICD-10-CM

## 2018-07-03 LAB
ALBUMIN SERPL ELPH-MCNC: 2.4 G/DL — LOW (ref 3.3–5)
ALP SERPL-CCNC: 92 U/L — SIGNIFICANT CHANGE UP (ref 30–120)
ALT FLD-CCNC: 77 U/L DA — HIGH (ref 10–60)
ANION GAP SERPL CALC-SCNC: 12 MMOL/L — SIGNIFICANT CHANGE UP (ref 5–17)
APPEARANCE UR: CLEAR — SIGNIFICANT CHANGE UP
APTT BLD: 30.6 SEC — SIGNIFICANT CHANGE UP (ref 27.5–37.4)
AST SERPL-CCNC: 23 U/L — SIGNIFICANT CHANGE UP (ref 10–40)
BASOPHILS # BLD AUTO: 0 K/UL — SIGNIFICANT CHANGE UP (ref 0–0.2)
BASOPHILS NFR BLD AUTO: 0 % — SIGNIFICANT CHANGE UP (ref 0–2)
BILIRUB SERPL-MCNC: 0.3 MG/DL — SIGNIFICANT CHANGE UP (ref 0.2–1.2)
BILIRUB UR-MCNC: NEGATIVE — SIGNIFICANT CHANGE UP
BUN SERPL-MCNC: 22 MG/DL — SIGNIFICANT CHANGE UP (ref 7–23)
CALCIUM SERPL-MCNC: 9.2 MG/DL — SIGNIFICANT CHANGE UP (ref 8.4–10.5)
CHLORIDE SERPL-SCNC: 105 MMOL/L — SIGNIFICANT CHANGE UP (ref 96–108)
CO2 SERPL-SCNC: 21 MMOL/L — LOW (ref 22–31)
COLOR SPEC: YELLOW — SIGNIFICANT CHANGE UP
CREAT SERPL-MCNC: 2.6 MG/DL — HIGH (ref 0.5–1.3)
DIFF PNL FLD: ABNORMAL
EOSINOPHIL # BLD AUTO: 0.19 K/UL — SIGNIFICANT CHANGE UP (ref 0–0.5)
EOSINOPHIL NFR BLD AUTO: 3 % — SIGNIFICANT CHANGE UP (ref 0–6)
GLUCOSE SERPL-MCNC: 110 MG/DL — HIGH (ref 70–99)
GLUCOSE UR QL: NEGATIVE MG/DL — SIGNIFICANT CHANGE UP
HCT VFR BLD CALC: 40 % — SIGNIFICANT CHANGE UP (ref 39–50)
HGB BLD-MCNC: 13.2 G/DL — SIGNIFICANT CHANGE UP (ref 13–17)
INR BLD: 1.27 RATIO — HIGH (ref 0.88–1.16)
KETONES UR-MCNC: NEGATIVE — SIGNIFICANT CHANGE UP
LEUKOCYTE ESTERASE UR-ACNC: NEGATIVE — SIGNIFICANT CHANGE UP
LYMPHOCYTES # BLD AUTO: 2.2 K/UL — SIGNIFICANT CHANGE UP (ref 1–3.3)
LYMPHOCYTES # BLD AUTO: 34 % — SIGNIFICANT CHANGE UP (ref 13–44)
MCHC RBC-ENTMCNC: 31 PG — SIGNIFICANT CHANGE UP (ref 27–34)
MCHC RBC-ENTMCNC: 33 GM/DL — SIGNIFICANT CHANGE UP (ref 32–36)
MCV RBC AUTO: 93.9 FL — SIGNIFICANT CHANGE UP (ref 80–100)
MONOCYTES # BLD AUTO: 0.52 K/UL — SIGNIFICANT CHANGE UP (ref 0–0.9)
MONOCYTES NFR BLD AUTO: 8 % — SIGNIFICANT CHANGE UP (ref 2–14)
NEUTROPHILS # BLD AUTO: 3.56 K/UL — SIGNIFICANT CHANGE UP (ref 1.8–7.4)
NEUTROPHILS NFR BLD AUTO: 50 % — SIGNIFICANT CHANGE UP (ref 43–77)
NITRITE UR-MCNC: NEGATIVE — SIGNIFICANT CHANGE UP
PH UR: 5 — SIGNIFICANT CHANGE UP (ref 5–8)
PLATELET # BLD AUTO: 344 K/UL — SIGNIFICANT CHANGE UP (ref 150–400)
POTASSIUM SERPL-MCNC: 3.8 MMOL/L — SIGNIFICANT CHANGE UP (ref 3.5–5.3)
POTASSIUM SERPL-SCNC: 3.8 MMOL/L — SIGNIFICANT CHANGE UP (ref 3.5–5.3)
PROT SERPL-MCNC: 6.7 G/DL — SIGNIFICANT CHANGE UP (ref 6–8.3)
PROT UR-MCNC: 30 MG/DL
PROTHROM AB SERPL-ACNC: 13.9 SEC — HIGH (ref 9.8–12.7)
RBC # BLD: 4.26 M/UL — SIGNIFICANT CHANGE UP (ref 4.2–5.8)
RBC # FLD: 13.1 % — SIGNIFICANT CHANGE UP (ref 10.3–14.5)
SODIUM SERPL-SCNC: 138 MMOL/L — SIGNIFICANT CHANGE UP (ref 135–145)
SP GR SPEC: 1.01 — SIGNIFICANT CHANGE UP (ref 1.01–1.02)
UROBILINOGEN FLD QL: NEGATIVE MG/DL — SIGNIFICANT CHANGE UP
WBC # BLD: 6.48 K/UL — SIGNIFICANT CHANGE UP (ref 3.8–10.5)
WBC # FLD AUTO: 6.48 K/UL — SIGNIFICANT CHANGE UP (ref 3.8–10.5)

## 2018-07-03 PROCEDURE — 93010 ELECTROCARDIOGRAM REPORT: CPT

## 2018-07-03 PROCEDURE — 12345: CPT | Mod: NC

## 2018-07-03 PROCEDURE — 99283 EMERGENCY DEPT VISIT LOW MDM: CPT

## 2018-07-03 PROCEDURE — 99223 1ST HOSP IP/OBS HIGH 75: CPT | Mod: AI

## 2018-07-03 RX ORDER — LACTOBACILLUS ACIDOPHILUS 100MM CELL
1 CAPSULE ORAL
Qty: 0 | Refills: 0 | Status: DISCONTINUED | OUTPATIENT
Start: 2018-07-03 | End: 2018-07-12

## 2018-07-03 RX ORDER — HEPARIN SODIUM 5000 [USP'U]/ML
5000 INJECTION INTRAVENOUS; SUBCUTANEOUS EVERY 12 HOURS
Qty: 0 | Refills: 0 | Status: DISCONTINUED | OUTPATIENT
Start: 2018-07-03 | End: 2018-07-12

## 2018-07-03 RX ORDER — SODIUM CHLORIDE 9 MG/ML
1000 INJECTION INTRAMUSCULAR; INTRAVENOUS; SUBCUTANEOUS ONCE
Qty: 0 | Refills: 0 | Status: COMPLETED | OUTPATIENT
Start: 2018-07-03 | End: 2018-07-03

## 2018-07-03 RX ORDER — ASPIRIN/CALCIUM CARB/MAGNESIUM 324 MG
81 TABLET ORAL DAILY
Qty: 0 | Refills: 0 | Status: DISCONTINUED | OUTPATIENT
Start: 2018-07-03 | End: 2018-07-12

## 2018-07-03 RX ORDER — SODIUM CHLORIDE 9 MG/ML
3 INJECTION INTRAMUSCULAR; INTRAVENOUS; SUBCUTANEOUS ONCE
Qty: 0 | Refills: 0 | Status: COMPLETED | OUTPATIENT
Start: 2018-07-03 | End: 2018-07-03

## 2018-07-03 RX ORDER — LOPERAMIDE HCL 2 MG
2 TABLET ORAL EVERY 8 HOURS
Qty: 0 | Refills: 0 | Status: DISCONTINUED | OUTPATIENT
Start: 2018-07-03 | End: 2018-07-03

## 2018-07-03 RX ORDER — SODIUM CHLORIDE 9 MG/ML
1000 INJECTION, SOLUTION INTRAVENOUS
Qty: 0 | Refills: 0 | Status: DISCONTINUED | OUTPATIENT
Start: 2018-07-03 | End: 2018-07-05

## 2018-07-03 RX ORDER — CHOLESTYRAMINE 4 G/9G
4 POWDER, FOR SUSPENSION ORAL DAILY
Qty: 0 | Refills: 0 | Status: DISCONTINUED | OUTPATIENT
Start: 2018-07-03 | End: 2018-07-09

## 2018-07-03 RX ORDER — VANCOMYCIN HCL 1 G
500 VIAL (EA) INTRAVENOUS EVERY 6 HOURS
Qty: 0 | Refills: 0 | Status: DISCONTINUED | OUTPATIENT
Start: 2018-07-03 | End: 2018-07-04

## 2018-07-03 RX ORDER — MORPHINE SULFATE 50 MG/1
2 CAPSULE, EXTENDED RELEASE ORAL ONCE
Qty: 0 | Refills: 0 | Status: DISCONTINUED | OUTPATIENT
Start: 2018-07-03 | End: 2018-07-03

## 2018-07-03 RX ADMIN — SODIUM CHLORIDE 1000 MILLILITER(S): 9 INJECTION INTRAMUSCULAR; INTRAVENOUS; SUBCUTANEOUS at 13:36

## 2018-07-03 RX ADMIN — MORPHINE SULFATE 2 MILLIGRAM(S): 50 CAPSULE, EXTENDED RELEASE ORAL at 17:15

## 2018-07-03 RX ADMIN — SODIUM CHLORIDE 100 MILLILITER(S): 9 INJECTION, SOLUTION INTRAVENOUS at 21:57

## 2018-07-03 RX ADMIN — SODIUM CHLORIDE 3 MILLILITER(S): 9 INJECTION INTRAMUSCULAR; INTRAVENOUS; SUBCUTANEOUS at 13:33

## 2018-07-03 RX ADMIN — CHOLESTYRAMINE 4 GRAM(S): 4 POWDER, FOR SUSPENSION ORAL at 15:05

## 2018-07-03 RX ADMIN — Medication 500 MILLIGRAM(S): at 18:54

## 2018-07-03 RX ADMIN — Medication 500 MILLIGRAM(S): at 23:58

## 2018-07-03 RX ADMIN — SODIUM CHLORIDE 100 MILLILITER(S): 9 INJECTION, SOLUTION INTRAVENOUS at 19:15

## 2018-07-03 RX ADMIN — Medication 1 TABLET(S): at 21:57

## 2018-07-03 NOTE — CONSULT NOTE ADULT - PROBLEM SELECTOR RECOMMENDATION 9
in and out  bacid one tab tid  low residue lactose free diet  stool studies  to follow  if stool negative consider imodium

## 2018-07-03 NOTE — ED PROVIDER NOTE - CHPI ED SYMPTOMS NEG
no chills/no decreased eating/drinking/no tingling/no dizziness/no nausea/no numbness/no vomiting/no fever

## 2018-07-03 NOTE — CONSULT NOTE ADULT - SUBJECTIVE AND OBJECTIVE BOX
Chief Complaint:  Patient is a 53y old  Male who presents with a chief complaint of  diarrhea 6-8 bm a day and is having some blood as weklkl diarrhea started after a recent stroke no ivda no prbc here for follow up never had a colonosocpy done in the past no melena no travel no sick contacts     Allergies:  Bactrim (Rash (Skin))      Medications:      PMHX/PSHX:  Hyperlipemia  Drug abuse  CVA (cerebral vascular accident)  No pertinent past medical history  No significant past surgical history      Family history:  No pertinent family history in first degree relatives      Social History: lives at home no ivda no prbc no tatoos      ROS:     General:  No wt loss, fevers, chills, night sweats, fatigue,   Eyes:  Good vision, no reported pain  ENT:  No sore throat, pain, runny nose, dysphagia  CV:  No pain, palpitations, hypo/hypertension  Resp:  No dyspnea, cough, tachypnea, wheezing  GI:  No pain, No nausea, No vomiting, No diarrhea, No constipation, No weight loss, No fever, No pruritis, No rectal bleeding, No tarry stools, No dysphagia,  :  No pain, bleeding, incontinence, nocturia  Muscle:  No pain, weakness  Neuro:  No weakness, tingling, memory problems  Psych:  No fatigue, insomnia, mood problems, depression  Endocrine:  No polyuria, polydipsia, cold/heat intolerance  Heme:  No petechiae, ecchymosis, easy bruisability  Skin:  No rash, tattoos, scars, edema      PHYSICAL EXAM:   Vital Signs:  Vital Signs Last 24 Hrs  T(C): 36.9 (2018 13:06), Max: 36.9 (2018 13:06)  T(F): 98.4 (2018 13:06), Max: 98.4 (2018 13:06)  HR: 97 (2018 13:06) (97 - 97)  BP: 115/70 (2018 13:06) (115/70 - 115/70)  BP(mean): --  RR: 16 (2018 13:06) (16 - 16)  SpO2: 96% (2018 13:06) (96% - 96%)  Daily Height in cm: 180.34 (2018 13:06)    Daily     GENERAL:  Appears stated age, well-groomed, well-nourished, no distress  HEENT:  NC/AT,  conjunctivae clear and pink, no thyromegaly, nodules, adenopathy, no JVD, sclera -anicteric  CHEST:  Full & symmetric excursion, no increased effort, breath sounds clear  HEART:  Regular rhythm, S1, S2, no murmur/rub/S3/S4, no abdominal bruit, no edema  ABDOMEN:  Soft, non-tender, non-distended, normoactive bowel sounds,  no masses ,no hepato-splenomegaly, no signs of chronic liver disease  EXTEREMITIES:  no cyanosis,clubbing or edema  SKIN:  No rash/erythema/ecchymoses/petechiae/wounds/abscess/warm/dry  NEURO:  Alert, oriented, no asterixis, no tremor, no encephalopathy    LABS:                        13.2   6.48  )-----------( 344      ( 2018 13:46 )             40.0     07-03    138  |  105  |  22  ----------------------------<  110<H>  3.8   |  21<L>  |  2.60<H>    Ca    9.2      2018 13:46    TPro  6.7  /  Alb  2.4<L>  /  TBili  0.3  /  DBili  x   /  AST  23  /  ALT  77<H>  /  AlkPhos  92  07-03    LIVER FUNCTIONS - ( 2018 13:46 )  Alb: 2.4 g/dL / Pro: 6.7 g/dL / ALK PHOS: 92 U/L / ALT: 77 U/L DA / AST: 23 U/L / GGT: x           PT/INR - ( 2018 13:46 )   PT: 13.9 sec;   INR: 1.27 ratio         PTT - ( 2018 13:46 )  PTT:30.6 sec  Urinalysis Basic - ( 2018 23:11 )    Color: Yellow / Appearance: Clear / S.013 / pH: x  Gluc: x / Ketone: Negative  / Bili: Negative / Urobili: Negative   Blood: x / Protein: Trace / Nitrite: Negative   Leuk Esterase: Negative / RBC: 0-2 /HPF / WBC 3-5 /HPF   Sq Epi: x / Non Sq Epi: OCC /HPF / Bacteria: x          Imaging:

## 2018-07-03 NOTE — PHYSICAL THERAPY INITIAL EVALUATION ADULT - ADDITIONAL COMMENTS
pt lives alone, has no stairs inside home or to enter home. Pt has no assistive device. Pt reports that he was d/c from hospital without an assistive device, and that he has no been able to care for himself at home. Pt does not have any family to assist him. Pt has no  strength on right hand, and would need assist at home for ADLs.

## 2018-07-03 NOTE — PHYSICAL THERAPY INITIAL EVALUATION ADULT - PERTINENT HX OF CURRENT PROBLEM, REHAB EVAL
pt is a 54 y/o recently hospitalized at [facility] for acute CVA related to drug and alcohol abuse, right sided weakness. Pt admitted, reporting that he cannot care for himself

## 2018-07-03 NOTE — ED ADULT NURSE NOTE - OBJECTIVE STATEMENT
Pt presents with complaint of weakness for 3 weeks with watery brown diarrhea. Alert oriented x 3. SHALONDA. weakness noted right upper extremity. Mild weakness noted right lower extremity. Lungs clear on auscultation. Abdomen soft non tender. Positive bowel sounds noted. 12 cm x 12 cm blanchable erythema noted on buttocks

## 2018-07-03 NOTE — ED PROVIDER NOTE - OBJECTIVE STATEMENT
pt is a 52yo male with pmhx of HTN, HLD, CVA 3 weeks ago c/o weakness x 3 weeks. pt reports since his stroke he has been weak and has not been able to take care of himself. pt reports no new weakness. pt states he was seen at [facility] 2 days ago, seen by neurologist and sent home without acute findings. pt followed up with pmd today ana who advised pt he needs ed eval for ELIAZAR. pt endorses diarrhea since d/c without abd pain. pt has been eating and drinking without issue. pt reports he cannot take care of himself at home.

## 2018-07-03 NOTE — PHYSICAL THERAPY INITIAL EVALUATION ADULT - IMPAIRMENTS CONTRIBUTING TO GAIT DEVIATIONS, PT EVAL
impaired balance/impaired motor control/decreased strength/abnormal muscle tone/pt seems to have right sided neglect, possibly due to vision as pt stated he feels he cannot see as well peripherally, pt with decreased safety awareness, bumping into objects on right side

## 2018-07-03 NOTE — H&P ADULT - PROBLEM SELECTOR PLAN 6
CKD- 4 , with CT imaging showing left atrophic kidney.    - from rhabdomyelysis -recently in last admission last month  + hemodynamic injury from cocaine abuse.   -monitor creatinine trend and urine output  -avoid nephrotoxins, ace/arb, imaging contrast.   Renal consult.

## 2018-07-03 NOTE — ED PROVIDER NOTE - PROGRESS NOTE DETAILS
Attending Contribution to Care:  53 y.o M recently hospitalized at [facility] for acute CVA related to drug and alcohol abuse, discharged home and was supposed to arrange outpatient rehab but had insurance issues, BIB son today for evaluation of R-sided weakness after the stroke and inability to take care of himself. Seen by Dr. Ramirez in the office today who referred him to the ER to be admitted and referred to rehab facility. Pt denies acute symptom or problem. PE vital signs stable, residual R-sided weakness, awake and alert and oriented x3, PERRL, no other neurological deficits. Impression- post-CVA weakness. Plan- may need readmission to get to sub-acute rehab. pt seen and evaluated by PT advised for ELIAZAR. will admit to medicine dr conway. dr glynn will eval pt for diarrhea.

## 2018-07-03 NOTE — H&P ADULT - HISTORY OF PRESENT ILLNESS
52yo male with pmhx of HTN, HLD, CVA 3 weeks ago c/o weakness x 3 weeks. pt reports since his stroke he has been weak and has not been able to take care of himself. pt reports no new weakness. pt states he was seen at [facility] 2 days ago, seen by neurologist and sent home without acute findings. pt followed up with pmd today   who advised pt he needs ED eval for ELIAZAR. pt endorses diarrhea since d/c without abd pain. pt has been eating and drinking without issue. pt reports he cannot take care of himself at home. patient is also

## 2018-07-03 NOTE — PHYSICAL THERAPY INITIAL EVALUATION ADULT - ACTIVE RANGE OF MOTION EXAMINATION, REHAB EVAL
bilateral upper extremity Active ROM was WFL (within functional limits)/bilateral  lower extremity Active ROM was WFL (within functional limits)/no active right wrist and hand ROM

## 2018-07-04 DIAGNOSIS — R51 HEADACHE: ICD-10-CM

## 2018-07-04 DIAGNOSIS — B37.0 CANDIDAL STOMATITIS: ICD-10-CM

## 2018-07-04 LAB
ALBUMIN SERPL ELPH-MCNC: 2.2 G/DL — LOW (ref 3.3–5)
ALP SERPL-CCNC: 90 U/L — SIGNIFICANT CHANGE UP (ref 30–120)
ALT FLD-CCNC: 67 U/L DA — HIGH (ref 10–60)
AMPHET UR-MCNC: NEGATIVE — SIGNIFICANT CHANGE UP
ANION GAP SERPL CALC-SCNC: 10 MMOL/L — SIGNIFICANT CHANGE UP (ref 5–17)
AST SERPL-CCNC: 20 U/L — SIGNIFICANT CHANGE UP (ref 10–40)
BARBITURATES UR SCN-MCNC: NEGATIVE — SIGNIFICANT CHANGE UP
BASOPHILS # BLD AUTO: 0 K/UL — SIGNIFICANT CHANGE UP (ref 0–0.2)
BASOPHILS NFR BLD AUTO: 0 % — SIGNIFICANT CHANGE UP (ref 0–2)
BENZODIAZ UR-MCNC: NEGATIVE — SIGNIFICANT CHANGE UP
BILIRUB SERPL-MCNC: 0.3 MG/DL — SIGNIFICANT CHANGE UP (ref 0.2–1.2)
BUN SERPL-MCNC: 22 MG/DL — SIGNIFICANT CHANGE UP (ref 7–23)
C DIFF BY PCR RESULT: SIGNIFICANT CHANGE UP
C DIFF TOX GENS STL QL NAA+PROBE: SIGNIFICANT CHANGE UP
CALCIUM SERPL-MCNC: 9 MG/DL — SIGNIFICANT CHANGE UP (ref 8.4–10.5)
CHLORIDE SERPL-SCNC: 108 MMOL/L — SIGNIFICANT CHANGE UP (ref 96–108)
CHOLEST SERPL-MCNC: 99 MG/DL — SIGNIFICANT CHANGE UP (ref 10–199)
CO2 SERPL-SCNC: 19 MMOL/L — LOW (ref 22–31)
COCAINE METAB.OTHER UR-MCNC: NEGATIVE — SIGNIFICANT CHANGE UP
CREAT SERPL-MCNC: 2.31 MG/DL — HIGH (ref 0.5–1.3)
CULTURE RESULTS: SIGNIFICANT CHANGE UP
EOSINOPHIL # BLD AUTO: 0.22 K/UL — SIGNIFICANT CHANGE UP (ref 0–0.5)
EOSINOPHIL NFR BLD AUTO: 3 % — SIGNIFICANT CHANGE UP (ref 0–6)
GLUCOSE SERPL-MCNC: 85 MG/DL — SIGNIFICANT CHANGE UP (ref 70–99)
HBA1C BLD-MCNC: 5.3 % — SIGNIFICANT CHANGE UP (ref 4–5.6)
HBV SURFACE AB SER-ACNC: SIGNIFICANT CHANGE UP
HBV SURFACE AG SER-ACNC: SIGNIFICANT CHANGE UP
HCT VFR BLD CALC: 38.3 % — LOW (ref 39–50)
HCV AB S/CO SERPL IA: 0.12 S/CO — SIGNIFICANT CHANGE UP
HCV AB SERPL-IMP: SIGNIFICANT CHANGE UP
HDLC SERPL-MCNC: 35 MG/DL — LOW (ref 40–125)
HGB BLD-MCNC: 12.7 G/DL — LOW (ref 13–17)
HIV 1+2 AB+HIV1 P24 AG SERPL QL IA: SIGNIFICANT CHANGE UP
LIPID PNL WITH DIRECT LDL SERPL: 38 MG/DL — SIGNIFICANT CHANGE UP
LYMPHOCYTES # BLD AUTO: 1.63 K/UL — SIGNIFICANT CHANGE UP (ref 1–3.3)
LYMPHOCYTES # BLD AUTO: 22 % — SIGNIFICANT CHANGE UP (ref 13–44)
MANUAL SMEAR VERIFICATION: SIGNIFICANT CHANGE UP
MCHC RBC-ENTMCNC: 31.2 PG — SIGNIFICANT CHANGE UP (ref 27–34)
MCHC RBC-ENTMCNC: 33.2 GM/DL — SIGNIFICANT CHANGE UP (ref 32–36)
MCV RBC AUTO: 94.1 FL — SIGNIFICANT CHANGE UP (ref 80–100)
METHADONE UR-MCNC: NEGATIVE — SIGNIFICANT CHANGE UP
MONOCYTES # BLD AUTO: 1.04 K/UL — HIGH (ref 0–0.9)
MONOCYTES NFR BLD AUTO: 14 % — SIGNIFICANT CHANGE UP (ref 2–14)
NEUTROPHILS # BLD AUTO: 4.53 K/UL — SIGNIFICANT CHANGE UP (ref 1.8–7.4)
NEUTROPHILS NFR BLD AUTO: 43 % — SIGNIFICANT CHANGE UP (ref 43–77)
NEUTS BAND # BLD: 18 % — HIGH (ref 0–8)
NRBC # BLD: 0 — SIGNIFICANT CHANGE UP
NRBC # BLD: SIGNIFICANT CHANGE UP /100 WBCS (ref 0–0)
OB PNL STL: POSITIVE
OPIATES UR-MCNC: NEGATIVE — SIGNIFICANT CHANGE UP
PCP SPEC-MCNC: SIGNIFICANT CHANGE UP
PCP UR-MCNC: NEGATIVE — SIGNIFICANT CHANGE UP
PLAT MORPH BLD: NORMAL — SIGNIFICANT CHANGE UP
PLATELET # BLD AUTO: 320 K/UL — SIGNIFICANT CHANGE UP (ref 150–400)
POTASSIUM SERPL-MCNC: 4.3 MMOL/L — SIGNIFICANT CHANGE UP (ref 3.5–5.3)
POTASSIUM SERPL-SCNC: 4.3 MMOL/L — SIGNIFICANT CHANGE UP (ref 3.5–5.3)
PROT SERPL-MCNC: 6.3 G/DL — SIGNIFICANT CHANGE UP (ref 6–8.3)
RBC # BLD: 4.07 M/UL — LOW (ref 4.2–5.8)
RBC # FLD: 13.2 % — SIGNIFICANT CHANGE UP (ref 10.3–14.5)
RBC BLD AUTO: NORMAL — SIGNIFICANT CHANGE UP
SODIUM SERPL-SCNC: 137 MMOL/L — SIGNIFICANT CHANGE UP (ref 135–145)
SPECIMEN SOURCE: SIGNIFICANT CHANGE UP
T3 SERPL-MCNC: 95 NG/DL — SIGNIFICANT CHANGE UP (ref 80–200)
T4 AB SER-ACNC: 6.7 UG/DL — SIGNIFICANT CHANGE UP (ref 4.6–12)
THC UR QL: NEGATIVE — SIGNIFICANT CHANGE UP
TOTAL CHOLESTEROL/HDL RATIO MEASUREMENT: 2.8 RATIO — LOW (ref 3.4–9.6)
TRIGL SERPL-MCNC: 131 MG/DL — SIGNIFICANT CHANGE UP (ref 10–149)
TSH SERPL-MCNC: 0.49 UIU/ML — SIGNIFICANT CHANGE UP (ref 0.27–4.2)
WBC # BLD: 7.42 K/UL — SIGNIFICANT CHANGE UP (ref 3.8–10.5)
WBC # FLD AUTO: 7.42 K/UL — SIGNIFICANT CHANGE UP (ref 3.8–10.5)

## 2018-07-04 PROCEDURE — 99233 SBSQ HOSP IP/OBS HIGH 50: CPT

## 2018-07-04 RX ORDER — ACETAMINOPHEN 500 MG
500 TABLET ORAL THREE TIMES A DAY
Qty: 0 | Refills: 0 | Status: DISCONTINUED | OUTPATIENT
Start: 2018-07-04 | End: 2018-07-12

## 2018-07-04 RX ORDER — ZOLPIDEM TARTRATE 10 MG/1
5 TABLET ORAL AT BEDTIME
Qty: 0 | Refills: 0 | Status: DISCONTINUED | OUTPATIENT
Start: 2018-07-04 | End: 2018-07-05

## 2018-07-04 RX ORDER — DIPHENOXYLATE HCL/ATROPINE 2.5-.025MG
1 TABLET ORAL
Qty: 0 | Refills: 0 | Status: DISCONTINUED | OUTPATIENT
Start: 2018-07-04 | End: 2018-07-09

## 2018-07-04 RX ORDER — PSYLLIUM SEED (WITH DEXTROSE)
1 POWDER (GRAM) ORAL DAILY
Qty: 0 | Refills: 0 | Status: DISCONTINUED | OUTPATIENT
Start: 2018-07-04 | End: 2018-07-08

## 2018-07-04 RX ADMIN — Medication 500 MILLIGRAM(S): at 16:49

## 2018-07-04 RX ADMIN — Medication 1 PACKET(S): at 16:44

## 2018-07-04 RX ADMIN — Medication 1 TABLET(S): at 21:18

## 2018-07-04 RX ADMIN — Medication 500 MILLIGRAM(S): at 06:29

## 2018-07-04 RX ADMIN — HEPARIN SODIUM 5000 UNIT(S): 5000 INJECTION INTRAVENOUS; SUBCUTANEOUS at 06:29

## 2018-07-04 RX ADMIN — Medication 1 TABLET(S): at 08:57

## 2018-07-04 RX ADMIN — SODIUM CHLORIDE 100 MILLILITER(S): 9 INJECTION, SOLUTION INTRAVENOUS at 08:57

## 2018-07-04 RX ADMIN — Medication 1 TABLET(S): at 17:10

## 2018-07-04 RX ADMIN — Medication 500 MILLIGRAM(S): at 11:20

## 2018-07-04 RX ADMIN — Medication 1 TABLET(S): at 11:20

## 2018-07-04 RX ADMIN — CHOLESTYRAMINE 4 GRAM(S): 4 POWDER, FOR SUSPENSION ORAL at 08:57

## 2018-07-04 RX ADMIN — Medication 500 MILLIGRAM(S): at 16:44

## 2018-07-04 RX ADMIN — Medication 81 MILLIGRAM(S): at 11:20

## 2018-07-04 RX ADMIN — HEPARIN SODIUM 5000 UNIT(S): 5000 INJECTION INTRAVENOUS; SUBCUTANEOUS at 17:10

## 2018-07-04 RX ADMIN — Medication 1 TABLET(S): at 16:24

## 2018-07-04 NOTE — CONSULT NOTE ADULT - ASSESSMENT
SHAQUILLE with incomplete recovery, bland urinalysis, may have a degree of pre-existing CKD.  Renal function is stable, no further w/u is needed on this admission.  Check bladder scan for PVR.  Agree with IVF while remains with diarrhea.  Interval monitoring of BUN/Creatinine/electrolytes.  Thank you.

## 2018-07-04 NOTE — PROGRESS NOTE ADULT - SUBJECTIVE AND OBJECTIVE BOX
Patient is a 53y old  Male who presents with a chief complaint of loose bowel movement for over a week now (2018 22:37)      INTERVAL HPI/OVERNIGHT EVENTS: no acute overnight events . still having diarrhoea. c/o headache .     MEDICATIONS  (STANDING):  aspirin enteric coated 81 milliGRAM(s) Oral daily  cholestyramine Powder (Sugar-Free) 4 Gram(s) Oral daily  heparin  Injectable 5000 Unit(s) SubCutaneous every 12 hours  lactobacillus acidophilus 1 Tablet(s) Oral four times a day with meals  psyllium Powder 1 Packet(s) Oral daily  sodium chloride 0.45%. 1000 milliLiter(s) (100 mL/Hr) IV Continuous <Continuous>    MEDICATIONS  (PRN):  acetaminophen   Tablet. 500 milliGRAM(s) Oral three times a day PRN Moderate Pain (4 - 6)  diphenoxylate/atropine 1 Tablet(s) Oral four times a day PRN Diarrhea      Allergies    Bactrim (Rash (Skin))    Intolerances        REVIEW OF SYSTEMS:  CONSTITUTIONAL: No fever or chills  HEENT:  No headache, no sore throat  RESPIRATORY: No cough, wheezing, or shortness of breath  CARDIOVASCULAR: No chest pain, palpitations, or leg swelling  GASTROINTESTINAL: No nausea, vomiting, or diarrhea  GENITOURINARY: No dysuria, frequency, or hematuria  NEUROLOGICAL: no focal weakness or dizziness  SKIN:  No rashes or lesions   MUSCULOSKELETAL: no myalgias   PSYCHIATRIC: No depression or anxiety      Vital Signs Last 24 Hrs  T(C): 36.9 (2018 14:13), Max: 37.3 (2018 21:29)  T(F): 98.5 (2018 14:13), Max: 99.1 (2018 21:29)  HR: 75 (2018 14:13) (75 - 102)  BP: 96/66 (2018 14:13) (96/66 - 130/74)  BP(mean): --  RR: 16 (2018 14:13) (16 - 18)  SpO2: 94% (2018 14:13) (94% - 98%)    PHYSICAL EXAM:  GENERAL: NAD  HEENT:  EOMI, mmm  CHEST/LUNG:  CTA b/l, no rales, wheezes, or rhonchi  HEART:  RRR, S1, S2, []murmur  ABDOMEN:  BS+, soft, NT,  obese.   EXTREMITIES: no edema or calf tenderness  NERVOUS SYSTEM: AA&Ox3 , no focal neurological deficit.     DIET:     Cultures:     LABS:                        12.7   7.42  )-----------( 320      ( 2018 08:05 )             38.3     CBC Full  -  ( 2018 08:05 )  WBC Count : 7.42 K/uL  Hemoglobin : 12.7 g/dL  Hematocrit : 38.3 %  Platelet Count - Automated : 320 K/uL  Mean Cell Volume : 94.1 fl  Mean Cell Hemoglobin : 31.2 pg  Mean Cell Hemoglobin Concentration : 33.2 gm/dL  Auto Neutrophil # : 4.53 K/uL  Auto Lymphocyte # : 1.63 K/uL  Auto Monocyte # : 1.04 K/uL  Auto Eosinophil # : 0.22 K/uL  Auto Basophil # : 0.00 K/uL  Auto Neutrophil % : 43.0 %  Auto Lymphocyte % : 22.0 %  Auto Monocyte % : 14.0 %  Auto Eosinophil % : 3.0 %  Auto Basophil % : 0.0 %    2018 08:05    137    |  108    |  22     ----------------------------<  85     4.3     |  19     |  2.31     Ca    9.0        2018 08:05    TPro  6.3    /  Alb  2.2    /  TBili  0.3    /  DBili  x      /  AST  20     /  ALT  67     /  AlkPhos  90     2018 08:05    PT/INR - ( 2018 13:46 )   PT: 13.9 sec;   INR: 1.27 ratio         PTT - ( 2018 13:46 )  PTT:30.6 sec  Urinalysis Basic - ( 2018 14:57 )    Color: Yellow / Appearance: Clear / S.010 / pH: x  Gluc: x / Ketone: Negative  / Bili: Negative / Urobili: Negative mg/dL   Blood: x / Protein: 30 mg/dL / Nitrite: Negative   Leuk Esterase: Negative / RBC: 0-2 /HPF / WBC x   Sq Epi: x / Non Sq Epi: x / Bacteria: Occasional      CAPILLARY BLOOD GLUCOSE              RADIOLOGY & ADDITIONAL TESTS:    Personally reviewed.     Consultant(s) Notes Reviewed:  [x] YES  [ ] NO    Care Discussed with [ ] Consultants  [x] Patient  [ ] Family  [x]      [ ] Other; RN  DVT ppx  Advanced directive

## 2018-07-04 NOTE — CONSULT NOTE ADULT - SUBJECTIVE AND OBJECTIVE BOX
Patient is a 53y old  Male who presents with a chief complaint of loose bowel movement (C. Diff negative) for over a week. He has PMH of HTN, Cocaine use, HLD, CVA and SHAQUILLE due to Rhabdo/Cocaine hemodynamic effect 3 weeks. He refused rehab but has not been able to take care of himself at home. He has been eating and drinking without issue. No voiding c/o.    PAST MEDICAL & SURGICAL HISTORY:  Hyperlipemia  Drug abuse  CVA (cerebral vascular accident)  No significant past surgical history    ALLERGIES:  Bactrim (Rash (Skin))    MEDICATIONS  (STANDING):  aspirin enteric coated 81 milliGRAM(s) Oral daily  cholestyramine Powder (Sugar-Free) 4 Gram(s) Oral daily  heparin  Injectable 5000 Unit(s) SubCutaneous every 12 hours  lactobacillus acidophilus 1 Tablet(s) Oral four times a day with meals  psyllium Powder 1 Packet(s) Oral daily  sodium chloride 0.45%. 1000 milliLiter(s) (100 mL/Hr) IV Continuous <Continuous>    MEDICATIONS  (PRN):  diphenoxylate/atropine 1 Tablet(s) Oral four times a day PRN Diarrhea    I&O's Detail    2018 07:  -  2018 07:00  --------------------------------------------------------  IN:    Oral Fluid: 480 mL    sodium chloride 0.45%.: 1100 mL  Total IN: 1580 mL    OUT:  Total OUT: 0 mL    Total NET: 1580 mL      2018 07:01  -  2018 13:20  --------------------------------------------------------  IN:    Oral Fluid: 950 mL    sodium chloride 0.45%.: 600 mL  Total IN: 1550 mL    OUT:  Total OUT: 0 mL    Total NET: 1550 mL        Vital Signs Last 24 Hrs  T(C): 36.9 (2018 09:53), Max: 37.3 (2018 21:29)  T(F): 98.4 (2018 09:53), Max: 99.1 (2018 21:29)  HR: 90 (2018 09:53) (84 - 102)  BP: 102/64 (2018 09:53) (102/64 - 130/74)  BP(mean): --  RR: 16 (2018 09:53) (16 - 18)  SpO2: 94% (2018 09:53) (94% - 98%)    PHYSICAL EXAM:  General: NAD, AAO x3  No JVD  Respiratory: b/l air entry, clear  Cardiovascular: S1 S2 Reg  Gastrointestinal: soft, not tender, BS present, no overt bladder distension.  Extremities:  no edema  No skin rash  Neuro: Right sided weakness          137  |  108  |  22  ----------------------------<  85  4.3   |  19<L>  |  2.31<H>    Ca    9.0      2018 08:05    TPro  6.3  /  Alb  2.2<L>  /  TBili  0.3  /  DBili  x   /  AST  20  /  ALT  67<H>  /  AlkPhos  90      Blood Urea Nitrogen, Serum: 22 mg/dL ( @ 08:05)  Calcium, Total Serum: 9.0 mg/dL ( @ 08:05)  Potassium, Serum: 4.3 mmol/L ( @ 08:05)  Hemoglobin: 12.7 g/dL ( @ 08:05)  Hematocrit: 38.3 % ( @ 08:05)  Hemoglobin: 13.2 g/dL ( @ 13:46)  Hematocrit: 40.0 % ( @ 13:46)  Calcium, Total Serum: 9.2 mg/dL ( @ 13:46)  Potassium, Serum: 3.8 mmol/L ( @ 13:46)  Blood Urea Nitrogen, Serum: 22 mg/dL ( @ 13:46)  Hemoglobin: 13.4 g/dL ( @ 22:12)  Hematocrit: 39.9 % ( 22:12)  Potassium, Serum: 4.1 mmol/L ( 22:12)  Blood Urea Nitrogen, Serum: 23 mg/dL (07-01 @ 22:12)  Calcium, Total Serum: 8.9 mg/dL ( @ 22:12)      Creatinine, Serum: 2.31 ( @ 08:05)  Creatinine, Serum: 2.60 ( @ 13:46)  Creatinine, Serum: 2.25 ( @ 22:12)    CBC Full  -  ( 2018 08:05 )  WBC Count : 7.42 K/uL  Hemoglobin : 12.7 g/dL  Hematocrit : 38.3 %  Platelet Count - Automated : 320 K/uL  Mean Cell Volume : 94.1 fl  Mean Cell Hemoglobin : 31.2 pg  Mean Cell Hemoglobin Concentration : 33.2 gm/dL  Auto Neutrophil # : 4.53 K/uL  Auto Lymphocyte # : 1.63 K/uL  Auto Monocyte # : 1.04 K/uL  Auto Eosinophil # : 0.22 K/uL  Auto Basophil # : 0.00 K/uL  Auto Neutrophil % : 43.0 %  Auto Lymphocyte % : 22.0 %  Auto Monocyte % : 14.0 %  Auto Eosinophil % : 3.0 %  Auto Basophil % : 0.0 %    Urinalysis Basic - ( 2018 14:57 )    Color: Yellow / Appearance: Clear / S.010 / pH: x  Gluc: x / Ketone: Negative  / Bili: Negative / Urobili: Negative mg/dL   Blood: x / Protein: 30 mg/dL / Nitrite: Negative   Leuk Esterase: Negative / RBC: 0-2 /HPF / WBC x   Sq Epi: x / Non Sq Epi: x / Bacteria: Occasional

## 2018-07-04 NOTE — PROGRESS NOTE ADULT - SUBJECTIVE AND OBJECTIVE BOX
INTERVAL HPI/OVERNIGHT EVENTS:  No new overnight event.  No N/V.  Tolerating diet.  still iwth 4 loose bm today    MEDICATIONS  (STANDING):  aspirin enteric coated 81 milliGRAM(s) Oral daily  cholestyramine Powder (Sugar-Free) 4 Gram(s) Oral daily  heparin  Injectable 5000 Unit(s) SubCutaneous every 12 hours  lactobacillus acidophilus 1 Tablet(s) Oral four times a day with meals  psyllium Powder 1 Packet(s) Oral daily  sodium chloride 0.45%. 1000 milliLiter(s) (100 mL/Hr) IV Continuous <Continuous>    MEDICATIONS  (PRN):  diphenoxylate/atropine 1 Tablet(s) Oral four times a day PRN Diarrhea      Allergies    Bactrim (Rash (Skin))    Intolerances          General:  No wt loss, fevers, chills, night sweats, fatigue,   Eyes:  Good vision, no reported pain  ENT:  No sore throat, pain, runny nose, dysphagia  CV:  No pain, palpitations, hypo/hypertension  Resp:  No dyspnea, cough, tachypnea, wheezing  GI:  No pain, No nausea, No vomiting, No diarrhea, No constipation, No weight loss, No fever, No pruritis, No rectal bleeding, No tarry stools, No dysphagia,  :  No pain, bleeding, incontinence, nocturia  Muscle:  No pain, weakness  Neuro:  No weakness, tingling, memory problems  Psych:  No fatigue, insomnia, mood problems, depression  Endocrine:  No polyuria, polydipsia, cold/heat intolerance  Heme:  No petechiae, ecchymosis, easy bruisability  Skin:  No rash, tattoos, scars, edema      PHYSICAL EXAM:   Vital Signs:  Vital Signs Last 24 Hrs  T(C): 36.9 (2018 09:53), Max: 37.3 (2018 21:29)  T(F): 98.4 (2018 09:53), Max: 99.1 (2018 21:29)  HR: 90 (2018 09:53) (84 - 102)  BP: 102/64 (2018 09:53) (102/64 - 130/74)  BP(mean): --  RR: 16 (2018 09:53) (16 - 18)  SpO2: 94% (2018 09:53) (94% - 98%)  Daily Height in cm: 180.34 (2018 13:06)    Daily Weight in k.3 (2018 04:06)I&O's Summary    2018 07:01  -  2018 07:00  --------------------------------------------------------  IN: 1480 mL / OUT: 0 mL / NET: 1480 mL        GENERAL:  Appears stated age, well-groomed, well-nourished, no distress  HEENT:  NC/AT,  conjunctivae clear and pink, no thyromegaly, nodules, adenopathy, no JVD, sclera -anicteric  CHEST:  Full & symmetric excursion, no increased effort, breath sounds clear  HEART:  Regular rhythm, S1, S2, no murmur/rub/S3/S4, no abdominal bruit, no edema  ABDOMEN:  Soft, non-tender, non-distended, normoactive bowel sounds,  no masses ,no hepato-splenomegaly, no signs of chronic liver disease  EXTEREMITIES:  no cyanosis,clubbing or edema  SKIN:  No rash/erythema/ecchymoses/petechiae/wounds/abscess/warm/dry  NEURO:  Alert, oriented, no asterixis, no tremor, no encephalopathy      LABS:                        12.7   7.42  )-----------( 320      ( 2018 08:05 )             38.3     07-    137  |  108  |  22  ----------------------------<  85  4.3   |  19<L>  |  2.31<H>    Ca    9.0      2018 08:05    TPro  6.3  /  Alb  2.2<L>  /  TBili  0.3  /  DBili  x   /  AST  20  /  ALT  67<H>  /  AlkPhos  90  07-04    PT/INR - ( 2018 13:46 )   PT: 13.9 sec;   INR: 1.27 ratio         PTT - ( 2018 13:46 )  PTT:30.6 sec  Urinalysis Basic - ( 2018 14:57 )    Color: Yellow / Appearance: Clear / S.010 / pH: x  Gluc: x / Ketone: Negative  / Bili: Negative / Urobili: Negative mg/dL   Blood: x / Protein: 30 mg/dL / Nitrite: Negative   Leuk Esterase: Negative / RBC: 0-2 /HPF / WBC x   Sq Epi: x / Non Sq Epi: x / Bacteria: Occasional      amylase   lipase  RADIOLOGY & ADDITIONAL TESTS:

## 2018-07-04 NOTE — PROGRESS NOTE ADULT - PROBLEM SELECTOR PLAN 6
Cr slightly improved.   CKD- 4 , with CT imaging showing left atrophic kidney.    - from rhabdomyelysis -recently in last admission last month  + hemodynamic injury from cocaine abuse.   -monitor creatinine trend and urine output  -avoid nephrotoxins, ace/arb, imaging contrast.   Renal consult appreciated .

## 2018-07-05 LAB
ANION GAP SERPL CALC-SCNC: 10 MMOL/L — SIGNIFICANT CHANGE UP (ref 5–17)
BUN SERPL-MCNC: 18 MG/DL — SIGNIFICANT CHANGE UP (ref 7–23)
CALCIUM SERPL-MCNC: 8.9 MG/DL — SIGNIFICANT CHANGE UP (ref 8.4–10.5)
CHLORIDE SERPL-SCNC: 108 MMOL/L — SIGNIFICANT CHANGE UP (ref 96–108)
CO2 SERPL-SCNC: 19 MMOL/L — LOW (ref 22–31)
CREAT SERPL-MCNC: 1.87 MG/DL — HIGH (ref 0.5–1.3)
GLUCOSE SERPL-MCNC: 83 MG/DL — SIGNIFICANT CHANGE UP (ref 70–99)
POTASSIUM SERPL-MCNC: 4.1 MMOL/L — SIGNIFICANT CHANGE UP (ref 3.5–5.3)
POTASSIUM SERPL-SCNC: 4.1 MMOL/L — SIGNIFICANT CHANGE UP (ref 3.5–5.3)
SODIUM SERPL-SCNC: 137 MMOL/L — SIGNIFICANT CHANGE UP (ref 135–145)

## 2018-07-05 PROCEDURE — 99233 SBSQ HOSP IP/OBS HIGH 50: CPT

## 2018-07-05 PROCEDURE — 12345: CPT | Mod: NC

## 2018-07-05 RX ORDER — LANOLIN ALCOHOL/MO/W.PET/CERES
3 CREAM (GRAM) TOPICAL ONCE
Qty: 0 | Refills: 0 | Status: COMPLETED | OUTPATIENT
Start: 2018-07-05 | End: 2018-07-05

## 2018-07-05 RX ORDER — SODIUM CHLORIDE 9 MG/ML
1000 INJECTION INTRAMUSCULAR; INTRAVENOUS; SUBCUTANEOUS
Qty: 0 | Refills: 0 | Status: DISCONTINUED | OUTPATIENT
Start: 2018-07-05 | End: 2018-07-12

## 2018-07-05 RX ADMIN — Medication 1 TABLET(S): at 15:40

## 2018-07-05 RX ADMIN — CHOLESTYRAMINE 4 GRAM(S): 4 POWDER, FOR SUSPENSION ORAL at 08:16

## 2018-07-05 RX ADMIN — Medication 1 TABLET(S): at 12:49

## 2018-07-05 RX ADMIN — HEPARIN SODIUM 5000 UNIT(S): 5000 INJECTION INTRAVENOUS; SUBCUTANEOUS at 17:21

## 2018-07-05 RX ADMIN — SODIUM CHLORIDE 100 MILLILITER(S): 9 INJECTION, SOLUTION INTRAVENOUS at 05:16

## 2018-07-05 RX ADMIN — Medication 81 MILLIGRAM(S): at 12:49

## 2018-07-05 RX ADMIN — Medication 1 TABLET(S): at 21:01

## 2018-07-05 RX ADMIN — HEPARIN SODIUM 5000 UNIT(S): 5000 INJECTION INTRAVENOUS; SUBCUTANEOUS at 05:15

## 2018-07-05 RX ADMIN — Medication 500 MILLIGRAM(S): at 16:37

## 2018-07-05 RX ADMIN — Medication 1 TABLET(S): at 17:21

## 2018-07-05 RX ADMIN — Medication 1 TABLET(S): at 08:16

## 2018-07-05 RX ADMIN — ZOLPIDEM TARTRATE 5 MILLIGRAM(S): 10 TABLET ORAL at 00:13

## 2018-07-05 RX ADMIN — SODIUM CHLORIDE 100 MILLILITER(S): 9 INJECTION, SOLUTION INTRAVENOUS at 12:49

## 2018-07-05 RX ADMIN — Medication 500 MILLIGRAM(S): at 15:37

## 2018-07-05 RX ADMIN — SODIUM CHLORIDE 75 MILLILITER(S): 9 INJECTION INTRAMUSCULAR; INTRAVENOUS; SUBCUTANEOUS at 15:40

## 2018-07-05 RX ADMIN — Medication 3 MILLIGRAM(S): at 22:08

## 2018-07-05 NOTE — PROGRESS NOTE ADULT - SUBJECTIVE AND OBJECTIVE BOX
INTERVAL HPI/OVERNIGHT EVENTS:  No new overnight event.  No N/V  Tolerating diet.  less bm  MEDICATIONS  (STANDING):  aspirin enteric coated 81 milliGRAM(s) Oral daily  cholestyramine Powder (Sugar-Free) 4 Gram(s) Oral daily  heparin  Injectable 5000 Unit(s) SubCutaneous every 12 hours  lactobacillus acidophilus 1 Tablet(s) Oral four times a day with meals  psyllium Powder 1 Packet(s) Oral daily  sodium chloride 0.45%. 1000 milliLiter(s) (100 mL/Hr) IV Continuous <Continuous>    MEDICATIONS  (PRN):  acetaminophen   Tablet. 500 milliGRAM(s) Oral three times a day PRN Moderate Pain (4 - 6)  diphenoxylate/atropine 1 Tablet(s) Oral four times a day PRN Diarrhea      Allergies    Bactrim (Rash (Skin))    Intolerances          General:  No wt loss, fevers, chills, night sweats, fatigue,   Eyes:  Good vision, no reported pain  ENT:  No sore throat, pain, runny nose, dysphagia  CV:  No pain, palpitations, hypo/hypertension  Resp:  No dyspnea, cough, tachypnea, wheezing  GI:  No pain, No nausea, No vomiting, No diarrhea, No constipation, No weight loss, No fever, No pruritis, No rectal bleeding, No tarry stools, No dysphagia,  :  No pain, bleeding, incontinence, nocturia  Muscle:  No pain, weakness  Neuro:  No weakness, tingling, memory problems  Psych:  No fatigue, insomnia, mood problems, depression  Endocrine:  No polyuria, polydipsia, cold/heat intolerance  Heme:  No petechiae, ecchymosis, easy bruisability  Skin:  No rash, tattoos, scars, edema      PHYSICAL EXAM:   Vital Signs:  Vital Signs Last 24 Hrs  T(C): 36.9 (2018 06:09), Max: 37.1 (2018 17:11)  T(F): 98.4 (2018 06:09), Max: 98.8 (2018 17:11)  HR: 79 (2018 06:09) (75 - 92)  BP: 108/69 (2018 06:09) (93/58 - 108/69)  BP(mean): --  RR: 18 (2018 06:09) (16 - 18)  SpO2: 95% (2018 06:09) (94% - 97%)  Daily     Daily I&O's Summary    2018 07:01  -  2018 07:00  --------------------------------------------------------  IN: 2850 mL / OUT: 427 mL / NET: 2423 mL        GENERAL:  Appears stated age, well-groomed, well-nourished, no distress  HEENT:  NC/AT,  conjunctivae clear and pink, no thyromegaly, nodules, adenopathy, no JVD, sclera -anicteric  CHEST:  Full & symmetric excursion, no increased effort, breath sounds clear  HEART:  Regular rhythm, S1, S2, no murmur/rub/S3/S4, no abdominal bruit, no edema  ABDOMEN:  Soft, non-tender, non-distended, normoactive bowel sounds,  no masses ,no hepato-splenomegaly, no signs of chronic liver disease  EXTEREMITIES:  no cyanosis,clubbing or edema  SKIN:  No rash/erythema/ecchymoses/petechiae/wounds/abscess/warm/dry  NEURO:  Alert, oriented, no asterixis, no tremor, no encephalopathy      LABS:                        12.7   7.42  )-----------( 320      ( 2018 08:05 )             38.3     07-04    137  |  108  |  22  ----------------------------<  85  4.3   |  19<L>  |  2.31<H>    Ca    9.0      2018 08:05    TPro  6.3  /  Alb  2.2<L>  /  TBili  0.3  /  DBili  x   /  AST  20  /  ALT  67<H>  /  AlkPhos  90  07-04    PT/INR - ( 2018 13:46 )   PT: 13.9 sec;   INR: 1.27 ratio         PTT - ( 2018 13:46 )  PTT:30.6 sec  Urinalysis Basic - ( 2018 14:57 )    Color: Yellow / Appearance: Clear / S.010 / pH: x  Gluc: x / Ketone: Negative  / Bili: Negative / Urobili: Negative mg/dL   Blood: x / Protein: 30 mg/dL / Nitrite: Negative   Leuk Esterase: Negative / RBC: 0-2 /HPF / WBC x   Sq Epi: x / Non Sq Epi: x / Bacteria: Occasional      amylase   lipase  RADIOLOGY & ADDITIONAL TESTS:

## 2018-07-05 NOTE — OCCUPATIONAL THERAPY INITIAL EVALUATION ADULT - RANGE OF MOTION EXAMINATION, UPPER EXTREMITY
Left UE Active ROM was WNL (within normal limits)/Right UE unable to perform isolated shld flexion, elbow flexion/extension. No AROM for right wrist/digits MMT wrist/hand 0/5

## 2018-07-05 NOTE — OCCUPATIONAL THERAPY INITIAL EVALUATION ADULT - PERTINENT HX OF CURRENT PROBLEM, REHAB EVAL
~  3 weeks acute CVA related to drug and alcohol abuse, right sided weakness. Pt admitted, reporting that he cannot care for himself

## 2018-07-05 NOTE — PROGRESS NOTE ADULT - ASSESSMENT
53 y/o M w/ recent CVA, hyperlipidemia , iv drug abuse came in with weakness and diarrhea.      - Weakness.  Plan: Deconditioning.  secondary to persistent diarrhea and deconditioning.   PT/OT   continue with ivfs  tolerating po lactose free diet.      -Diarrhea, unspecified type.  Plan: Follow up Stool studies   C.diff -neg    HIV negative.  Continue with  IV fluids .   GI consult appreciated. will start imodium if last remaining stool test negative. may need scope if symptoms persisting.   Continue with lactose free diet,    - Hyperlipemia.   Plan: continue wih statin.     -CVA (cerebral vascular accident).  Plan: continue with statin and ASA .  Continue with supportive care.  Needs ELIAZAR.     -CKD (chronic kidney disease), stage IV.  Plan: Cr slightly improved.   CKD- 4 , with CT imaging showing left atrophic kidney.    rhabdomyolysis -recently in last admission last month  + hemodynamic injury from cocaine abuse.   -monitor creatinine trend and urine output  -avoid nephrotoxins, ace/arb, imaging contrast.   Renal consult appreciated .    - Thrush.    Plan: Continue with fluconazole.     - Headache.   Plan: Likely tension headache - continue with Tylenol PRN.    - Prophylactic measure.    Plan: DVT - px- Heparin s/q.

## 2018-07-05 NOTE — OCCUPATIONAL THERAPY INITIAL EVALUATION ADULT - ADDITIONAL COMMENTS
Pt reports pt lives alone, has no stairs inside home or to enter home. Pt has no assistive device. Pt reports that he was d/c from hospital without an assistive device, and that he has no been able to care for himself at home. Pt does not have any family to assist him. Pt has no  strength on right hand, and would need assist at home for ADLs. Pt is right handed dominant

## 2018-07-05 NOTE — PROGRESS NOTE ADULT - ASSESSMENT
·	SHAQUILLE, ? CKD   ·	Diarrhea    Improving renal function. To continue current meds. Change to IV NS.   GI work up in progress. Avoid nephrotoxins.

## 2018-07-05 NOTE — OCCUPATIONAL THERAPY INITIAL EVALUATION ADULT - ADL RETRAINING, OT EVAL
Patient will dress upper body with minimal assistance within 3-5 sessions. Patient will dress lower body with minimal assistance, AE as needed within 3-5 sessions.

## 2018-07-05 NOTE — PROGRESS NOTE ADULT - SUBJECTIVE AND OBJECTIVE BOX
Patient is a 52 y/o  Male who presents with a chief complaint of loose bowel movement for over a week now (2018 22:37)      Patient seen in follow up for SHAQUILLE. + diarrhea    PAST MEDICAL HISTORY:  Hyperlipemia  Drug abuse  CVA (cerebral vascular accident)  No pertinent past medical history    MEDICATIONS  (STANDING):  aspirin enteric coated 81 milliGRAM(s) Oral daily  cholestyramine Powder (Sugar-Free) 4 Gram(s) Oral daily  heparin  Injectable 5000 Unit(s) SubCutaneous every 12 hours  lactobacillus acidophilus 1 Tablet(s) Oral four times a day with meals  psyllium Powder 1 Packet(s) Oral daily  sodium chloride 0.45%. 1000 milliLiter(s) (100 mL/Hr) IV Continuous <Continuous>    MEDICATIONS  (PRN):  acetaminophen   Tablet. 500 milliGRAM(s) Oral three times a day PRN Moderate Pain (4 - 6)  diphenoxylate/atropine 1 Tablet(s) Oral four times a day PRN Diarrhea    T(C): 36.7 (18 @ 13:31), Max: 37.2 (18 @ 09:59)  HR: 70 (18 @ 13:31) (70 - 92)  BP: 99/65 (18 @ 13:31) (93/58 - 108/73)  RR: 16 (18 @ 13:31) (16 - 18)  SpO2: 98% (18 @ 13:31) (94% - 98%)  Wt(kg): --  I&O's Detail    2018 07:  -  2018 07:00  --------------------------------------------------------  IN:    Oral Fluid: 1150 mL    sodium chloride 0.45%.: 1700 mL  Total IN: 2850 mL    OUT:    Voided: 427 mL  Total OUT: 427 mL    Total NET: 2423 mL      2018 07:01  -  2018 14:54  --------------------------------------------------------  IN:    Oral Fluid: 900 mL  Total IN: 900 mL    OUT:  Total OUT: 0 mL    Total NET: 900 mL          PHYSICAL EXAM:  General: NAD  Respiratory: b/l air entry  Cardiovascular: S1 S2  Gastrointestinal: soft  Extremities:  no edema                           12.7   7.42  )-----------( 320      ( 2018 08:05 )             38.3         137  |  108  |  18  ----------------------------<  83  4.1   |  19<L>  |  1.87<H>    Ca    8.9      2018 08:18    TPro  6.3  /  Alb  2.2<L>  /  TBili  0.3  /  DBili  x   /  AST  20  /  ALT  67<H>  /  AlkPhos  90  07-04        LIVER FUNCTIONS - ( 2018 08:05 )  Alb: 2.2 g/dL / Pro: 6.3 g/dL / ALK PHOS: 90 U/L / ALT: 67 U/L DA / AST: 20 U/L / GGT: x           Urinalysis Basic - ( 2018 14:57 )    Color: Yellow / Appearance: Clear / S.010 / pH: x  Gluc: x / Ketone: Negative  / Bili: Negative / Urobili: Negative mg/dL   Blood: x / Protein: 30 mg/dL / Nitrite: Negative   Leuk Esterase: Negative / RBC: 0-2 /HPF / WBC x   Sq Epi: x / Non Sq Epi: x / Bacteria: Occasional        Sodium, Serum: 137 ( @ 08:18)  Sodium, Serum: 137 ( @ 08:05)  Sodium, Serum: 138 ( @ 13:46)  Sodium, Serum: 136 ( @ 22:12)    Creatinine, Serum: 1.87 ( @ 08:18)  Creatinine, Serum: 2.31 ( @ 08:05)  Creatinine, Serum: 2.60 ( @ 13:46)  Creatinine, Serum: 2.25 ( @ 22:12)    Potassium, Serum: 4.1 ( @ 08:18)  Potassium, Serum: 4.3 ( @ 08:05)  Potassium, Serum: 3.8 ( @ 13:46)  Potassium, Serum: 4.1 ( @ 22:12)    Hemoglobin: 12.7 ( @ 08:05)  Hemoglobin: 13.2 ( @ 13:46)  Hemoglobin: 13.4 ( @ 22:12)

## 2018-07-05 NOTE — PROGRESS NOTE ADULT - SUBJECTIVE AND OBJECTIVE BOX
Patient is a 53y old  Male who presents with a chief complaint of loose bowel movement for over a week (2018 22:37)    HPI:  52yo male with pmhx of HTN, HLD, CVA 3 weeks ago with left sided hemiparesis and plegia who c/o weakness x 3 weeks. pt reports since his stroke he has been weak and has not been able to take care of himself. pt reports no new weakness. pt states he was seen at [facility]  neurologist and sent home without acute findings. pt followed up with pmd who advised pt he needs ED eval for ELIAZAR. pt endorses diarrhea since d/c without abd pain. pt has been eating and drinking without issue. pt reports he cannot take care of himself at home.  (2018 17:11)     Today:  Notes having 8-10 watery bowel movements in past 24 hours. Denies blood per rectum.     REVIEW OF SYSTEMS  Respiratory and Thorax:	no sob, no cough, wheezing  Cardiovascular:	no chest pain, no palpitations, no lower extremity swelling  Gastrointestinal:	no nausea, no vomiting,   Musculoskeletal:	 no muscle pain  Neurological: +ve weakness to right upper and lower extremity.     PAST MEDICAL & SURGICAL HISTORY:  Hyperlipemia  Drug abuse  CVA (cerebral vascular accident)  No significant past surgical history    MEDICATIONS  (STANDING):  aspirin enteric coated 81 milliGRAM(s) Oral daily  cholestyramine Powder (Sugar-Free) 4 Gram(s) Oral daily  heparin  Injectable 5000 Unit(s) SubCutaneous every 12 hours  lactobacillus acidophilus 1 Tablet(s) Oral four times a day with meals  psyllium Powder 1 Packet(s) Oral daily  sodium chloride 0.45%. 1000 milliLiter(s) (100 mL/Hr) IV Continuous <Continuous>    MEDICATIONS  (PRN):  acetaminophen   Tablet. 500 milliGRAM(s) Oral three times a day PRN Moderate Pain (4 - 6)  diphenoxylate/atropine 1 Tablet(s) Oral four times a day PRN Diarrhea    EXAM:  Vital Signs Last 24 Hrs  T(C): 36.9 (2018 06:09), Max: 37.1 (2018 17:11)  T(F): 98.4 (2018 06:09), Max: 98.8 (2018 17:11)  HR: 79 (2018 06:09) (75 - 92)  BP: 108/69 (2018 06:09) (93/58 - 108/69)  BP(mean): --  RR: 18 (2018 06:09) (16 - 18)  SpO2: 95% (2018 06:09) (94% - 97%)    07-04 @ 07:01  -  07-05 @ 07:00  --------------------------------------------------------  IN: 2850 mL / OUT: 427 mL / NET: 2423 mL    PHYSICAL EXAM:  Constitutional: awake in bed  Respiratory: bilaterally clear to auscultation, no wheezing, no rhonchi, no crackles, no decreased air entry  Cardiovascular: s1s2, rrr, no murmurs.   Gastrointestinal: soft, non tender, distended, no rebound, no guarding.   Extremities: RUE hand contracted.   Neurological: alert and oriented to person, date and place.   Skin: warm to touch.   Psychiatric: no homicidal, suicidal ideation. appropriate affect.     LABS:  Urinalysis Basic - ( 2018 14:57 )  Color: Yellow / Appearance: Clear / S.010 / pH: x  Gluc: x / Ketone: Negative  / Bili: Negative / Urobili: Negative mg/dL   Blood: x / Protein: 30 mg/dL / Nitrite: Negative   Leuk Esterase: Negative / RBC: 0-2 /HPF / WBC x   Sq Epi: x / Non Sq Epi: x / Bacteria: Occasional    PT/INR - ( 2018 13:46 )   PT: 13.9 sec;   INR: 1.27 ratio    PTT - ( 2018 13:46 )  PTT:30.6 sec  LIVER FUNCTIONS - ( 2018 08:05 )  Alb: 2.2 g/dL / Pro: 6.3 g/dL / ALK PHOS: 90 U/L / ALT: 67 U/L DA / AST: 20 U/L / GGT: x                               12.7   7.42  )-----------( 320      ( 2018 08:05 )             38.3     137  |  108  |  18  ----------------------------<  83  4.1   |  19<L>  |  1.87<H>  Ca    8.9      2018 08:18  TPro  6.3  /  Alb  2.2<L>  /  TBili  0.3  /  DBili  x   /  AST  20  /  ALT  67<H>  /  AlkPhos  90      GI PCR Panel, Stool (collected 18 @ 17:09)  Source: .Stool Feces  Final Report (18 @ 21:23):    GI PCR Results: NOT detected    *******Please Note:*******    GI panel PCR evaluates for:    Campylobacter, Plesiomonas shigelloides, Salmonella,    Vibrio, Yersinia enterocolitica, Enteroaggregative    Escherichia coli (EAEC), Enteropathogenic E.coli (EPEC),    Enterotoxigenic E. coli (ETEC) lt/st, Shiga-like    toxin-producing E. coli (STEC) stx1/stx2,    Shigella/ Enteroinvasive E. coli (EIEC), Cryptosporidium,    Cyclospora cayetanensis, Entamoeba histolytica,    Giardia lamblia, Adenovirus F 40/41, Astrovirus,    Norovirus GI/GII, Rotavirus A, Sapovirus

## 2018-07-06 LAB
ANION GAP SERPL CALC-SCNC: 11 MMOL/L — SIGNIFICANT CHANGE UP (ref 5–17)
BUN SERPL-MCNC: 15 MG/DL — SIGNIFICANT CHANGE UP (ref 7–23)
CALCIUM SERPL-MCNC: 8.9 MG/DL — SIGNIFICANT CHANGE UP (ref 8.4–10.5)
CHLORIDE SERPL-SCNC: 107 MMOL/L — SIGNIFICANT CHANGE UP (ref 96–108)
CO2 SERPL-SCNC: 21 MMOL/L — LOW (ref 22–31)
CREAT SERPL-MCNC: 1.87 MG/DL — HIGH (ref 0.5–1.3)
CULTURE RESULTS: SIGNIFICANT CHANGE UP
GLUCOSE SERPL-MCNC: 87 MG/DL — SIGNIFICANT CHANGE UP (ref 70–99)
HCT VFR BLD CALC: 37.6 % — LOW (ref 39–50)
HGB BLD-MCNC: 12.3 G/DL — LOW (ref 13–17)
MCHC RBC-ENTMCNC: 30.6 PG — SIGNIFICANT CHANGE UP (ref 27–34)
MCHC RBC-ENTMCNC: 32.7 GM/DL — SIGNIFICANT CHANGE UP (ref 32–36)
MCV RBC AUTO: 93.5 FL — SIGNIFICANT CHANGE UP (ref 80–100)
NRBC # BLD: 0 /100 WBCS — SIGNIFICANT CHANGE UP (ref 0–0)
PLATELET # BLD AUTO: 300 K/UL — SIGNIFICANT CHANGE UP (ref 150–400)
POTASSIUM SERPL-MCNC: 4.3 MMOL/L — SIGNIFICANT CHANGE UP (ref 3.5–5.3)
POTASSIUM SERPL-SCNC: 4.3 MMOL/L — SIGNIFICANT CHANGE UP (ref 3.5–5.3)
RBC # BLD: 4.02 M/UL — LOW (ref 4.2–5.8)
RBC # FLD: 13 % — SIGNIFICANT CHANGE UP (ref 10.3–14.5)
SODIUM SERPL-SCNC: 139 MMOL/L — SIGNIFICANT CHANGE UP (ref 135–145)
SPECIMEN SOURCE: SIGNIFICANT CHANGE UP
WBC # BLD: 5.98 K/UL — SIGNIFICANT CHANGE UP (ref 3.8–10.5)
WBC # FLD AUTO: 5.98 K/UL — SIGNIFICANT CHANGE UP (ref 3.8–10.5)

## 2018-07-06 PROCEDURE — 12345: CPT | Mod: NC

## 2018-07-06 PROCEDURE — 99232 SBSQ HOSP IP/OBS MODERATE 35: CPT

## 2018-07-06 RX ORDER — VALACYCLOVIR 500 MG/1
1000 TABLET, FILM COATED ORAL EVERY 8 HOURS
Qty: 0 | Refills: 0 | Status: DISCONTINUED | OUTPATIENT
Start: 2018-07-06 | End: 2018-07-12

## 2018-07-06 RX ORDER — VALACYCLOVIR 500 MG/1
500 TABLET, FILM COATED ORAL THREE TIMES A DAY
Qty: 0 | Refills: 0 | Status: DISCONTINUED | OUTPATIENT
Start: 2018-07-06 | End: 2018-07-06

## 2018-07-06 RX ORDER — LOPERAMIDE HCL 2 MG
2 TABLET ORAL EVERY 8 HOURS
Qty: 0 | Refills: 0 | Status: DISCONTINUED | OUTPATIENT
Start: 2018-07-06 | End: 2018-07-09

## 2018-07-06 RX ORDER — DIPHENHYDRAMINE HCL 50 MG
25 CAPSULE ORAL ONCE
Qty: 0 | Refills: 0 | Status: COMPLETED | OUTPATIENT
Start: 2018-07-06 | End: 2018-07-06

## 2018-07-06 RX ADMIN — Medication 2 MILLIGRAM(S): at 21:05

## 2018-07-06 RX ADMIN — Medication 81 MILLIGRAM(S): at 13:43

## 2018-07-06 RX ADMIN — VALACYCLOVIR 1000 MILLIGRAM(S): 500 TABLET, FILM COATED ORAL at 13:43

## 2018-07-06 RX ADMIN — SODIUM CHLORIDE 75 MILLILITER(S): 9 INJECTION INTRAMUSCULAR; INTRAVENOUS; SUBCUTANEOUS at 17:33

## 2018-07-06 RX ADMIN — VALACYCLOVIR 1000 MILLIGRAM(S): 500 TABLET, FILM COATED ORAL at 21:05

## 2018-07-06 RX ADMIN — Medication 1 TABLET(S): at 13:43

## 2018-07-06 RX ADMIN — CHOLESTYRAMINE 4 GRAM(S): 4 POWDER, FOR SUSPENSION ORAL at 08:44

## 2018-07-06 RX ADMIN — Medication 25 MILLIGRAM(S): at 21:05

## 2018-07-06 RX ADMIN — Medication 1 TABLET(S): at 18:28

## 2018-07-06 RX ADMIN — VALACYCLOVIR 1000 MILLIGRAM(S): 500 TABLET, FILM COATED ORAL at 05:18

## 2018-07-06 RX ADMIN — HEPARIN SODIUM 5000 UNIT(S): 5000 INJECTION INTRAVENOUS; SUBCUTANEOUS at 05:19

## 2018-07-06 RX ADMIN — HEPARIN SODIUM 5000 UNIT(S): 5000 INJECTION INTRAVENOUS; SUBCUTANEOUS at 17:33

## 2018-07-06 RX ADMIN — Medication 1 TABLET(S): at 21:06

## 2018-07-06 RX ADMIN — Medication 1 TABLET(S): at 08:44

## 2018-07-06 RX ADMIN — Medication 1 TABLET(S): at 17:33

## 2018-07-06 RX ADMIN — Medication 1 TABLET(S): at 04:43

## 2018-07-06 NOTE — PROGRESS NOTE ADULT - SUBJECTIVE AND OBJECTIVE BOX
SERAFIN STORY  53y  Male    Patient is a 53y old  Male who presents with a chief complaint of loose bowel movement for over a week now (03 Jul 2018 22:37)    comfortable.     PAST MEDICAL & SURGICAL HISTORY:  Hyperlipemia  Drug abuse  CVA (cerebral vascular accident)  No significant past surgical history          PHYSICAL EXAM:    T(C): 36.9 (07-06-18 @ 05:52), Max: 37.3 (07-05-18 @ 17:05)  HR: 80 (07-06-18 @ 05:52) (70 - 91)  BP: 106/70 (07-06-18 @ 05:52) (99/65 - 137/75)  RR: 18 (07-06-18 @ 05:52) (16 - 18)  SpO2: 96% (07-06-18 @ 05:52) (96% - 98%)  Wt(kg): --    I&O's Detail    05 Jul 2018 07:01  -  06 Jul 2018 07:00  --------------------------------------------------------  IN:    Oral Fluid: 900 mL    sodium chloride 0.9%.: 900 mL  Total IN: 1800 mL    OUT:    Voided: 660 mL  Total OUT: 660 mL    Total NET: 1140 mL          Respiratory: clear anteriorly, decreased BS at bases  Cardiovascular: S1 S2  Gastrointestinal: soft NT ND +BS  Extremities: edema   Neuro: Awake and alert    MEDICATIONS  (STANDING):  aspirin enteric coated 81 milliGRAM(s) Oral daily  cholestyramine Powder (Sugar-Free) 4 Gram(s) Oral daily  heparin  Injectable 5000 Unit(s) SubCutaneous every 12 hours  lactobacillus acidophilus 1 Tablet(s) Oral four times a day with meals  psyllium Powder 1 Packet(s) Oral daily  sodium chloride 0.9%. 1000 milliLiter(s) (75 mL/Hr) IV Continuous <Continuous>  valACYclovir 1000 milliGRAM(s) Oral every 8 hours    MEDICATIONS  (PRN):  acetaminophen   Tablet. 500 milliGRAM(s) Oral three times a day PRN Moderate Pain (4 - 6)  diphenoxylate/atropine 1 Tablet(s) Oral four times a day PRN Diarrhea  loperamide 2 milliGRAM(s) Oral every 8 hours PRN Diarrhea                            12.3   5.98  )-----------( 300      ( 06 Jul 2018 08:13 )             37.6       07-06    139  |  107  |  15  ----------------------------<  87  4.3   |  21<L>  |  1.87<H>    Ca    8.9      06 Jul 2018 08:13

## 2018-07-06 NOTE — PROGRESS NOTE ADULT - ASSESSMENT
53 white male with a history of HLD, head aches and drug abuse and CVA now admitted with Diarrhea and found to have SHAQUILLE.  Renal indices are improving now with IVF  possibility of chronicity. will follow

## 2018-07-06 NOTE — DIETITIAN INITIAL EVALUATION ADULT. - OTHER INFO
Pt assessed per RN request: weakness/diarrhea recent hospitalization at [facility] in June for CVA related to ETOH abuse. Also noted from that admission CT abdomen partial SBO vs. ileus.  Pt states he has been having diarrhea x 4 weeks. Pt triston per [facility] RD assessment 260#. Current weight 234#. Pt states his usual weight is 230#. It does not appear pt lost weight and he states he is eating but still having diarrhea. Pt on renal diet 2/2 hx CKD4: Would add low residue/fiber. Reviewed therapeutic diet c pt who verbalized understanding. Noted c.diff negative, HIV negative occult blood stool positive. Per MD pt may need scope. Pt assessed per RN request: weakness/diarrhea recent hospitalization at [facility] in June for CVA related to ETOH abuse. Also noted from that admission CT abdomen partial SBO vs. ileus.  Pt states he has been having diarrhea x 4 weeks. Pt per [facility] RD assessment in June pt weight  260#. (noted generalized edema) Current weight 234#. Pt states his usual weight is 230#. It does not appear pt lost weight and he states he is eating but still having diarrhea. Pt on renal diet 2/2 hx CKD4: Would add low residue/fiber. Reviewed therapeutic diet c pt who verbalized understanding. Noted c.diff negative, HIV negative occult blood stool positive. Per MD pt may need scope.

## 2018-07-06 NOTE — PROGRESS NOTE ADULT - ASSESSMENT
53 y/o M w/ recent CVA, hyperlipidemia , iv drug abuse came in with weakness and diarrhea.      - Weakness.  Plan: Deconditioning.  secondary to persistent diarrhea and deconditioning.   PT/OT   continue with ivfs  tolerating po lactose free diet.      -Diarrhea, unspecified type.  Plan: Follow up Stool studies   C.diff -neg    HIV negative.  Continue with  IV fluids .   -  start imodium if last remaining stool test negative. may need scope if symptoms persisting.   Continue with lactose free diet,      # Herpes Zoster right upper back - T1 / T2 dermatome  - Contact precatutions  -  Valtrex to continue  - Hyperlipemia.   Plan: continue wih statin.     -CVA (cerebral vascular accident).  Plan: continue with statin and ASA .  Continue with supportive care.  Needs ELIAZAR.     -CKD (chronic kidney disease), stage IV.  Plan: Cr slightly improved.   CKD- 4 , with CT imaging showing left atrophic kidney.    rhabdomyolysis -recently in last admission last month  + hemodynamic injury from cocaine abuse.   -monitor creatinine trend and urine output  -avoid nephrotoxins, ace/arb, imaging contrast.   Renal following the patient    - Thrush.    Plan: Continue with fluconazole.     - Headache.   Plan: Likely tension headache - continue with Tylenol PRN.    - Prophylactic measure.    Plan: DVT - px- Heparin s/q.

## 2018-07-06 NOTE — DIETITIAN INITIAL EVALUATION ADULT. - PROBLEM SELECTOR PLAN 2
Stool studies   C.diff sent   HIV ordered.   Started IV fluids and Vancomycin po..  GI consult appreciated .   Continue with lactose free diet,  Contact Isolation.

## 2018-07-06 NOTE — PROGRESS NOTE ADULT - SUBJECTIVE AND OBJECTIVE BOX
INTERVAL HPI/OVERNIGHT EVENTS:  still with diarrhea    MEDICATIONS  (STANDING):  aspirin enteric coated 81 milliGRAM(s) Oral daily  cholestyramine Powder (Sugar-Free) 4 Gram(s) Oral daily  heparin  Injectable 5000 Unit(s) SubCutaneous every 12 hours  lactobacillus acidophilus 1 Tablet(s) Oral four times a day with meals  psyllium Powder 1 Packet(s) Oral daily  sodium chloride 0.9%. 1000 milliLiter(s) (75 mL/Hr) IV Continuous <Continuous>  valACYclovir 1000 milliGRAM(s) Oral every 8 hours    MEDICATIONS  (PRN):  acetaminophen   Tablet. 500 milliGRAM(s) Oral three times a day PRN Moderate Pain (4 - 6)  diphenoxylate/atropine 1 Tablet(s) Oral four times a day PRN Diarrhea      Allergies    Bactrim (Rash (Skin))    Intolerances        Review of Systems:    General:  No wt loss, fevers, chills, night sweats,fatigue,   Eyes:  Good vision, no reported pain  ENT:  No sore throat, pain, runny nose, dysphagia  CV:  No pain, palpitatioins, hypo/hypertension  Resp:  No dyspnea, cough, tachypnea, wheezing  GI:  No pain, No nausea, No vomiting, No diarrhea, No constipatiion, No weight loss, No fever, No pruritis, No rectal bleeding, No tarry stools, No dysphagia,  :  No pain, bleeding, incontinence, nocturia  Muscle:  No pain, weakness  Neuro:  No weakness, tingling, memory problems  Psych:  No fatigue, insomnia, mood problems, depression  Endocrine:  No polyuria, polydypsia, cold/heat intolerance  Heme:  No petechiae, ecchymosis, easy bruisability  Skin:  No rash, tattoos, scars, edema      Vital Signs Last 24 Hrs  T(C): 36.9 (06 Jul 2018 05:52), Max: 37.3 (05 Jul 2018 17:05)  T(F): 98.4 (06 Jul 2018 05:52), Max: 99.2 (05 Jul 2018 17:05)  HR: 80 (06 Jul 2018 05:52) (70 - 91)  BP: 106/70 (06 Jul 2018 05:52) (99/65 - 137/75)  BP(mean): --  RR: 18 (06 Jul 2018 05:52) (16 - 18)  SpO2: 96% (06 Jul 2018 05:52) (96% - 98%)    PHYSICAL EXAM:    Constitutional: NAD, well-developed  HEENT: EOMI, throat clear  Neck: No LAD, supple  Respiratory: CTA and P  Cardiovascular: S1 and S2, RRR, no M  Gastrointestinal: BS+, soft, NT/ND, neg HSM,  Extremities: No peripheral edema, neg clubing, cyanosis  Vascular: 2+ peripheral pulses  Neurological: A/O x 3, no focal deficits  Psychiatric: Normal mood, normal affect  Skin: No rashes      LABS:                        12.3   5.98  )-----------( 300      ( 06 Jul 2018 08:13 )             37.6     07-06    139  |  107  |  15  ----------------------------<  87  4.3   |  21<L>  |  1.87<H>    Ca    8.9      06 Jul 2018 08:13            RADIOLOGY & ADDITIONAL TESTS:

## 2018-07-06 NOTE — PROGRESS NOTE ADULT - SUBJECTIVE AND OBJECTIVE BOX
Patient is a 53y old  Male who presents with a chief complaint of loose bowel movement for over a week (2018 22:37)    HPI:  52yo male with pmhx of HTN, HLD, CVA 3 weeks ago with left sided hemiparesis and plegia who c/o weakness x 3 weeks. pt reports since his stroke he has been weak and has not been able to take care of himself. pt reports no new weakness. pt states he was seen at [facility]  neurologist and sent home without acute findings. pt followed up with pmd who advised pt he needs ED eval for ELIAZAR. pt endorses diarrhea since d/c without abd pain. pt has been eating and drinking without issue. pt reports he cannot take care of himself at home.  (2018 17:11)     Today:  Notes having 6 watery bowel movements in past 24 hours. Denies blood per rectum.   Denies nausea, vomiting or abdominal pain or fever    REVIEW OF SYSTEMS  Respiratory and Thorax:	no sob, no cough, wheezing  Cardiovascular:	no chest pain, no palpitations, no lower extremity swelling  Gastrointestinal:	no nausea, no vomiting, patient reports diarrhea  Musculoskeletal:	 no muscle pain  Neurological: +ve weakness to right upper and lower extremity.     PAST MEDICAL & SURGICAL HISTORY:  Hyperlipemia  Drug abuse  CVA (cerebral vascular accident)  No significant past surgical history    MEDICATIONS  (STANDING):  aspirin enteric coated 81 milliGRAM(s) Oral daily  cholestyramine Powder (Sugar-Free) 4 Gram(s) Oral daily  heparin  Injectable 5000 Unit(s) SubCutaneous every 12 hours  lactobacillus acidophilus 1 Tablet(s) Oral four times a day with meals  psyllium Powder 1 Packet(s) Oral daily  sodium chloride 0.45%. 1000 milliLiter(s) (100 mL/Hr) IV Continuous <Continuous>    MEDICATIONS  (PRN):  acetaminophen   Tablet. 500 milliGRAM(s) Oral three times a day PRN Moderate Pain (4 - 6)  diphenoxylate/atropine 1 Tablet(s) Oral four times a day PRN Diarrhea    EXAM:    Vital Signs Last 24 Hrs  T(C): 36.9 (2018 17:36), Max: 37.2 (2018 22:08)  T(F): 98.4 (2018 17:36), Max: 98.9 (2018 22:08)  HR: 87 (2018 17:36) (78 - 92)  BP: 105/66 (2018 17:36) (99/69 - 137/75)  BP(mean): --  RR: 18 (2018 17:36) (18 - 18)  SpO2: 97% (2018 17:36) (96% - 98%)    I&O's Summary    2018 07:01  -  2018 07:00  --------------------------------------------------------  IN: 1800 mL / OUT: 660 mL / NET: 1140 mL          PHYSICAL EXAM:  Constitutional: awake in bed  Respiratory: bilaterally clear to auscultation, no wheezing, no rhonchi, no crackles, no decreased air entry  Cardiovascular: s1s2, rrr, no murmurs.   Gastrointestinal: soft, non tender, distended, no rebound, no guarding.   Extremities: RUE hand contracted.   Neurological: alert and oriented to person, date and place.   Skin: warm to touch.   Psychiatric: no homicidal, suicidal ideation. appropriate affect.     LABS:                            12.3   5.98  )-----------( 300      ( 2018 08:13 )             37.6       07-06    139  |  107  |  15  ----------------------------<  87  4.3   |  21<L>  |  1.87<H>    Ca    8.9      2018 08:13      Urinalysis Basic - ( 2018 14:57 )  Color: Yellow / Appearance: Clear / S.010 / pH: x  Gluc: x / Ketone: Negative  / Bili: Negative / Urobili: Negative mg/dL   Blood: x / Protein: 30 mg/dL / Nitrite: Negative   Leuk Esterase: Negative / RBC: 0-2 /HPF / WBC x   Sq Epi: x / Non Sq Epi: x / Bacteria: Occasional    PT/INR - ( 2018 13:46 )   PT: 13.9 sec;   INR: 1.27 ratio    PTT - ( 2018 13:46 )  PTT:30.6 sec  LIVER FUNCTIONS - ( 2018 08:05 )  Alb: 2.2 g/dL / Pro: 6.3 g/dL / ALK PHOS: 90 U/L / ALT: 67 U/L DA / AST: 20 U/L / GGT: x               GI PCR Panel, Stool (collected 18 @ 17:09)  Source: .Stool Feces  Final Report (18 @ 21:23):    GI PCR Results: NOT detected    *******Please Note:*******    GI panel PCR evaluates for:    Campylobacter, Plesiomonas shigelloides, Salmonella,    Vibrio, Yersinia enterocolitica, Enteroaggregative    Escherichia coli (EAEC), Enteropathogenic E.coli (EPEC),    Enterotoxigenic E. coli (ETEC) lt/st, Shiga-like    toxin-producing E. coli (STEC) stx1/stx2,    Shigella/ Enteroinvasive E. coli (EIEC), Cryptosporidium,    Cyclospora cayetanensis, Entamoeba histolytica,    Giardia lamblia, Adenovirus F 40/41, Astrovirus,    Norovirus GI/GII, Rotavirus A, Sapovirus Patient is a 53y old  Male who presents with a chief complaint of loose bowel movement for over a week (2018 22:37)    HPI:  52yo male with pmhx of HTN, HLD, CVA 3 weeks ago with left sided hemiparesis and plegia who c/o weakness x 3 weeks. pt reports since his stroke he has been weak and has not been able to take care of himself. pt reports no new weakness. pt states he was [facility] at [facility]  neurologist and sent home without acute findings. pt followed up with pmd who advised pt he needs ED eval for ELIAZAR. pt endorses diarrhea since d/c without abd pain. pt has been eating and drinking without issue. pt reports he cannot take care of himself at home.  (2018 17:11)     Today:  Notes having 6 watery bowel movements in past 24 hours. Denies blood per rectum.   Denies nausea, vomiting or abdominal pain or fever    REVIEW OF SYSTEMS  Respiratory and Thorax:	no sob, no cough, wheezing  Cardiovascular:	no chest pain, no palpitations, no lower extremity swelling  Gastrointestinal:	no nausea, no vomiting, patient reports diarrhea  Musculoskeletal:	 no muscle pain  Neurological: +ve weakness to right upper and lower extremity.     PAST MEDICAL & SURGICAL HISTORY:  Hyperlipemia  Drug abuse  CVA (cerebral vascular accident)  No significant past surgical history    MEDICATIONS  (STANDING):  aspirin enteric coated 81 milliGRAM(s) Oral daily  cholestyramine Powder (Sugar-Free) 4 Gram(s) Oral daily  heparin  Injectable 5000 Unit(s) SubCutaneous every 12 hours  lactobacillus acidophilus 1 Tablet(s) Oral four times a day with meals  psyllium Powder 1 Packet(s) Oral daily  sodium chloride 0.45%. 1000 milliLiter(s) (100 mL/Hr) IV Continuous <Continuous>    MEDICATIONS  (PRN):  acetaminophen   Tablet. 500 milliGRAM(s) Oral three times a day PRN Moderate Pain (4 - 6)  diphenoxylate/atropine 1 Tablet(s) Oral four times a day PRN Diarrhea    EXAM:    Vital Signs Last 24 Hrs  T(C): 36.9 (2018 17:36), Max: 37.2 (2018 22:08)  T(F): 98.4 (2018 17:36), Max: 98.9 (2018 22:08)  HR: 87 (2018 17:36) (78 - 92)  BP: 105/66 (2018 17:36) (99/69 - 137/75)  BP(mean): --  RR: 18 (2018 17:36) (18 - 18)  SpO2: 97% (2018 17:36) (96% - 98%)    I&O's Summary    2018 07:01  -  2018 07:00  --------------------------------------------------------  IN: 1800 mL / OUT: 660 mL / NET: 1140 mL          PHYSICAL EXAM:  Skin - right upper back macular erythematous rash  Constitutional: awake in bed  Respiratory: bilaterally clear to auscultation, no wheezing, no rhonchi, no crackles, no decreased air entry  Cardiovascular: s1s2, rrr, no murmurs.   Gastrointestinal: soft, non tender, distended, no rebound, no guarding.   Extremities: RUE hand contracted.   Neurological: alert and oriented to person, date and place.   Skin: warm to touch.   Psychiatric: no homicidal, suicidal ideation. appropriate affect.     LABS:                            12.3   5.98  )-----------( 300      ( 2018 08:13 )             37.6       07-06    139  |  107  |  15  ----------------------------<  87  4.3   |  21<L>  |  1.87<H>    Ca    8.9      2018 08:13      Urinalysis Basic - ( 2018 14:57 )  Color: Yellow / Appearance: Clear / S.010 / pH: x  Gluc: x / Ketone: Negative  / Bili: Negative / Urobili: Negative mg/dL   Blood: x / Protein: 30 mg/dL / Nitrite: Negative   Leuk Esterase: Negative / RBC: 0-2 /HPF / WBC x   Sq Epi: x / Non Sq Epi: x / Bacteria: Occasional    PT/INR - ( 2018 13:46 )   PT: 13.9 sec;   INR: 1.27 ratio    PTT - ( 2018 13:46 )  PTT:30.6 sec  LIVER FUNCTIONS - ( 2018 08:05 )  Alb: 2.2 g/dL / Pro: 6.3 g/dL / ALK PHOS: 90 U/L / ALT: 67 U/L DA / AST: 20 U/L / GGT: x               GI PCR Panel, Stool (collected 18 @ 17:09)  Source: .Stool Feces  Final Report (18 @ 21:23):    GI PCR Results: NOT detected    *******Please Note:*******    GI panel PCR evaluates for:    Campylobacter, Plesiomonas shigelloides, Salmonella,    Vibrio, Yersinia enterocolitica, Enteroaggregative    Escherichia coli (EAEC), Enteropathogenic E.coli (EPEC),    Enterotoxigenic E. coli (ETEC) lt/st, Shiga-like    toxin-producing E. coli (STEC) stx1/stx2,    Shigella/ Enteroinvasive E. coli (EIEC), Cryptosporidium,    Cyclospora cayetanensis, Entamoeba histolytica,    Giardia lamblia, Adenovirus F 40/41, Astrovirus,    Norovirus GI/GII, Rotavirus A, Sapovirus

## 2018-07-06 NOTE — CHART NOTE - NSCHARTNOTEFT_GEN_A_CORE
Called to see patient regarding a rash.  Rash is located on his upper back and it just started a couple of hours ago.  Rash is associated with a "tightness" but the patient could not describe any sharpness or burning sensation.  Rash is described as multiple erythematous papules but no vesicular lesions seen.  However it does seem to be in a dermatomal distribution (upper right T1/T2 dermatome).  Patient denies having any history of shingles but does claim to have had chickenpox when he was a child.  Although symptoms and timing does not fit zoster, suspicion for zoster is there given the clinical picture.  Will institute zoster precautions and start valcyclovir for now.

## 2018-07-07 LAB
HCT VFR BLD CALC: 37 % — LOW (ref 39–50)
HGB BLD-MCNC: 12.2 G/DL — LOW (ref 13–17)
MCHC RBC-ENTMCNC: 30.7 PG — SIGNIFICANT CHANGE UP (ref 27–34)
MCHC RBC-ENTMCNC: 33 GM/DL — SIGNIFICANT CHANGE UP (ref 32–36)
MCV RBC AUTO: 93.2 FL — SIGNIFICANT CHANGE UP (ref 80–100)
NRBC # BLD: 0 /100 WBCS — SIGNIFICANT CHANGE UP (ref 0–0)
PLATELET # BLD AUTO: 346 K/UL — SIGNIFICANT CHANGE UP (ref 150–400)
RBC # BLD: 3.97 M/UL — LOW (ref 4.2–5.8)
RBC # FLD: 13.2 % — SIGNIFICANT CHANGE UP (ref 10.3–14.5)
WBC # BLD: 5.77 K/UL — SIGNIFICANT CHANGE UP (ref 3.8–10.5)
WBC # FLD AUTO: 5.77 K/UL — SIGNIFICANT CHANGE UP (ref 3.8–10.5)

## 2018-07-07 PROCEDURE — 99233 SBSQ HOSP IP/OBS HIGH 50: CPT

## 2018-07-07 PROCEDURE — 12345: CPT | Mod: NC

## 2018-07-07 RX ORDER — LANOLIN ALCOHOL/MO/W.PET/CERES
3 CREAM (GRAM) TOPICAL AT BEDTIME
Qty: 0 | Refills: 0 | Status: DISCONTINUED | OUTPATIENT
Start: 2018-07-07 | End: 2018-07-12

## 2018-07-07 RX ADMIN — HEPARIN SODIUM 5000 UNIT(S): 5000 INJECTION INTRAVENOUS; SUBCUTANEOUS at 05:16

## 2018-07-07 RX ADMIN — Medication 2 MILLIGRAM(S): at 05:16

## 2018-07-07 RX ADMIN — Medication 2 MILLIGRAM(S): at 18:11

## 2018-07-07 RX ADMIN — Medication 1 TABLET(S): at 11:45

## 2018-07-07 RX ADMIN — Medication 1 TABLET(S): at 18:11

## 2018-07-07 RX ADMIN — Medication 1 TABLET(S): at 21:27

## 2018-07-07 RX ADMIN — Medication 1 TABLET(S): at 08:24

## 2018-07-07 RX ADMIN — Medication 1 TABLET(S): at 21:31

## 2018-07-07 RX ADMIN — Medication 81 MILLIGRAM(S): at 11:45

## 2018-07-07 RX ADMIN — HEPARIN SODIUM 5000 UNIT(S): 5000 INJECTION INTRAVENOUS; SUBCUTANEOUS at 18:11

## 2018-07-07 RX ADMIN — VALACYCLOVIR 1000 MILLIGRAM(S): 500 TABLET, FILM COATED ORAL at 21:27

## 2018-07-07 RX ADMIN — SODIUM CHLORIDE 75 MILLILITER(S): 9 INJECTION INTRAMUSCULAR; INTRAVENOUS; SUBCUTANEOUS at 08:24

## 2018-07-07 RX ADMIN — Medication 3 MILLIGRAM(S): at 22:07

## 2018-07-07 RX ADMIN — SODIUM CHLORIDE 75 MILLILITER(S): 9 INJECTION INTRAMUSCULAR; INTRAVENOUS; SUBCUTANEOUS at 18:11

## 2018-07-07 RX ADMIN — Medication 1 TABLET(S): at 08:29

## 2018-07-07 RX ADMIN — VALACYCLOVIR 1000 MILLIGRAM(S): 500 TABLET, FILM COATED ORAL at 05:16

## 2018-07-07 RX ADMIN — VALACYCLOVIR 1000 MILLIGRAM(S): 500 TABLET, FILM COATED ORAL at 14:04

## 2018-07-07 RX ADMIN — CHOLESTYRAMINE 4 GRAM(S): 4 POWDER, FOR SUSPENSION ORAL at 08:24

## 2018-07-07 NOTE — PROGRESS NOTE ADULT - ASSESSMENT
·	SHAQUILLE, ? CKD   ·	Diarrhea    Improving renal function trend. ? Baseline creatinine. To continue current meds. Change IVF to 50 cc/hr.   GI work up in progress. Avoid nephrotoxins.

## 2018-07-07 NOTE — PROGRESS NOTE ADULT - SUBJECTIVE AND OBJECTIVE BOX
INTERVAL HPI/OVERNIGHT EVENTS:  states he had 12 episodes of diarrhea ???  MEDICATIONS  (STANDING):  aspirin enteric coated 81 milliGRAM(s) Oral daily  cholestyramine Powder (Sugar-Free) 4 Gram(s) Oral daily  heparin  Injectable 5000 Unit(s) SubCutaneous every 12 hours  lactobacillus acidophilus 1 Tablet(s) Oral four times a day with meals  psyllium Powder 1 Packet(s) Oral daily  sodium chloride 0.9%. 1000 milliLiter(s) (75 mL/Hr) IV Continuous <Continuous>  valACYclovir 1000 milliGRAM(s) Oral every 8 hours    MEDICATIONS  (PRN):  acetaminophen   Tablet. 500 milliGRAM(s) Oral three times a day PRN Moderate Pain (4 - 6)  diphenoxylate/atropine 1 Tablet(s) Oral four times a day PRN Diarrhea  loperamide 2 milliGRAM(s) Oral every 8 hours PRN Diarrhea      Allergies    Bactrim (Rash (Skin))    Intolerances        Review of Systems:    General:  No wt loss, fevers, chills, night sweats,fatigue,   Eyes:  Good vision, no reported pain  ENT:  No sore throat, pain, runny nose, dysphagia  CV:  No pain, palpitatioins, hypo/hypertension  Resp:  No dyspnea, cough, tachypnea, wheezing  GI:  No pain, No nausea, No vomiting, No diarrhea, No constipatiion, No weight loss, No fever, No pruritis, No rectal bleeding, No tarry stools, No dysphagia,  :  No pain, bleeding, incontinence, nocturia  Muscle:  No pain, weakness  Neuro:  No weakness, tingling, memory problems  Psych:  No fatigue, insomnia, mood problems, depression  Endocrine:  No polyuria, polydypsia, cold/heat intolerance  Heme:  No petechiae, ecchymosis, easy bruisability  Skin:  No rash, tattoos, scars, edema      Vital Signs Last 24 Hrs  T(C): 36.7 (07 Jul 2018 05:10), Max: 36.9 (06 Jul 2018 17:36)  T(F): 98.1 (07 Jul 2018 05:10), Max: 98.4 (06 Jul 2018 17:36)  HR: 90 (07 Jul 2018 05:10) (87 - 90)  BP: 101/66 (07 Jul 2018 05:10) (101/66 - 105/66)  BP(mean): --  RR: 18 (07 Jul 2018 05:10) (18 - 18)  SpO2: 97% (07 Jul 2018 05:10) (96% - 99%)    PHYSICAL EXAM:    Constitutional: NAD, well-developed  HEENT: EOMI, throat clear  Neck: No LAD, supple  Respiratory: CTA and P  Cardiovascular: S1 and S2, RRR, no M  Gastrointestinal: BS+, soft, NT/ND, neg HSM,  Extremities: No peripheral edema, neg clubing, cyanosis  Vascular: 2+ peripheral pulses  Neurological: A/O x 3, no focal deficits  Psychiatric: Normal mood, normal affect  Skin: No rashes      LABS:                        12.2   5.77  )-----------( 346      ( 07 Jul 2018 08:00 )             37.0     07-06    139  |  107  |  15  ----------------------------<  87  4.3   |  21<L>  |  1.87<H>    Ca    8.9      06 Jul 2018 08:13            RADIOLOGY & ADDITIONAL TESTS:

## 2018-07-07 NOTE — PROGRESS NOTE ADULT - SUBJECTIVE AND OBJECTIVE BOX
Patient is a 53y old  Male who presents with a chief complaint of loose bowel movement for over a week now (03 Jul 2018 22:37)      INTERVAL HPI/OVERNIGHT EVENTS:pt isc/o rash and persistent diarrhoea.     MEDICATIONS  (STANDING):  aspirin enteric coated 81 milliGRAM(s) Oral daily  cholestyramine Powder (Sugar-Free) 4 Gram(s) Oral daily  heparin  Injectable 5000 Unit(s) SubCutaneous every 12 hours  lactobacillus acidophilus 1 Tablet(s) Oral four times a day with meals  psyllium Powder 1 Packet(s) Oral daily  sodium chloride 0.9%. 1000 milliLiter(s) (75 mL/Hr) IV Continuous <Continuous>  valACYclovir 1000 milliGRAM(s) Oral every 8 hours    MEDICATIONS  (PRN):  acetaminophen   Tablet. 500 milliGRAM(s) Oral three times a day PRN Moderate Pain (4 - 6)  diphenoxylate/atropine 1 Tablet(s) Oral four times a day PRN Diarrhea  loperamide 2 milliGRAM(s) Oral every 8 hours PRN Diarrhea      Allergies    Bactrim (Rash (Skin))    Intolerances        REVIEW OF SYSTEMS:  CONSTITUTIONAL: No fever or chills  HEENT:  No headache, no sore throat  RESPIRATORY: No cough, wheezing, or shortness of breath  CARDIOVASCULAR: No chest pain, palpitations, or leg swelling  GASTROINTESTINAL: No nausea, vomiting,  , c/o  diarrhea  GENITOURINARY: No dysuria, frequency, or hematuria  NEUROLOGICAL: no focal weakness or dizziness  SKIN: c/o  rashes   MUSCULOSKELETAL: no myalgias   PSYCHIATRIC: No depression or anxiety    Vital Signs Last 24 Hrs  T(C): 36.7 (07 Jul 2018 09:35), Max: 36.9 (06 Jul 2018 17:36)  T(F): 98 (07 Jul 2018 09:35), Max: 98.4 (06 Jul 2018 17:36)  HR: 92 (07 Jul 2018 09:35) (87 - 92)  BP: 107/74 (07 Jul 2018 09:35) (101/66 - 107/74)  BP(mean): --  RR: 18 (07 Jul 2018 09:35) (18 - 18)  SpO2: 98% (07 Jul 2018 09:35) (96% - 99%)    PHYSICAL EXAM:  GENERAL: NAD  HEENT:  EOMI, mmm  CHEST/LUNG:  CTA b/l, no rales, wheezes, or rhonchi  HEART:  RRR, S1, S2, []murmur  ABDOMEN:  BS+, soft, NT, ND  EXTREMITIES: no edema or calf tenderness  SKIN:  rash in the back   NERVOUS SYSTEM: AA&Ox3     DIET:     Cultures: Culture Results:   GI PCR Results: NOT detected  *******Please Note:*******  GI panel PCR evaluates for:  Campylobacter, Plesiomonas shigelloides, Salmonella,  Vibrio, Yersinia enterocolitica, Enteroaggregative  Escherichia coli (EAEC), Enteropathogenic E.coli (EPEC),  Enterotoxigenic E. coli (ETEC) lt/st, Shiga-like  toxin-producing E. coli (STEC) stx1/stx2,  Shigella/ Enteroinvasive E. coli (EIEC), Cryptosporidium,  Cyclospora cayetanensis, Entamoeba histolytica,  Giardia lamblia, Adenovirus F 40/41, Astrovirus,  Norovirus GI/GII, Rotavirus A, Sapovirus (07-04 @ 17:09)  Culture Results:   No Protozoa seen by trichrome stain  No Helminths or Protozoa seen in formalin concentrate  performed by iodine stain  (routine O+P not evaluated for Microsporidia,  Cryptosporidia, Cyclospora, or Isospora.)  Note: One negative specimen does not rule  out the possibility of a parasitic infection. (07-04 @ 13:16)      LABS:                        12.2   5.77  )-----------( 346      ( 07 Jul 2018 08:00 )             37.0     CBC Full  -  ( 07 Jul 2018 08:00 )  WBC Count : 5.77 K/uL  Hemoglobin : 12.2 g/dL  Hematocrit : 37.0 %  Platelet Count - Automated : 346 K/uL  Mean Cell Volume : 93.2 fl  Mean Cell Hemoglobin : 30.7 pg  Mean Cell Hemoglobin Concentration : 33.0 gm/dL  Auto Neutrophil # : x  Auto Lymphocyte # : x  Auto Monocyte # : x  Auto Eosinophil # : x  Auto Basophil # : x  Auto Neutrophil % : x  Auto Lymphocyte % : x  Auto Monocyte % : x  Auto Eosinophil % : x  Auto Basophil % : x      Ca    8.9        06 Jul 2018 08:13          CAPILLARY BLOOD GLUCOSE              RADIOLOGY & ADDITIONAL TESTS:    Personally reviewed.     Consultant(s) Notes Reviewed:  [x] YES  [ ] NO    Care Discussed with [ ] Consultants  [x] Patient  [ ] Family  [x]      [ ] Other; RN  DVT ppx  Advanced directive

## 2018-07-07 NOTE — PROGRESS NOTE ADULT - ASSESSMENT
53 y/o M w/ recent CVA, hyperlipidemia , iv drug abuse came in with weakness and diarrhea.      - Weakness.  Plan: Deconditioning.  secondary to persistent diarrhea and deconditioning.   PT/OT   continue with ivfs  tolerating po lactose free diet.      -Diarrhea, unspecified type.  Plan: Follow up Stool studies   C.diff -neg    HIV negative.  Continue with  IV fluids .   -  given  imodium  1 dose today    may need scope if symptoms persisting.   Continue with lactose free diet,      # Herpes Zoster right upper back - T1 / T2 dermatome  - Contact precatutions  -  Valtrex to continue  - Hyperlipemia.   Plan: continue wih statin.     -CVA (cerebral vascular accident).  Plan: continue with statin and ASA .  Continue with supportive care.  Needs ELIAZAR.     -CKD (chronic kidney disease), stage IV.  Plan: Cr slightly improved.   CKD- 4 , with CT imaging showing left atrophic kidney.    rhabdomyolysis -recently in last admission last month  + hemodynamic injury from cocaine abuse.   -monitor creatinine trend and urine output  -avoid nephrotoxins, ace/arb, imaging contrast.   Renal following the patient    - Thrush in the throat.    Plan: Continue with fluconazole.     - Headache.   Plan: Likely tension headache - continue with Tylenol PRN.    - Prophylactic measure.    Plan: DVT - px- Heparin s/q.

## 2018-07-08 LAB
ANION GAP SERPL CALC-SCNC: 11 MMOL/L — SIGNIFICANT CHANGE UP (ref 5–17)
BUN SERPL-MCNC: 13 MG/DL — SIGNIFICANT CHANGE UP (ref 7–23)
CALCIUM SERPL-MCNC: 8.9 MG/DL — SIGNIFICANT CHANGE UP (ref 8.4–10.5)
CHLORIDE SERPL-SCNC: 109 MMOL/L — HIGH (ref 96–108)
CO2 SERPL-SCNC: 20 MMOL/L — LOW (ref 22–31)
CREAT SERPL-MCNC: 1.94 MG/DL — HIGH (ref 0.5–1.3)
GLUCOSE SERPL-MCNC: 104 MG/DL — HIGH (ref 70–99)
HCT VFR BLD CALC: 38.5 % — LOW (ref 39–50)
HGB BLD-MCNC: 12.5 G/DL — LOW (ref 13–17)
MCHC RBC-ENTMCNC: 30.2 PG — SIGNIFICANT CHANGE UP (ref 27–34)
MCHC RBC-ENTMCNC: 32.5 GM/DL — SIGNIFICANT CHANGE UP (ref 32–36)
MCV RBC AUTO: 93 FL — SIGNIFICANT CHANGE UP (ref 80–100)
NRBC # BLD: 0 /100 WBCS — SIGNIFICANT CHANGE UP (ref 0–0)
PLATELET # BLD AUTO: 356 K/UL — SIGNIFICANT CHANGE UP (ref 150–400)
POTASSIUM SERPL-MCNC: 3.9 MMOL/L — SIGNIFICANT CHANGE UP (ref 3.5–5.3)
POTASSIUM SERPL-SCNC: 3.9 MMOL/L — SIGNIFICANT CHANGE UP (ref 3.5–5.3)
RBC # BLD: 4.14 M/UL — LOW (ref 4.2–5.8)
RBC # FLD: 13.2 % — SIGNIFICANT CHANGE UP (ref 10.3–14.5)
SODIUM SERPL-SCNC: 140 MMOL/L — SIGNIFICANT CHANGE UP (ref 135–145)
WBC # BLD: 6.47 K/UL — SIGNIFICANT CHANGE UP (ref 3.8–10.5)
WBC # FLD AUTO: 6.47 K/UL — SIGNIFICANT CHANGE UP (ref 3.8–10.5)

## 2018-07-08 PROCEDURE — 99233 SBSQ HOSP IP/OBS HIGH 50: CPT

## 2018-07-08 RX ORDER — FAMOTIDINE 10 MG/ML
20 INJECTION INTRAVENOUS ONCE
Qty: 0 | Refills: 0 | Status: COMPLETED | OUTPATIENT
Start: 2018-07-08 | End: 2018-07-08

## 2018-07-08 RX ADMIN — HEPARIN SODIUM 5000 UNIT(S): 5000 INJECTION INTRAVENOUS; SUBCUTANEOUS at 17:16

## 2018-07-08 RX ADMIN — Medication 500 MILLIGRAM(S): at 13:35

## 2018-07-08 RX ADMIN — Medication 1 TABLET(S): at 17:16

## 2018-07-08 RX ADMIN — Medication 1 TABLET(S): at 08:17

## 2018-07-08 RX ADMIN — Medication 3 MILLIGRAM(S): at 21:31

## 2018-07-08 RX ADMIN — Medication 1 TABLET(S): at 12:19

## 2018-07-08 RX ADMIN — VALACYCLOVIR 1000 MILLIGRAM(S): 500 TABLET, FILM COATED ORAL at 21:32

## 2018-07-08 RX ADMIN — Medication 2 MILLIGRAM(S): at 17:16

## 2018-07-08 RX ADMIN — Medication 1 TABLET(S): at 21:32

## 2018-07-08 RX ADMIN — VALACYCLOVIR 1000 MILLIGRAM(S): 500 TABLET, FILM COATED ORAL at 13:36

## 2018-07-08 RX ADMIN — HEPARIN SODIUM 5000 UNIT(S): 5000 INJECTION INTRAVENOUS; SUBCUTANEOUS at 05:44

## 2018-07-08 RX ADMIN — Medication 1 TABLET(S): at 12:16

## 2018-07-08 RX ADMIN — VALACYCLOVIR 1000 MILLIGRAM(S): 500 TABLET, FILM COATED ORAL at 05:44

## 2018-07-08 RX ADMIN — FAMOTIDINE 20 MILLIGRAM(S): 10 INJECTION INTRAVENOUS at 17:16

## 2018-07-08 RX ADMIN — Medication 2 MILLIGRAM(S): at 05:43

## 2018-07-08 RX ADMIN — Medication 1 TABLET(S): at 21:31

## 2018-07-08 RX ADMIN — Medication 81 MILLIGRAM(S): at 12:16

## 2018-07-08 RX ADMIN — CHOLESTYRAMINE 4 GRAM(S): 4 POWDER, FOR SUSPENSION ORAL at 08:17

## 2018-07-08 RX ADMIN — Medication 500 MILLIGRAM(S): at 14:15

## 2018-07-08 NOTE — PROGRESS NOTE ADULT - SUBJECTIVE AND OBJECTIVE BOX
INTERVAL HPI/OVERNIGHT EVENTS:  ?diarrhea  MEDICATIONS  (STANDING):  aspirin enteric coated 81 milliGRAM(s) Oral daily  cholestyramine Powder (Sugar-Free) 4 Gram(s) Oral daily  heparin  Injectable 5000 Unit(s) SubCutaneous every 12 hours  lactobacillus acidophilus 1 Tablet(s) Oral four times a day with meals  sodium chloride 0.9%. 1000 milliLiter(s) (75 mL/Hr) IV Continuous <Continuous>  valACYclovir 1000 milliGRAM(s) Oral every 8 hours    MEDICATIONS  (PRN):  acetaminophen   Tablet. 500 milliGRAM(s) Oral three times a day PRN Moderate Pain (4 - 6)  diphenoxylate/atropine 1 Tablet(s) Oral four times a day PRN Diarrhea  loperamide 2 milliGRAM(s) Oral every 8 hours PRN Diarrhea  melatonin 3 milliGRAM(s) Oral at bedtime PRN Insomnia      Allergies    Bactrim (Rash (Skin))    Intolerances        Review of Systems:    General:  No wt loss, fevers, chills, night sweats,fatigue,   Eyes:  Good vision, no reported pain  ENT:  No sore throat, pain, runny nose, dysphagia  CV:  No pain, palpitatioins, hypo/hypertension  Resp:  No dyspnea, cough, tachypnea, wheezing  GI:  No pain, No nausea, No vomiting, No diarrhea, No constipatiion, No weight loss, No fever, No pruritis, No rectal bleeding, No tarry stools, No dysphagia,  :  No pain, bleeding, incontinence, nocturia  Muscle:  No pain, weakness  Neuro:  No weakness, tingling, memory problems  Psych:  No fatigue, insomnia, mood problems, depression  Endocrine:  No polyuria, polydypsia, cold/heat intolerance  Heme:  No petechiae, ecchymosis, easy bruisability  Skin:  No rash, tattoos, scars, edema      Vital Signs Last 24 Hrs  T(C): 36.8 (08 Jul 2018 09:45), Max: 36.9 (07 Jul 2018 21:35)  T(F): 98.3 (08 Jul 2018 09:45), Max: 98.5 (07 Jul 2018 21:35)  HR: 91 (08 Jul 2018 09:45) (78 - 96)  BP: 94/61 (08 Jul 2018 09:45) (94/61 - 124/71)  BP(mean): --  RR: 19 (08 Jul 2018 09:45) (17 - 19)  SpO2: 97% (08 Jul 2018 09:45) (96% - 98%)    PHYSICAL EXAM:    Constitutional: NAD, well-developed  HEENT: EOMI, throat clear  Neck: No LAD, supple  Respiratory: CTA and P  Cardiovascular: S1 and S2, RRR, no M  Gastrointestinal: BS+, soft, NT/ND, neg HSM,  Extremities: No peripheral edema, neg clubing, cyanosis  Vascular: 2+ peripheral pulses  Neurological: A/O x 3, no focal deficits  Psychiatric: Normal mood, normal affect  Skin: No rashes      LABS:                        12.5   6.47  )-----------( 356      ( 08 Jul 2018 07:44 )             38.5     07-08    140  |  109<H>  |  13  ----------------------------<  104<H>  3.9   |  20<L>  |  1.94<H>    Ca    8.9      08 Jul 2018 07:44            RADIOLOGY & ADDITIONAL TESTS:

## 2018-07-08 NOTE — PROGRESS NOTE ADULT - SUBJECTIVE AND OBJECTIVE BOX
Patient is a 53y old  Male who presents with a chief complaint of loose bowel movement for over a week now (03 Jul 2018 22:37)      INTERVAL HPI/OVERNIGHT EVENTS: pt is stll c/o diarrhea.     MEDICATIONS  (STANDING):  aspirin enteric coated 81 milliGRAM(s) Oral daily  cholestyramine Powder (Sugar-Free) 4 Gram(s) Oral daily  heparin  Injectable 5000 Unit(s) SubCutaneous every 12 hours  lactobacillus acidophilus 1 Tablet(s) Oral four times a day with meals  sodium chloride 0.9%. 1000 milliLiter(s) (75 mL/Hr) IV Continuous <Continuous>  valACYclovir 1000 milliGRAM(s) Oral every 8 hours    MEDICATIONS  (PRN):  acetaminophen   Tablet. 500 milliGRAM(s) Oral three times a day PRN Moderate Pain (4 - 6)  diphenoxylate/atropine 1 Tablet(s) Oral four times a day PRN Diarrhea  loperamide 2 milliGRAM(s) Oral every 8 hours PRN Diarrhea  melatonin 3 milliGRAM(s) Oral at bedtime PRN Insomnia      Allergies    Bactrim (Rash (Skin))    Intolerances        REVIEW OF SYSTEMS:  CONSTITUTIONAL: No fever or chills  HEENT:  No headache, no sore throat  RESPIRATORY: No cough, wheezing, or shortness of breath  CARDIOVASCULAR: No chest pain, palpitations, or leg swelling  GASTROINTESTINAL: No nausea, vomiting, but c/o  diarrhea  GENITOURINARY: No dysuria, frequency, or hematuria  NEUROLOGICAL: no focal weakness or dizziness , c/o headache.   SKIN:  No rashes or lesions   MUSCULOSKELETAL: no myalgias   PSYCHIATRIC: No depression or anxiety      Vital Signs Last 24 Hrs  T(C): 36.8 (08 Jul 2018 09:45), Max: 36.9 (07 Jul 2018 21:35)  T(F): 98.3 (08 Jul 2018 09:45), Max: 98.5 (07 Jul 2018 21:35)  HR: 91 (08 Jul 2018 09:45) (78 - 96)  BP: 94/61 (08 Jul 2018 09:45) (94/61 - 124/71)  BP(mean): --  RR: 19 (08 Jul 2018 09:45) (17 - 19)  SpO2: 97% (08 Jul 2018 09:45) (96% - 98%)    PHYSICAL EXAM:  GENERAL: NAD  HEENT:  EOMI, mmm  CHEST/LUNG:  CTA b/l, no rales, wheezes, or rhonchi  HEART:  RRR, S1, S2, []murmur  ABDOMEN:  BS+, soft, NT, distended   EXTREMITIES: no edema or calf tenderness  SKIN:   rash in the back.   NERVOUS SYSTEM: AA&Ox3    DIET:     Cultures: Culture Results:   GI PCR Results: NOT detected  *******Please Note:*******  GI panel PCR evaluates for:  Campylobacter, Plesiomonas shigelloides, Salmonella,  Vibrio, Yersinia enterocolitica, Enteroaggregative  Escherichia coli (EAEC), Enteropathogenic E.coli (EPEC),  Enterotoxigenic E. coli (ETEC) lt/st, Shiga-like  toxin-producing E. coli (STEC) stx1/stx2,  Shigella/ Enteroinvasive E. coli (EIEC), Cryptosporidium,  Cyclospora cayetanensis, Entamoeba histolytica,  Giardia lamblia, Adenovirus F 40/41, Astrovirus,  Norovirus GI/GII, Rotavirus A, Sapovirus (07-04 @ 17:09)  Culture Results:   No Protozoa seen by trichrome stain  No Helminths or Protozoa seen in formalin concentrate  performed by iodine stain  (routine O+P not evaluated for Microsporidia,  Cryptosporidia, Cyclospora, or Isospora.)  Note: One negative specimen does not rule  out the possibility of a parasitic infection. (07-04 @ 13:16)      LABS:                        12.5   6.47  )-----------( 356      ( 08 Jul 2018 07:44 )             38.5     CBC Full  -  ( 08 Jul 2018 07:44 )  WBC Count : 6.47 K/uL  Hemoglobin : 12.5 g/dL  Hematocrit : 38.5 %  Platelet Count - Automated : 356 K/uL  Mean Cell Volume : 93.0 fl  Mean Cell Hemoglobin : 30.2 pg  Mean Cell Hemoglobin Concentration : 32.5 gm/dL  Auto Neutrophil # : x  Auto Lymphocyte # : x  Auto Monocyte # : x  Auto Eosinophil # : x  Auto Basophil # : x  Auto Neutrophil % : x  Auto Lymphocyte % : x  Auto Monocyte % : x  Auto Eosinophil % : x  Auto Basophil % : x    08 Jul 2018 07:44    140    |  109    |  13     ----------------------------<  104    3.9     |  20     |  1.94     Ca    8.9        08 Jul 2018 07:44          CAPILLARY BLOOD GLUCOSE              RADIOLOGY & ADDITIONAL TESTS:    Personally reviewed.     Consultant(s) Notes Reviewed:  [x] YES  [ ] NO    Care Discussed with [ ] Consultants  [x] Patient  [ ] Family  [x]      [ ] Other; RN  DVT ppx  Advanced directive

## 2018-07-08 NOTE — PROGRESS NOTE ADULT - PROBLEM SELECTOR PLAN 2
continue Immodium and Lomotil  dc metamucil  monitor elyltes
in and out  bacid one tab tid  low residue lactose free diet  stool studies  to follow  give one dose of immodium today
in and out  bacid one tab tid  low residue lactose free diet  stool studies  to follow  give one dose of immodium today  strict monitoring of BMS
in and out  bacid one tab tid  low residue lactose free diet  stool studies  to follow  if stool negative consider imodium      may need scope if symptoms persist
in and out  bacid one tab tid  low residue lactose free diet  stool studies  to follow  if stool negative consider imodium      may need scope if symptoms persist
Follow up Stool studies   C.diff -neg    HIV ordered.   Continue with  IV fluids .   GI consult appreciated .   Continue with lactose free diet,  Contact Isolation.

## 2018-07-08 NOTE — PROGRESS NOTE ADULT - ASSESSMENT
53 y/o M w/ recent CVA, hyperlipidemia , iv drug abuse came in with weakness and diarrhea.      - Weakness.  Plan: Deconditioning.  secondary to persistent diarrhea and deconditioning.   PT/OT   continue with ivfs  tolerating po lactose free diet.      -Diarrhea, unspecified type.  Plan: Follow up Stool studies   C.diff -neg    HIV negative.  Continue with  IV fluids .   -  given  imodium  1 dose today    may need scope if symptoms persisting.   Continue with lactose free diet,      # Herpes Zoster right upper back - T1 / T2 dermatome  - Contact precatutions  -  Valtrex to continue  - Hyperlipemia.   Plan: continue wih statin.     -CVA (cerebral vascular accident).  Plan: continue with statin and ASA .  Continue with supportive care.  Needs ELIAZAR.     -CKD (chronic kidney disease), stage IV.  Plan: Cr slightly improved.   CKD- 4 , with CT imaging showing left atrophic kidney.    rhabdomyolysis -recently in last admission last month  + hemodynamic injury from cocaine abuse.   -monitor creatinine trend and urine output  -avoid nephrotoxins, ace/arb, imaging contrast.   Renal following the patient    - Thrush in the throat.    Plan: Continue with fluconazole.     - Headache.   Plan: Likely tension headache - continue with Tylenol PRN.    - Prophylactic measure.    Plan: DVT - px- Heparin s/q. 51 y/o M w/ recent CVA, hyperlipidemia , iv drug abuse came in with weakness and diarrhea.      - Weakness.    Deconditioning.  secondary to persistent diarrhea and deconditioning due to recent stroke .   PT/OT   continue with iv fluids.   tolerating po lactose free diet.      -Diarrhea, unspecified type.  Plan: Follow up Stool studies   C.diff -neg    HIV negative.  Continue with  IV fluids .   - continue with   imodium  and lomitil   Continue with lactose free diet,  Gi follow up appreciated.     # Herpes Zoster right upper back - T1 / T2 dermatome  - Contact precatutions  -  continue with Valacyclovir.   # Hyperlipemia.   Plan: continue wih statin.     #CVA (cerebral vascular accident).  Plan: continue with statin and ASA .  Continue with supportive care.  Needs ELIAZAR.     #CKD (chronic kidney disease), stage IV.  Cr slightly improved.    with CT imaging showing left atrophic kidney.    rhabdomyolysis -recently in last admission last month  + hemodynamic injury from cocaine abuse.   -monitor creatinine trend and urine output  -avoid nephrotoxins, ace/arb, imaging contrast.   Renal following the patient    - Thrush in the throat.    Plan: Continue with fluconazole.     - Headache.   Plan: Likely tension headache - continue with Tylenol PRN.    - Prophylactic measure.    Plan: DVT - px- Heparin s/q.

## 2018-07-09 ENCOUNTER — TRANSCRIPTION ENCOUNTER (OUTPATIENT)
Age: 53
End: 2018-07-09

## 2018-07-09 PROCEDURE — 99233 SBSQ HOSP IP/OBS HIGH 50: CPT

## 2018-07-09 RX ADMIN — Medication 1 TABLET(S): at 09:04

## 2018-07-09 RX ADMIN — VALACYCLOVIR 1000 MILLIGRAM(S): 500 TABLET, FILM COATED ORAL at 21:12

## 2018-07-09 RX ADMIN — Medication 500 MILLIGRAM(S): at 20:53

## 2018-07-09 RX ADMIN — Medication 500 MILLIGRAM(S): at 21:11

## 2018-07-09 RX ADMIN — HEPARIN SODIUM 5000 UNIT(S): 5000 INJECTION INTRAVENOUS; SUBCUTANEOUS at 17:43

## 2018-07-09 RX ADMIN — Medication 1 TABLET(S): at 17:43

## 2018-07-09 RX ADMIN — VALACYCLOVIR 1000 MILLIGRAM(S): 500 TABLET, FILM COATED ORAL at 05:32

## 2018-07-09 RX ADMIN — SODIUM CHLORIDE 75 MILLILITER(S): 9 INJECTION INTRAMUSCULAR; INTRAVENOUS; SUBCUTANEOUS at 21:13

## 2018-07-09 RX ADMIN — Medication 1 TABLET(S): at 21:12

## 2018-07-09 RX ADMIN — Medication 81 MILLIGRAM(S): at 12:42

## 2018-07-09 RX ADMIN — HEPARIN SODIUM 5000 UNIT(S): 5000 INJECTION INTRAVENOUS; SUBCUTANEOUS at 05:32

## 2018-07-09 RX ADMIN — VALACYCLOVIR 1000 MILLIGRAM(S): 500 TABLET, FILM COATED ORAL at 14:08

## 2018-07-09 RX ADMIN — SODIUM CHLORIDE 75 MILLILITER(S): 9 INJECTION INTRAMUSCULAR; INTRAVENOUS; SUBCUTANEOUS at 05:32

## 2018-07-09 RX ADMIN — Medication 1 TABLET(S): at 12:42

## 2018-07-09 RX ADMIN — CHOLESTYRAMINE 4 GRAM(S): 4 POWDER, FOR SUSPENSION ORAL at 09:04

## 2018-07-09 RX ADMIN — Medication 2 MILLIGRAM(S): at 05:31

## 2018-07-09 NOTE — DISCHARGE NOTE ADULT - CARE PLAN
Principal Discharge DX:	Diarrhea, unspecified type  Goal:	symptom control  Assessment and plan of treatment:	stable.  Secondary Diagnosis:	CVA (cerebral vascular accident)  Assessment and plan of treatment:	Pt to be discharged to rehab for PT/OT.  Secondary Diagnosis:	Hyperlipemia  Assessment and plan of treatment:	continue with statin  Secondary Diagnosis:	CKD (chronic kidney disease), stage IV  Assessment and plan of treatment:	stable.   monitor bmp Principal Discharge DX:	Diarrhea, unspecified type  Goal:	symptom control  Assessment and plan of treatment:	stable. Maintain lactose free low fiber diet. GI follow on discharge.  Secondary Diagnosis:	CVA (cerebral vascular accident)  Assessment and plan of treatment:	Pt to be discharged to rehab for PT/OT.  Secondary Diagnosis:	Hyperlipemia  Assessment and plan of treatment:	continue with statin  Secondary Diagnosis:	CKD (chronic kidney disease), stage IV  Assessment and plan of treatment:	stable.   monitor bmp  Secondary Diagnosis:	Right shoulder pain  Assessment and plan of treatment:	sp imaging on June.

## 2018-07-09 NOTE — PROGRESS NOTE ADULT - ASSESSMENT
·	SHAQUILLE, ? CKD   ·	Diarrhea    Recheck am labs. To continue current meds. On IVF.   GI work up in progress. Avoid nephrotoxins.

## 2018-07-09 NOTE — DISCHARGE NOTE ADULT - PATIENT PORTAL LINK FT
You can access the [email or web address] Patient Portal, offered by [facility], by registering with the following website: [email or web address]

## 2018-07-09 NOTE — PROGRESS NOTE ADULT - SUBJECTIVE AND OBJECTIVE BOX
INTERVAL HPI/OVERNIGHT EVENTS:  still with diarrhea    MEDICATIONS  (STANDING):  aspirin enteric coated 81 milliGRAM(s) Oral daily  cholestyramine Powder (Sugar-Free) 4 Gram(s) Oral daily  heparin  Injectable 5000 Unit(s) SubCutaneous every 12 hours  lactobacillus acidophilus 1 Tablet(s) Oral four times a day with meals  sodium chloride 0.9%. 1000 milliLiter(s) (75 mL/Hr) IV Continuous <Continuous>  valACYclovir 1000 milliGRAM(s) Oral every 8 hours    MEDICATIONS  (PRN):  acetaminophen   Tablet. 500 milliGRAM(s) Oral three times a day PRN Moderate Pain (4 - 6)  diphenoxylate/atropine 1 Tablet(s) Oral four times a day PRN Diarrhea  loperamide 2 milliGRAM(s) Oral every 8 hours PRN Diarrhea  melatonin 3 milliGRAM(s) Oral at bedtime PRN Insomnia      Allergies    Bactrim (Rash (Skin))    Intolerances        Review of Systems:    General:  No wt loss, fevers, chills, night sweats,fatigue,   Eyes:  Good vision, no reported pain  ENT:  No sore throat, pain, runny nose, dysphagia  CV:  No pain, palpitatioins, hypo/hypertension  Resp:  No dyspnea, cough, tachypnea, wheezing  GI:  No pain, No nausea, No vomiting, No diarrhea, No constipatiion, No weight loss, No fever, No pruritis, No rectal bleeding, No tarry stools, No dysphagia,  :  No pain, bleeding, incontinence, nocturia  Muscle:  No pain, weakness  Neuro:  No weakness, tingling, memory problems  Psych:  No fatigue, insomnia, mood problems, depression  Endocrine:  No polyuria, polydypsia, cold/heat intolerance  Heme:  No petechiae, ecchymosis, easy bruisability  Skin:  No rash, tattoos, scars, edema      Vital Signs Last 24 Hrs  T(C): 36.8 (09 Jul 2018 14:01), Max: 37 (09 Jul 2018 09:34)  T(F): 98.2 (09 Jul 2018 14:01), Max: 98.6 (09 Jul 2018 09:34)  HR: 78 (09 Jul 2018 14:01) (77 - 96)  BP: 113/75 (09 Jul 2018 14:01) (89/61 - 130/67)  BP(mean): --  RR: 18 (09 Jul 2018 14:01) (18 - 19)  SpO2: 98% (09 Jul 2018 14:01) (96% - 98%)    PHYSICAL EXAM:    Constitutional: NAD, well-developed  HEENT: EOMI, throat clear  Neck: No LAD, supple  Respiratory: CTA and P  Cardiovascular: S1 and S2, RRR, no M  Gastrointestinal: BS+, soft, NT/ND, neg HSM,  Extremities: No peripheral edema, neg clubing, cyanosis  Vascular: 2+ peripheral pulses  Neurological: A/O x 3, no focal deficits  Psychiatric: Normal mood, normal affect  Skin: No rashes      LABS:                        12.5   6.47  )-----------( 356      ( 08 Jul 2018 07:44 )             38.5     07-08    140  |  109<H>  |  13  ----------------------------<  104<H>  3.9   |  20<L>  |  1.94<H>    Ca    8.9      08 Jul 2018 07:44            RADIOLOGY & ADDITIONAL TESTS:

## 2018-07-09 NOTE — DISCHARGE NOTE ADULT - CARE PROVIDER_API CALL
Ramakrishna Rucker), Gastroenterology  [address] [place] [address]  Phone: [phone]  Fax: [phone] Ramakrishna Rucker (MD), Gastroenterology  [address] [place] [address]  Phone: [phone]  Fax: [phone]    Orthopedic consult.,   Phone: (   )    -  Fax: (   )    -

## 2018-07-09 NOTE — DISCHARGE NOTE ADULT - NS AS DC STROKE ED MATERIALS
Call 911 for Stroke/Stroke Education Booklet/Stroke Warning Signs and Symptoms/Recent Stroke is a risk factor for Future Strokes   Please Review Stroke Education materials/Risk Factors for Stroke/Prescribed Medications/Need for Followup After Discharge Recent Stroke is a risk factor for Future Strokes   Please Review Stroke Education materials

## 2018-07-09 NOTE — DISCHARGE NOTE ADULT - MEDICATION SUMMARY - MEDICATIONS TO STOP TAKING
I will STOP taking the medications listed below when I get home from the hospital:    fluconazole 200 mg oral tablet  -- 2 tab(s) by mouth once a day

## 2018-07-09 NOTE — PROGRESS NOTE ADULT - SUBJECTIVE AND OBJECTIVE BOX
Patient is a 52 y/o  Male who presents with a chief complaint of loose bowel movement for over a week now (03 Jul 2018 22:37)      Patient seen in follow up for SHAQUILLE. + diarrhea    PAST MEDICAL HISTORY:  Hyperlipemia  Drug abuse  CVA (cerebral vascular accident)  No pertinent past medical history    MEDICATIONS  (STANDING):  aspirin enteric coated 81 milliGRAM(s) Oral daily  cholestyramine Powder (Sugar-Free) 4 Gram(s) Oral daily  heparin  Injectable 5000 Unit(s) SubCutaneous every 12 hours  lactobacillus acidophilus 1 Tablet(s) Oral four times a day with meals  sodium chloride 0.9%. 1000 milliLiter(s) (75 mL/Hr) IV Continuous <Continuous>  valACYclovir 1000 milliGRAM(s) Oral every 8 hours    MEDICATIONS  (PRN):  acetaminophen   Tablet. 500 milliGRAM(s) Oral three times a day PRN Moderate Pain (4 - 6)  diphenoxylate/atropine 1 Tablet(s) Oral four times a day PRN Diarrhea  loperamide 2 milliGRAM(s) Oral every 8 hours PRN Diarrhea  melatonin 3 milliGRAM(s) Oral at bedtime PRN Insomnia    T(C): 36.8 (07-09-18 @ 14:01), Max: 37 (07-09-18 @ 09:34)  HR: 78 (07-09-18 @ 14:01) (77 - 96)  BP: 113/75 (07-09-18 @ 14:01) (89/61 - 130/67)  RR: 18 (07-09-18 @ 14:01)  SpO2: 98% (07-09-18 @ 14:01)  Wt(kg): --  I&O's Detail    08 Jul 2018 07:01  -  09 Jul 2018 07:00  --------------------------------------------------------  IN:    sodium chloride 0.9%.: 1500 mL  Total IN: 1500 mL    OUT:    Voided: 1150 mL  Total OUT: 1150 mL    Total NET: 350 mL        PHYSICAL EXAM:  General: NAD  Respiratory: b/l air entry  Cardiovascular: S1 S2  Gastrointestinal: soft  Extremities:  no edema       LABORATORY:                        12.5   6.47  )-----------( 356      ( 08 Jul 2018 07:44 )             38.5     07-08    140  |  109<H>  |  13  ----------------------------<  104<H>  3.9   |  20<L>  |  1.94<H>    Ca    8.9      08 Jul 2018 07:44      Sodium, Serum: 140 mmol/L (07-08 @ 07:44)    Potassium, Serum: 3.9 mmol/L (07-08 @ 07:44)    Hemoglobin: 12.5 g/dL (07-08 @ 07:44)  Hemoglobin: 12.2 g/dL (07-07 @ 08:00)    Creatinine, Serum 1.94 (07-08 @ 07:44)

## 2018-07-09 NOTE — PROGRESS NOTE ADULT - SUBJECTIVE AND OBJECTIVE BOX
Patient is a 53y old  Male who presents with a chief complaint of loose bowel movement for over a week now (03 Jul 2018 22:37)      HPI:  54yo male with pmhx of HTN, HLD, CVA 3 weeks ago c/o weakness x 3 weeks. pt reports since his stroke he has been weak and has not been able to take care of himself. pt reports no new weakness. pt states he was seen at [facility] 2 days ago, seen by neurologist and sent home without acute findings. pt followed up with pmd today   who advised pt he needs ED eval for ELIAZAR. pt endorses diarrhea since d/c without abd pain. pt has been eating and drinking without issue. pt reports he cannot take care of himself at home.  (03 Jul 2018 17:11)    Today:  Notes having 20 bouts of watery diarrhea.     REVIEW OF SYSTEMS  General: no lethargy	  Skin/Breast: no rash  Ophthalmologic: no blurry vision  ENMT:	no sore throat, no ear pain  Respiratory and Thorax:	no sob, no cough, wheezing  Cardiovascular:	no chest pain, no palpitations, no lower extremity swelling  Gastrointestinal:	no nausea, no vomiting,   Genitourinary: no dysuria, no frequency	  Musculoskeletal:	 no muscle pain  Neurological: no weakness  Psychiatric: no anxiety	  Hematology/Lymphatics:	 no bruising  Endocrine: no increased thirst, no change in appetite    PAST MEDICAL & SURGICAL HISTORY:  Hyperlipemia  Drug abuse  CVA (cerebral vascular accident)  No significant past surgical history    MEDICATIONS  (STANDING):  aspirin enteric coated 81 milliGRAM(s) Oral daily  heparin  Injectable 5000 Unit(s) SubCutaneous every 12 hours  lactobacillus acidophilus 1 Tablet(s) Oral four times a day with meals  sodium chloride 0.9%. 1000 milliLiter(s) (75 mL/Hr) IV Continuous <Continuous>  valACYclovir 1000 milliGRAM(s) Oral every 8 hours    MEDICATIONS  (PRN):  acetaminophen   Tablet. 500 milliGRAM(s) Oral three times a day PRN Moderate Pain (4 - 6)  melatonin 3 milliGRAM(s) Oral at bedtime PRN Insomnia    EXAM:  Vital Signs Last 24 Hrs  T(C): 36.8 (09 Jul 2018 14:01), Max: 37 (09 Jul 2018 09:34)  T(F): 98.2 (09 Jul 2018 14:01), Max: 98.6 (09 Jul 2018 09:34)  HR: 78 (09 Jul 2018 14:01) (77 - 96)  BP: 113/75 (09 Jul 2018 14:01) (89/61 - 130/67)  BP(mean): --  RR: 18 (09 Jul 2018 14:01) (18 - 19)  SpO2: 98% (09 Jul 2018 14:01) (96% - 98%)    07-08 @ 07:01  -  07-09 @ 07:00  --------------------------------------------------------  IN: 1500 mL / OUT: 1150 mL / NET: 350 mL    PHYSICAL EXAM:  Constitutional: awake sleeping in stretcher chair.   ENMT: patent nares, moist mucus membranes  Neck: supple  Respiratory: bilaterally clear to auscultation, no wheezing, no rhonchi, no crackles, no decreased air entry  Cardiovascular: s1s2, rrr, no murmurs.   Gastrointestinal: soft, non tender, +bowel sounds, no rebound, no guarding.   Extremities: no edema.   Neurological: alert and oriented to person, date and place.   Skin: intact no rash, warm to touch.   Musculoskeletal: moves all 4 extremities    LABS:             12.5   6.47  )-----------( 356      ( 08 Jul 2018 07:44 )             38.5     07-08    140  |  109<H>  |  13  ----------------------------<  104<H>  3.9   |  20<L>  |  1.94<H>    Ca    8.9      08 Jul 2018 07:44

## 2018-07-09 NOTE — DISCHARGE NOTE ADULT - CARE PROVIDERS DIRECT ADDRESSES
,[email or web address],DirectAddress_Unknown

## 2018-07-09 NOTE — DISCHARGE NOTE ADULT - SECONDARY DIAGNOSIS.
CVA (cerebral vascular accident) Hyperlipemia CKD (chronic kidney disease), stage IV Right shoulder pain

## 2018-07-09 NOTE — DISCHARGE NOTE ADULT - PLAN OF CARE
symptom control stable. Pt to be discharged to rehab for PT/OT. continue with statin stable.   monitor bmp stable. Maintain lactose free low fiber diet. GI follow on discharge. sp imaging on June.

## 2018-07-09 NOTE — DISCHARGE NOTE ADULT - OTHER SIGNIFICANT FINDINGS
< from: Xray Forearm, Right (06.18.18 @ 08:40) >    EXAM:  FOREARM RIGHT                          EXAM:  HAND RIGHT (MINIMUM 3 VIEWS)                          EXAM:  WRIST COMPLETE RIGHT-MIN 3 VIEWS                            PROCEDURE DATE:  06/18/2018            INTERPRETATION:  CLINICAL INFORMATION: Right upper extremity edema.    TECHNIQUE: PA, oblique, lateral views of right wrist and hand. 2 views of   the right forearm    COMPARISON: None.    FINDINGS:     No acute fracture or dislocation. Chronic fracture of the metacarpal neck   of the fifth digit. No radiographic evidence of osteomyelitis.  Joint space maintained.  Diffuse soft tissue swelling.    IMPRESSION:     No acute fracture or dislocation.                 MATT JAIMES M.D., RADIOLOGY RESIDENT  This document has been electronically signed.  REUBEN KNOX M.D., ATTENDING RADIOLOGIST  This document has been electronically signed. Jun 18 2018 10:34AM        < end of copied text >

## 2018-07-09 NOTE — DISCHARGE NOTE ADULT - HOSPITAL COURSE
Patient is a 53y old  Male who presents with a chief complaint of loose bowel movement for over a week now (09 Jul 2018 20:19)    HPI:  54yo male with pmhx of HTN, HLD, CVA 3 weeks ago c/o weakness x 3 weeks. pt reports since his stroke he has been weak and has not been able to take care of himself. pt reports no new weakness. pt states he was seen at [facility] seen by neurologist and sent home without acute findings. pt followed up with pmd who advised pt he needs ED eval for ELIAZAR. Pt was admitted but complained of watery diarrhea. Stool cultures were negative. Pt was placed on a lactose free low fiber diet. GI was consulted. Pt's symptoms did not improve hence colonoscopy done. Found to have diverticulosis, hemorrhoids and polyp. Pt did have an episode of depression and started on zoloft and Trazadone Pt medically cleared for discharged with GI follow up for polyp biopsy results. Pt was also noted to have a shingles outbreak sp valacyclovir. Patient is a 53y old  Male who presents with a chief complaint of loose bowel movement for over a week now (09 Jul 2018 20:19)    HPI:  54yo male with pmhx of HTN, HLD, CVA 3 weeks ago c/o weakness x 3 weeks. pt reports since his stroke he has been weak and has not been able to take care of himself. pt reports no new weakness. pt states he was seen at [facility] seen by neurologist and sent home without acute findings. pt followed up with pmd who advised pt he needs ED eval for ELIAZAR. Pt was admitted but complained of watery diarrhea. Stool cultures were negative. Pt was placed on a lactose free low fiber diet. GI was consulted. Pt's symptoms did not improve hence colonoscopy done. Found to have diverticulosis, hemorrhoids and polyp. Pt did have an episode of depression and started on zoloft and Trazadone Pt medically cleared for discharged with GI follow up for polyp biopsy results. Pt was also noted to have a shingles outbreak sp valacyclovir to complete 4 more days. Pt did complain of right shoulder pain since fall in June. Xrays with no acute fracture. Pt to have PT/OT at rehab. If no improvement pt to follow up with Orthopedics.

## 2018-07-09 NOTE — DISCHARGE NOTE ADULT - PROVIDER TOKENS
TOKEN:'[id number]' TOKEN:'[id number]',FREE:[LAST:[Orthopedic consult.],PHONE:[(   )    -],FAX:[(   )    -]]

## 2018-07-09 NOTE — DISCHARGE NOTE ADULT - MEDICATION SUMMARY - MEDICATIONS TO TAKE
I will START or STAY ON the medications listed below when I get home from the hospital:    aspirin 81 mg oral delayed release tablet  -- 1 tab(s) by mouth once a day  -- Indication: For Cad    sertraline 50 mg oral tablet  -- 1 tab(s) by mouth once a day  -- Indication: For Depression    traZODone 100 mg oral tablet  -- 1 tab(s) by mouth once a day (at bedtime)  -- Indication: For Depression    atorvastatin 40 mg oral tablet  -- 1 tab(s) by mouth once a day   -- Avoid grapefruit and grapefruit juice while taking this medication.  Do not take this drug if you are pregnant.  It is very important that you take or use this exactly as directed.  Do not skip doses or discontinue unless directed by your doctor.  Obtain medical advice before taking any non-prescription drugs as some may affect the action of this medication.  Take with food or milk.    -- Indication: For Hld    melatonin 3 mg oral tablet  -- 1 tab(s) by mouth once a day (at bedtime), As needed, Insomnia  -- Indication: For insomnia    lactobacillus acidophilus oral capsule  -- 1 tab(s) by mouth 4 times a day   -- Indication: For Probiotics I will START or STAY ON the medications listed below when I get home from the hospital:    aspirin 81 mg oral delayed release tablet  -- 1 tab(s) by mouth once a day  -- Indication: For Cad    sertraline 50 mg oral tablet  -- 1 tab(s) by mouth once a day  -- Indication: For Depression    traZODone 100 mg oral tablet  -- 1 tab(s) by mouth once a day (at bedtime)  -- Indication: For Depression    atorvastatin 40 mg oral tablet  -- 1 tab(s) by mouth once a day   -- Avoid grapefruit and grapefruit juice while taking this medication.  Do not take this drug if you are pregnant.  It is very important that you take or use this exactly as directed.  Do not skip doses or discontinue unless directed by your doctor.  Obtain medical advice before taking any non-prescription drugs as some may affect the action of this medication.  Take with food or milk.    -- Indication: For Hld    valACYclovir 1 g oral tablet  -- 1 tab(s) by mouth every 8 hours  -- Indication: For shingles    melatonin 3 mg oral tablet  -- 1 tab(s) by mouth once a day (at bedtime), As needed, Insomnia  -- Indication: For insomnia    lactobacillus acidophilus oral capsule  -- 1 tab(s) by mouth 4 times a day   -- Indication: For Probiotics

## 2018-07-10 ENCOUNTER — TRANSCRIPTION ENCOUNTER (OUTPATIENT)
Age: 53
End: 2018-07-10

## 2018-07-10 LAB
ANION GAP SERPL CALC-SCNC: 11 MMOL/L — SIGNIFICANT CHANGE UP (ref 5–17)
BUN SERPL-MCNC: 13 MG/DL — SIGNIFICANT CHANGE UP (ref 7–23)
CALCIUM SERPL-MCNC: 9.2 MG/DL — SIGNIFICANT CHANGE UP (ref 8.4–10.5)
CHLORIDE SERPL-SCNC: 109 MMOL/L — HIGH (ref 96–108)
CO2 SERPL-SCNC: 21 MMOL/L — LOW (ref 22–31)
CREAT SERPL-MCNC: 1.67 MG/DL — HIGH (ref 0.5–1.3)
GLUCOSE SERPL-MCNC: 89 MG/DL — SIGNIFICANT CHANGE UP (ref 70–99)
HCT VFR BLD CALC: 38.2 % — LOW (ref 39–50)
HGB BLD-MCNC: 12.9 G/DL — LOW (ref 13–17)
MCHC RBC-ENTMCNC: 31.2 PG — SIGNIFICANT CHANGE UP (ref 27–34)
MCHC RBC-ENTMCNC: 33.8 GM/DL — SIGNIFICANT CHANGE UP (ref 32–36)
MCV RBC AUTO: 92.3 FL — SIGNIFICANT CHANGE UP (ref 80–100)
NRBC # BLD: 0 /100 WBCS — SIGNIFICANT CHANGE UP (ref 0–0)
PLATELET # BLD AUTO: 374 K/UL — SIGNIFICANT CHANGE UP (ref 150–400)
POTASSIUM SERPL-MCNC: 4 MMOL/L — SIGNIFICANT CHANGE UP (ref 3.5–5.3)
POTASSIUM SERPL-SCNC: 4 MMOL/L — SIGNIFICANT CHANGE UP (ref 3.5–5.3)
RBC # BLD: 4.14 M/UL — LOW (ref 4.2–5.8)
RBC # FLD: 13.3 % — SIGNIFICANT CHANGE UP (ref 10.3–14.5)
SODIUM SERPL-SCNC: 141 MMOL/L — SIGNIFICANT CHANGE UP (ref 135–145)
WBC # BLD: 6.92 K/UL — SIGNIFICANT CHANGE UP (ref 3.8–10.5)
WBC # FLD AUTO: 6.92 K/UL — SIGNIFICANT CHANGE UP (ref 3.8–10.5)

## 2018-07-10 PROCEDURE — 99233 SBSQ HOSP IP/OBS HIGH 50: CPT

## 2018-07-10 PROCEDURE — 12345: CPT | Mod: NC

## 2018-07-10 RX ORDER — MORPHINE SULFATE 50 MG/1
2 CAPSULE, EXTENDED RELEASE ORAL ONCE
Qty: 0 | Refills: 0 | Status: DISCONTINUED | OUTPATIENT
Start: 2018-07-10 | End: 2018-07-10

## 2018-07-10 RX ORDER — SOD SULF/SODIUM/NAHCO3/KCL/PEG
1000 SOLUTION, RECONSTITUTED, ORAL ORAL
Qty: 0 | Refills: 0 | Status: DISCONTINUED | OUTPATIENT
Start: 2018-07-10 | End: 2018-07-10

## 2018-07-10 RX ORDER — SOD SULF/SODIUM/NAHCO3/KCL/PEG
1000 SOLUTION, RECONSTITUTED, ORAL ORAL
Qty: 0 | Refills: 0 | Status: COMPLETED | OUTPATIENT
Start: 2018-07-10 | End: 2018-07-10

## 2018-07-10 RX ADMIN — Medication 1 TABLET(S): at 21:38

## 2018-07-10 RX ADMIN — Medication 500 MILLIGRAM(S): at 21:38

## 2018-07-10 RX ADMIN — VALACYCLOVIR 1000 MILLIGRAM(S): 500 TABLET, FILM COATED ORAL at 05:18

## 2018-07-10 RX ADMIN — SODIUM CHLORIDE 75 MILLILITER(S): 9 INJECTION INTRAMUSCULAR; INTRAVENOUS; SUBCUTANEOUS at 17:31

## 2018-07-10 RX ADMIN — Medication 1 TABLET(S): at 11:51

## 2018-07-10 RX ADMIN — Medication 81 MILLIGRAM(S): at 11:51

## 2018-07-10 RX ADMIN — Medication 500 MILLIGRAM(S): at 13:34

## 2018-07-10 RX ADMIN — MORPHINE SULFATE 2 MILLIGRAM(S): 50 CAPSULE, EXTENDED RELEASE ORAL at 05:15

## 2018-07-10 RX ADMIN — VALACYCLOVIR 1000 MILLIGRAM(S): 500 TABLET, FILM COATED ORAL at 13:36

## 2018-07-10 RX ADMIN — MORPHINE SULFATE 2 MILLIGRAM(S): 50 CAPSULE, EXTENDED RELEASE ORAL at 01:46

## 2018-07-10 RX ADMIN — Medication 1 TABLET(S): at 17:30

## 2018-07-10 RX ADMIN — Medication 1000 MILLILITER(S): at 20:25

## 2018-07-10 RX ADMIN — Medication 500 MILLIGRAM(S): at 11:54

## 2018-07-10 RX ADMIN — Medication 1000 MILLILITER(S): at 17:30

## 2018-07-10 RX ADMIN — Medication 1 TABLET(S): at 08:20

## 2018-07-10 RX ADMIN — Medication 500 MILLIGRAM(S): at 22:50

## 2018-07-10 RX ADMIN — VALACYCLOVIR 1000 MILLIGRAM(S): 500 TABLET, FILM COATED ORAL at 21:38

## 2018-07-10 RX ADMIN — HEPARIN SODIUM 5000 UNIT(S): 5000 INJECTION INTRAVENOUS; SUBCUTANEOUS at 05:17

## 2018-07-10 RX ADMIN — HEPARIN SODIUM 5000 UNIT(S): 5000 INJECTION INTRAVENOUS; SUBCUTANEOUS at 17:30

## 2018-07-10 NOTE — PROGRESS NOTE ADULT - SUBJECTIVE AND OBJECTIVE BOX
Patient is a 53y old  Male who presents with a chief complaint of loose bowel movement for over a week now (03 Jul 2018 22:37)    HPI:  52yo male with pmhx of HTN, HLD, CVA 3 weeks ago c/o weakness x 3 weeks. pt reports since his stroke he has been weak and has not been able to take care of himself. pt reports no new weakness. pt states he was seen at [facility] 2 days ago, seen by neurologist and sent home without acute findings. pt followed up with pmd who advised pt he needs ED eval for ELIAZAR. pt endorses diarrhea since d/c without abd pain. pt has been eating and drinking without issue. pt reports he cannot take care of himself at home.  (03 Jul 2018 17:11)    Today:  still having 20 bouts of watery diarrhea. denies abd pain.     ROS:  no fevers  no chest pain   no sob  +diarrhea     PAST MEDICAL & SURGICAL HISTORY:  Hyperlipemia  Drug abuse  CVA (cerebral vascular accident)    MEDICATIONS  (STANDING):  aspirin enteric coated 81 milliGRAM(s) Oral daily  heparin  Injectable 5000 Unit(s) SubCutaneous every 12 hours  lactobacillus acidophilus 1 Tablet(s) Oral four times a day with meals  polyethylene glycol/electrolyte Solution 1000 milliLiter(s) Oral every 2 hours  sodium chloride 0.9%. 1000 milliLiter(s) (75 mL/Hr) IV Continuous <Continuous>  valACYclovir 1000 milliGRAM(s) Oral every 8 hours    MEDICATIONS  (PRN):  acetaminophen   Tablet. 500 milliGRAM(s) Oral three times a day PRN Moderate Pain (4 - 6)  melatonin 3 milliGRAM(s) Oral at bedtime PRN Insomnia    EXAM:  Vital Signs Last 24 Hrs  T(C): 36.4 (10 Jul 2018 09:19), Max: 37.1 (10 Jul 2018 05:55)  T(F): 97.6 (10 Jul 2018 09:19), Max: 98.7 (10 Jul 2018 05:55)  HR: 90 (10 Jul 2018 09:19) (78 - 90)  BP: 95/64 (10 Jul 2018 09:19) (95/64 - 118/78)  BP(mean): --  RR: 17 (10 Jul 2018 09:19) (17 - 18)  SpO2: 94% (10 Jul 2018 09:19) (94% - 98%)    07-09 @ 07:01  -  07-10 @ 07:00  --------------------------------------------------------  IN: 1825 mL / OUT: 0 mL / NET: 1825 mL    PHYSICAL EXAM:  Constitutional: awake in bed.  ENMT: patent nares  Neck: supple  Respiratory: bilaterally clear to auscultation, no wheezing, no rhonchi, no crackles, no decreased air entry  Cardiovascular: s1s2, rrr, no murmurs.   Gastrointestinal: soft, non tender, +bowel sounds, no rebound, no guarding.   Extremities: no edema.   Neurological: alert and oriented to person, date and place.   Skin: intact no rash, warm to touch.     LABS:                     12.9   6.92  )-----------( 374      ( 10 Jul 2018 07:09 )             38.2   07-10  141  |  109<H>  |  13  ----------------------------<  89  4.0   |  21<L>  |  1.67<H>  Ca    9.2      10 Jul 2018 07:09

## 2018-07-10 NOTE — PROGRESS NOTE ADULT - PROBLEM SELECTOR PLAN 1
etiology unclear   colonoscopy on Wednesday   hold all antidiarrheals
etiology unclear   colonoscopy on Wednesday   hold all antidiarrheals
f/u toxicology screen  in and out  to follow abstinence
Deconditioning.  PT evaluation appreciated   SW evaluation appreciated.   Awaiting  ELIAZAR .

## 2018-07-10 NOTE — PROGRESS NOTE ADULT - SUBJECTIVE AND OBJECTIVE BOX
INTERVAL HPI/OVERNIGHT EVENTS:  No new overnight event.  No N/V/D.    MEDICATIONS  (STANDING):  aspirin enteric coated 81 milliGRAM(s) Oral daily  heparin  Injectable 5000 Unit(s) SubCutaneous every 12 hours  lactobacillus acidophilus 1 Tablet(s) Oral four times a day with meals  polyethylene glycol/electrolyte Solution 1000 milliLiter(s) Oral every 2 hours  sodium chloride 0.9%. 1000 milliLiter(s) (75 mL/Hr) IV Continuous <Continuous>  valACYclovir 1000 milliGRAM(s) Oral every 8 hours    MEDICATIONS  (PRN):  acetaminophen   Tablet. 500 milliGRAM(s) Oral three times a day PRN Moderate Pain (4 - 6)  melatonin 3 milliGRAM(s) Oral at bedtime PRN Insomnia      Allergies    Bactrim (Rash (Skin))    Intolerances        Review of Systems:    General:  No wt loss, fevers, chills, night sweats,fatigue,   Eyes:  Good vision, no reported pain  ENT:  No sore throat, pain, runny nose, dysphagia  CV:  No pain, palpitatioins, hypo/hypertension  Resp:  No dyspnea, cough, tachypnea, wheezing  GI:  No pain, No nausea, No vomiting, No diarrhea, No constipatiion, No weight loss, No fever, No pruritis, No rectal bleeding, No tarry stools, No dysphagia,  :  No pain, bleeding, incontinence, nocturia  Muscle:  No pain, weakness  Neuro:  No weakness, tingling, memory problems  Psych:  No fatigue, insomnia, mood problems, depression  Endocrine:  No polyuria, polydypsia, cold/heat intolerance  Heme:  No petechiae, ecchymosis, easy bruisability  Skin:  No rash, tattoos, scars, edema      Vital Signs Last 24 Hrs  T(C): 36.5 (10 Jul 2018 14:01), Max: 37.1 (10 Jul 2018 05:55)  T(F): 97.7 (10 Jul 2018 14:01), Max: 98.7 (10 Jul 2018 05:55)  HR: 83 (10 Jul 2018 14:01) (80 - 90)  BP: 111/72 (10 Jul 2018 14:01) (95/64 - 115/80)  BP(mean): --  RR: 18 (10 Jul 2018 14:01) (17 - 18)  SpO2: 98% (10 Jul 2018 14:01) (94% - 98%)    PHYSICAL EXAM:    Constitutional: NAD, well-developed  HEENT: EOMI, throat clear  Neck: No LAD, supple  Respiratory: CTA and P  Cardiovascular: S1 and S2, RRR, no M  Gastrointestinal: BS+, soft, NT/ND, neg HSM,  Extremities: No peripheral edema, neg clubing, cyanosis  Vascular: 2+ peripheral pulses  Neurological: A/O x 3, no focal deficits  Psychiatric: Normal mood, normal affect  Skin: No rashes      LABS:                        12.9   6.92  )-----------( 374      ( 10 Jul 2018 07:09 )             38.2     07-10    141  |  109<H>  |  13  ----------------------------<  89  4.0   |  21<L>  |  1.67<H>    Ca    9.2      10 Jul 2018 07:09            RADIOLOGY & ADDITIONAL TESTS:

## 2018-07-10 NOTE — PROGRESS NOTE ADULT - ASSESSMENT
53 y/o M w/ recent CVA, hyperlipidemia , iv drug abuse came in with weakness and diarrhea.      - Weakness.    Deconditioning.  secondary to persistent diarrhea and deconditioning due to recent stroke .   PT/OT   continue with iv fluids.   tolerating po lactose free diet.      -Diarrhea, unspecified type.    Plan:  Stool studies negative.  C. diff -neg    HIV negative.  Continue with  IV fluids .   continue with   imodium  and Lomotil   Gi follow up appreciated. Wednesday planned for colonoscopy.     # Herpes Zoster right upper back - T1 / T2 dermatome  - sp precautions  -  continue with Valacyclovir.     # Hyperlipemia.   Plan: continue wih statin.     #CVA (cerebral vascular accident).  Plan: continue with statin and ASA .  Continue with supportive care.  Needs ELIAZAR.     #CKD (chronic kidney disease), stage IV.  Cr slightly improved.    with CT imaging showing left atrophic kidney.    rhabdomyolysis -recently in last admission last month  + hemodynamic injury from cocaine abuse.   monitor creatinine trend and urine output  avoid nephrotoxins, ace/arb, imaging contrast.   Renal following the patient    - Thrush in the throat.    Plan: Continue with fluconazole.     - Headache.   Plan: Likely tension headache - continue with Tylenol PRN.    - Prophylactic measure.    Plan: DVT - px- Heparin s/q.

## 2018-07-10 NOTE — PROGRESS NOTE ADULT - PROBLEM SELECTOR PROBLEM 1
Diarrhea, unspecified type
Diarrhea, unspecified type
Drug abuse
Weakness

## 2018-07-10 NOTE — PROGRESS NOTE ADULT - SUBJECTIVE AND OBJECTIVE BOX
Patient is a 52 y/o  Male who presents with a chief complaint of loose bowel movement for over a week now (03 Jul 2018 22:37)      Patient seen in follow up for SHAQUILLE. + diarrhea    PAST MEDICAL HISTORY:  Hyperlipemia  Drug abuse  CVA (cerebral vascular accident)  No pertinent past medical history    MEDICATIONS  (STANDING):  aspirin enteric coated 81 milliGRAM(s) Oral daily  heparin  Injectable 5000 Unit(s) SubCutaneous every 12 hours  lactobacillus acidophilus 1 Tablet(s) Oral four times a day with meals  polyethylene glycol/electrolyte Solution 1000 milliLiter(s) Oral every 2 hours  sodium chloride 0.9%. 1000 milliLiter(s) (75 mL/Hr) IV Continuous <Continuous>  valACYclovir 1000 milliGRAM(s) Oral every 8 hours    MEDICATIONS  (PRN):  acetaminophen   Tablet. 500 milliGRAM(s) Oral three times a day PRN Moderate Pain (4 - 6)  melatonin 3 milliGRAM(s) Oral at bedtime PRN Insomnia    T(C): 36.4 (07-10-18 @ 09:19), Max: 37.1 (07-10-18 @ 05:55)  HR: 90 (07-10-18 @ 09:19) (77 - 96)  BP: 95/64 (07-10-18 @ 09:19) (89/61 - 130/67)  RR: 17 (07-10-18 @ 09:19)  SpO2: 94% (07-10-18 @ 09:19)  Wt(kg): --  I&O's Detail    09 Jul 2018 07:01  -  10 Jul 2018 07:00  --------------------------------------------------------  IN:    Oral Fluid: 1000 mL    sodium chloride 0.9%.: 825 mL  Total IN: 1825 mL    OUT:  Total OUT: 0 mL    Total NET: 1825 mL        PHYSICAL EXAM:  General: NAD  Respiratory: b/l air entry  Cardiovascular: S1 S2  Gastrointestinal: soft  Extremities:  no edema       LABORATORY:                        12.9   6.92  )-----------( 374      ( 10 Jul 2018 07:09 )             38.2     07-10    141  |  109<H>  |  13  ----------------------------<  89  4.0   |  21<L>  |  1.67<H>    Ca    9.2      10 Jul 2018 07:09      Sodium, Serum: 141 mmol/L (07-10 @ 07:09)    Potassium, Serum: 4.0 mmol/L (07-10 @ 07:09)    Hemoglobin: 12.9 g/dL (07-10 @ 07:09)  Hemoglobin: 12.5 g/dL (07-08 @ 07:44)    Creatinine, Serum 1.67 (07-10 @ 07:09)  Creatinine, Serum 1.94 (07-08 @ 07:44)

## 2018-07-10 NOTE — PROGRESS NOTE ADULT - ASSESSMENT
·	SHAQUILLE, ? CKD   ·	Diarrhea    Improving renal function. To continue current meds. On IVF.   GI work up in progress. Avoid nephrotoxins. D/c planning if stable.

## 2018-07-11 ENCOUNTER — RESULT REVIEW (OUTPATIENT)
Age: 53
End: 2018-07-11

## 2018-07-11 DIAGNOSIS — F43.23 ADJUSTMENT DISORDER WITH MIXED ANXIETY AND DEPRESSED MOOD: ICD-10-CM

## 2018-07-11 LAB
ANION GAP SERPL CALC-SCNC: 11 MMOL/L — SIGNIFICANT CHANGE UP (ref 5–17)
BUN SERPL-MCNC: 13 MG/DL — SIGNIFICANT CHANGE UP (ref 7–23)
CALCIUM SERPL-MCNC: 9.3 MG/DL — SIGNIFICANT CHANGE UP (ref 8.4–10.5)
CHLORIDE SERPL-SCNC: 110 MMOL/L — HIGH (ref 96–108)
CO2 SERPL-SCNC: 18 MMOL/L — LOW (ref 22–31)
CREAT SERPL-MCNC: 1.69 MG/DL — HIGH (ref 0.5–1.3)
GLUCOSE SERPL-MCNC: 86 MG/DL — SIGNIFICANT CHANGE UP (ref 70–99)
HCT VFR BLD CALC: 39.3 % — SIGNIFICANT CHANGE UP (ref 39–50)
HGB BLD-MCNC: 13.1 G/DL — SIGNIFICANT CHANGE UP (ref 13–17)
MCHC RBC-ENTMCNC: 30.3 PG — SIGNIFICANT CHANGE UP (ref 27–34)
MCHC RBC-ENTMCNC: 33.3 GM/DL — SIGNIFICANT CHANGE UP (ref 32–36)
MCV RBC AUTO: 91 FL — SIGNIFICANT CHANGE UP (ref 80–100)
NRBC # BLD: 0 /100 WBCS — SIGNIFICANT CHANGE UP (ref 0–0)
PLATELET # BLD AUTO: 387 K/UL — SIGNIFICANT CHANGE UP (ref 150–400)
POTASSIUM SERPL-MCNC: 4.1 MMOL/L — SIGNIFICANT CHANGE UP (ref 3.5–5.3)
POTASSIUM SERPL-SCNC: 4.1 MMOL/L — SIGNIFICANT CHANGE UP (ref 3.5–5.3)
RBC # BLD: 4.32 M/UL — SIGNIFICANT CHANGE UP (ref 4.2–5.8)
RBC # FLD: 13.4 % — SIGNIFICANT CHANGE UP (ref 10.3–14.5)
SODIUM SERPL-SCNC: 139 MMOL/L — SIGNIFICANT CHANGE UP (ref 135–145)
WBC # BLD: 6.39 K/UL — SIGNIFICANT CHANGE UP (ref 3.8–10.5)
WBC # FLD AUTO: 6.39 K/UL — SIGNIFICANT CHANGE UP (ref 3.8–10.5)

## 2018-07-11 PROCEDURE — 88305 TISSUE EXAM BY PATHOLOGIST: CPT | Mod: 26

## 2018-07-11 PROCEDURE — 12345: CPT | Mod: NC

## 2018-07-11 PROCEDURE — 99233 SBSQ HOSP IP/OBS HIGH 50: CPT

## 2018-07-11 RX ORDER — SERTRALINE 25 MG/1
50 TABLET, FILM COATED ORAL DAILY
Qty: 0 | Refills: 0 | Status: DISCONTINUED | OUTPATIENT
Start: 2018-07-11 | End: 2018-07-12

## 2018-07-11 RX ORDER — OXYCODONE AND ACETAMINOPHEN 5; 325 MG/1; MG/1
1 TABLET ORAL EVERY 4 HOURS
Qty: 0 | Refills: 0 | Status: DISCONTINUED | OUTPATIENT
Start: 2018-07-11 | End: 2018-07-11

## 2018-07-11 RX ORDER — SODIUM CHLORIDE 9 MG/ML
1000 INJECTION, SOLUTION INTRAVENOUS
Qty: 0 | Refills: 0 | Status: DISCONTINUED | OUTPATIENT
Start: 2018-07-11 | End: 2018-07-11

## 2018-07-11 RX ORDER — ONDANSETRON 8 MG/1
4 TABLET, FILM COATED ORAL ONCE
Qty: 0 | Refills: 0 | Status: DISCONTINUED | OUTPATIENT
Start: 2018-07-11 | End: 2018-07-11

## 2018-07-11 RX ORDER — TRAZODONE HCL 50 MG
100 TABLET ORAL AT BEDTIME
Qty: 0 | Refills: 0 | Status: DISCONTINUED | OUTPATIENT
Start: 2018-07-11 | End: 2018-07-12

## 2018-07-11 RX ADMIN — Medication 1 TABLET(S): at 08:35

## 2018-07-11 RX ADMIN — VALACYCLOVIR 1000 MILLIGRAM(S): 500 TABLET, FILM COATED ORAL at 05:21

## 2018-07-11 RX ADMIN — VALACYCLOVIR 1000 MILLIGRAM(S): 500 TABLET, FILM COATED ORAL at 21:29

## 2018-07-11 RX ADMIN — Medication 500 MILLIGRAM(S): at 09:56

## 2018-07-11 RX ADMIN — OXYCODONE AND ACETAMINOPHEN 1 TABLET(S): 5; 325 TABLET ORAL at 17:58

## 2018-07-11 RX ADMIN — Medication 500 MILLIGRAM(S): at 22:30

## 2018-07-11 RX ADMIN — Medication 81 MILLIGRAM(S): at 21:29

## 2018-07-11 RX ADMIN — OXYCODONE AND ACETAMINOPHEN 1 TABLET(S): 5; 325 TABLET ORAL at 17:16

## 2018-07-11 RX ADMIN — HEPARIN SODIUM 5000 UNIT(S): 5000 INJECTION INTRAVENOUS; SUBCUTANEOUS at 21:32

## 2018-07-11 RX ADMIN — Medication 3 MILLIGRAM(S): at 00:06

## 2018-07-11 RX ADMIN — VALACYCLOVIR 1000 MILLIGRAM(S): 500 TABLET, FILM COATED ORAL at 17:11

## 2018-07-11 RX ADMIN — Medication 500 MILLIGRAM(S): at 10:00

## 2018-07-11 RX ADMIN — Medication 1 TABLET(S): at 17:11

## 2018-07-11 RX ADMIN — Medication 500 MILLIGRAM(S): at 21:34

## 2018-07-11 RX ADMIN — Medication 1 TABLET(S): at 21:29

## 2018-07-11 RX ADMIN — Medication 100 MILLIGRAM(S): at 21:29

## 2018-07-11 NOTE — PROGRESS NOTE ADULT - ASSESSMENT
51 y/o M w/ recent CVA, hyperlipidemia , iv drug abuse came in with weakness and diarrhea.      -Change in behavior:  psychiatry consult for stating to staff that he needs a bullet as per staff. Pt currently cooperative with staff and PT/OT.   Pt placed on 1:1. To be assessed by psychiatry.     - Weakness/Deconditioning.  secondary to persistent diarrhea and deconditioning due to recent stroke .   PT/OT   tolerating po lactose free diet.      -Diarrhea, unspecified type.    Plan:  Stool studies negative.  C. diff -neg    HIV negative.  Continue with  IV fluids .   continue with   imodium  and Lomotil   Gi follow up appreciated. planned for colonoscopy today.     # Herpes Zoster right upper back - T1 / T2 dermatome  - sp precautions  -  continue with Valacyclovir.     # Hyperlipemia.   Plan: continue wih statin.     #CVA (cerebral vascular accident).  Plan: continue with statin and ASA .  Continue with supportive care.  Needs ELIAZAR.     #CKD (chronic kidney disease), stage IV.  Cr slightly improved.   with CT imaging showing left atrophic kidney.    rhabdomyolysis -recently in last admission last month  + hemodynamic injury from cocaine abuse.   monitor creatinine trend and urine output  avoid nephrotoxins, ace/arb, imaging contrast.     - Headache.   Plan: Likely tension headache - continue with Tylenol PRN.    - Prophylactic measure.    Plan: DVT px- Heparin s/q.     -Dispo:   DC to Rehab once stable.

## 2018-07-11 NOTE — PROGRESS NOTE ADULT - PROBLEM SELECTOR PLAN 3
Pt states she would like to transfer PN care to us, she currently see [facility]. She is taking a PNV. She is aware she will see all 6 MDs and the first appt is with a nurse. OBN appt made, pt to either fax or bring records from [facility].  I encouraged her to call a - Likely secondary to ARF/CVA.   - Patient refusing NG tube placement.   - C/w bowel regimen - Ileus likely secondary to ARF/CVA.   - Patient refusing NG tube placement.   - Will start simethicone daily  - Positive FOBT- on pantoprazole.

## 2018-07-11 NOTE — CHART NOTE - NSCHARTNOTEFT_GEN_A_CORE
Assessment: Pt c hx HLD, drug abuse, CVA, CKD4 admitted c diarrhea. No clear etiology as of yet: Pt still c/o  loose stools and decreased appetite. He states that he has been eating a little. Now has thrush in the throat which is also likely affecting intake .Pt negative for c.diff and HIV. NPO. Pt to have colonscopy today. Await results     Factors impacting intake: [ ] none [ ] nausea  [ ] vomiting [ x] diarrhea [ ] constipation  [ ]chewing problems [ ] swallowing issues  [ ] other:     Diet Presciption: Diet, Clear Liquid (07-10-18 @ 09:53)  Diet, NPO after Midnight:      NPO Start Date: 10-Jul-2018,   NPO Start Time: 23:59 (07-10-18 @ 18:22)    Intake: 30-50%    Current Weight: Weight (kg): 104.3 (07-11 @ 09:31)  % Weight Change    Pertinent Medications: MEDICATIONS  (STANDING):  aspirin enteric coated 81 milliGRAM(s) Oral daily  heparin  Injectable 5000 Unit(s) SubCutaneous every 12 hours  lactobacillus acidophilus 1 Tablet(s) Oral four times a day with meals  sodium chloride 0.9%. 1000 milliLiter(s) (75 mL/Hr) IV Continuous <Continuous>  valACYclovir 1000 milliGRAM(s) Oral every 8 hours    MEDICATIONS  (PRN):  acetaminophen   Tablet. 500 milliGRAM(s) Oral three times a day PRN Moderate Pain (4 - 6)  melatonin 3 milliGRAM(s) Oral at bedtime PRN Insomnia    Pertinent Labs: 07-11 Na139 mmol/L Glu 86 mg/dL K+ 4.1 mmol/L Cr  1.69 mg/dL<H> BUN 13 mg/dL 07-04 [id number] 5.3 % 07-04 Chol 99 mg/dL LDL 38 mg/dL HDL 35 mg/dL<L> Trig 131 mg/dL     CAPILLARY BLOOD GLUCOSE        Skin:     Estimated Needs:   [x ] no change since previous assessment  [ ] recalculated:     Previous Nutrition Diagnosis:   [ ] Inadequate Energy Intake [ ]Inadequate Oral Intake [ ] Excessive Energy Intake   [ ] Underweight [ ] Increased Nutrient Needs [ ] Overweight/Obesity   [x ] Altered GI Function [ ] Unintended Weight Loss [ ] Food & Nutrition Related Knowledge Deficit [ ] Malnutrition     Nutrition Diagnosis is [x ] ongoing  [ ] resolved [ ] not applicable     New Nutrition Diagnosis: [ ] not applicable       Interventions:   Recommend  [ ] Change Diet To:  [ ] Nutrition Supplement  [ ] Nutrition Support  [x ] Other: Recommend resume low residue/lactose free diet when medically feasible.    Monitoring and Evaluation:   [ ] PO intake [ x ] Tolerance to diet prescription [ x ] weights [ x ] labs[ x ] follow up per protocol  [ ] other: Assessment: Pt c hx HLD, drug abuse, CVA, CKD4 admitted c diarrhea. No clear etiology as of yet: Pt still c/o  loose stools and decreased appetite. He states that he has been eating a little. Now has thrush in the throat which is also likely affecting intake .Pt negative for c.diff and HIV. NPO. Pt to have colonscopy today. Await results: Pt meets criteria for moderate malnutrition in context of acute illness 2/2:  1. Pt c 1-2% weight loss over 7 days  2. Pt consumed <75% of nutritional needs x 7 days    Factors impacting intake: [ ] none [ ] nausea  [ ] vomiting [ x] diarrhea [ ] constipation  [ ]chewing problems [ ] swallowing issues  [ ] other:     Diet Presciption: Diet, Clear Liquid (07-10-18 @ 09:53)  Diet, NPO after Midnight:      NPO Start Date: 10-Jul-2018,   NPO Start Time: 23:59 (07-10-18 @ 18:22)    Intake: 30-50%    Current Weight: Weight (kg): 104.3 (07-11 @ 09:31)  % Weight Change 7/4/18  234#  (- 4.0# or 1.7% x one week)    Pertinent Medications: MEDICATIONS  (STANDING):  aspirin enteric coated 81 milliGRAM(s) Oral daily  heparin  Injectable 5000 Unit(s) SubCutaneous every 12 hours  lactobacillus acidophilus 1 Tablet(s) Oral four times a day with meals  sodium chloride 0.9%. 1000 milliLiter(s) (75 mL/Hr) IV Continuous <Continuous>  valACYclovir 1000 milliGRAM(s) Oral every 8 hours    MEDICATIONS  (PRN):  acetaminophen   Tablet. 500 milliGRAM(s) Oral three times a day PRN Moderate Pain (4 - 6)  melatonin 3 milliGRAM(s) Oral at bedtime PRN Insomnia    Pertinent Labs: 07-11 Na139 mmol/L Glu 86 mg/dL K+ 4.1 mmol/L Cr  1.69 mg/dL<H> BUN 13 mg/dL 07-04 [id number] 5.3 % 07-04 Chol 99 mg/dL LDL 38 mg/dL HDL 35 mg/dL<L> Trig 131 mg/dL     CAPILLARY BLOOD GLUCOSE        Skin: no edema/skinbreakdown    Estimated Needs:   [x ] no change since previous assessment  [ ] recalculated:     Previous Nutrition Diagnosis:   [ ] Inadequate Energy Intake [ ]Inadequate Oral Intake [ ] Excessive Energy Intake   [ ] Underweight [ ] Increased Nutrient Needs [ ] Overweight/Obesity   [x ] Altered GI Function [ ] Unintended Weight Loss [ ] Food & Nutrition Related Knowledge Deficit [ ] Malnutrition     Nutrition Diagnosis is [x ] ongoing  [ ] resolved [ ] not applicable     New Nutrition Diagnosis: [ ] not applicable [x] malnutrition      Pt meets criteria for moderate malnutrition in context of acute illness:  As evidenced by:  1. Pt c 1-2% weight loss over 7 days  2. Pt consumed <75% of nutritional needs x 7 days         Interventions:   Recommend  [ ] Change Diet To:  [ ] Nutrition Supplement  [ ] Nutrition Support  [x ] Other: Recommend resume low residue/lactose free diet when medically feasible. Also consider supplement    Monitoring and Evaluation:   [ ] PO intake [ x ] Tolerance to diet prescription [ x ] weights [ x ] labs[ x ] follow up per protocol  [ ] other:

## 2018-07-11 NOTE — PROGRESS NOTE ADULT - SUBJECTIVE AND OBJECTIVE BOX
Patient is a 52 y/o  Male who presents with a chief complaint of loose bowel movement for over a week now (03 Jul 2018 22:37)      Patient seen in follow up for SHAQUILLE. For colonoscopy.     PAST MEDICAL HISTORY:  Hyperlipemia  Drug abuse  CVA (cerebral vascular accident)  No pertinent past medical history    MEDICATIONS  (STANDING):  aspirin enteric coated 81 milliGRAM(s) Oral daily  heparin  Injectable 5000 Unit(s) SubCutaneous every 12 hours  lactobacillus acidophilus 1 Tablet(s) Oral four times a day with meals  sodium chloride 0.9%. 1000 milliLiter(s) (75 mL/Hr) IV Continuous <Continuous>  valACYclovir 1000 milliGRAM(s) Oral every 8 hours    MEDICATIONS  (PRN):  acetaminophen   Tablet. 500 milliGRAM(s) Oral three times a day PRN Moderate Pain (4 - 6)  melatonin 3 milliGRAM(s) Oral at bedtime PRN Insomnia    T(C): 36.6 (07-11-18 @ 13:30), Max: 37.1 (07-10-18 @ 05:55)  HR: 85 (07-11-18 @ 13:30) (79 - 98)  BP: 108/73 (07-11-18 @ 13:30) (95/64 - 120/79)  RR: 18 (07-11-18 @ 13:30)  SpO2: 96% (07-11-18 @ 13:30)  Wt(kg): --  I&O's Detail    10 Jul 2018 07:01  -  11 Jul 2018 07:00  --------------------------------------------------------  IN:    Oral Fluid: 220 mL    sodium chloride 0.9%.: 1600 mL  Total IN: 1820 mL    OUT:    Voided: 400 mL  Total OUT: 400 mL    Total NET: 1420 mL      11 Jul 2018 07:01  -  11 Jul 2018 14:12  --------------------------------------------------------  IN:    Oral Fluid: 400 mL  Total IN: 400 mL    OUT:  Total OUT: 0 mL    Total NET: 400 mL              PHYSICAL EXAM:  General: NAD  Respiratory: b/l air entry  Cardiovascular: S1 S2  Gastrointestinal: soft  Extremities:  no edema       LABORATORY:                        13.1   6.39  )-----------( 387      ( 11 Jul 2018 07:47 )             39.3     07-11    139  |  110<H>  |  13  ----------------------------<  86  4.1   |  18<L>  |  1.69<H>    Ca    9.3      11 Jul 2018 07:47      Sodium, Serum: 139 mmol/L (07-11 @ 07:47)  Sodium, Serum: 141 mmol/L (07-10 @ 07:09)    Potassium, Serum: 4.1 mmol/L (07-11 @ 07:47)  Potassium, Serum: 4.0 mmol/L (07-10 @ 07:09)    Hemoglobin: 13.1 g/dL (07-11 @ 07:47)  Hemoglobin: 12.9 g/dL (07-10 @ 07:09)    Creatinine, Serum 1.69 (07-11 @ 07:47)  Creatinine, Serum 1.67 (07-10 @ 07:09)

## 2018-07-11 NOTE — CHART NOTE - NSCHARTNOTEFT_GEN_A_CORE
Upon Nutritional Assessment by the Registered Dietitian your patient was determined to meet criteria / has evidence of the following diagnosis/diagnoses:          [ ]  Mild Protein Calorie Malnutrition        [ x]  Moderate Protein Calorie Malnutrition        [ ] Severe Protein Calorie Malnutrition        [ ] Unspecified Protein Calorie Malnutrition        [ ] Underweight / BMI <19        [ ] Morbid Obesity / BMI > 40      Findings as based on:  •  Comprehensive nutrition assessment and consultation  •  Calorie counts (nutrient intake analysis)  •  Food acceptance and intake status from observations by staff  •  Follow up  •  Patient education  •  Intervention secondary to interdisciplinary rounds  •   concerns      Treatment:    The following diet has been recommended: When medically feasible resume low fiber, lactose free diet and ensure clear TID.      PROVIDER Section:     By signing this assessment you are acknowledging and agree with the diagnosis/diagnoses assigned by the Registered Dietitian    Comments:      Pt meets criteria for moderate malnutrition in context of acute illness 2/2:  1. Pt c 1-2% weight loss over 7 days (1.7% lost x one week)  2. Pt consumed <75% of nutritional needs x 7 days

## 2018-07-11 NOTE — PROGRESS NOTE ADULT - ASSESSMENT
·	SHAQUILLE, ? CKD   ·	Diarrhea    Stable renal function. To continue current meds. On IVF.   GI work up in progress. Avoid nephrotoxins.

## 2018-07-11 NOTE — PRE-OP CHECKLIST - SELECT TESTS ORDERED
EKG/BMP/CBC/PT/PTT/INR/Hepatic Function/CMP INR/CBC/PT/PTT/BMP/CMP/EKG/Type and Screen/Hepatic Function

## 2018-07-11 NOTE — CHART NOTE - NSCHARTNOTEFT_GEN_A_CORE
Went to see patient regarding right shoulder pain.  Patient possibly injured his right shoulder about 4 weeks ago when he fell and was in a contorted position for a prolong period of time.  Patient also complained of a rash on his neck/upper right shoulder  last week where he was placed on valtrex for possible herpes zoster (though only complaint at that time was tightness).  Patient was seen able to move his right shoulder.  Some stiffness noted with passive ROM.  Pain on palpation on the right trapezius.  Possibly patient may have sustained an injury such as a tear or a sprain vs muscle strain during the fall.  Will address it with the hospitalist and consider ortho consult.  Continue pain management as needed.

## 2018-07-11 NOTE — BEHAVIORAL HEALTH ASSESSMENT NOTE - RISK ASSESSMENT
At this time patient is future-oriented, attempts to seek help, denies suicidal ideation and is able to contract for safety

## 2018-07-11 NOTE — PROGRESS NOTE ADULT - SUBJECTIVE AND OBJECTIVE BOX
Patient is a 53y old  Male who presents with a chief complaint of loose bowel movement for over a week (03 Jul 2018 22:37)    HPI:  54yo male with pmhx of HTN, HLD, CVA 3 weeks ago c/o weakness x 3 weeks. pt reports since his stroke he has been weak and has not been able to take care of himself. pt reports no new weakness. pt states he was seen at [facility] 2 days ago, seen by neurologist and sent home without acute findings. pt followed up with pmd who advised pt he needs ED eval for ELIAZAR. pt endorses diarrhea since d/c without abd pain. pt has been eating and drinking without issue. pt reports he cannot take care of himself at home.  (03 Jul 2018 17:11)    Today:  Pt noted to make comments to staff about, "needing a bullet." Pt has not made any suicide attempts. Pt noted to be angry and frustrated this am.   Pt seen ambulating with PT. Pt seen talking, pleasant and calm. Pt states that he is having his colonoscopy today.     ROS:  no fevers  no nausea  no chest pain   no sob     PAST MEDICAL & SURGICAL HISTORY:  Hyperlipemia  Drug abuse  CVA (cerebral vascular accident)  No significant past surgical history    MEDICATIONS  (STANDING):  aspirin enteric coated 81 milliGRAM(s) Oral daily  heparin  Injectable 5000 Unit(s) SubCutaneous every 12 hours  lactobacillus acidophilus 1 Tablet(s) Oral four times a day with meals  sodium chloride 0.9%. 1000 milliLiter(s) (75 mL/Hr) IV Continuous <Continuous>  valACYclovir 1000 milliGRAM(s) Oral every 8 hours    MEDICATIONS  (PRN):  acetaminophen   Tablet. 500 milliGRAM(s) Oral three times a day PRN Moderate Pain (4 - 6)  melatonin 3 milliGRAM(s) Oral at bedtime PRN Insomnia    EXAM:  Vital Signs Last 24 Hrs  T(C): 36.3 (11 Jul 2018 09:37), Max: 37.1 (10 Jul 2018 18:00)  T(F): 97.4 (11 Jul 2018 09:37), Max: 98.8 (10 Jul 2018 18:00)  HR: 79 (11 Jul 2018 09:37) (79 - 98)  BP: 120/79 (11 Jul 2018 09:37) (103/98 - 120/79)  BP(mean): --  RR: 18 (11 Jul 2018 09:37) (18 - 20)  SpO2: 97% (11 Jul 2018 09:37) (94% - 99%)    07-10 @ 07:01 - 07-11 @ 07:00  --------------------------------------------------------  IN: 1820 mL / OUT: 400 mL / NET: 1420 mL    07-11 @ 07:01 - 07-11 @ 13:21  --------------------------------------------------------  IN: 400 mL / OUT: 0 mL / NET: 400 mL    PHYSICAL EXAM:  Constitutional: awake, ambulating.   Neck: supple  Back: no scoliosis   Extremities: no edema.   Neurological: alert and oriented to person, date and place.   Skin: intact no rash, warm to touch.   Musculoskeletal: moves all 4 extremities  Psychiatric: no homicidal, suicidal ideation. appropriate affect.     LABS:                      13.1   6.39  )-----------( 387      ( 11 Jul 2018 07:47 )             39.3   07-11  139  |  110<H>  |  13  ----------------------------<  86  4.1   |  18<L>  |  1.69<H>  Ca    9.3      11 Jul 2018 07:47

## 2018-07-12 VITALS
RESPIRATION RATE: 18 BRPM | TEMPERATURE: 98 F | SYSTOLIC BLOOD PRESSURE: 116 MMHG | HEART RATE: 93 BPM | DIASTOLIC BLOOD PRESSURE: 79 MMHG | OXYGEN SATURATION: 98 %

## 2018-07-12 DIAGNOSIS — Z71.89 OTHER SPECIFIED COUNSELING: ICD-10-CM

## 2018-07-12 LAB
ANION GAP SERPL CALC-SCNC: 9 MMOL/L — SIGNIFICANT CHANGE UP (ref 5–17)
BUN SERPL-MCNC: 13 MG/DL — SIGNIFICANT CHANGE UP (ref 7–23)
CALCIUM SERPL-MCNC: 8.9 MG/DL — SIGNIFICANT CHANGE UP (ref 8.4–10.5)
CHLORIDE SERPL-SCNC: 108 MMOL/L — SIGNIFICANT CHANGE UP (ref 96–108)
CO2 SERPL-SCNC: 19 MMOL/L — LOW (ref 22–31)
CREAT SERPL-MCNC: 1.68 MG/DL — HIGH (ref 0.5–1.3)
GLUCOSE SERPL-MCNC: 96 MG/DL — SIGNIFICANT CHANGE UP (ref 70–99)
HCT VFR BLD CALC: 39 % — SIGNIFICANT CHANGE UP (ref 39–50)
HGB BLD-MCNC: 13 G/DL — SIGNIFICANT CHANGE UP (ref 13–17)
MCHC RBC-ENTMCNC: 30.3 PG — SIGNIFICANT CHANGE UP (ref 27–34)
MCHC RBC-ENTMCNC: 33.3 GM/DL — SIGNIFICANT CHANGE UP (ref 32–36)
MCV RBC AUTO: 90.9 FL — SIGNIFICANT CHANGE UP (ref 80–100)
NRBC # BLD: 0 /100 WBCS — SIGNIFICANT CHANGE UP (ref 0–0)
PLATELET # BLD AUTO: 390 K/UL — SIGNIFICANT CHANGE UP (ref 150–400)
POTASSIUM SERPL-MCNC: 3.9 MMOL/L — SIGNIFICANT CHANGE UP (ref 3.5–5.3)
POTASSIUM SERPL-SCNC: 3.9 MMOL/L — SIGNIFICANT CHANGE UP (ref 3.5–5.3)
RBC # BLD: 4.29 M/UL — SIGNIFICANT CHANGE UP (ref 4.2–5.8)
RBC # FLD: 13.5 % — SIGNIFICANT CHANGE UP (ref 10.3–14.5)
SODIUM SERPL-SCNC: 136 MMOL/L — SIGNIFICANT CHANGE UP (ref 135–145)
WBC # BLD: 7.24 K/UL — SIGNIFICANT CHANGE UP (ref 3.8–10.5)
WBC # FLD AUTO: 7.24 K/UL — SIGNIFICANT CHANGE UP (ref 3.8–10.5)

## 2018-07-12 PROCEDURE — 36415 COLL VENOUS BLD VENIPUNCTURE: CPT

## 2018-07-12 PROCEDURE — 81001 URINALYSIS AUTO W/SCOPE: CPT

## 2018-07-12 PROCEDURE — 84480 ASSAY TRIIODOTHYRONINE (T3): CPT

## 2018-07-12 PROCEDURE — 93005 ELECTROCARDIOGRAM TRACING: CPT

## 2018-07-12 PROCEDURE — 97162 PT EVAL MOD COMPLEX 30 MIN: CPT

## 2018-07-12 PROCEDURE — 87389 HIV-1 AG W/HIV-1&-2 AB AG IA: CPT

## 2018-07-12 PROCEDURE — 80307 DRUG TEST PRSMV CHEM ANLYZR: CPT

## 2018-07-12 PROCEDURE — 97116 GAIT TRAINING THERAPY: CPT

## 2018-07-12 PROCEDURE — 80061 LIPID PANEL: CPT

## 2018-07-12 PROCEDURE — 85610 PROTHROMBIN TIME: CPT

## 2018-07-12 PROCEDURE — 87340 HEPATITIS B SURFACE AG IA: CPT

## 2018-07-12 PROCEDURE — 87177 OVA AND PARASITES SMEARS: CPT

## 2018-07-12 PROCEDURE — 97110 THERAPEUTIC EXERCISES: CPT

## 2018-07-12 PROCEDURE — 80048 BASIC METABOLIC PNL TOTAL CA: CPT

## 2018-07-12 PROCEDURE — 97140 MANUAL THERAPY 1/> REGIONS: CPT

## 2018-07-12 PROCEDURE — 86706 HEP B SURFACE ANTIBODY: CPT

## 2018-07-12 PROCEDURE — 87507 IADNA-DNA/RNA PROBE TQ 12-25: CPT

## 2018-07-12 PROCEDURE — 97165 OT EVAL LOW COMPLEX 30 MIN: CPT

## 2018-07-12 PROCEDURE — 85730 THROMBOPLASTIN TIME PARTIAL: CPT

## 2018-07-12 PROCEDURE — 99238 HOSP IP/OBS DSCHRG MGMT 30/<: CPT

## 2018-07-12 PROCEDURE — 85027 COMPLETE CBC AUTOMATED: CPT

## 2018-07-12 PROCEDURE — 96360 HYDRATION IV INFUSION INIT: CPT

## 2018-07-12 PROCEDURE — 84443 ASSAY THYROID STIM HORMONE: CPT

## 2018-07-12 PROCEDURE — 83036 HEMOGLOBIN GLYCOSYLATED A1C: CPT

## 2018-07-12 PROCEDURE — 88305 TISSUE EXAM BY PATHOLOGIST: CPT

## 2018-07-12 PROCEDURE — 86803 HEPATITIS C AB TEST: CPT

## 2018-07-12 PROCEDURE — 99285 EMERGENCY DEPT VISIT HI MDM: CPT | Mod: 25

## 2018-07-12 PROCEDURE — 97535 SELF CARE MNGMENT TRAINING: CPT

## 2018-07-12 PROCEDURE — 97112 NEUROMUSCULAR REEDUCATION: CPT

## 2018-07-12 PROCEDURE — 97530 THERAPEUTIC ACTIVITIES: CPT

## 2018-07-12 PROCEDURE — 80053 COMPREHEN METABOLIC PANEL: CPT

## 2018-07-12 PROCEDURE — 82272 OCCULT BLD FECES 1-3 TESTS: CPT

## 2018-07-12 PROCEDURE — 87493 C DIFF AMPLIFIED PROBE: CPT

## 2018-07-12 PROCEDURE — 84436 ASSAY OF TOTAL THYROXINE: CPT

## 2018-07-12 RX ORDER — ASPIRIN/CALCIUM CARB/MAGNESIUM 324 MG
1 TABLET ORAL
Qty: 0 | Refills: 0 | DISCHARGE
Start: 2018-07-12

## 2018-07-12 RX ORDER — SERTRALINE 25 MG/1
1 TABLET, FILM COATED ORAL
Qty: 0 | Refills: 0 | COMMUNITY
Start: 2018-07-12

## 2018-07-12 RX ORDER — VALACYCLOVIR 500 MG/1
1 TABLET, FILM COATED ORAL
Qty: 12 | Refills: 0 | OUTPATIENT
Start: 2018-07-12 | End: 2018-07-15

## 2018-07-12 RX ORDER — TRAZODONE HCL 50 MG
1 TABLET ORAL
Qty: 0 | Refills: 0 | COMMUNITY
Start: 2018-07-12

## 2018-07-12 RX ORDER — LANOLIN ALCOHOL/MO/W.PET/CERES
1 CREAM (GRAM) TOPICAL
Qty: 0 | Refills: 0 | COMMUNITY
Start: 2018-07-12

## 2018-07-12 RX ORDER — LACTOBACILLUS ACIDOPHILUS 100MM CELL
1 CAPSULE ORAL
Qty: 120 | Refills: 0 | OUTPATIENT
Start: 2018-07-12 | End: 2018-08-10

## 2018-07-12 RX ADMIN — Medication 500 MILLIGRAM(S): at 12:05

## 2018-07-12 RX ADMIN — Medication 500 MILLIGRAM(S): at 12:40

## 2018-07-12 RX ADMIN — SERTRALINE 50 MILLIGRAM(S): 25 TABLET, FILM COATED ORAL at 12:05

## 2018-07-12 RX ADMIN — Medication 1 TABLET(S): at 16:58

## 2018-07-12 RX ADMIN — Medication 1 TABLET(S): at 12:05

## 2018-07-12 RX ADMIN — VALACYCLOVIR 1000 MILLIGRAM(S): 500 TABLET, FILM COATED ORAL at 14:56

## 2018-07-12 RX ADMIN — HEPARIN SODIUM 5000 UNIT(S): 5000 INJECTION INTRAVENOUS; SUBCUTANEOUS at 12:04

## 2018-07-12 RX ADMIN — Medication 81 MILLIGRAM(S): at 12:05

## 2018-07-12 RX ADMIN — Medication 1 TABLET(S): at 08:42

## 2018-07-12 RX ADMIN — VALACYCLOVIR 1000 MILLIGRAM(S): 500 TABLET, FILM COATED ORAL at 05:29

## 2018-07-12 NOTE — PROGRESS NOTE ADULT - SUBJECTIVE AND OBJECTIVE BOX
Patient is a 53y old  Male who presents with a chief complaint of loose bowel movement for over a week (03 Jul 2018 22:37)    HPI:  54yo male with pmhx of HTN, HLD, CVA 3 weeks ago c/o weakness x 3 weeks. pt reports since his stroke he has been weak and has not been able to take care of himself. pt reports no new weakness. pt states he was seen at [facility] 2 days ago, seen by neurologist and sent home without acute findings. pt followed up with pmd who advised pt he needs ED eval for ELIAZAR. pt endorses diarrhea since d/c without abd pain. pt has been eating and drinking without issue. pt reports he cannot take care of himself at home.  (03 Jul 2018 17:11)    Today:  Pt notes pain to right shoulder since fall in the past. elevated overnight by nocturnist.     ROS:  no fevers  no nausea  no chest pain   no sob     PAST MEDICAL & SURGICAL HISTORY:  Hyperlipemia  Drug abuse  CVA (cerebral vascular accident)  No significant past surgical history    MEDICATIONS  (STANDING):  aspirin enteric coated 81 milliGRAM(s) Oral daily  heparin  Injectable 5000 Unit(s) SubCutaneous every 12 hours  lactobacillus acidophilus 1 Tablet(s) Oral four times a day with meals  sertraline 50 milliGRAM(s) Oral daily  traZODone 100 milliGRAM(s) Oral at bedtime  valACYclovir 1000 milliGRAM(s) Oral every 8 hours    MEDICATIONS  (PRN):  acetaminophen   Tablet. 500 milliGRAM(s) Oral three times a day PRN Moderate Pain (4 - 6)  melatonin 3 milliGRAM(s) Oral at bedtime PRN Insomnia    EXAM:  Vital Signs Last 24 Hrs  T(C): 36.7 (12 Jul 2018 13:56), Max: 37.1 (12 Jul 2018 10:03)  T(F): 98.1 (12 Jul 2018 13:56), Max: 98.8 (12 Jul 2018 10:03)  HR: 81 (12 Jul 2018 13:56) (78 - 96)  BP: 105/71 (12 Jul 2018 13:56) (90/60 - 137/81)  BP(mean): --  RR: 18 (12 Jul 2018 13:56) (16 - 27)  SpO2: 96% (12 Jul 2018 13:56) (95% - 100%)    PHYSICAL EXAM:  Constitutional: awake, ambulating.   Neck: supple  Back: no scoliosis   Extremities: no edema.   Neurological: alert and oriented to person, date and place.   Skin: intact no rash, warm to touch.   Musculoskeletal: moves all 4 extremities  Psychiatric: no homicidal, suicidal ideation. appropriate affect.     LABS:                      13.0   7.24  )-----------( 390      ( 12 Jul 2018 08:18 )             39.0   07-12  136  |  108  |  13  ----------------------------<  96  3.9   |  19<L>  |  1.68<H>  Ca    8.9      12 Jul 2018 08:18 Patient is a 53y old  Male who presents with a chief complaint of loose bowel movement for over a week (03 Jul 2018 22:37)    HPI:  54yo male with pmhx of HTN, HLD, CVA 3 weeks ago c/o weakness x 3 weeks. pt reports since his stroke he has been weak and has not been able to take care of himself. pt reports no new weakness. pt states he was seen at [facility] 2 days ago, seen by neurologist and sent home without acute findings. pt followed up with pmd who advised pt he needs ED eval for ELIAZAR. pt endorses diarrhea since d/c without abd pain. pt has been eating and drinking without issue. pt reports he cannot take care of himself at home.  (03 Jul 2018 17:11)    Today:  Pt notes pain to right shoulder since fall in the past. evaluated overnight by nocturnist.     ROS:  no fevers  no nausea  no chest pain   no sob     PAST MEDICAL & SURGICAL HISTORY:  Hyperlipemia  Drug abuse  CVA (cerebral vascular accident)  No significant past surgical history    MEDICATIONS  (STANDING):  aspirin enteric coated 81 milliGRAM(s) Oral daily  heparin  Injectable 5000 Unit(s) SubCutaneous every 12 hours  lactobacillus acidophilus 1 Tablet(s) Oral four times a day with meals  sertraline 50 milliGRAM(s) Oral daily  traZODone 100 milliGRAM(s) Oral at bedtime  valACYclovir 1000 milliGRAM(s) Oral every 8 hours    MEDICATIONS  (PRN):  acetaminophen   Tablet. 500 milliGRAM(s) Oral three times a day PRN Moderate Pain (4 - 6)  melatonin 3 milliGRAM(s) Oral at bedtime PRN Insomnia    EXAM:  Vital Signs Last 24 Hrs  T(C): 36.7 (12 Jul 2018 13:56), Max: 37.1 (12 Jul 2018 10:03)  T(F): 98.1 (12 Jul 2018 13:56), Max: 98.8 (12 Jul 2018 10:03)  HR: 81 (12 Jul 2018 13:56) (78 - 96)  BP: 105/71 (12 Jul 2018 13:56) (90/60 - 137/81)  BP(mean): --  RR: 18 (12 Jul 2018 13:56) (16 - 27)  SpO2: 96% (12 Jul 2018 13:56) (95% - 100%)    PHYSICAL EXAM:  Constitutional: awake, ambulating.   Neck: supple  Back: no scoliosis   Extremities: no edema.   Neurological: alert and oriented to person, date and place.   Skin: intact no rash, warm to touch.   Musculoskeletal: moves all 4 extremities  Psychiatric: no homicidal, suicidal ideation. appropriate affect.     LABS:                      13.0   7.24  )-----------( 390      ( 12 Jul 2018 08:18 )             39.0   07-12  136  |  108  |  13  ----------------------------<  96  3.9   |  19<L>  |  1.68<H>  Ca    8.9      12 Jul 2018 08:18

## 2018-07-12 NOTE — PROGRESS NOTE ADULT - PROVIDER SPECIALTY LIST ADULT
Gastroenterology
Hospitalist
Nephrology
Gastroenterology

## 2018-07-12 NOTE — PROGRESS NOTE ADULT - ASSESSMENT
53 y/o M w/ recent CVA, hyperlipidemia , iv drug abuse came in with weakness and diarrhea.      -Change in behavior:  started on zoloft and tramadol at night.    - Weakness/Deconditioning.  secondary to persistent diarrhea and deconditioning due to recent stroke .   PT/OT   tolerating po lactose free diet.      -Diarrhea, unspecified type.    Plan:  Stool studies negative.  C. diff -neg    HIV negative.  contsp colonoscopy: +diverticulosis, hemorroids and poly. gi follow up on discharge.     # Herpes Zoster right upper back - T1 / T2 dermatome  - sp precautions  sp Valacyclovir.     # Hyperlipemia.   Plan: continue wih statin.     #CVA (cerebral vascular accident).  Plan: continue with statin and ASA .  Continue with supportive care.    #CKD (chronic kidney disease), stage IV.  Cr slightly improved.   with CT imaging showing left atrophic kidney.    rhabdomyolysis -recently in last admission last month  + hemodynamic injury from cocaine abuse.   monitor creatinine trend and urine output  avoid nephrotoxins, ace/arb, imaging contrast.     - Headache.   Plan: Likely tension headache - continue with Tylenol PRN.    - Prophylactic measure.    Plan: DVT px- Heparin s/q.     -Dispo:   DC to Rehab 53 y/o M w/ recent CVA, hyperlipidemia , iv drug abuse came in with weakness and diarrhea.      -Change in behavior:  started on zoloft and tramadol at night.    - Weakness/Deconditioning.  secondary to persistent diarrhea and deconditioning due to recent stroke .   PT/OT   tolerating po lactose free diet.     -right shoulder pain:  sp xray in June. No MRI available in [place]. Informed pt for ortho follow up on discharge.      -Diarrhea, unspecified type.    Plan:  Stool studies negative.  C. diff -neg    HIV negative.  contsp colonoscopy: +diverticulosis, hemorroids and poly. gi follow up on discharge.     # Herpes Zoster right upper back - T1 / T2 dermatome  - sp precautions  sp Valacyclovir.     # Hyperlipemia.   Plan: continue wih statin.     #CVA (cerebral vascular accident).  Plan: continue with statin and ASA .  Continue with supportive care.    #CKD (chronic kidney disease), stage IV.  Cr slightly improved.   with CT imaging showing left atrophic kidney.    rhabdomyolysis -recently in last admission last month  + hemodynamic injury from cocaine abuse.   monitor creatinine trend and urine output  avoid nephrotoxins, ace/arb, imaging contrast.     - Headache.   Plan: Likely tension headache - continue with Tylenol PRN.    - Prophylactic measure.    Plan: DVT px- Heparin s/q.     -Dispo:   DC to Rehab

## 2018-07-12 NOTE — PROGRESS NOTE ADULT - ASSESSMENT
·	SHAQUILLE, CKD 3  ·	Diarrhea    Stable renal function. To continue current meds. Will d/c IVF.    GI follow up. Avoid nephrotoxins. Will follow electrolytes and renal function trend.

## 2018-07-12 NOTE — PROGRESS NOTE ADULT - SUBJECTIVE AND OBJECTIVE BOX
Patient is a 54 y/o  Male who presents with a chief complaint of loose bowel movement for over a week now (03 Jul 2018 22:37)      Patient seen in follow up for SHAQUILLE. s/p colonoscopy.     PAST MEDICAL HISTORY:  Hyperlipemia  Drug abuse  CVA (cerebral vascular accident)  No pertinent past medical history      MEDICATIONS  (STANDING):  aspirin enteric coated 81 milliGRAM(s) Oral daily  heparin  Injectable 5000 Unit(s) SubCutaneous every 12 hours  lactobacillus acidophilus 1 Tablet(s) Oral four times a day with meals  sertraline 50 milliGRAM(s) Oral daily  sodium chloride 0.9%. 1000 milliLiter(s) (75 mL/Hr) IV Continuous <Continuous>  traZODone 100 milliGRAM(s) Oral at bedtime  valACYclovir 1000 milliGRAM(s) Oral every 8 hours    MEDICATIONS  (PRN):  acetaminophen   Tablet. 500 milliGRAM(s) Oral three times a day PRN Moderate Pain (4 - 6)  melatonin 3 milliGRAM(s) Oral at bedtime PRN Insomnia    T(C): 36.7 (07-12-18 @ 04:30), Max: 37.1 (07-10-18 @ 18:00)  HR: 86 (07-12-18 @ 04:30) (76 - 98)  BP: 120/70 (07-12-18 @ 04:30) (93/54 - 137/81)  RR: 18 (07-12-18 @ 04:30)  SpO2: 97% (07-12-18 @ 04:30)  Wt(kg): --  I&O's Detail    11 Jul 2018 07:01  -  12 Jul 2018 07:00  --------------------------------------------------------  IN:    lactated ringers.: 1275 mL    Oral Fluid: 640 mL    sodium chloride 0.9%.: 900 mL  Total IN: 2815 mL    OUT:  Total OUT: 0 mL    Total NET: 2815 mL        PHYSICAL EXAM:  General: NAD  Respiratory: b/l air entry  Cardiovascular: S1 S2  Gastrointestinal: soft  Extremities:  no edema       LABORATORY:                        13.0   7.24  )-----------( 390      ( 12 Jul 2018 08:18 )             39.0     07-12    136  |  108  |  13  ----------------------------<  96  3.9   |  19<L>  |  1.68<H>    Ca    8.9      12 Jul 2018 08:18      Sodium, Serum: 136 mmol/L (07-12 @ 08:18)  Sodium, Serum: 139 mmol/L (07-11 @ 07:47)    Potassium, Serum: 3.9 mmol/L (07-12 @ 08:18)  Potassium, Serum: 4.1 mmol/L (07-11 @ 07:47)    Hemoglobin: 13.0 g/dL (07-12 @ 08:18)  Hemoglobin: 13.1 g/dL (07-11 @ 07:47)  Hemoglobin: 12.9 g/dL (07-10 @ 07:09)    Creatinine, Serum 1.68 (07-12 @ 08:18)  Creatinine, Serum 1.69 (07-11 @ 07:47)  Creatinine, Serum 1.67 (07-10 @ 07:09)

## 2018-07-13 NOTE — CHART NOTE - NSCHARTNOTEFT_GEN_A_CORE
Called sister Lida Gonsales, who reported that patient developed profuse diarrhea at home and was taken to [facility], where he was reportedly admitted x 1 week and recently transferred to rehab. Sister reports patient will follow up with outpatient providers upon discharge from rehab.

## 2018-07-20 PROBLEM — E78.5 HYPERLIPIDEMIA, UNSPECIFIED: Chronic | Status: ACTIVE | Noted: 2018-07-03

## 2018-07-20 PROBLEM — Z00.00 ENCOUNTER FOR PREVENTIVE HEALTH EXAMINATION: Status: ACTIVE | Noted: 2018-07-20

## 2018-08-07 ENCOUNTER — APPOINTMENT (OUTPATIENT)
Dept: ORTHOPEDIC SURGERY | Facility: CLINIC | Age: 53
End: 2018-08-07
Payer: MEDICAID

## 2018-08-07 VITALS
BODY MASS INDEX: 31.5 KG/M2 | HEIGHT: 71 IN | HEART RATE: 98 BPM | DIASTOLIC BLOOD PRESSURE: 75 MMHG | WEIGHT: 225 LBS | SYSTOLIC BLOOD PRESSURE: 105 MMHG

## 2018-08-07 DIAGNOSIS — M47.812 SPONDYLOSIS W/OUT MYELOPATHY OR RADICULOPATHY, CERVICAL REGION: ICD-10-CM

## 2018-08-07 DIAGNOSIS — Z78.9 OTHER SPECIFIED HEALTH STATUS: ICD-10-CM

## 2018-08-07 DIAGNOSIS — R29.898 OTHER SYMPTOMS AND SIGNS INVOLVING THE MUSCULOSKELETAL SYSTEM: ICD-10-CM

## 2018-08-07 DIAGNOSIS — F17.200 NICOTINE DEPENDENCE, UNSPECIFIED, UNCOMPLICATED: ICD-10-CM

## 2018-08-07 DIAGNOSIS — Z86.73 PERSONAL HISTORY OF TRANSIENT ISCHEMIC ATTACK (TIA), AND CEREBRAL INFARCTION W/OUT RESIDUAL DEFICITS: ICD-10-CM

## 2018-08-07 DIAGNOSIS — Z60.2 PROBLEMS RELATED TO LIVING ALONE: ICD-10-CM

## 2018-08-07 PROCEDURE — 99205 OFFICE O/P NEW HI 60 MIN: CPT

## 2018-08-07 PROCEDURE — 72040 X-RAY EXAM NECK SPINE 2-3 VW: CPT

## 2018-08-07 SDOH — SOCIAL STABILITY - SOCIAL INSECURITY: PROBLEMS RELATED TO LIVING ALONE: Z60.2

## 2018-08-09 ENCOUNTER — APPOINTMENT (OUTPATIENT)
Dept: NEUROLOGY | Facility: CLINIC | Age: 53
End: 2018-08-09
Payer: MEDICAID

## 2018-08-09 VITALS
HEART RATE: 92 BPM | WEIGHT: 220 LBS | DIASTOLIC BLOOD PRESSURE: 64 MMHG | OXYGEN SATURATION: 95 % | BODY MASS INDEX: 30.8 KG/M2 | HEIGHT: 71 IN | SYSTOLIC BLOOD PRESSURE: 106 MMHG

## 2018-08-09 DIAGNOSIS — R53.1 WEAKNESS: ICD-10-CM

## 2018-08-09 DIAGNOSIS — Z83.511 FAMILY HISTORY OF GLAUCOMA: ICD-10-CM

## 2018-08-09 DIAGNOSIS — Z86.73 PERSONAL HISTORY OF TRANSIENT ISCHEMIC ATTACK (TIA), AND CEREBRAL INFARCTION W/OUT RESIDUAL DEFICITS: ICD-10-CM

## 2018-08-09 DIAGNOSIS — Z78.9 OTHER SPECIFIED HEALTH STATUS: ICD-10-CM

## 2018-08-09 DIAGNOSIS — F17.200 NICOTINE DEPENDENCE, UNSPECIFIED, UNCOMPLICATED: ICD-10-CM

## 2018-08-09 DIAGNOSIS — B02.29 OTHER POSTHERPETIC NERVOUS SYSTEM INVOLVEMENT: ICD-10-CM

## 2018-08-09 DIAGNOSIS — R60.0 LOCALIZED EDEMA: ICD-10-CM

## 2018-08-09 PROCEDURE — 99205 OFFICE O/P NEW HI 60 MIN: CPT

## 2018-08-09 RX ORDER — VALACYCLOVIR 500 MG/1
500 TABLET, FILM COATED ORAL
Qty: 12 | Refills: 0 | Status: DISCONTINUED | COMMUNITY
Start: 2018-07-13

## 2018-08-09 RX ORDER — SERTRALINE HYDROCHLORIDE 50 MG/1
50 TABLET, FILM COATED ORAL
Qty: 30 | Refills: 0 | Status: DISCONTINUED | COMMUNITY
Start: 2018-07-13

## 2018-08-09 RX ORDER — CIPROFLOXACIN HYDROCHLORIDE 500 MG/1
500 TABLET, FILM COATED ORAL
Qty: 14 | Refills: 0 | Status: ACTIVE | COMMUNITY
Start: 2018-07-31

## 2018-08-09 RX ORDER — IBUPROFEN 800 MG/1
800 TABLET, FILM COATED ORAL
Qty: 90 | Refills: 0 | Status: ACTIVE | COMMUNITY
Start: 2018-07-19

## 2018-08-09 RX ORDER — METRONIDAZOLE 250 MG/1
250 TABLET ORAL
Qty: 21 | Refills: 0 | Status: ACTIVE | COMMUNITY
Start: 2018-07-31

## 2018-08-09 RX ORDER — TRAZODONE HYDROCHLORIDE 100 MG/1
100 TABLET ORAL
Qty: 30 | Refills: 0 | Status: DISCONTINUED | COMMUNITY
Start: 2018-07-13

## 2018-08-09 RX ORDER — LIDOCAINE 5% 700 MG/1
5 PATCH TOPICAL
Qty: 30 | Refills: 2 | Status: ACTIVE | COMMUNITY
Start: 2018-08-09 | End: 1900-01-01

## 2018-08-09 RX ORDER — ATORVASTATIN CALCIUM 40 MG/1
40 TABLET, FILM COATED ORAL
Qty: 30 | Refills: 0 | Status: ACTIVE | COMMUNITY
Start: 2018-07-13

## 2018-08-09 RX ORDER — GABAPENTIN 300 MG/1
300 CAPSULE ORAL
Qty: 90 | Refills: 2 | Status: ACTIVE | COMMUNITY
Start: 2018-08-09 | End: 1900-01-01

## 2018-08-09 RX ORDER — DIPHENOXYLATE HYDROCHLORIDE AND ATROPINE SULFATE 2.5; .025 MG/1; MG/1
2.5-0.025 TABLET ORAL
Qty: 16 | Refills: 0 | Status: DISCONTINUED | COMMUNITY
Start: 2018-07-19

## 2018-08-09 RX ORDER — OMEPRAZOLE 40 MG/1
CAPSULE, DELAYED RELEASE ORAL
Refills: 0 | Status: ACTIVE | COMMUNITY

## 2018-08-15 PROBLEM — F17.200 CURRENT SMOKER: Status: ACTIVE | Noted: 2018-08-07

## 2018-08-15 PROBLEM — Z78.9 DOES NOT USE ILLICIT DRUGS: Status: ACTIVE | Noted: 2018-08-07

## 2018-08-15 PROBLEM — Z60.2 LIVES ALONE WITHOUT HELP AVAILABLE: Status: ACTIVE | Noted: 2018-08-07

## 2018-08-31 ENCOUNTER — APPOINTMENT (OUTPATIENT)
Dept: ORTHOPEDIC SURGERY | Facility: CLINIC | Age: 53
End: 2018-08-31

## 2018-10-02 NOTE — PATIENT PROFILE ADULT. - NS PRO CONTRA FLU 1
this service):      OCCUPATIONAL THERAPY    ADL  Equipment Provided: Reacher, Sock aid, Long-handled shoe horn, Long-handled sponge  Feeding: Modified independent   Grooming: Supervision (Pt completed hand/face washing, oral care and hair care while seated at sink with SPV. )  UE Bathing: Supervision (Pt completed while seated at sink. )  LE Bathing: Stand by assistance, Setup (Pt used long handled sponge for lower legs and feet. Stood to complete pericare and thigh areas. SBA provided with support at sink. )  UE Dressing: Setup, Supervision (Completed while seated in chair )  LE Dressing: Stand by assistance, Setup (Pt managed clothing down with SBA, doffed over feet while seated with reacher. Pt doffed socks with reacher. Pt threaded underwear and pants with reacher with no assist. Managed clothing up with SBA. Assist for L KEV hose; Assist for L shoe. )  Toileting: Setup (Pt emptied colostomy and nephrostomy bags with setup. )  Additional Comments: Pt completed sponge bath at sink with assist for setup and minimal reminders for hip precautions and use of AE. Bed mobility  Bridging: Stand by assistance  Rolling to Left: Unable to assess (deferred due to pain)  Rolling to Right: Stand by assistance (extra time and effort; pillow between legs to prevent hip ADD)  Supine to Sit: Stand by assistance (use of leg , extra time)  Sit to Supine: Minimal assistance (use of leg  but still required Esme with LLE)  Scooting: Minimal assistance (assist moving LLE intermittently)  Comment: flat therapy mat, no rails    Functional Transfers: Toilet Transfers  Toilet Transfers Comments: Planned to practice later this date - pt does report sitting on commode at times d/t sensation despite previous telling therapist she does not use. Shower Transfers  Shower Transfers Comments: Pt requested sponge bath at sink this date.        FIMS:  Eatin - Feeds self with adaptive equipment/dentures and/or feeds self with out of season (available sept 1 thru apr 2 only)

## 2018-10-29 ENCOUNTER — APPOINTMENT (OUTPATIENT)
Dept: NEUROLOGY | Facility: CLINIC | Age: 53
End: 2018-10-29

## 2018-11-12 ENCOUNTER — OUTPATIENT (OUTPATIENT)
Dept: OUTPATIENT SERVICES | Facility: HOSPITAL | Age: 53
LOS: 1 days | End: 2018-11-12
Payer: MEDICAID

## 2018-11-12 VITALS
DIASTOLIC BLOOD PRESSURE: 87 MMHG | RESPIRATION RATE: 16 BRPM | HEIGHT: 71 IN | SYSTOLIC BLOOD PRESSURE: 127 MMHG | OXYGEN SATURATION: 96 % | TEMPERATURE: 98 F | HEART RATE: 82 BPM | WEIGHT: 315 LBS

## 2018-11-12 DIAGNOSIS — Z72.0 TOBACCO USE: ICD-10-CM

## 2018-11-12 DIAGNOSIS — Z98.890 OTHER SPECIFIED POSTPROCEDURAL STATES: Chronic | ICD-10-CM

## 2018-11-12 DIAGNOSIS — Z01.818 ENCOUNTER FOR OTHER PREPROCEDURAL EXAMINATION: ICD-10-CM

## 2018-11-12 DIAGNOSIS — K62.5 HEMORRHAGE OF ANUS AND RECTUM: ICD-10-CM

## 2018-11-12 DIAGNOSIS — Z91.89 OTHER SPECIFIED PERSONAL RISK FACTORS, NOT ELSEWHERE CLASSIFIED: ICD-10-CM

## 2018-11-12 DIAGNOSIS — Z79.82 LONG TERM (CURRENT) USE OF ASPIRIN: ICD-10-CM

## 2018-11-12 DIAGNOSIS — E78.5 HYPERLIPIDEMIA, UNSPECIFIED: ICD-10-CM

## 2018-11-12 DIAGNOSIS — K50.111 CROHN'S DISEASE OF LARGE INTESTINE WITH RECTAL BLEEDING: ICD-10-CM

## 2018-11-12 DIAGNOSIS — K62.89 OTHER SPECIFIED DISEASES OF ANUS AND RECTUM: ICD-10-CM

## 2018-11-12 LAB
ANION GAP SERPL CALC-SCNC: 12 MMOL/L — SIGNIFICANT CHANGE UP (ref 5–17)
BUN SERPL-MCNC: 24 MG/DL — HIGH (ref 7–23)
CALCIUM SERPL-MCNC: 9.8 MG/DL — SIGNIFICANT CHANGE UP (ref 8.4–10.5)
CHLORIDE SERPL-SCNC: 105 MMOL/L — SIGNIFICANT CHANGE UP (ref 96–108)
CO2 SERPL-SCNC: 22 MMOL/L — SIGNIFICANT CHANGE UP (ref 22–31)
CREAT SERPL-MCNC: 1.39 MG/DL — HIGH (ref 0.5–1.3)
GLUCOSE SERPL-MCNC: 117 MG/DL — HIGH (ref 70–99)
HCT VFR BLD CALC: 48.8 % — SIGNIFICANT CHANGE UP (ref 39–50)
HGB BLD-MCNC: 15.9 G/DL — SIGNIFICANT CHANGE UP (ref 13–17)
MCHC RBC-ENTMCNC: 31.1 PG — SIGNIFICANT CHANGE UP (ref 27–34)
MCHC RBC-ENTMCNC: 32.6 GM/DL — SIGNIFICANT CHANGE UP (ref 32–36)
MCV RBC AUTO: 95.5 FL — SIGNIFICANT CHANGE UP (ref 80–100)
PLATELET # BLD AUTO: 262 K/UL — SIGNIFICANT CHANGE UP (ref 150–400)
POTASSIUM SERPL-MCNC: 4.1 MMOL/L — SIGNIFICANT CHANGE UP (ref 3.5–5.3)
POTASSIUM SERPL-SCNC: 4.1 MMOL/L — SIGNIFICANT CHANGE UP (ref 3.5–5.3)
RBC # BLD: 5.11 M/UL — SIGNIFICANT CHANGE UP (ref 4.2–5.8)
RBC # FLD: 15 % — HIGH (ref 10.3–14.5)
SODIUM SERPL-SCNC: 139 MMOL/L — SIGNIFICANT CHANGE UP (ref 135–145)
WBC # BLD: 11.78 K/UL — HIGH (ref 3.8–10.5)
WBC # FLD AUTO: 11.78 K/UL — HIGH (ref 3.8–10.5)

## 2018-11-12 PROCEDURE — 80048 BASIC METABOLIC PNL TOTAL CA: CPT

## 2018-11-12 PROCEDURE — G0463: CPT

## 2018-11-12 PROCEDURE — 85027 COMPLETE CBC AUTOMATED: CPT

## 2018-11-12 RX ORDER — CEFOTETAN DISODIUM 1 G
2 VIAL (EA) INJECTION ONCE
Qty: 0 | Refills: 0 | Status: DISCONTINUED | OUTPATIENT
Start: 2018-11-14 | End: 2018-11-29

## 2018-11-12 RX ORDER — LIDOCAINE HCL 20 MG/ML
0.2 VIAL (ML) INJECTION ONCE
Qty: 0 | Refills: 0 | Status: DISCONTINUED | OUTPATIENT
Start: 2018-11-14 | End: 2018-11-14

## 2018-11-12 RX ORDER — SODIUM CHLORIDE 9 MG/ML
3 INJECTION INTRAMUSCULAR; INTRAVENOUS; SUBCUTANEOUS EVERY 8 HOURS
Qty: 0 | Refills: 0 | Status: DISCONTINUED | OUTPATIENT
Start: 2018-11-14 | End: 2018-11-14

## 2018-11-12 NOTE — H&P PST ADULT - NSANTHOSAYNRD_GEN_A_CORE
No. LATHA screening performed.  STOP BANG Legend: 0-2 = LOW Risk; 3-4 = INTERMEDIATE Risk; 5-8 = HIGH Risk/neck = 16.5 inches

## 2018-11-12 NOTE — H&P PST ADULT - HISTORY OF PRESENT ILLNESS
54 yo male with h/o HLD, CVA (left MCA infarct) 6/2018 with residual right upper extremity weakness, past history of cocaine/IVDA (none since CVA) and rectal polyp s/p colonoscopy with biopsy 7/11/2018. Pt states that he is experiencing significant rectal pain/itching with frequent bowel movements and BRBPR.  Pt is scheduled for colonoscopy and rectal examination under anesthesia.

## 2018-11-12 NOTE — H&P PST ADULT - ACTIVITY
ADLs, light housework, food shopping, activity limited by rectal pain and right upper extremity weakness

## 2018-11-12 NOTE — H&P PST ADULT - PMH
Cervical spondylosis    CVA (cerebral vascular accident)  6/17/2018 - found by son 48 hours after CVA - right upper extremity weakness  Diverticulitis    Drug abuse    Hyperlipemia    Rectal bleeding SHAQUILLE (acute kidney injury)  rhabdomyolysis 6/2018  Cervical spondylosis    CVA (cerebral vascular accident)  6/17/2018 - found by son 48 hours after CVA - residual right upper extremity weakness  Diverticulitis    Drug abuse  Cocaine/IVDA - last used 6/2018  Hyperlipemia    Ileus  6/2018  Morbid obesity with BMI of 45.0-49.9, adult    Rectal bleeding SHAQUILLE (acute kidney injury)  rhabdomyolysis 6/2018  Cervical spondylosis    CVA (cerebral vascular accident)  6/17/2018 - found by son 48 hours after CVA - residual right upper extremity weakness  Diverticulitis    Drug abuse  Cocaine/IVDA - last used 6/2018  Hyperlipemia    Ileus  6/2018  Obesity (BMI 30-39.9)    Rectal bleeding

## 2018-11-12 NOTE — H&P PST ADULT - ASSESSMENT
1.	Rectal Bleeding  2.	Hyperlipidemia  3.	At Risk for Sleep Apnea  4.	Tobacco Abuse  5.	Long term aspirin use

## 2018-11-12 NOTE — H&P PST ADULT - ATTENDING COMMENTS
I discussed with patient risks including bleeding infection perforation incontence stenosis and nohealing.  He is aware understands and wishes to proceed

## 2018-11-13 ENCOUNTER — RESULT REVIEW (OUTPATIENT)
Age: 53
End: 2018-11-13

## 2018-11-13 ENCOUNTER — TRANSCRIPTION ENCOUNTER (OUTPATIENT)
Age: 53
End: 2018-11-13

## 2018-11-13 PROBLEM — F19.10 OTHER PSYCHOACTIVE SUBSTANCE ABUSE, UNCOMPLICATED: Chronic | Status: ACTIVE | Noted: 2018-07-01

## 2018-11-13 PROBLEM — I63.9 CEREBRAL INFARCTION, UNSPECIFIED: Chronic | Status: ACTIVE | Noted: 2018-07-01

## 2018-11-14 ENCOUNTER — OUTPATIENT (OUTPATIENT)
Dept: OUTPATIENT SERVICES | Facility: HOSPITAL | Age: 53
LOS: 1 days | End: 2018-11-14
Payer: MEDICAID

## 2018-11-14 VITALS
RESPIRATION RATE: 18 BRPM | HEART RATE: 66 BPM | TEMPERATURE: 98 F | OXYGEN SATURATION: 96 % | DIASTOLIC BLOOD PRESSURE: 86 MMHG | SYSTOLIC BLOOD PRESSURE: 113 MMHG

## 2018-11-14 VITALS
OXYGEN SATURATION: 99 % | DIASTOLIC BLOOD PRESSURE: 85 MMHG | RESPIRATION RATE: 18 BRPM | SYSTOLIC BLOOD PRESSURE: 145 MMHG | TEMPERATURE: 98 F | HEIGHT: 71 IN | HEART RATE: 77 BPM | WEIGHT: 225.09 LBS

## 2018-11-14 DIAGNOSIS — K50.111 CROHN'S DISEASE OF LARGE INTESTINE WITH RECTAL BLEEDING: ICD-10-CM

## 2018-11-14 DIAGNOSIS — K62.5 HEMORRHAGE OF ANUS AND RECTUM: ICD-10-CM

## 2018-11-14 DIAGNOSIS — Z98.890 OTHER SPECIFIED POSTPROCEDURAL STATES: Chronic | ICD-10-CM

## 2018-11-14 DIAGNOSIS — K62.89 OTHER SPECIFIED DISEASES OF ANUS AND RECTUM: ICD-10-CM

## 2018-11-14 PROCEDURE — 46200 REMOVAL OF ANAL FISSURE: CPT

## 2018-11-14 PROCEDURE — 88305 TISSUE EXAM BY PATHOLOGIST: CPT | Mod: 26

## 2018-11-14 PROCEDURE — 88305 TISSUE EXAM BY PATHOLOGIST: CPT

## 2018-11-14 PROCEDURE — 45380 COLONOSCOPY AND BIOPSY: CPT

## 2018-11-14 RX ORDER — SODIUM CHLORIDE 9 MG/ML
1000 INJECTION, SOLUTION INTRAVENOUS
Qty: 0 | Refills: 0 | Status: DISCONTINUED | OUTPATIENT
Start: 2018-11-14 | End: 2018-11-14

## 2018-11-14 RX ORDER — OXYCODONE AND ACETAMINOPHEN 5; 325 MG/1; MG/1
1 TABLET ORAL ONCE
Qty: 0 | Refills: 0 | Status: DISCONTINUED | OUTPATIENT
Start: 2018-11-14 | End: 2018-11-14

## 2018-11-14 RX ORDER — HYDROMORPHONE HYDROCHLORIDE 2 MG/ML
0.5 INJECTION INTRAMUSCULAR; INTRAVENOUS; SUBCUTANEOUS
Qty: 0 | Refills: 0 | Status: DISCONTINUED | OUTPATIENT
Start: 2018-11-14 | End: 2018-11-14

## 2018-11-14 RX ADMIN — HYDROMORPHONE HYDROCHLORIDE 0.5 MILLIGRAM(S): 2 INJECTION INTRAMUSCULAR; INTRAVENOUS; SUBCUTANEOUS at 09:35

## 2018-11-14 RX ADMIN — HYDROMORPHONE HYDROCHLORIDE 0.5 MILLIGRAM(S): 2 INJECTION INTRAMUSCULAR; INTRAVENOUS; SUBCUTANEOUS at 09:20

## 2018-11-14 NOTE — ASU DISCHARGE PLAN (ADULT/PEDIATRIC). - MEDICATION SUMMARY - MEDICATIONS TO TAKE
I will START or STAY ON the medications listed below when I get home from the hospital:    Pentasa 500 mg oral capsule, extended release  -- 2 cap(s) by mouth 4 times a day  -- Indication: For Possible inflammatory disease    Tylenol 500 mg oral tablet  -- 2 tab(s) by mouth every 6 hours, As Needed  -- Indication: For Pain    aspirin 81 mg oral delayed release tablet  -- 1 tab(s) by mouth once a day- pt states he has held since 11/7/18 per surgeon request  -- Indication: For Cardiac prophylaxis    hydrocortisone-pramoxine 2.5%-1% topical cream  -- Apply on skin to affected area 3 times a day  -- Indication: For Fissure    atorvastatin 40 mg oral tablet  -- 1 tab(s) by mouth once a day - pt states he stopped taking on his own  -- Avoid grapefruit and grapefruit juice while taking this medication.  Do not take this drug if you are pregnant.  It is very important that you take or use this exactly as directed.  Do not skip doses or discontinue unless directed by your doctor.  Obtain medical advice before taking any non-prescription drugs as some may affect the action of this medication.  Take with food or milk.    -- Indication: For Hyperlipidemia    Proctosol-HC 25 mg rectal suppository  -- 1 suppository(ies) rectally 2 times a day  -- Indication: For Fissure

## 2018-11-14 NOTE — BRIEF OPERATIVE NOTE - OPERATION/FINDINGS
Procedure: Colonoscopy with biopsy, EUA, fissurectomy, injection of Botox    Findings: A colonoscopy was performed. Biopsies from the terminal ileum to the rectum. An EUA was then performed, a posterior midline fissure was noted. Biopsy was taken of the fissure and Botox was injected. Gelfoam and Surgicel was then applied to the rectum.

## 2018-11-14 NOTE — ASU DISCHARGE PLAN (ADULT/PEDIATRIC). - SPECIAL INSTRUCTIONS
The dressing will fall out on its own during a bowel movement. Or it can be removed tomorrow. You will need to see Dr. Dickinson in 3 weeks.

## 2018-11-14 NOTE — BRIEF OPERATIVE NOTE - PROCEDURE
<<-----Click on this checkbox to enter Procedure Injection of botulinum toxin into anal sphincter  11/14/2018    Active  CASEG1  Colonoscopy  11/14/2018    Active  JOSHUA

## 2018-11-15 LAB — SURGICAL PATHOLOGY STUDY: SIGNIFICANT CHANGE UP

## 2020-05-20 ENCOUNTER — TRANSCRIPTION ENCOUNTER (OUTPATIENT)
Age: 55
End: 2020-05-20

## 2020-09-01 PROCEDURE — G9005: CPT

## 2021-02-17 NOTE — ED ADULT NURSE NOTE - NSHISCREENINGQ1_ED_A_ED
Documentation noted Sepsis workup on 2/5 patient admitted on 2/3 with meeting two SIRs criteria    Supporting Documentation:  RR 24  WBC 21.32    2/5 Hospitalist note  2) R/O sepsis 2/2 Pneumonia, seen on CXR  - Blood and sputum cultures pending  - Started on Zosyn, s/p 1 dose Vanco  - ID consulted  - repeat bcx, legionella urine ag  2/9 Hospitalist note:  · Subjective and Objective:   CC: F/u fluid overload ESRD on HD, hypercapnic resp failure with hypoxia currently on BIPAP, r/o sepsis 2/2 pna, acute on chronic anemia, DM2    Pls clarify the status of sepsis  A) Sepsis ruled in, present on admission 2/2 pneumonia  A) Sepsis ruled in, present on admission 2/2 __________, as pneumonia was ruled out  C) Sepsis ruled out, Pneumonia ruled out  D) Other, pls specify  E) Not clinically significant No

## 2021-05-19 NOTE — H&P PST ADULT - RS GEN PE MLT RESP DETAILS PC
Patient provided discharge instructions, and verbalized understanding.   Patient ambulated to exit with steady gait   breath sounds equal/respirations non-labored/good air movement/clear to auscultation bilaterally

## 2021-05-25 NOTE — PROGRESS NOTE ADULT - PROBLEM SELECTOR PLAN 7

## 2021-07-21 ENCOUNTER — EMERGENCY (EMERGENCY)
Facility: HOSPITAL | Age: 56
LOS: 1 days | Discharge: HOME | End: 2021-07-21
Attending: EMERGENCY MEDICINE | Admitting: EMERGENCY MEDICINE
Payer: MEDICAID

## 2021-07-21 VITALS
DIASTOLIC BLOOD PRESSURE: 91 MMHG | RESPIRATION RATE: 16 BRPM | HEIGHT: 71 IN | TEMPERATURE: 97 F | WEIGHT: 242.07 LBS | HEART RATE: 108 BPM | OXYGEN SATURATION: 96 % | SYSTOLIC BLOOD PRESSURE: 129 MMHG

## 2021-07-21 VITALS
DIASTOLIC BLOOD PRESSURE: 89 MMHG | TEMPERATURE: 98 F | RESPIRATION RATE: 18 BRPM | SYSTOLIC BLOOD PRESSURE: 128 MMHG | OXYGEN SATURATION: 97 % | HEART RATE: 100 BPM

## 2021-07-21 DIAGNOSIS — Z98.890 OTHER SPECIFIED POSTPROCEDURAL STATES: Chronic | ICD-10-CM

## 2021-07-21 PROBLEM — M47.812 SPONDYLOSIS WITHOUT MYELOPATHY OR RADICULOPATHY, CERVICAL REGION: Chronic | Status: ACTIVE | Noted: 2018-11-12

## 2021-07-21 PROBLEM — K56.7 ILEUS, UNSPECIFIED: Chronic | Status: ACTIVE | Noted: 2018-11-12

## 2021-07-21 PROBLEM — K62.5 HEMORRHAGE OF ANUS AND RECTUM: Chronic | Status: ACTIVE | Noted: 2018-11-12

## 2021-07-21 PROBLEM — N17.9 ACUTE KIDNEY FAILURE, UNSPECIFIED: Chronic | Status: ACTIVE | Noted: 2018-11-12

## 2021-07-21 PROBLEM — K57.92 DIVERTICULITIS OF INTESTINE, PART UNSPECIFIED, WITHOUT PERFORATION OR ABSCESS WITHOUT BLEEDING: Chronic | Status: ACTIVE | Noted: 2018-11-12

## 2021-07-21 PROBLEM — E66.9 OBESITY, UNSPECIFIED: Chronic | Status: ACTIVE | Noted: 2018-11-12

## 2021-07-21 PROCEDURE — 96374 THER/PROPH/DIAG INJ IV PUSH: CPT

## 2021-07-21 PROCEDURE — 76376 3D RENDER W/INTRP POSTPROCES: CPT | Mod: 26

## 2021-07-21 PROCEDURE — 73030 X-RAY EXAM OF SHOULDER: CPT

## 2021-07-21 PROCEDURE — 73200 CT UPPER EXTREMITY W/O DYE: CPT | Mod: 26,LT,MA

## 2021-07-21 PROCEDURE — 73030 X-RAY EXAM OF SHOULDER: CPT | Mod: 26,LT

## 2021-07-21 PROCEDURE — 73060 X-RAY EXAM OF HUMERUS: CPT | Mod: 26,LT

## 2021-07-21 PROCEDURE — 99284 EMERGENCY DEPT VISIT MOD MDM: CPT

## 2021-07-21 PROCEDURE — 76376 3D RENDER W/INTRP POSTPROCES: CPT

## 2021-07-21 PROCEDURE — 99284 EMERGENCY DEPT VISIT MOD MDM: CPT | Mod: 25

## 2021-07-21 PROCEDURE — 73200 CT UPPER EXTREMITY W/O DYE: CPT | Mod: MA

## 2021-07-21 PROCEDURE — 73060 X-RAY EXAM OF HUMERUS: CPT

## 2021-07-21 RX ORDER — OXYCODONE AND ACETAMINOPHEN 5; 325 MG/1; MG/1
1 TABLET ORAL ONCE
Refills: 0 | Status: DISCONTINUED | OUTPATIENT
Start: 2021-07-21 | End: 2021-07-21

## 2021-07-21 RX ORDER — HYDROMORPHONE HYDROCHLORIDE 2 MG/ML
0.5 INJECTION INTRAMUSCULAR; INTRAVENOUS; SUBCUTANEOUS ONCE
Refills: 0 | Status: DISCONTINUED | OUTPATIENT
Start: 2021-07-21 | End: 2021-07-21

## 2021-07-21 RX ADMIN — OXYCODONE AND ACETAMINOPHEN 1 TABLET(S): 5; 325 TABLET ORAL at 10:43

## 2021-07-21 RX ADMIN — HYDROMORPHONE HYDROCHLORIDE 0.5 MILLIGRAM(S): 2 INJECTION INTRAMUSCULAR; INTRAVENOUS; SUBCUTANEOUS at 09:14

## 2021-07-21 NOTE — ED ADULT NURSE NOTE - OBJECTIVE STATEMENT
Pt received in bed alert and oriented and resting in bed with the c/o left shoulder pain after fall in the kitchen this Am. Pt states that he was drinking a cup of water and there was possibly water on the floor and he slipped. Pt stated that he attempted to pt dislocated shoulder back in by hitting shoulder on hard surface. Pt able to move fingers freely and radial and brachial pulses present. Shoulder immobilizer placed on left shoulder along with an ice pack. Pt now awaiting eval from ER MD. Nursing care ongoing and safety maintained.

## 2021-07-21 NOTE — ED PROVIDER NOTE - PROGRESS NOTE DETAILS
Reevaluated patient at bedside.  Patient feeling much improved.  Discussed the results of all diagnostic testing in ED and copies of all reports given.   An opportunity to ask questions was given.  Discussed the importance of prompt, close medical follow-up.  Patient will return with any changes, concerns or persistent / worsening symptoms.  Understanding of all instructions verbalized. Reevaluated patient at bedside.  Patient feeling much improved.  Discussed the results of all diagnostic testing in ED. seen by ortho resident. CT reviewed by ortho. no evidence of dislocation. sling applied by ortho resident. post application shows good alignment, n/v intact. will follow up with ortho outpatient.    An opportunity to ask questions was given.  Discussed the importance of prompt, close medical follow-up.  Patient will return with any changes, concerns or persistent / worsening symptoms.  Understanding of all instructions verbalized.

## 2021-07-21 NOTE — ED PROVIDER NOTE - OBJECTIVE STATEMENT
56 year old male with history of obesity, SHAQUILLE, ileus, diverticulitis, rectal bleed, HLD, history of drug abuse, and history of CVA (right sided deficits) presents with lft shoulder pain after trip and fall. tripped on water at home, landed on left shoulder. denies hitting head or LOC. does not take any blood thinners. no neck or back pain. denies other injuries or complaints. pain 10/10  PCP Teofilo Ramirez

## 2021-07-21 NOTE — ED PROVIDER NOTE - NSFOLLOWUPINSTRUCTIONS_ED_ALL_ED_FT
sling  ice  tylenol or motrin over the counter for pain  percocet for severe pain  follow up with ortho in office, referrals provided       Arm Fracture in Adults    WHAT YOU NEED TO KNOW:    An arm fracture is a crack or break in one or more of the bones in your arm.     Adult Arm Bones         DISCHARGE INSTRUCTIONS:    Return to the emergency department if:   •The pain in your injured arm does not get better or gets worse, even after you rest and take medicine.      •Your injured arm, hand, or fingers feel numb.      •Your arm is swollen, red, and feels warm.      •Your skin over the fracture is swollen, cold, or pale.      •You cannot move your arm, hand, or fingers.       Call your doctor if:   •You have a fever.      •Your brace or splint becomes wet, damaged, or comes off.      •You have questions or concerns about your injury, treatment, or care.      Medicines: You may need any of the following:   •NSAIDs, such as ibuprofen, help decrease swelling, pain, and fever. This medicine is available with or without a doctor's order. NSAIDs can cause stomach bleeding or kidney problems in certain people. If you take blood thinner medicine, always ask your healthcare provider if NSAIDs are safe for you. Always read the medicine label and follow directions.      •Acetaminophen decreases pain and fever. It is available without a doctor's order. Ask how much to take and how often to take it. Follow directions. Read the labels of all other medicines you are using to see if they also contain acetaminophen, or ask your doctor or pharmacist. Acetaminophen can cause liver damage if not taken correctly. Do not use more than 4 grams (4,000 milligrams) total of acetaminophen in one day.       •Prescription pain medicine may be given. Ask your healthcare provider how to take this medicine safely. Some prescription pain medicines contain acetaminophen. Do not take other medicines that contain acetaminophen without talking to your healthcare provider. Too much acetaminophen may cause liver damage. Prescription pain medicine may cause constipation. Ask your healthcare provider how to prevent or treat constipation.       •Take your medicine as directed. Contact your healthcare provider if you think your medicine is not helping or if you have side effects. Tell him or her if you are allergic to any medicine. Keep a list of the medicines, vitamins, and herbs you take. Include the amounts, and when and why you take them. Bring the list or the pill bottles to follow-up visits. Carry your medicine list with you in case of an emergency.      Self-care:   •Elevate your arm above the level of your heart as often as you can. This will help decrease swelling and pain. Prop your arm on pillows or blankets to keep it elevated comfortably.             •Apply ice on your arm for 15 to 20 minutes every hour or as directed. Use an ice pack, or put crushed ice in a plastic bag. Cover it with a towel. Ice helps prevent tissue damage and decreases swelling and pain.      •Rest your arm as much as possible. Ask your healthcare provider when you can put pressure or weight on your arm. Also ask when you can return to sports or exercise.       Care for your cast or splint: Ask your healthcare provider when it is okay to bathe. Do not get your cast or splint wet. Before you take a bath or shower, cover your cast or splint with a plastic bag. Tape the bag to your skin to help keep water out. Hold your arm away from the water in case the bag has a hole or tear.  •Check the skin around your cast or splint each day for any redness or open skin.      •Do not use a sharp or pointed object to scratch your skin under the cast or splint.      Physical therapy: A physical therapist teaches you exercises to help improve movement and strength, and to decrease pain.     Follow up with your doctor within 1 week: You may need to see a bone specialist within 3 to 4 days if you need surgery or more treatment. Write down your questions so you remember to ask them during your visits.

## 2021-07-21 NOTE — CONSULT NOTE ADULT - ASSESSMENT
56M with left proximal humerus fracture    Plan:  XR and CT imaging reviewed with left proximal humerus fracture, no apparent dislocation  NWKARENA MAN in Sling  Recommend pain control PRN  Discussed with patient that these fractures usually heal well without operative intervention, but needs to be monitored with outpatient XR imaging. Patient expressed clear understanding of plan and all questions answered to patient satisfaction.   No acute orthopedic surgical intervention indicated at this time. Orthopedically stable for discharge. Patient to follow up with Dr. Sevilla as outpatient for further evaluation and treatment.  Will discuss with Dr. Sevilla and advise if any changes to plan 56M with left proximal humerus fracture    Plan:  XR and CT imaging reviewed with left proximal humerus fracture, no apparent dislocation  NWB LUE in Sling  Recommend pain control PRN - Patient with history of opioid abuse but comfortable with low quantity of Percocet for breakthrough pain, emphasis on taking NSAIDs/Acetaminophen as primary form of pain control  Discussed with patient that these fractures usually heal well without operative intervention, but needs to be monitored with outpatient XR imaging. Patient expressed clear understanding of plan and all questions answered to patient satisfaction.   No acute orthopedic surgical intervention indicated at this time. Orthopedically stable for discharge. Patient to follow up with Dr. Sevilla as outpatient for further evaluation and treatment.  Will discuss with Dr. Sevilla and advise if any changes to plan 56M with left proximal humerus fracture    Plan:  XR and CT imaging reviewed with left proximal humerus fracture, no apparent dislocation  NWB LUE in Sling  Recommend pain control PRN - Patient with history of opioid abuse but comfortable with low quantity of Percocet for breakthrough pain, emphasis on taking NSAIDs/Acetaminophen as primary form of pain control  Discussed with patient that these fractures usually heal well without operative intervention, but needs to be monitored with outpatient XR imaging. Patient questioned his ability to drive with this injury. Discussed with patient that given his limited dexterity of his right hand from prior CVA, and immobility of left arm in sling due to acute fracture, highly advise patient not to drive given high risk for MVC. Patient expressed clear understanding of plan and all questions answered to patient satisfaction.   No acute orthopedic surgical intervention indicated at this time. Orthopedically stable for discharge. Patient to follow up with Dr. Sevilla as outpatient for further evaluation and treatment.  Will discuss with Dr. Sevilla and advise if any changes to plan

## 2021-07-21 NOTE — ED PROVIDER NOTE - CLINICAL SUMMARY MEDICAL DECISION MAKING FREE TEXT BOX
left shoulder pain after mechanical fall. suspect fx vs dislocation. x-ray. pain control. ortho. denies hitting head or LOC. do not suspect ICH or skull fx

## 2021-07-21 NOTE — CONSULT NOTE ADULT - SUBJECTIVE AND OBJECTIVE BOX
56M presents to [facility] for evaluation of left shoulder pain. Patient reports that he slipped and fell in his kitchen this morning and felt immediate left shoulder pain. He reports that he thought it was dislocated so tried to reduce it by hitting it against a hard object without success. Patient is right hand dominant. Otherwise he denies head strike/LOC, numbness/tingling, weakness, any other acute orthopedic complaints.    Vital Signs Last 24 Hrs  T(C): 36.1 (21 Jul 2021 07:14), Max: 36.1 (21 Jul 2021 07:14)  T(F): 97 (21 Jul 2021 07:14), Max: 97 (21 Jul 2021 07:14)  HR: 108 (21 Jul 2021 07:14) (108 - 108)  BP: 129/91 (21 Jul 2021 07:14) (129/91 - 129/91)  BP(mean): --  RR: 16 (21 Jul 2021 07:14) (16 - 16)  SpO2: 96% (21 Jul 2021 07:14) (96% - 96%)      Exam:  General: Awake alert mild painful distress  LUE:   Skin intact. No obvious abrasions/lacerations. Obvious deformity of proximal humerus.   Diffuse swelling of shoulder.   TTP over proximal humerus/shoulder. No other bony TTP appreciated  Limited ROM of shoulder secondary to pain. FROM elbow/wrist/hand without discomfort.   SILT, +Ax/Msc/Rad/Uln/Med/AIN/PIN  Radial/Ulnar pulses intact  Compartments soft and compressible  No calf TTP bilaterally    Secondary Assessment:  NC/AT, NTTP of clavicles, NTTP of C-,T-,L-Spine, NTTP of Pelvis  RUE: NTTP of Shoulder, Elbow, Wrist, Hand; NT with AROM/PROM of Shoulder, Elbow, Wrist, Hand; AIN/PIN/Med/Uln/Msc/Rad/Ax intact  LEs: Able to SLR, NT with Log Roll, NT with Heel Strike, NTTP of Hips, Knees, Ankles, Feet; NT with AROM/PROM of Hips, Knees, Ankles, Feet; Q/H/Gsc/TA/EHL/FHL intact    XR and CT imaging reviewed with left proximal humerus fracture without dislocation 56M presents to [facility] for evaluation of left shoulder pain. Patient reports that he woke up around 2am to go to the bathroom when he slipped and fell in his kitchen and felt immediate left shoulder pain. He reports that he thought it was dislocated so tried to reduce it by hitting it against a hard object without success. Patient is right hand dominant, however he has history of prior stroke with right sided deficits. Otherwise he denies head strike/LOC, numbness/tingling, weakness, any other acute orthopedic complaints.    Vital Signs Last 24 Hrs  T(C): 36.1 (21 Jul 2021 07:14), Max: 36.1 (21 Jul 2021 07:14)  T(F): 97 (21 Jul 2021 07:14), Max: 97 (21 Jul 2021 07:14)  HR: 108 (21 Jul 2021 07:14) (108 - 108)  BP: 129/91 (21 Jul 2021 07:14) (129/91 - 129/91)  BP(mean): --  RR: 16 (21 Jul 2021 07:14) (16 - 16)  SpO2: 96% (21 Jul 2021 07:14) (96% - 96%)      Exam:  General: Awake alert no acute distress  LUE:   Skin intact. Ecchymosis over pectoral fold. No obvious abrasions/lacerations.   Diffuse swelling of shoulder.   Mild TTP over proximal humerus/shoulder. No other bony TTP appreciated  Limited ROM of shoulder secondary to pain. FROM elbow/wrist/hand without discomfort.   SILT, +Ax/Msc/Rad/Uln/Med/AIN/PIN  Radial/Ulnar pulses intact  Compartments soft and compressible  No calf TTP bilaterally    Secondary Assessment:  NC/AT, NTTP of clavicles, NTTP of C-,T-,L-Spine, NTTP of Pelvis  RUE: NTTP of Shoulder, Elbow, Wrist, Hand; NT with AROM/PROM of Shoulder, Elbow, Wrist, Hand; AIN/PIN/Med/Uln/Msc/Rad/Ax grossly intact but limited secondary to prior CVA with residual right sided deficits  LEs: Able to SLR, NT with Log Roll, NT with Heel Strike, NTTP of Hips, Knees, Ankles, Feet; NT with AROM/PROM of Hips, Knees, Ankles, Feet; Q/H/Gsc/TA/EHL/FHL intact    XR and CT imaging reviewed with left proximal humerus fracture without dislocation

## 2021-07-21 NOTE — ED PROVIDER NOTE - PROVIDER TOKENS
PROVIDER:[TOKEN:[[id number]],FOLLOWUP:[1-3 Days]],PROVIDER:[TOKEN:[[id number]],FOLLOWUP:[1-3 Days]]

## 2021-07-21 NOTE — ED ADULT NURSE NOTE - PMH
SHAQUILLE (acute kidney injury)  rhabdomyolysis 6/2018  Cervical spondylosis    CVA (cerebral vascular accident)  6/17/2018 - found by son 48 hours after CVA - residual right upper extremity weakness  Diverticulitis    Drug abuse  Cocaine/IVDA - last used 6/2018  Hyperlipemia    Ileus  6/2018  Obesity (BMI 30-39.9)    Rectal bleeding

## 2021-07-21 NOTE — ED PROVIDER NOTE - ATTENDING CONTRIBUTION TO CARE
55 y/o M with c/o left shoulder pain after trip and fall on water at home.    PE: well appearing mild distress to pain  left shoulder ttp, +2 radial pulses    xray

## 2021-07-21 NOTE — ED PROVIDER NOTE - MUSCULOSKELETAL, MLM
Spine appears normal, range of motion is not limited, no vert tenderness. left shoulder tenderness with anterior fullness. limited ROM. no tenderness to elbow or wrist Spine appears normal, range of motion is not limited, no vert tenderness. left shoulder tenderness with mild anterior fullness. limited ROM. no tenderness to elbow or wrist

## 2021-07-21 NOTE — ED PROVIDER NOTE - PATIENT PORTAL LINK FT
You can access the FollowMyHealth Patient Portal offered by [facility] by registering at the following website: [email or web address]. By joining [employer]’s FollowMyHealth portal, you will also be able to view your health information using other applications (apps) compatible with our system.

## 2021-07-21 NOTE — ED PROVIDER NOTE - CARE PROVIDER_API CALL
Andrew Sevilla)  Orthopaedic Surgery  [address] [place] [address]  Phone: [phone]  Fax: [phone]  Follow Up Time: 1-3 Days    Hudson Diaz)  Orthopaedic Surgery; Surgery of the Hand  [address] [place] [address]  Phone: [phone]  Fax: [phone]  Follow Up Time: 1-3 Days

## 2021-07-30 NOTE — BEHAVIORAL HEALTH ASSESSMENT NOTE - THOUGHT ASSOCIATIONS
Patient: Krysta Mack Date: 2021   : 1998 MR#: [id number]   23 year old female      Time Spent:     Reason for visit:    This visit was performed via telephone with patient's verbal consent.   Clinician Location:  Clinic.  Patient Location:  Home.    Krysta is in [place] and identity has been established.     She was informed that consent to treat includes permission to submit charges to the applicable insurance on file.  Krysta was advised regarding the potential risk inherent in video visits, as the assessment may be limited due to what can be seen on the screen which potentially results in an incomplete assessment; as well as either of us may discontinue the video visit if it is.    Subjective    Chief complaint:  \"Mood swings\"    Interval history:  Krysta follows up today for medication management.      Regarding her PMDD, patient reports mood swings prior to menses that are very dysregulated. She can feel emotional, angry and frustrated. Pt reports her mother had the same symptoms prior to a hysterectomy. States that she has taken 3 pregnancy tests and denies being pregnant but feels her hormones are playing a huge part in her dysregulation. She states she will be starting with a new OB. She denies any current side effects from medication . Denies TD or EPS. Denies yg. Denies self harm.     Regarding anxiety and depression, pt cites she has been more stable overall. Sleeping better. She has been sleeping and eating which she was not doing prior. Pt denies intrusive thoughts and panic attacks. Pt states she is not crying as often as she once was. denies hopelessness or guilt. She denies SI/HI, plan or intent, noting her daughter is her protective factor. She denies any current side effects from medication .Denies yg. Does not feel she has to take clonidine as she was taking it.     Regarding her ADHD, patient reports symptoms with attention and focus are maintained with Adderall  40 mg XR and 10mg.  Reports she feels less distracted. She states she takes the booster which has helped extend throughout her shift.     Interval social: She reports that she has working.     Current medications:  · adderall 40mg XR and 10mg  · Clonidine 0.1mg  · Escitalopram 20mg  · Quetiapine 50mg    Allergies: Penicillin    Suicide and Violence Risk Assessment:  SI/HI intent or plan:  Denies SI, intent or plan at present.  Denies HI, intent or plan at present.    Self-injurious behavior, violence or impulsivity:  Denies all    Brief review of systems:  Denies chest pain, palpitations, or shortness of breath.  Denies nausea, vomiting, and constipation. Denies any dizziness, lightheadedness, syncope, seizures, abnormal muscle movements, tics, tremors, or HA; reports no significant changes in health.     Past/Family/Social History (PFSH):   Active Ambulatory Problems     Diagnosis Date Noted   • IUGR,  04/15/2017   • Severe preeclampsia 2017   • Marijuana use 2017   •  delivery, delivered 2017   •  (normal spontaneous vaginal delivery) 2017     Resolved Ambulatory Problems     Diagnosis Date Noted   • Headache(784.0) 2012   • Vocal cord edema 2016   • Tonsillitis, chronic 2016   • Elevated blood pressure complicating pregnancy, antepartum 2017   • Marijuana use 2017   • Pregnancy-induced hypertension in third trimester 04/15/2017     Past Medical History:   Diagnosis Date   • Anxiety    • Depression    • Encounter for IUD removal 2018   • Gastroesophageal reflux disease    • Hemorrhoids    • IUD (intrauterine device) in place 2017   • Metrorrhagia      Patient Active Problem List   Diagnosis   • IUGR,    • Severe preeclampsia   • Marijuana use   •  delivery, delivered   •  (normal spontaneous vaginal delivery)     Social History     Tobacco Use   • Smoking status: Current Every Day Smoker     Packs/day: 0.50      Types: Cigarettes   • Smokeless tobacco: Never Used   Substance Use Topics   • Alcohol use: Not Currently     Comment: socially   • Drug use: Not Currently     Types: Marijuana     Comment: holding for surgery         Objective  Vitals: none taken/reported    Pertinent labs:    TSH (mcUnits/mL)   Date Value   06/21/2019 3.059     Recent Labs   Lab 12/15/20  1855   WBC 11.3*   RBC 4.29   HGB 13.3   HCT 40.5   MCV 94.4      Absolute Neutrophils 6.5   Absolute Lymphocytes 3.7   Absolute Monocytes 0.8   Absolute Eosinophils  0.2   Absolute Basophils 0.1     Recent Labs   Lab 12/15/20  1855   Glucose 85   Sodium 140   Potassium 4.6   Chloride 105   BUN 17   Creatinine 0.63   Calcium 9.2   Albumin 3.2*   GOT/AST 22   Alkaline Phosphatase 85   GPT 25     Recent Labs   Lab 12/15/20  1855   Anion Gap 13   Globulin 3.8     Sodium (mmol/L)   Date Value   12/15/2020 140     Potassium (mmol/L)   Date Value   12/15/2020 4.6     Chloride (mmol/L)   Date Value   12/15/2020 105     Glucose (mg/dL)   Date Value   12/15/2020 85     Calcium (mg/dL)   Date Value   12/15/2020 9.2     Carbon Dioxide (mmol/L)   Date Value   12/15/2020 27     BUN (mg/dL)   Date Value   12/15/2020 17     Creatinine (mg/dL)   Date Value   12/15/2020 0.63         Screening tests done: none    Mental status exam:  Cooperativity:  cooperative, forthcoming, appears reliable.    Speech:  normal rate, normal tone, and volume.   Language:  no abnormality noted.    Mood: Noted in HPI.  Affect:  mood congruent  Thought Process:  linear, logical, goal-directed.    Thought Content:  no overt delusions or abnormality noted.    Perception:  no reported hallucinations.  Consciousness:  awake and alert.   Orientation:  oriented to person, place and time.   Memory:  good, able to demonstrate accurate historical recall for recent and more remote events and discussions.  Attention:  good, no fluctuation or obvious deficit noted and able to follow the conversation.    Fund of Knowledge:  consistent with education and experiences as evidenced by vocabulary.   Insight:  Fair-good, based on appropriate recognition of impact of psychiatric symptoms and need for treatment.   Judgment:  good, based on recent decisions.  Safety:  Noted in HPI.  Motivation to pursue treatment:  Good.    Assessment   Diagnoses:  PMDD (premenstrual dysphoric disorder)  (primary encounter diagnosis)  ADHD (attention deficit hyperactivity disorder), inattentive type  OCTAVIA (generalized anxiety disorder)      R/o personality disorder    Plan  Provider discussed various treatment options.  Patient agreed with below-mentioned medication regimen. Writer discussed the side effects, risks, benefits, and alternatives of all prescribed psychotropic medication.      · CONTINUE cloNIDine (CATAPRES) 0.1 MG tablet; Take 1-2 tablet by mouth nightly.  · CONTINUE Adderall 40 mg XR and 10mg booster  · CONTINUE escitalopram 20 mg daily  · CONTINUE hydroxyzine 25-50mg  CONTINUE quetiapine 25-50mg    Follow up: 12 weeks or sooner if needed. If any safety concern arises, patient instructed to call 911, go to emergency room and/ or nearby hospital for further evaluation. Patient informed to call the office for any questions and/or concerns during regular business hours.     CRISTÓBAL Ann 7/30/2021   Normal

## 2021-08-02 ENCOUNTER — NON-APPOINTMENT (OUTPATIENT)
Age: 56
End: 2021-08-02

## 2021-08-02 ENCOUNTER — APPOINTMENT (OUTPATIENT)
Dept: ORTHOPEDIC SURGERY | Facility: CLINIC | Age: 56
End: 2021-08-02
Payer: MEDICAID

## 2021-08-02 DIAGNOSIS — S42.292A OTHER DISPLACED FRACTURE OF UPPER END OF LEFT HUMERUS, INITIAL ENCOUNTER FOR CLOSED FRACTURE: ICD-10-CM

## 2021-08-02 PROCEDURE — 73030 X-RAY EXAM OF SHOULDER: CPT | Mod: LT

## 2021-08-02 PROCEDURE — 99215 OFFICE O/P EST HI 40 MIN: CPT

## 2021-08-02 NOTE — ASSESSMENT
[FreeTextEntry1] : 56-year-old male 2 weeks status postmechanical fall with a displaced fracture of the left proximal humerus. Risks and benefits of closed treatment versus surgical intervention for ORIF versus reversed total shoulder were discussed with the patient he would like to proceed with surgery. He'll be put for the procedure and may continue sling immobilization and nonweightbearing left upper extremity until that time.

## 2021-08-02 NOTE — PHYSICAL EXAM
[Normal Touch] : sensation intact for  touch [Normal] : no peripheral adenopathy appreciated [de-identified] : Left shoulder: \par skin intact mild swelling mild ecchymosis no erythema no gross deformity\par Positive tenderness to palpation at the fracture site\par Range of motion of the elbow limited secondary to pain and stiffness\par Range of motion of the digits and wrist intact\par neurovascular intact distally [de-identified] : rovertor [de-identified] : 3 x-ray views of the left shoulder are reviewed there is a displaced three-part fracture of the left proximal humerus, no dislocation

## 2021-08-06 DIAGNOSIS — Z01.818 ENCOUNTER FOR OTHER PREPROCEDURAL EXAMINATION: ICD-10-CM

## 2021-08-16 ENCOUNTER — APPOINTMENT (OUTPATIENT)
Dept: DISASTER EMERGENCY | Facility: CLINIC | Age: 56
End: 2021-08-16

## 2021-08-16 DIAGNOSIS — Z01.818 ENCOUNTER FOR OTHER PREPROCEDURAL EXAMINATION: ICD-10-CM

## 2021-08-17 LAB — SARS-COV-2 N GENE NPH QL NAA+PROBE: NOT DETECTED

## 2021-08-19 ENCOUNTER — INPATIENT (INPATIENT)
Facility: HOSPITAL | Age: 56
LOS: 1 days | Discharge: HOME | DRG: 315 | End: 2021-08-21
Attending: STUDENT IN AN ORGANIZED HEALTH CARE EDUCATION/TRAINING PROGRAM | Admitting: STUDENT IN AN ORGANIZED HEALTH CARE EDUCATION/TRAINING PROGRAM
Payer: MEDICAID

## 2021-08-19 ENCOUNTER — APPOINTMENT (OUTPATIENT)
Dept: ORTHOPEDIC SURGERY | Facility: HOSPITAL | Age: 56
End: 2021-08-19

## 2021-08-19 ENCOUNTER — TRANSCRIPTION ENCOUNTER (OUTPATIENT)
Age: 56
End: 2021-08-19

## 2021-08-19 ENCOUNTER — RESULT REVIEW (OUTPATIENT)
Age: 56
End: 2021-08-19

## 2021-08-19 VITALS
OXYGEN SATURATION: 97 % | WEIGHT: 240.52 LBS | HEIGHT: 71 IN | TEMPERATURE: 97 F | SYSTOLIC BLOOD PRESSURE: 115 MMHG | DIASTOLIC BLOOD PRESSURE: 80 MMHG | HEART RATE: 69 BPM | RESPIRATION RATE: 16 BRPM

## 2021-08-19 DIAGNOSIS — Z98.890 OTHER SPECIFIED POSTPROCEDURAL STATES: Chronic | ICD-10-CM

## 2021-08-19 DIAGNOSIS — S42.292A OTHER DISPLACED FRACTURE OF UPPER END OF LEFT HUMERUS, INITIAL ENCOUNTER FOR CLOSED FRACTURE: ICD-10-CM

## 2021-08-19 LAB
ANION GAP SERPL CALC-SCNC: 6 MMOL/L — SIGNIFICANT CHANGE UP (ref 5–17)
BUN SERPL-MCNC: 18 MG/DL — SIGNIFICANT CHANGE UP (ref 7–23)
CALCIUM SERPL-MCNC: 8.8 MG/DL — SIGNIFICANT CHANGE UP (ref 8.5–10.1)
CHLORIDE SERPL-SCNC: 111 MMOL/L — HIGH (ref 96–108)
CO2 SERPL-SCNC: 22 MMOL/L — SIGNIFICANT CHANGE UP (ref 22–31)
CREAT SERPL-MCNC: 1.21 MG/DL — SIGNIFICANT CHANGE UP (ref 0.5–1.3)
GLUCOSE SERPL-MCNC: 115 MG/DL — HIGH (ref 70–99)
HCT VFR BLD CALC: 40 % — SIGNIFICANT CHANGE UP (ref 39–50)
HGB BLD-MCNC: 13.1 G/DL — SIGNIFICANT CHANGE UP (ref 13–17)
MCHC RBC-ENTMCNC: 30.8 PG — SIGNIFICANT CHANGE UP (ref 27–34)
MCHC RBC-ENTMCNC: 32.8 GM/DL — SIGNIFICANT CHANGE UP (ref 32–36)
MCV RBC AUTO: 93.9 FL — SIGNIFICANT CHANGE UP (ref 80–100)
PLATELET # BLD AUTO: 186 K/UL — SIGNIFICANT CHANGE UP (ref 150–400)
POTASSIUM SERPL-MCNC: 4.4 MMOL/L — SIGNIFICANT CHANGE UP (ref 3.5–5.3)
POTASSIUM SERPL-SCNC: 4.4 MMOL/L — SIGNIFICANT CHANGE UP (ref 3.5–5.3)
RBC # BLD: 4.26 M/UL — SIGNIFICANT CHANGE UP (ref 4.2–5.8)
RBC # FLD: 13.5 % — SIGNIFICANT CHANGE UP (ref 10.3–14.5)
SODIUM SERPL-SCNC: 139 MMOL/L — SIGNIFICANT CHANGE UP (ref 135–145)
WBC # BLD: 12.11 K/UL — HIGH (ref 3.8–10.5)
WBC # FLD AUTO: 12.11 K/UL — HIGH (ref 3.8–10.5)

## 2021-08-19 PROCEDURE — 82962 GLUCOSE BLOOD TEST: CPT

## 2021-08-19 PROCEDURE — 86901 BLOOD TYPING SEROLOGIC RH(D): CPT

## 2021-08-19 PROCEDURE — 88307 TISSUE EXAM BY PATHOLOGIST: CPT | Mod: 26

## 2021-08-19 PROCEDURE — 88311 DECALCIFY TISSUE: CPT | Mod: 26

## 2021-08-19 PROCEDURE — 97162 PT EVAL MOD COMPLEX 30 MIN: CPT | Mod: GP

## 2021-08-19 PROCEDURE — 86850 RBC ANTIBODY SCREEN: CPT

## 2021-08-19 PROCEDURE — 86900 BLOOD TYPING SEROLOGIC ABO: CPT

## 2021-08-19 PROCEDURE — 73030 X-RAY EXAM OF SHOULDER: CPT | Mod: 26,LT

## 2021-08-19 PROCEDURE — C1776: CPT

## 2021-08-19 PROCEDURE — 85027 COMPLETE CBC AUTOMATED: CPT

## 2021-08-19 PROCEDURE — 88311 DECALCIFY TISSUE: CPT

## 2021-08-19 PROCEDURE — 86769 SARS-COV-2 COVID-19 ANTIBODY: CPT

## 2021-08-19 PROCEDURE — 23472 RECONSTRUCT SHOULDER JOINT: CPT | Mod: LT

## 2021-08-19 PROCEDURE — 73030 X-RAY EXAM OF SHOULDER: CPT | Mod: LT

## 2021-08-19 PROCEDURE — 80048 BASIC METABOLIC PNL TOTAL CA: CPT

## 2021-08-19 PROCEDURE — C1713: CPT

## 2021-08-19 PROCEDURE — 88307 TISSUE EXAM BY PATHOLOGIST: CPT

## 2021-08-19 PROCEDURE — 97116 GAIT TRAINING THERAPY: CPT | Mod: GP

## 2021-08-19 PROCEDURE — 36415 COLL VENOUS BLD VENIPUNCTURE: CPT

## 2021-08-19 RX ORDER — SODIUM CHLORIDE 9 MG/ML
1000 INJECTION, SOLUTION INTRAVENOUS
Refills: 0 | Status: DISCONTINUED | OUTPATIENT
Start: 2021-08-19 | End: 2021-08-21

## 2021-08-19 RX ORDER — MESALAMINE 400 MG
2 TABLET, DELAYED RELEASE (ENTERIC COATED) ORAL
Qty: 0 | Refills: 0 | DISCHARGE

## 2021-08-19 RX ORDER — ATORVASTATIN CALCIUM 80 MG/1
40 TABLET, FILM COATED ORAL AT BEDTIME
Refills: 0 | Status: DISCONTINUED | OUTPATIENT
Start: 2021-08-19 | End: 2021-08-21

## 2021-08-19 RX ORDER — PANTOPRAZOLE SODIUM 20 MG/1
40 TABLET, DELAYED RELEASE ORAL
Refills: 0 | Status: DISCONTINUED | OUTPATIENT
Start: 2021-08-19 | End: 2021-08-21

## 2021-08-19 RX ORDER — CEFAZOLIN SODIUM 1 G
2000 VIAL (EA) INJECTION EVERY 8 HOURS
Refills: 0 | Status: COMPLETED | OUTPATIENT
Start: 2021-08-19 | End: 2021-08-20

## 2021-08-19 RX ORDER — SODIUM CHLORIDE 9 MG/ML
1000 INJECTION, SOLUTION INTRAVENOUS
Refills: 0 | Status: DISCONTINUED | OUTPATIENT
Start: 2021-08-19 | End: 2021-08-19

## 2021-08-19 RX ORDER — PANTOPRAZOLE SODIUM 20 MG/1
1 TABLET, DELAYED RELEASE ORAL
Qty: 30 | Refills: 0
Start: 2021-08-19 | End: 2021-09-17

## 2021-08-19 RX ORDER — FENTANYL CITRATE 50 UG/ML
50 INJECTION INTRAVENOUS
Refills: 0 | Status: DISCONTINUED | OUTPATIENT
Start: 2021-08-19 | End: 2021-08-19

## 2021-08-19 RX ORDER — IBUPROFEN 200 MG
0 TABLET ORAL
Qty: 0 | Refills: 0 | DISCHARGE

## 2021-08-19 RX ORDER — GABAPENTIN 400 MG/1
0 CAPSULE ORAL
Qty: 0 | Refills: 0 | DISCHARGE

## 2021-08-19 RX ORDER — OXYCODONE HYDROCHLORIDE 5 MG/1
0 TABLET ORAL
Qty: 0 | Refills: 0 | DISCHARGE

## 2021-08-19 RX ORDER — OXYCODONE HYDROCHLORIDE 5 MG/1
5 TABLET ORAL EVERY 4 HOURS
Refills: 0 | Status: DISCONTINUED | OUTPATIENT
Start: 2021-08-19 | End: 2021-08-21

## 2021-08-19 RX ORDER — ACETAMINOPHEN 500 MG
2 TABLET ORAL
Qty: 84 | Refills: 0
Start: 2021-08-19 | End: 2021-09-01

## 2021-08-19 RX ORDER — FLUCONAZOLE 150 MG/1
2 TABLET ORAL
Qty: 0 | Refills: 0 | DISCHARGE

## 2021-08-19 RX ORDER — HYDROMORPHONE HYDROCHLORIDE 2 MG/ML
0.5 INJECTION INTRAMUSCULAR; INTRAVENOUS; SUBCUTANEOUS EVERY 4 HOURS
Refills: 0 | Status: DISCONTINUED | OUTPATIENT
Start: 2021-08-19 | End: 2021-08-21

## 2021-08-19 RX ORDER — ACETAMINOPHEN 500 MG
2 TABLET ORAL
Qty: 0 | Refills: 0 | DISCHARGE

## 2021-08-19 RX ORDER — HYDROCORTISONE 1 %
1 OINTMENT (GRAM) TOPICAL
Qty: 0 | Refills: 0 | DISCHARGE

## 2021-08-19 RX ORDER — IBUPROFEN 200 MG
1 TABLET ORAL
Qty: 0 | Refills: 0 | DISCHARGE

## 2021-08-19 RX ORDER — OXYCODONE HYDROCHLORIDE 5 MG/1
5 TABLET ORAL ONCE
Refills: 0 | Status: DISCONTINUED | OUTPATIENT
Start: 2021-08-19 | End: 2021-08-19

## 2021-08-19 RX ORDER — LIDOCAINE 4 G/100G
0 CREAM TOPICAL
Qty: 0 | Refills: 0 | DISCHARGE

## 2021-08-19 RX ORDER — POLYETHYLENE GLYCOL 3350 17 G/17G
17 POWDER, FOR SOLUTION ORAL
Qty: 1 | Refills: 0
Start: 2021-08-19 | End: 2021-09-01

## 2021-08-19 RX ORDER — OXYCODONE HYDROCHLORIDE 5 MG/1
10 TABLET ORAL EVERY 4 HOURS
Refills: 0 | Status: DISCONTINUED | OUTPATIENT
Start: 2021-08-19 | End: 2021-08-21

## 2021-08-19 RX ORDER — OXYCODONE HYDROCHLORIDE 5 MG/1
1 TABLET ORAL
Qty: 30 | Refills: 0
Start: 2021-08-19 | End: 2021-08-23

## 2021-08-19 RX ORDER — OMEPRAZOLE 10 MG/1
0 CAPSULE, DELAYED RELEASE ORAL
Qty: 0 | Refills: 0 | DISCHARGE

## 2021-08-19 RX ORDER — SENNA PLUS 8.6 MG/1
2 TABLET ORAL
Qty: 28 | Refills: 0
Start: 2021-08-19 | End: 2021-09-01

## 2021-08-19 RX ORDER — ASPIRIN/CALCIUM CARB/MAGNESIUM 324 MG
325 TABLET ORAL DAILY
Refills: 0 | Status: DISCONTINUED | OUTPATIENT
Start: 2021-08-20 | End: 2021-08-20

## 2021-08-19 RX ORDER — ONDANSETRON 8 MG/1
4 TABLET, FILM COATED ORAL ONCE
Refills: 0 | Status: DISCONTINUED | OUTPATIENT
Start: 2021-08-19 | End: 2021-08-19

## 2021-08-19 RX ORDER — ACETAMINOPHEN 500 MG
975 TABLET ORAL EVERY 8 HOURS
Refills: 0 | Status: DISCONTINUED | OUTPATIENT
Start: 2021-08-19 | End: 2021-08-21

## 2021-08-19 RX ORDER — ONDANSETRON 8 MG/1
8 TABLET, FILM COATED ORAL EVERY 8 HOURS
Refills: 0 | Status: DISCONTINUED | OUTPATIENT
Start: 2021-08-19 | End: 2021-08-21

## 2021-08-19 RX ORDER — HYDROCORTISONE/PRAMOXINE 2.5 %-1 %
1 CREAM WITH APPLICATOR RECTAL
Qty: 0 | Refills: 0 | DISCHARGE

## 2021-08-19 RX ORDER — BUPROPION HYDROCHLORIDE 150 MG/1
300 TABLET, EXTENDED RELEASE ORAL DAILY
Refills: 0 | Status: DISCONTINUED | OUTPATIENT
Start: 2021-08-19 | End: 2021-08-21

## 2021-08-19 RX ADMIN — Medication 100 MILLIGRAM(S): at 23:31

## 2021-08-19 NOTE — DISCHARGE NOTE PROVIDER - HOSPITAL COURSE
H&P:  Pt is a 56y Male    PAST MEDICAL & SURGICAL HISTORY:  CVA (cerebral vascular accident)  6/17/2018 - found by son 48 hours after CVA - residual right upper extremity weakness    Drug abuse  Cocaine/IVDA - last used 6/2018    Hyperlipemia    Cervical spondylosis    Rectal bleeding    Diverticulitis    Ileus  6/2018    SHAQUILLE (acute kidney injury)  rhabdomyolysis 6/2018    Obesity (BMI 30-39.9)    HTN (hypertension)    H/O colonoscopy with polypectomy  7/2018         Now s/p Left Reverse Total Shoulder Arthroplasty. Pt is afebrile with stable vital signs. Pain is controlled. Alert and Oriented. Exam reveals intact AIN/PIN, wrist flexion and wrist extension, 2+Radial Pulse. Dressing is clean and dry.    Hospital Course:  Patient presented to [facility] medically cleared for Left Reverse Total Shoulder Replacement Surgery for a left proximal humerus fracture. Prophylactic antibiotics were started before the procedure and continued for 24 hours. They were admitted after surgery to the orthopedic floor. There were no complications during the hospital stay.     Routine consult was obtained from Physical Therapy for gait training, NO shoulder ROM. Consult to Hospitalist for Medical Co-management. Patient was placed on anticoagulation post-op.  Pertinent home medications were continued.  Daily labs were followed.      On POD 0 there were no overnight events and the pt was OOB with PT. POD 1, PT was continued, and anticipate POD 1 DC to home. The orthopedic Attending is aware and agrees.

## 2021-08-19 NOTE — DISCHARGE NOTE PROVIDER - CARE PROVIDER_API CALL
Justine Tadeo)  [facility]  [address] [place] [address]  Phone: [phone]  Fax: [phone]  Follow Up Time:

## 2021-08-19 NOTE — DISCHARGE NOTE PROVIDER - NSDCFUADDINST_GEN_ALL_CORE_FT
Left Reverse Total Shoulder Discharge Instructions     1. Activity: No ROM until seen by Dr. Booth. Maintain sling at all times. Ok to straighten elbow with arm braced at your side so elbow doesn't get stiff. Wiggle your fingers often and exercise your hand.     2. Call with fever over 101, wound redness, drainage.    3. Wound care: Keep bandage on. Do not remove it. It will be removed in the office. There are no stitches or staples to remove.     4. Bathing: Recommend sponge bathing.  IF you shower it must be with help of another person and with a detachable shower head from waist down only so you keep your bandage dry.     5. Continue to apply ICE packs.    6. DVT PE Prophylaxis: Per Anticoagulation Team recommendations. See med Rec.    7. Follow Up: Orthopedics, Dr. BOOTH  7-10 days after discharge from hospital. Call to schedule. eRx sent to your pharmacy for .

## 2021-08-19 NOTE — DISCHARGE NOTE PROVIDER - NSDCCPCAREPLAN_GEN_ALL_CORE_FT
PRINCIPAL DISCHARGE DIAGNOSIS  Diagnosis: Fracture of humerus, proximal, left, closed  Assessment and Plan of Treatment:

## 2021-08-19 NOTE — DISCHARGE NOTE PROVIDER - NSDCMRMEDTOKEN_GEN_ALL_CORE_FT
atorvastatin 40 mg oral tablet: 1 tab(s) orally once a day - pt states he stopped taking on his own  MiraLax oral powder for reconstitution: 17 gram(s) orally once a day  as needed for constipation  oxyCODONE 5 mg oral tablet: 1 tab(s) orally every 4 hours as needed for severe pain; MDD:6  oxycodone-acetaminophen 5 mg-325 mg oral tablet: 1 tab(s) orally every 6 hours, As Needed; STOP while taking Oxycodone and Tylenol course; can resume when no longer taking Oxyconde AND Tylenol  pantoprazole 40 mg oral delayed release tablet: 1 tab(s) orally once a day   senna oral tablet: 2 tab(s) orally once a day (at bedtime)   Tylenol Extra Strength 500 mg oral tablet: 2 tab(s) orally 3 times a day   Wellbutrin  mg/24 hours oral tablet, extended release: 1 tab(s) orally every 24 hours   Aspirin Enteric Coated 325 mg oral delayed release tablet: 1 tab(s) orally once a day MDD:325mg take as directed by anticoagualtion services last dose on sept 1st  atorvastatin 40 mg oral tablet: 1 tab(s) orally once a day - pt states he stopped taking on his own  MiraLax oral powder for reconstitution: 17 gram(s) orally once a day  as needed for constipation  oxyCODONE 5 mg oral tablet: 1 tab(s) orally every 4 hours as needed for severe pain; MDD:6  oxycodone-acetaminophen 5 mg-325 mg oral tablet: 1 tab(s) orally every 6 hours, As Needed; STOP while taking Oxycodone and Tylenol course; can resume when no longer taking Oxyconde AND Tylenol  pantoprazole 40 mg oral delayed release tablet: 1 tab(s) orally once a day   senna oral tablet: 2 tab(s) orally once a day (at bedtime)   Tylenol Extra Strength 500 mg oral tablet: 2 tab(s) orally 3 times a day   Wellbutrin  mg/24 hours oral tablet, extended release: 1 tab(s) orally every 24 hours

## 2021-08-19 NOTE — DISCHARGE NOTE PROVIDER - NSDCCPTREATMENT_GEN_ALL_CORE_FT
PRINCIPAL PROCEDURE  Procedure: Open reverse total shoulder replacement  Findings and Treatment: Left Reverse Total Shoulder Arthroplasty

## 2021-08-20 LAB
ANION GAP SERPL CALC-SCNC: 5 MMOL/L — SIGNIFICANT CHANGE UP (ref 5–17)
BUN SERPL-MCNC: 20 MG/DL — SIGNIFICANT CHANGE UP (ref 7–23)
CALCIUM SERPL-MCNC: 8.8 MG/DL — SIGNIFICANT CHANGE UP (ref 8.5–10.1)
CHLORIDE SERPL-SCNC: 107 MMOL/L — SIGNIFICANT CHANGE UP (ref 96–108)
CO2 SERPL-SCNC: 25 MMOL/L — SIGNIFICANT CHANGE UP (ref 22–31)
COVID-19 SPIKE DOMAIN AB INTERP: POSITIVE
COVID-19 SPIKE DOMAIN ANTIBODY RESULT: >250 U/ML — HIGH
CREAT SERPL-MCNC: 1.35 MG/DL — HIGH (ref 0.5–1.3)
GLUCOSE SERPL-MCNC: 107 MG/DL — HIGH (ref 70–99)
HCT VFR BLD CALC: 38.1 % — LOW (ref 39–50)
HGB BLD-MCNC: 12.6 G/DL — LOW (ref 13–17)
MCHC RBC-ENTMCNC: 30.7 PG — SIGNIFICANT CHANGE UP (ref 27–34)
MCHC RBC-ENTMCNC: 33.1 GM/DL — SIGNIFICANT CHANGE UP (ref 32–36)
MCV RBC AUTO: 92.7 FL — SIGNIFICANT CHANGE UP (ref 80–100)
PLATELET # BLD AUTO: 199 K/UL — SIGNIFICANT CHANGE UP (ref 150–400)
POTASSIUM SERPL-MCNC: 4.1 MMOL/L — SIGNIFICANT CHANGE UP (ref 3.5–5.3)
POTASSIUM SERPL-SCNC: 4.1 MMOL/L — SIGNIFICANT CHANGE UP (ref 3.5–5.3)
RBC # BLD: 4.11 M/UL — LOW (ref 4.2–5.8)
RBC # FLD: 13.4 % — SIGNIFICANT CHANGE UP (ref 10.3–14.5)
SARS-COV-2 IGG+IGM SERPL QL IA: >250 U/ML — HIGH
SARS-COV-2 IGG+IGM SERPL QL IA: POSITIVE
SODIUM SERPL-SCNC: 137 MMOL/L — SIGNIFICANT CHANGE UP (ref 135–145)
WBC # BLD: 12.11 K/UL — HIGH (ref 3.8–10.5)
WBC # FLD AUTO: 12.11 K/UL — HIGH (ref 3.8–10.5)

## 2021-08-20 PROCEDURE — 99221 1ST HOSP IP/OBS SF/LOW 40: CPT

## 2021-08-20 RX ORDER — KETOROLAC TROMETHAMINE 30 MG/ML
15 SYRINGE (ML) INJECTION ONCE
Refills: 0 | Status: DISCONTINUED | OUTPATIENT
Start: 2021-08-20 | End: 2021-08-20

## 2021-08-20 RX ORDER — ASPIRIN/CALCIUM CARB/MAGNESIUM 324 MG
1 TABLET ORAL
Qty: 14 | Refills: 0
Start: 2021-08-20 | End: 2021-09-02

## 2021-08-20 RX ORDER — ACETAMINOPHEN 500 MG
1000 TABLET ORAL ONCE
Refills: 0 | Status: COMPLETED | OUTPATIENT
Start: 2021-08-20 | End: 2021-08-20

## 2021-08-20 RX ORDER — ASPIRIN/CALCIUM CARB/MAGNESIUM 324 MG
325 TABLET ORAL DAILY
Refills: 0 | Status: DISCONTINUED | OUTPATIENT
Start: 2021-08-20 | End: 2021-08-21

## 2021-08-20 RX ADMIN — OXYCODONE HYDROCHLORIDE 10 MILLIGRAM(S): 5 TABLET ORAL at 09:33

## 2021-08-20 RX ADMIN — HYDROMORPHONE HYDROCHLORIDE 0.5 MILLIGRAM(S): 2 INJECTION INTRAMUSCULAR; INTRAVENOUS; SUBCUTANEOUS at 12:10

## 2021-08-20 RX ADMIN — Medication 975 MILLIGRAM(S): at 21:48

## 2021-08-20 RX ADMIN — OXYCODONE HYDROCHLORIDE 10 MILLIGRAM(S): 5 TABLET ORAL at 06:20

## 2021-08-20 RX ADMIN — BUPROPION HYDROCHLORIDE 300 MILLIGRAM(S): 150 TABLET, EXTENDED RELEASE ORAL at 09:33

## 2021-08-20 RX ADMIN — Medication 400 MILLIGRAM(S): at 13:41

## 2021-08-20 RX ADMIN — Medication 325 MILLIGRAM(S): at 09:33

## 2021-08-20 RX ADMIN — Medication 100 MILLIGRAM(S): at 06:38

## 2021-08-20 RX ADMIN — OXYCODONE HYDROCHLORIDE 10 MILLIGRAM(S): 5 TABLET ORAL at 20:10

## 2021-08-20 RX ADMIN — OXYCODONE HYDROCHLORIDE 10 MILLIGRAM(S): 5 TABLET ORAL at 12:15

## 2021-08-20 RX ADMIN — Medication 1000 MILLIGRAM(S): at 14:11

## 2021-08-20 RX ADMIN — HYDROMORPHONE HYDROCHLORIDE 0.5 MILLIGRAM(S): 2 INJECTION INTRAMUSCULAR; INTRAVENOUS; SUBCUTANEOUS at 11:46

## 2021-08-20 RX ADMIN — Medication 15 MILLIGRAM(S): at 13:15

## 2021-08-20 RX ADMIN — OXYCODONE HYDROCHLORIDE 10 MILLIGRAM(S): 5 TABLET ORAL at 20:40

## 2021-08-20 RX ADMIN — Medication 15 MILLIGRAM(S): at 12:50

## 2021-08-20 RX ADMIN — ATORVASTATIN CALCIUM 40 MILLIGRAM(S): 80 TABLET, FILM COATED ORAL at 21:48

## 2021-08-20 RX ADMIN — OXYCODONE HYDROCHLORIDE 10 MILLIGRAM(S): 5 TABLET ORAL at 15:35

## 2021-08-20 RX ADMIN — OXYCODONE HYDROCHLORIDE 10 MILLIGRAM(S): 5 TABLET ORAL at 05:26

## 2021-08-20 RX ADMIN — OXYCODONE HYDROCHLORIDE 10 MILLIGRAM(S): 5 TABLET ORAL at 16:09

## 2021-08-20 RX ADMIN — PANTOPRAZOLE SODIUM 40 MILLIGRAM(S): 20 TABLET, DELAYED RELEASE ORAL at 09:33

## 2021-08-20 NOTE — PHYSICAL THERAPY INITIAL EVALUATION ADULT - MANUAL MUSCLE TESTING RESULTS, REHAB EVAL
except L UE NT, R hand 3-/5 unable to oppose thumb and fingers, able to  3/5 to 3-/5./no strength deficits were identified

## 2021-08-20 NOTE — PHYSICAL THERAPY INITIAL EVALUATION ADULT - GENERAL OBSERVATIONS, REHAB EVAL
Pt seen on 2N, reports 9/10 pain, noted ABd pillow of brace off pt and Annelise PRICE called ortho to come and adjust.  Bandage R shld c/d/i. Brace intact for session. NWB L UE maintained. Pt stated he had a stroke 3 years ago and now has residual weakness in R hand. Pt states he uses L hand mostly at home.

## 2021-08-20 NOTE — CONSULT NOTE ADULT - ASSESSMENT
This is a 56 year old male pmh cva 2018  s/p fall 7-21 causing fx to left shoulder.  Pt now s/p left total shoulder replacement on 8-19.  Pt has moderate thrombosis risk and requires prophylactic anticoagulation.        Plan:  Plan:  :Aspirin 325mg enteric coated one tab once a day for 14 days post procedure.  :daily cbc/bmp  :le Venodynes  :increase mobility as tolerated  :thanks for consult will f/u

## 2021-08-20 NOTE — PHYSICAL THERAPY INITIAL EVALUATION ADULT - IMPAIRMENTS FOUND, PT EVAL
decreased R UE function due to old stroke and now due to fall and fracture L shld pt unable to use his good side L UE./gait, locomotion, and balance/muscle strength

## 2021-08-20 NOTE — CONSULT NOTE ADULT - ATTENDING COMMENTS
Patient is seen and examined at bedside with ABBI Montejo  55yo/M presented for LT TSR  Has residual RUE weakness from prior stroke  Seen POD#1, in a lot of pain  Toradol x1 given  Ice packs  Keep LUE in sling  Agree with above assessment and plan. D/w pt

## 2021-08-20 NOTE — PHYSICAL THERAPY INITIAL EVALUATION ADULT - PERTINENT HX OF CURRENT PROBLEM, REHAB EVAL
56yMale Stable POD 1 s/p reverse TSA of the left shoulder 56yMale Stable POD 1 s/p reverse TSA of the left shoulder. As per pt he slipped and fell at home a month ago and fractured his L shld now to  for rTSA.

## 2021-08-20 NOTE — PHYSICAL THERAPY INITIAL EVALUATION ADULT - ADDITIONAL COMMENTS
Pt stated he uses his L UE mostly at home and can't hold anything with his R hand, he drops objects.

## 2021-08-20 NOTE — CONSULT NOTE ADULT - SUBJECTIVE AND OBJECTIVE BOX
HPI: This is a 56Mseen in [facility] for evaluation of left shoulder pain on 2021  Patient reports that he woke up around 2am to go to the bathroom when he slipped and fell in his kitchen and felt immediate left shoulder pain. He reports that he thought it was dislocated so tried to reduce it by hitting it against a hard object without success. Patient is right hand dominant, however he has history of prior stroke  with right sided deficits. Otherwise he denies head strike/LOC, numbness/tingling, weakness, any other acute orthopedic complaints.  Pt discharged with f/u with Ortho.  Pt admitted to [facility] on  for left total shoulder replacement.         Patient is a 56y old  Male who presents with a chief complaint of Left Reverse Total Shoulder Arthroplasty (19 Aug 2021 15:56). s/p left Reverse Total Shoulder Arthroplasty on 2021.      Consulted by Dr. Justine Tadeo  for VTE prophylaxis, risk stratification, and anticoagulation management.    PAST MEDICAL & SURGICAL HISTORY:  CVA (cerebral vascular accident)  2018 - found by son 48 hours after CVA - residual right upper extremity weakness    Drug abuse  Cocaine/IVDA - last used 2018    Hyperlipemia    Cervical spondylosis    Rectal bleeding    Diverticulitis    Ileus  2018    SHAQULILE (acute kidney injury)  rhabdomyolysis 2018    Obesity (BMI 30-39.9)    HTN (hypertension)    H/O colonoscopy with polypectomy  2018        FAMILY HISTORY:  No pertinent family history in first degree relatives        Interval Note:  2021 Pt seen at bedside on 2 north oob in chair.  Discussed his need for anticoagulation.  States he was told to take a baby aspirin after stroke but he has not been compliant.  Made aware of the need for a full dose 325mg enteric coated aspirin once a day for 14 days post procedure. Pt v/u of the need.     CAPRINI SCORE  AGE RELATED RISK FACTORS                                                       MOBILITY RELATED FACTORS  [ ] Age 41-60 years                                            (1 Point)                  [ ] Bed rest                                                        (1 Point)  [x ] Age: 61-74 years                                           (2 Points)                [ ] Plaster cast                                                   (2 Points)  [ ] Age= 75 years                                              (3 Points)                 [ ] Bed bound for more than 72 hours                   (2 Points)    DISEASE RELATED RISK FACTORS                                               GENDER SPECIFIC FACTORS  [ ] Edema in the lower extremities                       (1 Point)           [ ] Pregnancy                                                            (1 Point)  [ ] Varicose veins                                               (1 Point)                  [ ] Post-partum < 6 weeks                                      (1 Point)             [x ] BMI > 25 Kg/m2                                            (1 Point)                  [ ] Hormonal therapy or oral contraception       (1 Point)                 [ ] Sepsis (in the previous month)                        (1 Point)             [ ] History of pregnancy complications                (1Point)  [ ] Pneumonia or serious lung disease                                             [ ] Unexplained or recurrent  (=/>3), premature                                 (In the previous month)                               (1 Point)                birth with toxemia or growth-restricted infant (1 Point)  [ ] Abnormal pulmonary function test            (1 Point)                                   SURGERY RELATED RISK FACTORS  [ ] Acute myocardial infarction                       (1 Point)                  [ ]  Section                                         (1 Point)  [ ] Congestive heart failure (in the previous month) (1 Point)   [ ] Minor surgery   lasting <45 minutes       (1 Point)   [ ] Inflammatory bowel disease                             (1 Point)          [ ] Arthroscopic surgery                                  (2 Points)  [ ] Central venous access                                    (2 Points)            [x ] General surgery lasting >45 minutes      (2 Points)       [ ] Stroke (in the previous month)                  (5 Points)            [ ] Elective major lower extremity arthroplasty (5 Points)                                   [  ] Malignancy (present or past include skin melanoma                                          but exclude  basal skin cell)    (2 points)                                      TRAUMA RELATED RISK FACTORS                HEMATOLOGY RELATED FACTORS                                  [ ] Fracture of the hip, pelvis, or leg                       (5 Points)  [ ] Prior episodes of VTE                                     (3 Points)          [ ] Acute spinal cord injury (in the previous month)  (5 Points)  [ ] Positive family history for VTE                         (3 Points)       [ ] Paralysis (less than 1 month)                          (5 Points)  [ ] Prothrombin 26600 A                                      (3 Points)         [ ] Multiple Trauma (within 1month)                 (5Points)                                                                                                                                                                [ ] Factor V Leiden                                          (3 Points)                                OTHER RISK FACTORS                          [ ] Lupus anticoagulants                                     (3 Points)                       [ ] BMI > 40                          (1 Point)                                                         [ ] Anticardiolipin antibodies                                (3 Points)                   [ ] Smoking                              (1Point)                                                [ ] High homocysteine in the blood                      (3 Points)                [  ] Diabetes requiring insulin (1point)                         [ ] Other congenital or acquired thrombophilia       (3 Points)          [  ] Chemotherapy                   (1 Point)  [ ] Heparin induced thrombocytopenia                  (3 Points)             [  ] Blood Transfusion                (1 point)                                                                                                             Total Score [ 5 ]                                                                                                                                                                                                                                                                                                                                                                                                                                         IMPROVE Bleeding Risk Score: 2.5    Falls Risk:   High (  )  Mod (  )  Low (x  )  crcl: 103.7        cr:1.21         BMI   33.6          EBL:  300 ml      Denies any personal or familial history of clotting or bleeding disorders.    Allergies    Bactrim (Rash (Skin))  sulfa drugs (Rash)    Intolerances        REVIEW OF SYSTEMS    (  )Fever	     (  )Constipation	(  )SOB				(  )Headache	(  )Dysuria  (  )Chills	     (  )Melena	(  )Dyspnea present on exertion	                    (  )Dizziness                    (  )Polyuria  (  )Nausea	     (  )Hematochezia	(  )Cough			                    (  )Syncope   	(  )Hematuria  (  )Vomiting    (  )Chest Pain	(  )Wheezing			(  )Weakness     (x) shoulder pain  (  )Diarrhea     (  )Palpitations	(  )Anorexia			(  )Myalgia    Pertinent positives in HPI and daily subjective.  All other ROS negative.      Vital Signs Last 24 Hrs  T(C): 36.9 (20 Aug 2021 08:30), Max: 37.1 (20 Aug 2021 00:47)  T(F): 98.5 (20 Aug 2021 08:30), Max: 98.7 (20 Aug 2021 00:47)  HR: 86 (20 Aug 2021 08:30) (66 - 92)  BP: 133/78 (20 Aug 2021 08:30) (89/58 - 133/78)  BP(mean): --  RR: 16 (20 Aug 2021 08:30) (12 - 20)  SpO2: 98% (20 Aug 2021 08:30) (94% - 99%)    PHYSICAL EXAM:    Constitutional: Appears Well    Neurological: A& O x 3    Skin: Warm    Respiratory and Thorax: normal effort; Breath sounds: normal; No rales/wheezing/rhonchi  	  Cardiovascular: S1, S2, regular, NMBR	    Gastrointestinal: BS + x 4Q, nontender	    Genitourinary:  Bladder nondistended, nontender    Musculoskeletal:   General Right:   no muscle/joint tenderness,   normal tone, no joint swelling,   ROM: full	    General Left:   no muscle/joint tenderness,   normal tone, no joint swelling,   ROM: limited        Shoulder:  Left: Drsing CDI; Sling/immob. noted; Cap refill good; Radial pulse +; Sensation intact                      Lower extrems:   Right: no calf tenderness              negative anat's sign               + pedal pulses    Left:   no calf tenderness              negative anat's sign               + pedal pulses                          12.6   12.11 )-----------( 199      ( 20 Aug 2021 07:19 )             38.1       08-20    137  |  107  |  20  ----------------------------<  107<H>  4.1   |  25  |  1.35<H>    Ca    8.8      20 Aug 2021 07:19        				    MEDICATIONS  (STANDING):  acetaminophen   Tablet .. 975 milliGRAM(s) Oral every 8 hours  aspirin enteric coated 325 milliGRAM(s) Oral daily  atorvastatin 40 milliGRAM(s) Oral at bedtime  buPROPion XL (24-Hour) . 300 milliGRAM(s) Oral daily  lactated ringers. 1000 milliLiter(s) IV Continuous <Continuous>  pantoprazole    Tablet 40 milliGRAM(s) Oral before breakfast        DVT Prophylaxis:  LMWH                   (  )  Heparin SQ           (  )  Coumadin             (  )  Xarelto                  (  )  Eliquis                   (  )  Venodynes           ( x )  Ambulation          (x  )  UFH                       (  )  Contraindicated  (  )  EC Aspirin             (x  )

## 2021-08-20 NOTE — CONSULT NOTE ADULT - SUBJECTIVE AND OBJECTIVE BOX
PCP: Dr. Teofilo Ramirez    CHIEF COMPLAINT:  left proximal humerus fx    HISTORY OF THE PRESENT ILLNESS: this is a 55yo male with PMH of CVA with residual right side deficit (2018), HLD, HTN, obesity, current smoker, hx SHAQUILLE, hx of drug abuse (cocaine)  presents with left shoulder pain after trip and fall on water at home, landed on left shoulder. denies hitting head or LOC. denies taking any blood thinners. Pt found to have left proximal humeral fracture and is now s/p reverse total shoulder arthroplasty. Pt c/o 10/10 pain in left shoulder and states that current pain regimen is not providing relief. Denies fever, cough, HA, dizziness, lightheadedness, chest pain, SOB, nausea, vomiting, abd pain, back pain, dysuria, urinary retention.  We are consulted for medical management.    PAST MEDICAL HISTORY:  CVA 2018 with residual RUE weakness  HLD  HTN  Cervical Spondylosis  Obesity  Drug Abuse- cocaine, last use 2018  Current smoker    PAST SURGICAL HISTORY:   - colonoscopy with polypectomy 2018    FAMILY HISTORY:   - Mother: , DM  - Father: , pt unable to recall medical hx    SOCIAL HISTORY:  current smoker, cigarette 1PPD x 40yrs, occasional alcohol, hx of cocaine use 4-5days/week x 20yrs, last use 2018. Denies IVDU    ALLERGIES: Bactrim (rash), sulfa drugs (rash)    HOME MEDS: see med rec    REVIEW OF SYSTEMS: All 10 systems reviewed in detailed and found to be negative with the exception of what has already been described above    MEDICATIONS  (STANDING):  acetaminophen   Tablet .. 975 milliGRAM(s) Oral every 8 hours  aspirin enteric coated 325 milliGRAM(s) Oral daily  atorvastatin 40 milliGRAM(s) Oral at bedtime  buPROPion XL (24-Hour) . 300 milliGRAM(s) Oral daily  ketorolac   Injectable 15 milliGRAM(s) IV Push once  lactated ringers. 1000 milliLiter(s) (75 mL/Hr) IV Continuous <Continuous>  pantoprazole    Tablet 40 milliGRAM(s) Oral before breakfast    MEDICATIONS  (PRN):  HYDROmorphone  Injectable 0.5 milliGRAM(s) SubCutaneous every 4 hours PRN Breakthrough Pain  ondansetron Injectable 8 milliGRAM(s) IV Push every 8 hours PRN Nausea and/or Vomiting  oxyCODONE    IR 5 milliGRAM(s) Oral every 4 hours PRN Mild Pain (1 - 3)  oxyCODONE    IR 10 milliGRAM(s) Oral every 4 hours PRN Moderate Pain (4 - 6)    VITALS:  T(F): 98.5 (21 @ 08:30), Max: 98.7 (21 @ 00:47)  HR: 86 (21 @ 08:30) (66 - 92)  BP: 133/78 (21 @ 08:30) (89/58 - 133/78)  RR: 16 (21 @ 08:30) (12 - 20)  SpO2: 98% (21 @ 08:30) (94% - 99%)  Wt(kg): --    I&O's Summary  19 Aug 2021 07:01  -  20 Aug 2021 07:00  --------------------------------------------------------  IN: 2895 mL / OUT: 950 mL / NET: 1945 mL    CAPILLARY BLOOD GLUCOSE  POCT Blood Glucose.: 109 mg/dL (20 Aug 2021 05:17)  POCT Blood Glucose.: 117 mg/dL (19 Aug 2021 19:11)    PHYSICAL EXAM:  HEENT:  pupils equal and reactive, EOMI,  NECK:   supple, no carotid bruits, no palpable lymph nodes, no thyromegaly  CV:  +S1, +S2, regular, no murmurs or rubs  RESP:   lungs clear to auscultation bilaterally, no wheezing, rales, rhonchi, good air entry bilaterally  BREAST:  not examined  GI: abdomen soft, non-tender, non-distended, normal BS  RECTAL:  not examined  :  not examined  MSK: + dressing left shoulder dry, left arm placed in sling, moving all digits, normal muscle tone, no atrophy  EXT: no cyanosis, no edema, no calf pain, swelling or erythema  VASCULAR:  pulses equal and symmetric in the upper and lower extremities  NEURO:  AAOX3, no focal neurological deficits, follows all commands, able to move extremities spontaneously  SKIN:  no ulcers, lesions or rashes    LABS:                      12.6   12.11 )-----------( 199      ( 20 Aug 2021 07:19 )             38.1     08-20    137  |  107  |  20  ----------------------------<  107<H>  4.1   |  25  |  1.35<H>    Ca    8.8      20 Aug 2021 07:19    IMPRESSION:  57 yo male with above pmh a/w:  #mechanical fall, left proximal humerus fracture  #S/P left reverse total shoulder arthroplasty  - POD#1  - pain control, ordered toradol 15mg x 1   - PT eval  - Bowel regimen  - IVF's  - Finish ABXs  - regular diet  - PPI  - VTE prophylaxis  - monitor CBC/BMP    #leukocytosis  - afebrile, no sign/sxs of infection  - likely reactive to surgery  - monitor CBC    #mildly elevated Cr  - cont IVF  - monitor BMP    #current smoker  - 40 pack year hx  - pt declines nicotine replacement tx    # HTN  - monitor BP    #CVA 2018 with right side deficits  #HLD  - cont statin    #anxiety/depression  - cont wellbutrin    # VTE prophylaxis  - AC consult-   - Venodynes  - inc mobility    Thank you for the consult, will follow       PCP: Dr. Teofilo Ramirez    CHIEF COMPLAINT:  left proximal humerus fx    HISTORY OF THE PRESENT ILLNESS: this is a 55yo male with PMH of CVA with residual right side deficit (2018), HLD, HTN, obesity, current smoker, hx SHAQUILLE, hx of drug abuse (cocaine)  presents with left shoulder pain after trip and fall on water at home, landed on left shoulder. denies hitting head or LOC. denies taking any blood thinners. Pt found to have left proximal humeral fracture and is now s/p reverse total shoulder arthroplasty. Pt c/o 10/10 pain in left shoulder and states that current pain regimen is not providing relief. Denies fever, cough, HA, dizziness, lightheadedness, chest pain, SOB, nausea, vomiting, abd pain, back pain, dysuria, urinary retention.  We are consulted for medical management.    PAST MEDICAL HISTORY:  CVA 2018 with residual RUE weakness  HLD  HTN  Cervical Spondylosis  Obesity  Drug Abuse- cocaine, last use 2018  Current smoker    PAST SURGICAL HISTORY:   - colonoscopy with polypectomy 2018    FAMILY HISTORY:   - Mother: , DM  - Father: , pt unable to recall medical hx    SOCIAL HISTORY:  current smoker, cigarette 1PPD x 40yrs, occasional alcohol, hx of cocaine use 4-5days/week x 20yrs, last use 2018. Denies IVDU    ALLERGIES: Bactrim (rash), sulfa drugs (rash)    HOME MEDS: see med rec    REVIEW OF SYSTEMS: All 10 systems reviewed in detailed and found to be negative with the exception of what has already been described above    MEDICATIONS  (STANDING):  acetaminophen   Tablet .. 975 milliGRAM(s) Oral every 8 hours  aspirin enteric coated 325 milliGRAM(s) Oral daily  atorvastatin 40 milliGRAM(s) Oral at bedtime  buPROPion XL (24-Hour) . 300 milliGRAM(s) Oral daily  ketorolac   Injectable 15 milliGRAM(s) IV Push once  lactated ringers. 1000 milliLiter(s) (75 mL/Hr) IV Continuous <Continuous>  pantoprazole    Tablet 40 milliGRAM(s) Oral before breakfast    MEDICATIONS  (PRN):  HYDROmorphone  Injectable 0.5 milliGRAM(s) SubCutaneous every 4 hours PRN Breakthrough Pain  ondansetron Injectable 8 milliGRAM(s) IV Push every 8 hours PRN Nausea and/or Vomiting  oxyCODONE    IR 5 milliGRAM(s) Oral every 4 hours PRN Mild Pain (1 - 3)  oxyCODONE    IR 10 milliGRAM(s) Oral every 4 hours PRN Moderate Pain (4 - 6)    VITALS:  T(F): 98.5 (21 @ 08:30), Max: 98.7 (21 @ 00:47)  HR: 86 (21 @ 08:30) (66 - 92)  BP: 133/78 (21 @ 08:30) (89/58 - 133/78)  RR: 16 (21 @ 08:30) (12 - 20)  SpO2: 98% (21 @ 08:30) (94% - 99%)  Wt(kg): --    I&O's Summary  19 Aug 2021 07:01  -  20 Aug 2021 07:00  --------------------------------------------------------  IN: 2895 mL / OUT: 950 mL / NET: 1945 mL    CAPILLARY BLOOD GLUCOSE  POCT Blood Glucose.: 109 mg/dL (20 Aug 2021 05:17)  POCT Blood Glucose.: 117 mg/dL (19 Aug 2021 19:11)    PHYSICAL EXAM:  HEENT:  pupils equal and reactive, EOMI,  NECK:   supple, no carotid bruits, no palpable lymph nodes, no thyromegaly  CV:  +S1, +S2, regular, no murmurs or rubs  RESP:   lungs clear to auscultation bilaterally, no wheezing, rales, rhonchi, good air entry bilaterally  BREAST:  not examined  GI: abdomen soft, non-tender, non-distended, normal BS  RECTAL:  not examined  :  not examined  MSK: + dressing left shoulder dry, left arm placed in sling, moving all digits, normal muscle tone, no atrophy  EXT: no cyanosis, no edema, no calf pain, swelling or erythema  VASCULAR:  pulses equal and symmetric in the upper and lower extremities  NEURO:  AAOX3, no focal neurological deficits, follows all commands, able to move extremities spontaneously  SKIN:  no ulcers, lesions or rashes    LABS:                      12.6   12.11 )-----------( 199      ( 20 Aug 2021 07:19 )             38.1     08-20    137  |  107  |  20  ----------------------------<  107<H>  4.1   |  25  |  1.35<H>    Ca    8.8      20 Aug 2021 07:19    IMPRESSION:  57 yo male with above pmh a/w:  #mechanical fall, left proximal humerus fracture  #S/P left reverse total shoulder arthroplasty  - POD#1  - pain control, ordered toradol 15mg x 1   - PT eval  - Bowel regimen  - IVF's  - Finish ABXs  - regular diet  - PPI  - VTE prophylaxis  - monitor CBC/BMP    #leukocytosis  - afebrile, no sign/sxs of infection  - likely reactive to surgery  - monitor CBC    #mildly elevated Cr likely 2 to dehydration  - cont IVF  - monitor BMP    #current smoker  - 40 pack year hx  - pt declines nicotine replacement tx    # HTN  - monitor BP    #CVA 2018 with right side deficits  #HLD  - cont statin    #anxiety/depression  - cont wellbutrin    # VTE prophylaxis  - AC consult-   - Venodynes  - inc mobility    Thank you for the consult, will follow

## 2021-08-20 NOTE — PHYSICAL THERAPY INITIAL EVALUATION ADULT - ACTIVE RANGE OF MOTION EXAMINATION, REHAB EVAL
L UE NT/Right UE Active ROM was WFL (within functional limits)/bilateral  lower extremity Active ROM was WFL (within functional limits)

## 2021-08-20 NOTE — PHYSICAL THERAPY INITIAL EVALUATION ADULT - DISCHARGE DISPOSITION, PT EVAL
ELIAZAR for OT (adaptive rehab for  ADL's and safety needed pt lives alone) and PT (higher level balance secondary to pt h/o fall and decreased function in R UE and L shld in a sling)

## 2021-08-21 ENCOUNTER — TRANSCRIPTION ENCOUNTER (OUTPATIENT)
Age: 56
End: 2021-08-21

## 2021-08-21 VITALS
DIASTOLIC BLOOD PRESSURE: 83 MMHG | RESPIRATION RATE: 16 BRPM | OXYGEN SATURATION: 97 % | SYSTOLIC BLOOD PRESSURE: 130 MMHG | HEART RATE: 94 BPM | TEMPERATURE: 99 F

## 2021-08-21 LAB
ANION GAP SERPL CALC-SCNC: 5 MMOL/L — SIGNIFICANT CHANGE UP (ref 5–17)
BUN SERPL-MCNC: 25 MG/DL — HIGH (ref 7–23)
CALCIUM SERPL-MCNC: 8.8 MG/DL — SIGNIFICANT CHANGE UP (ref 8.5–10.1)
CHLORIDE SERPL-SCNC: 107 MMOL/L — SIGNIFICANT CHANGE UP (ref 96–108)
CO2 SERPL-SCNC: 26 MMOL/L — SIGNIFICANT CHANGE UP (ref 22–31)
CREAT SERPL-MCNC: 1.32 MG/DL — HIGH (ref 0.5–1.3)
GLUCOSE SERPL-MCNC: 92 MG/DL — SIGNIFICANT CHANGE UP (ref 70–99)
HCT VFR BLD CALC: 35.5 % — LOW (ref 39–50)
HGB BLD-MCNC: 11.5 G/DL — LOW (ref 13–17)
MCHC RBC-ENTMCNC: 30.3 PG — SIGNIFICANT CHANGE UP (ref 27–34)
MCHC RBC-ENTMCNC: 32.4 GM/DL — SIGNIFICANT CHANGE UP (ref 32–36)
MCV RBC AUTO: 93.4 FL — SIGNIFICANT CHANGE UP (ref 80–100)
PLATELET # BLD AUTO: 162 K/UL — SIGNIFICANT CHANGE UP (ref 150–400)
POTASSIUM SERPL-MCNC: 4 MMOL/L — SIGNIFICANT CHANGE UP (ref 3.5–5.3)
POTASSIUM SERPL-SCNC: 4 MMOL/L — SIGNIFICANT CHANGE UP (ref 3.5–5.3)
RBC # BLD: 3.8 M/UL — LOW (ref 4.2–5.8)
RBC # FLD: 13.7 % — SIGNIFICANT CHANGE UP (ref 10.3–14.5)
SODIUM SERPL-SCNC: 138 MMOL/L — SIGNIFICANT CHANGE UP (ref 135–145)
WBC # BLD: 10.51 K/UL — HIGH (ref 3.8–10.5)
WBC # FLD AUTO: 10.51 K/UL — HIGH (ref 3.8–10.5)

## 2021-08-21 PROCEDURE — 99232 SBSQ HOSP IP/OBS MODERATE 35: CPT

## 2021-08-21 PROCEDURE — 99231 SBSQ HOSP IP/OBS SF/LOW 25: CPT

## 2021-08-21 RX ORDER — OXYCODONE HYDROCHLORIDE 5 MG/1
1 TABLET ORAL
Qty: 28 | Refills: 0
Start: 2021-08-21 | End: 2021-08-27

## 2021-08-21 RX ADMIN — OXYCODONE HYDROCHLORIDE 10 MILLIGRAM(S): 5 TABLET ORAL at 09:18

## 2021-08-21 RX ADMIN — OXYCODONE HYDROCHLORIDE 10 MILLIGRAM(S): 5 TABLET ORAL at 03:38

## 2021-08-21 RX ADMIN — OXYCODONE HYDROCHLORIDE 10 MILLIGRAM(S): 5 TABLET ORAL at 03:08

## 2021-08-21 RX ADMIN — PANTOPRAZOLE SODIUM 40 MILLIGRAM(S): 20 TABLET, DELAYED RELEASE ORAL at 09:18

## 2021-08-21 RX ADMIN — Medication 975 MILLIGRAM(S): at 05:23

## 2021-08-21 RX ADMIN — BUPROPION HYDROCHLORIDE 300 MILLIGRAM(S): 150 TABLET, EXTENDED RELEASE ORAL at 09:18

## 2021-08-21 RX ADMIN — Medication 975 MILLIGRAM(S): at 05:44

## 2021-08-21 RX ADMIN — Medication 325 MILLIGRAM(S): at 09:18

## 2021-08-21 NOTE — DISCHARGE NOTE NURSING/CASE MANAGEMENT/SOCIAL WORK - PATIENT PORTAL LINK FT
You can access the FollowMyHealth Patient Portal offered by [facility] by registering at the following website: [email or web address]. By joining [facility]’s FollowMyHealth portal, you will also be able to view your health information using other applications (apps) compatible with our system.

## 2021-08-21 NOTE — PROGRESS NOTE ADULT - SUBJECTIVE AND OBJECTIVE BOX
Orthopedics      Patient seen and examined at bedside. Feeling well. Pain controlled. No n/v. No acute events overnight.    Vital Signs Last 24 Hrs  T(C): 36.7 (21 Aug 2021 04:35), Max: 36.9 (20 Aug 2021 08:30)  T(F): 98 (21 Aug 2021 04:35), Max: 98.5 (20 Aug 2021 08:30)  HR: 80 (21 Aug 2021 04:35) (80 - 96)  BP: 105/66 (21 Aug 2021 04:35) (98/66 - 153/67)  BP(mean): --  RR: 16 (21 Aug 2021 04:35) (16 - 17)  SpO2: 98% (21 Aug 2021 04:35) (94% - 98%)          Exam:  Gen: NAD, resting comfortably  LUE:  Dressing c/d/i  +AIN/PIN/M/R/U/Ax/Musc  SILT C4-T1  Radial pulses 2+  Compartments soft and compressible  Calves NTTP b/l      A/P: 56yM POD2 s/p L rTSA  -FU AM labs  -Pain control  -NWB LUE  -DVT ppx  -Incentive Spirometry  -Elevation to extremity  -Medical management appreciated  -DC to home today, AC scripts sent
Orthopedics      Patient seen and examined at bedside. Feeling well. Pain controlled. No n/v. No acute events overnight.    Vital Signs Last 24 Hrs  T(C): 36.8 (08-20-21 @ 05:18), Max: 37.1 (08-20-21 @ 00:47)  T(F): 98.2 (08-20-21 @ 05:18), Max: 98.7 (08-20-21 @ 00:47)  HR: 92 (08-20-21 @ 05:18) (66 - 92)  BP: 98/66 (08-20-21 @ 05:18) (89/58 - 130/71)  BP(mean): --  RR: 17 (08-20-21 @ 05:18) (12 - 20)  SpO2: 94% (08-20-21 @ 05:18) (94% - 99%)                        13.1   12.11 )-----------( 186      ( 19 Aug 2021 19:47 )             40.0     19 Aug 2021 19:47    139    |  111    |  18     ----------------------------<  115    4.4     |  22     |  1.21     Ca    8.8        19 Aug 2021 19:47          Exam:  Gen: NAD, resting comfortably  LUE:  Dressing c/d/i  +AIN/PIN/M/R/U/Ax/Musc  SILT C4-T1  Radial pulses 2+  Compartments soft and compressible  Calves NTTP b/l      A/P: 56yM POD1 s/p l rTSA  -FU AM labs  -Pain control  -NWB LUE  -DVT ppx  -Incentive Spirometry  -Elevation to extremity  -Medical management appreciated  -DC after ELIAZAR placement, AC scripts sent
Patient seen and examined at bedside. Pain is controlled. Patient is feeling well and denies any nausea or vomiting.    Exam:  T(C): 36.6 (08-19-21 @ 21:46), Max: 36.6 (08-19-21 @ 21:46)  T(F): 97.9 (08-19-21 @ 21:46), Max: 97.9 (08-19-21 @ 21:46)  HR: 66 (08-19-21 @ 21:46) (66 - 80)  BP: 118/70 (08-19-21 @ 21:46) (89/58 - 120/76)  RR: 18 (08-19-21 @ 21:46) (12 - 20)  SpO2: 99% (08-19-21 @ 21:46) (96% - 99%)    Gen: NAD, resting comfortably  Dressing c/d/i  Patient numb throughout the extremity consistent with nerve block  No muscle strength throughout consistent with nerve block  Compartments soft and compressible  Radial pulse palpable  No calf TTP bilaterally                          13.1   12.11 )-----------( 186      ( 19 Aug 2021 19:47 )             40.0     19 Aug 2021 19:47    139    |  111    |  18     ----------------------------<  115    4.4     |  22     |  1.21     Ca    8.8        19 Aug 2021 19:47          A/P:  56yMale Stable POD 0  s/p reverse TSA of the left shoulder    Follow up postoperative labs  NWB LUE in sling and swathe  Pain management PRN  PT/OT  Continue PPx antibiotics  DVT PPx: hold until POD1  Incentive spirometry
PCP: Dr. Teofilo Ramirez    CHIEF COMPLAINT:  left proximal humerus fx    HISTORY OF THE PRESENT ILLNESS: this is a 55yo male with PMH of CVA with residual right side deficit (2018), HLD, HTN, obesity, current smoker, hx SHAQUILLE, hx of drug abuse (cocaine)  presents with left shoulder pain after trip and fall on water at home, landed on left shoulder. denies hitting head or LOC. denies taking any blood thinners. Pt found to have left proximal humeral fracture and is now s/p reverse total shoulder arthroplasty. Pt c/o 10/10 pain in left shoulder and states that current pain regimen is not providing relief. Denies fever, cough, HA, dizziness, lightheadedness, chest pain, SOB, nausea, vomiting, abd pain, back pain, dysuria, urinary retention.  We are consulted for medical management.    PAST MEDICAL HISTORY:  CVA 2018 with residual RUE weakness  HLD  HTN  Cervical Spondylosis  Obesity  Drug Abuse- cocaine, last use 2018  Current smoker    PAST SURGICAL HISTORY:   - colonoscopy with polypectomy 2018    FAMILY HISTORY:   - Mother: , DM  - Father: , pt unable to recall medical hx    SOCIAL HISTORY:  current smoker, cigarette 1PPD x 40yrs, occasional alcohol, hx of cocaine use 4-5days/week x 20yrs, last use . Denies IVDU  ALLERGIES: Bactrim (rash), sulfa drugs (rash)  HOME MEDS: see med rec    21- seen earlier today, pain was better controlled    REVIEW OF SYSTEMS: All 10 systems reviewed in detailed and found to be negative with the exception of what has already been described above    Vital Signs Last 24 Hrs  T(C): 37.2 (21 Aug 2021 09:22), Max: 37.2 (21 Aug 2021 09:22)  T(F): 99 (21 Aug 2021 09:22), Max: 99 (21 Aug 2021 09:22)  HR: 94 (21 Aug 2021 09:22) (80 - 96)  BP: 130/83 (21 Aug 2021 09:22) (99/61 - 153/67)  BP(mean): 94 (21 Aug 2021 09:22) (94 - 94)  RR: 16 (21 Aug 2021 09:22) (16 - 16)  SpO2: 97% (21 Aug 2021 09:22) (94% - 98%)    PHYSICAL EXAM:  HEENT:  pupils equal and reactive, EOMI,  NECK:   supple, no carotid bruits, no palpable lymph nodes, no thyromegaly  CV:  +S1, +S2, regular, no murmurs or rubs  RESP:   lungs clear to auscultation bilaterally, no wheezing, rales, rhonchi, good air entry bilaterally  BREAST:  not examined  GI: abdomen soft, non-tender, non-distended, normal BS  RECTAL:  not examined  :  not examined  MSK: + dressing left shoulder dry, left arm placed in sling, moving all digits, normal muscle tone, no atrophy  EXT: no cyanosis, no edema, no calf pain, swelling or erythema  VASCULAR:  pulses equal and symmetric in the upper and lower extremities  NEURO:  AAOX3, no focal neurological deficits, follows all commands, able to move extremities spontaneously  SKIN:  no ulcers, lesions or rashes    LABS:                                 11.5   10.51 )-----------( 162      ( 21 Aug 2021 06:55 )             35.5     21 Aug 2021 06:55    138    |  107    |  25     ----------------------------<  92     4.0     |  26     |  1.32     Ca    8.8        21 Aug 2021 06:55    CAPILLARY BLOOD GLUCOSE  POCT Blood Glucose.: 128 mg/dL (20 Aug 2021 21:47)      MEDICATIONS  (STANDING):  acetaminophen   Tablet .. 975 milliGRAM(s) Oral every 8 hours  aspirin enteric coated 325 milliGRAM(s) Oral daily  atorvastatin 40 milliGRAM(s) Oral at bedtime  buPROPion XL (24-Hour) . 300 milliGRAM(s) Oral daily  lactated ringers. 1000 milliLiter(s) (75 mL/Hr) IV Continuous <Continuous>  pantoprazole    Tablet 40 milliGRAM(s) Oral before breakfast    MEDICATIONS  (PRN):  HYDROmorphone  Injectable 0.5 milliGRAM(s) SubCutaneous every 4 hours PRN Breakthrough Pain  ondansetron Injectable 8 milliGRAM(s) IV Push every 8 hours PRN Nausea and/or Vomiting  oxyCODONE    IR 5 milliGRAM(s) Oral every 4 hours PRN Mild Pain (1 - 3)  oxyCODONE    IR 10 milliGRAM(s) Oral every 4 hours PRN Moderate Pain (4 - 6)    IMPRESSION:  57 yo male with above pmh a/w:  #mechanical fall, left proximal humerus fracture  #S/P left reverse total shoulder arthroplasty  Ortho following  Keep LUE in sling  Pain meds prn  Incentive spirometry  PT per ortho recommendations    #leukocytosis  - afebrile, no sign/sxs of infection  - likely reactive to surgery  - monitor CBC    #mildly elevated Cr likely 2 to dehydration  encourage PO hydration upon discharge    #current smoker  - 40 pack year hx  - pt declines nicotine replacement tx    # HTN  stable    #CVA 2018 with right side deficits  #HLD  - cont statin    #anxiety/depression  - cont wellbutrin    # VTE prophylaxis- Aspirin daily    #Dispo- medically stable for discharge home today. D/w pt      
  HPI: This is a 56[facility]seen in [facility] for evaluation of left shoulder pain on 2021  Patient reports that he woke up around 2am to go to the bathroom when he slipped and fell in his kitchen and felt immediate left shoulder pain. He reports that he thought it was dislocated so tried to reduce it by hitting it against a hard object without success. Patient is right hand dominant, however he has history of prior stroke  with right sided deficits. Otherwise he denies head strike/LOC, numbness/tingling, weakness, any other acute orthopedic complaints.  Pt discharged with f/u with Ortho.  Pt admitted to [facility] on  for left total shoulder replacement.         Patient is a 56y old  Male who presents with a chief complaint of Left Reverse Total Shoulder Arthroplasty (19 Aug 2021 15:56). s/p left Reverse Total Shoulder Arthroplasty on 2021.      Consulted by Dr. Justine Tadeo  for VTE prophylaxis, risk stratification, and anticoagulation management.    PAST MEDICAL & SURGICAL HISTORY:  CVA (cerebral vascular accident)  2018 - found by son 48 hours after CVA - residual right upper extremity weakness    Drug abuse  Cocaine/IVDA - last used 2018    Hyperlipemia    Cervical spondylosis    Rectal bleeding    Diverticulitis    Ileus  2018    SHAQUILLE (acute kidney injury)  rhabdomyolysis 2018    Obesity (BMI 30-39.9)    HTN (hypertension)    H/O colonoscopy with polypectomy  2018        FAMILY HISTORY:  No pertinent family history in first degree relatives        Interval Note:  2021 Pt seen at bedside on 2 [address] in chair.  Discussed his need for anticoagulation.  States he was told to take a baby aspirin after stroke but he has not been compliant.  Made aware of the need for a full dose 325mg enteric coated aspirin once a day for 14 days post procedure. Pt v/u of the need.   2021 Pt lynnette t bedside on [address].  discussed his enteric coated aspirin a his anticoagulant.  Pt has no concerns.  States he is going home today.   CAPRINI SCORE  AGE RELATED RISK FACTORS                                                       MOBILITY RELATED FACTORS  [ ] Age 41-60 years                                            (1 Point)                  [ ] Bed rest                                                        (1 Point)  [x ] Age: 61-74 years                                           (2 Points)                [ ] Plaster cast                                                   (2 Points)  [ ] Age= 75 years                                              (3 Points)                 [ ] Bed bound for more than 72 hours                   (2 Points)    DISEASE RELATED RISK FACTORS                                               GENDER SPECIFIC FACTORS  [ ] Edema in the lower extremities                       (1 Point)           [ ] Pregnancy                                                            (1 Point)  [ ] Varicose veins                                               (1 Point)                  [ ] Post-partum < 6 weeks                                      (1 Point)             [x ] BMI > 25 Kg/m2                                            (1 Point)                  [ ] Hormonal therapy or oral contraception       (1 Point)                 [ ] Sepsis (in the previous month)                        (1 Point)             [ ] History of pregnancy complications                (1Point)  [ ] Pneumonia or serious lung disease                                             [ ] Unexplained or recurrent  (=/>3), premature                                 (In the previous month)                               (1 Point)                birth with toxemia or growth-restricted infant (1 Point)  [ ] Abnormal pulmonary function test            (1 Point)                                   SURGERY RELATED RISK FACTORS  [ ] Acute myocardial infarction                       (1 Point)                  [ ]  Section                                         (1 Point)  [ ] Congestive heart failure (in the previous month) (1 Point)   [ ] Minor surgery   lasting <45 minutes       (1 Point)   [ ] Inflammatory bowel disease                             (1 Point)          [ ] Arthroscopic surgery                                  (2 Points)  [ ] Central venous access                                    (2 Points)            [x ] General surgery lasting >45 minutes      (2 Points)       [ ] Stroke (in the previous month)                  (5 Points)            [ ] Elective major lower extremity arthroplasty (5 Points)                                   [  ] Malignancy (present or past include skin melanoma                                          but exclude  basal skin cell)    (2 points)                                      TRAUMA RELATED RISK FACTORS                HEMATOLOGY RELATED FACTORS                                  [ ] Fracture of the hip, pelvis, or leg                       (5 Points)  [ ] Prior episodes of VTE                                     (3 Points)          [ ] Acute spinal cord injury (in the previous month)  (5 Points)  [ ] Positive family history for VTE                         (3 Points)       [ ] Paralysis (less than 1 month)                          (5 Points)  [ ] Prothrombin 63961 A                                      (3 Points)         [ ] Multiple Trauma (within 1month)                 (5Points)                                                                                                                                                                [ ] Factor V Leiden                                          (3 Points)                                OTHER RISK FACTORS                          [ ] Lupus anticoagulants                                     (3 Points)                       [ ] BMI > 40                          (1 Point)                                                         [ ] Anticardiolipin antibodies                                (3 Points)                   [ ] Smoking                              (1Point)                                                [ ] High homocysteine in the blood                      (3 Points)                [  ] Diabetes requiring insulin (1point)                         [ ] Other congenital or acquired thrombophilia       (3 Points)          [  ] Chemotherapy                   (1 Point)  [ ] Heparin induced thrombocytopenia                  (3 Points)             [  ] Blood Transfusion                (1 point)                                                                                                             Total Score [ 5 ]                                                                                                                                                                                                                                                                                                                                                                                                                                         IMPROVE Bleeding Risk Score: 2.5    Falls Risk:   High (  )  Mod (  )  Low (x  )  crcl: 103.7        cr:1.21         BMI   33.6          EBL:  300 ml      Denies any personal or familial history of clotting or bleeding disorders.    Allergies    Bactrim (Rash (Skin))  sulfa drugs (Rash)    Intolerances        REVIEW OF SYSTEMS    (  )Fever	     (  )Constipation	(  )SOB				(  )Headache	(  )Dysuria  (  )Chills	     (  )Melena	(  )Dyspnea present on exertion	                    (  )Dizziness                    (  )Polyuria  (  )Nausea	     (  )Hematochezia	(  )Cough			                    (  )Syncope   	(  )Hematuria  (  )Vomiting    (  )Chest Pain	(  )Wheezing			(  )Weakness     (x) shoulder pain  (  )Diarrhea     (  )Palpitations	(  )Anorexia			(  )Myalgia    Pertinent positives in HPI and daily subjective.  All other ROS negative.      Vital Signs Last 24 Hrs  T(C): 37.2 (21 @ 09:22), Max: 37.2 (21 @ 09:22)  T(F): 99 (21 @ 09:22), Max: 99 (21 @ 09:22)  HR: 94 (21 @ 09:22) (80 - 96)  BP: 130/83 (21 @ 09:22) (99/61 - 153/67)  BP(mean): 94 (21 @ 09:22) (94 - 94)  RR: 16 (21 @ 09:22) (16 - 16)  SpO2: 97% (21 @ 09:22) (94% - 98%)    PHYSICAL EXAM:    Constitutional: Appears Well    Neurological: A& O x 3    Skin: Warm    Respiratory and Thorax: normal effort; Breath sounds: normal; No rales/wheezing/rhonchi  	  Cardiovascular: S1, S2, regular, NMBR	    Gastrointestinal: BS + x 4Q, nontender	    Genitourinary:  Bladder nondistended, nontender    Musculoskeletal:   General Right:   no muscle/joint tenderness,   normal tone, no joint swelling,   ROM: full	    General Left:   no muscle/joint tenderness,   normal tone, no joint swelling,   ROM: limited        Shoulder:  Left: Drsing CDI; Sling/immob. noted; Cap refill good; Radial pulse +; Sensation intact                      Lower extrems:   Right: no calf tenderness              negative anat's sign               + pedal pulses    Left:   no calf tenderness              negative anat's sign               + pedal pulses                          12.6   12.11 )-----------( 199      ( 20 Aug 2021 07:19 )             38.1       08-20    137  |  107  |  20  ----------------------------<  107<H>  4.1   |  25  |  1.35<H>    Ca    8.8      20 Aug 2021 07:19      MEDICATIONS  (STANDING):  acetaminophen   Tablet .. 975 milliGRAM(s) Oral every 8 hours  aspirin enteric coated 325 milliGRAM(s) Oral daily  atorvastatin 40 milliGRAM(s) Oral at bedtime  buPROPion XL (24-Hour) . 300 milliGRAM(s) Oral daily  lactated ringers. 1000 milliLiter(s) IV Continuous <Continuous>  pantoprazole    Tablet 40 milliGRAM(s) Oral before breakfast        DVT Prophylaxis:  LMWH                   (  )  Heparin SQ           (  )  Coumadin             (  )  Xarelto                  (  )  Eliquis                   (  )  Venodynes           ( x )  Ambulation          (x  )  UFH                       (  )  Contraindicated  (  )  EC Aspirin             (x  )

## 2021-08-21 NOTE — PROGRESS NOTE ADULT - ASSESSMENT
This is a 56 year old male pmh cva 2018  s/p fall 7-21 causing fx to left shoulder.  Pt now s/p left total shoulder replacement on 8-19.  Pt has moderate thrombosis risk and requires prophylactic anticoagulation.        Plan:  Plan:  :Aspirin 325mg enteric coated one tab once a day for 14 days post procedure.  :daily cbc/bmp  :le Venodynes  :increase mobility as tolerated  :will sign off case please reconsult if needed.

## 2021-08-25 DIAGNOSIS — F17.210 NICOTINE DEPENDENCE, CIGARETTES, UNCOMPLICATED: ICD-10-CM

## 2021-08-25 DIAGNOSIS — S42.202A UNSPECIFIED FRACTURE OF UPPER END OF LEFT HUMERUS, INITIAL ENCOUNTER FOR CLOSED FRACTURE: ICD-10-CM

## 2021-08-25 DIAGNOSIS — F41.9 ANXIETY DISORDER, UNSPECIFIED: ICD-10-CM
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
## 2021-08-25 DIAGNOSIS — E78.5 HYPERLIPIDEMIA, UNSPECIFIED: ICD-10-CM
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
## 2021-08-25 DIAGNOSIS — E66.9 OBESITY, UNSPECIFIED: ICD-10-CM

## 2021-08-25 DIAGNOSIS — I69.351 HEMIPLEGIA AND HEMIPARESIS FOLLOWING CEREBRAL INFARCTION AFFECTING RIGHT DOMINANT SIDE: ICD-10-CM

## 2021-08-25 DIAGNOSIS — F32.9 MAJOR DEPRESSIVE DISORDER, SINGLE EPISODE, UNSPECIFIED: ICD-10-CM
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
## 2021-08-27 ENCOUNTER — APPOINTMENT (OUTPATIENT)
Dept: ORTHOPEDIC SURGERY | Facility: CLINIC | Age: 56
End: 2021-08-27
Payer: MEDICAID

## 2021-08-27 PROBLEM — I10 ESSENTIAL (PRIMARY) HYPERTENSION: Chronic | Status: ACTIVE | Noted: 2021-08-19

## 2021-08-27 PROCEDURE — 73030 X-RAY EXAM OF SHOULDER: CPT | Mod: LT

## 2021-08-27 PROCEDURE — 99024 POSTOP FOLLOW-UP VISIT: CPT

## 2021-08-27 NOTE — HISTORY OF PRESENT ILLNESS
[Clean/Dry/Intact] : clean, dry and intact [Neuro Intact] : an unremarkable neurological exam [Vascular Intact] : ~T peripheral vascular exam normal [Xray (Date:___)] : [unfilled] Xray -  [Doing Well] : is doing well [Chills] : no chills [Constipation] : no constipation [Diarrhea] : no diarrhea [Dysuria] : no dysuria [Fever] : no fever [Nausea] : no nausea [Vomiting] : no vomiting [Erythema] : not erythematous [Swelling] : swollen [Dehiscence] : not dehisced [Excellent Pain Control] : has excellent pain control [No Sign of Infection] : is showing no signs of infection [de-identified] : spo L reverse total shoulder replacement. DOS: 08/19/2021. [de-identified] : SERAFIN STORY is a 56 year male being seen 8 days post op L reverse TSA. He presents in a sling. He reports continued 5/10 pain, worse at night and with some movements. He has been taking anti-inflammatories with provide some relief in symptoms. Denies any numbness or paresthesia in the hand. He does report taking the sling off for short periods of time. Denies any injuries since the surgery.  [de-identified] : 3 views of L shoulder were performed today and available for me to review. Results were discussed with the patient. They demonstrate no f/x, dislocation or other deformity.\par  [de-identified] : 56-year-old male status post left reverse total shoulder arthroplasty for a four-part proximal humerus fracture. He is recovering well and will continue slingimmobilization at all times except bathing and dressing and physical therapy. He'll begin a course of physical therapy according to the postop protocol. Follow up 4 weeks for reevaluation.

## 2021-10-05 ENCOUNTER — APPOINTMENT (OUTPATIENT)
Dept: ORTHOPEDIC SURGERY | Facility: CLINIC | Age: 56
End: 2021-10-05
Payer: MEDICAID

## 2021-10-05 VITALS
BODY MASS INDEX: 30.8 KG/M2 | SYSTOLIC BLOOD PRESSURE: 121 MMHG | HEIGHT: 71 IN | DIASTOLIC BLOOD PRESSURE: 81 MMHG | WEIGHT: 220 LBS | HEART RATE: 86 BPM

## 2021-10-05 VITALS
HEIGHT: 71 IN | HEART RATE: 73 BPM | DIASTOLIC BLOOD PRESSURE: 65 MMHG | BODY MASS INDEX: 30.8 KG/M2 | SYSTOLIC BLOOD PRESSURE: 106 MMHG | WEIGHT: 220 LBS

## 2021-10-05 PROCEDURE — 99024 POSTOP FOLLOW-UP VISIT: CPT

## 2021-10-28 NOTE — PHYSICAL THERAPY INITIAL EVALUATION ADULT - SITTING BALANCE: STATIC
Asked to come bedside to give patient breathing treatment, nurse stated she heard audible wheezing. Upon my assessment, pt's lung bases are diminished and upper lobes have fine crackles indicating possible fluid overload. Pt stated that he has no pulmonary history and is only complaining of sinus issues and having a hard time coughing stuff up, this RT educated patient on the use of IS and flutter. Pt is on room air satting 94%, no distress noted. Breathing treatment not indicated at this time.   
good balance

## 2021-10-29 NOTE — HISTORY OF PRESENT ILLNESS
[Clean/Dry/Intact] : clean, dry and intact [Swelling] : swollen [Neuro Intact] : an unremarkable neurological exam [Vascular Intact] : ~T peripheral vascular exam normal [Xray (Date:___)] : [unfilled] Xray -  [Doing Well] : is doing well [Excellent Pain Control] : has excellent pain control [No Sign of Infection] : is showing no signs of infection [Chills] : no chills [Constipation] : no constipation [Diarrhea] : no diarrhea [Dysuria] : no dysuria [Fever] : no fever [Nausea] : no nausea [Vomiting] : no vomiting [Erythema] : not erythematous [Dehiscence] : not dehisced [de-identified] : sp L reverse total shoulder replacement. DOS: 08/19/2021. [de-identified] : SERAFIN STORY is a 56 year male 6 weeks s/p  post op L reverse TSA. He presents in a sling. He notes his pain has continued, which is controlled with use of oxycodone. He reports he has been participating in PT, and notes mild improvement in ROM.  [de-identified] : 3 views of L shoulder were performed today and available for me to review. Results were discussed with the patient. They demonstrate no f/x, dislocation or other deformity.\par  [de-identified] : 56-year-old male status post left reverse total shoulder arthroplasty for a four-part proximal humerus fracture. He is recovering well and will continue slingimmobilization at all times except bathing and dressing and physical therapy. He'll begin a course of physical therapy according to the postop protocol. Follow up 4 weeks for reevaluation.

## 2021-11-02 NOTE — ED PROVIDER NOTE - NS ED MD DISPO DIVISION
Pt given work note with return to work date of 11/5 per policy. Pt states \"I really hope I'm off until Monday\". During discharge teaching pt states to writer, \"If I'm not better by Friday, do I just come back to you guys?\" pt is encouraged to follow up with primary care provider for UC follow up, pt states that he attempted to see PCP today, however PCP office refused to see him due to continued URI symptoms despite negative COVID test. Pt is encourage to call office to discuss follow up with negative COVID test, however is educated that he may return to UC for re-evaluation if needed, pt verbalized understanding.   
[facility]

## 2021-11-03 ENCOUNTER — APPOINTMENT (OUTPATIENT)
Dept: ORTHOPEDIC SURGERY | Facility: CLINIC | Age: 56
End: 2021-11-03
Payer: MEDICAID

## 2021-11-03 VITALS
BODY MASS INDEX: 30.8 KG/M2 | WEIGHT: 220 LBS | DIASTOLIC BLOOD PRESSURE: 74 MMHG | HEART RATE: 77 BPM | SYSTOLIC BLOOD PRESSURE: 119 MMHG | HEIGHT: 71 IN

## 2021-11-03 DIAGNOSIS — Z96.612 PRESENCE OF LEFT ARTIFICIAL SHOULDER JOINT: ICD-10-CM

## 2021-11-03 PROCEDURE — 99024 POSTOP FOLLOW-UP VISIT: CPT

## 2021-11-03 NOTE — HISTORY OF PRESENT ILLNESS
[Clean/Dry/Intact] : clean, dry and intact [Swelling] : swollen [Neuro Intact] : an unremarkable neurological exam [Vascular Intact] : ~T peripheral vascular exam normal [Xray (Date:___)] : [unfilled] Xray -  [Doing Well] : is doing well [Excellent Pain Control] : has excellent pain control [No Sign of Infection] : is showing no signs of infection [Chills] : no chills [Constipation] : no constipation [Diarrhea] : no diarrhea [Dysuria] : no dysuria [Fever] : no fever [Nausea] : no nausea [Vomiting] : no vomiting [Erythema] : not erythematous [Dehiscence] : not dehisced [de-identified] : sp L reverse total shoulder replacement. DOS: 08/19/2021. [de-identified] : SERAFIN STORY is a 56 year male 11 weeks s/p  post op L reverse TSA. He notes his pain has improved, controlled by medications. He reports he has been participating in PT, and notes  improvement in ROM.  [de-identified] : Left shoulder: \par ROM FF to 90, Abd to 90, ER to 50, IR to lower back. \par sensation intact distally, cap refill < 2 sec  [de-identified] : 56-year-old male status post left reverse total shoulder arthroplasty for a four-part proximal humerus fracture, 11 weeks post op. He is recovering well, and will continue with physical therapy as per the protocol. He will follow up in two months for reevaluation ROM check.

## 2021-11-17 RX ORDER — CYCLOBENZAPRINE HYDROCHLORIDE 5 MG/1
5 TABLET, FILM COATED ORAL 3 TIMES DAILY
Qty: 42 | Refills: 0 | Status: ACTIVE | COMMUNITY
Start: 2021-09-15 | End: 1900-01-01

## 2021-12-03 NOTE — OCCUPATIONAL THERAPY INITIAL EVALUATION ADULT - ANTICIPATED DISCHARGE DISPOSITION, OT EVAL
After consulting with Oncology, NP, Demetra Lopez, and conducting chart review,  visit Mrs. Kayla Ashford on the Med. Surgical Unit. Mrs. Kayla Ashford was lying in bed and appeared to be resting comfortably. She did not respond to 's voice or presence. Mrs. Kayla Ashford' son, Lyubov Woo, was present at the bedside.  introduced herself and he remembered the  from a previous visit. Lyubov Woo did not engage in much conversation but affirmed that receiving his mother's health news today was a lot . He hasn't had time process anything yet.  inquired how she could best support him at this time and he shrugged his shoulders. Consulted with his nurse. He is aware of the 's availability. 's are available for further support upon referral  Tono Suarez. Andres Harrison.      Paging Service: [phone] rehabilitation facility

## 2021-12-07 RX ORDER — OXYCODONE AND ACETAMINOPHEN 5; 325 MG/1; MG/1
5-325 TABLET ORAL
Qty: 20 | Refills: 0 | Status: ACTIVE | COMMUNITY
Start: 2021-09-08 | End: 1900-01-01

## 2021-12-27 RX ORDER — TRAMADOL HYDROCHLORIDE 50 MG/1
50 TABLET, COATED ORAL
Qty: 30 | Refills: 0 | Status: ACTIVE | COMMUNITY
Start: 2021-12-27 | End: 1900-01-01

## 2022-01-12 ENCOUNTER — APPOINTMENT (OUTPATIENT)
Dept: ORTHOPEDIC SURGERY | Facility: CLINIC | Age: 57
End: 2022-01-12
Payer: MEDICAID

## 2022-01-12 DIAGNOSIS — Z98.890 OTHER SPECIFIED POSTPROCEDURAL STATES: ICD-10-CM

## 2022-01-12 PROCEDURE — 99213 OFFICE O/P EST LOW 20 MIN: CPT

## 2022-01-12 NOTE — PHYSICAL EXAM
[Normal] : Oriented to person, place, and time, insight and judgement were intact and the affect was normal [de-identified] : Left shoulder: \par forward flexion to 120°, abduction to 100°, internal rotation back pocket, external rotation 45°\par Skin intact surgical incision well healed no swelling no erythema no ecchymosis\par Range of motion of the elbow wrist and digits without pain\par Neurovascular intact distally

## 2022-01-12 NOTE — ASSESSMENT
[FreeTextEntry1] : 56-year-old male fell and a half months status post reverse total shoulder replacement for a proximal humerus fracture on the left side. He has recovered well and may continue physical therapy for range of motion and strengthening. He will followup in 2 months for reevaluation.

## 2022-01-12 NOTE — REASON FOR VISIT
[Follow-Up Visit] : a follow-up visit for [FreeTextEntry2] : s/p reverse total shoulder replacement 8/19/21

## 2022-01-12 NOTE — HISTORY OF PRESENT ILLNESS
[FreeTextEntry1] : 01/12/2022: Patient presents for a follow up evaluation of his left shoulder, s/p reverse total shoulder replacement 8/19/21. He reports over the last couple of weeks, he has made good progress with physical therapy. He notes his pain has now resolved. He has been improving his ROM as well, although slow and steady. He presents for routine evaluation and referral for continued physical therapy.

## 2022-01-18 ENCOUNTER — RX RENEWAL (OUTPATIENT)
Age: 57
End: 2022-01-18

## 2022-02-12 ENCOUNTER — RX RENEWAL (OUTPATIENT)
Age: 57
End: 2022-02-12

## 2022-03-15 ENCOUNTER — RX RENEWAL (OUTPATIENT)
Age: 57
End: 2022-03-15

## 2022-04-11 ENCOUNTER — RX RENEWAL (OUTPATIENT)
Age: 57
End: 2022-04-11

## 2022-05-13 ENCOUNTER — RX RENEWAL (OUTPATIENT)
Age: 57
End: 2022-05-13

## 2022-05-13 RX ORDER — DICLOFENAC SODIUM 75 MG/1
75 TABLET, DELAYED RELEASE ORAL
Qty: 60 | Refills: 0 | Status: ACTIVE | COMMUNITY
Start: 2021-12-27 | End: 1900-01-01

## 2022-06-02 NOTE — DIETITIAN INITIAL EVALUATION ADULT. - PROBLEM SELECTOR PROBLEM 6
Patient is calling returning a phone call from Penelope.     Patient is wondering if he needs another MRI as the appt is on Monday.     Please call back today if possible at [phone]   CKD (chronic kidney disease), stage IV

## 2022-06-14 NOTE — ED PROVIDER NOTE - RESPIRATORY, MLM
Quality 226: Preventive Care And Screening: Tobacco Use: Screening And Cessation Intervention: Patient screened for tobacco use and is an ex/non-smoker Quality 130: Documentation Of Current Medications In The Medical Record: Current Medications Documented Quality 137: Melanoma: Continuity Of Care - Recall System: Recall system not utilized, reason not otherwise specified Quality 110: Preventive Care And Screening: Influenza Immunization: Influenza Immunization Administered during Influenza season Quality 431: Preventive Care And Screening: Unhealthy Alcohol Use - Screening: Patient not identified as an unhealthy alcohol user when screened for unhealthy alcohol use using a systematic screening method Detail Level: Detailed Breath sounds clear and equal bilaterally.

## 2022-09-30 NOTE — OCCUPATIONAL THERAPY INITIAL EVALUATION ADULT - ADDITIONAL COMMENTS
Ok. Please inform I canceled the 20 mg adderall rx.    I just sent in adderall xr 25 mg x 1 month.  Please bring pt back in for appt in 2-3 weeks so I can check her BP /weight and see how she's doing on this new dose; before I can refill again since we've jumped up so much    MRI Head 6/22 Acute left MCA infarcts are identified with possible areas of petechial hemorrhage seen involving the left parietal region.

## 2022-12-25 ENCOUNTER — NON-APPOINTMENT (OUTPATIENT)
Age: 57
End: 2022-12-25

## 2023-01-06 NOTE — OCCUPATIONAL THERAPY INITIAL EVALUATION ADULT - AMBULATORY DEVICES NEEDED
Returned call. I spoke with Mrs. Salas and stated our office does not have a generic letter for cardiac pre-operative risk assessment letter to fax over to her cardiologist. I stated I would reach out to the patient's cardiologist to see what the needed from our office to complete a pre-operative risk assessment for surgery. The patient stated understanding.         I called the Cardiologist office, but the after hours staff state that the provider and his staff were out of the office for the weekend.          I contacted the patient to let her know that I have tried calling her cardiologist office and they are out of office for the weekend and I would try to call the office again Monday 1/9/23. The patient stated understanding.    ----- Message from Kylee Mccormick sent at 1/6/2023 12:07 PM CST -----  Contact: pt  Type: Needs Medical Advice         Who Called: pt  Best Call Back Number: [phone]  Additional Information: Requesting a call back regarding pt said she called Wednesday to inform the office that Dr Katz needs to send a clearance release letter to her cardio dr so they can fill it out and send it back to the so they can clear her for the procedure she is scheduled for next week.      Dr Shin - cardio dr   Fax# [phone]   Please Advise- Thank you       
no

## 2023-01-19 ENCOUNTER — NON-APPOINTMENT (OUTPATIENT)
Age: 58
End: 2023-01-19

## 2023-01-22 ENCOUNTER — EMERGENCY (EMERGENCY)
Facility: HOSPITAL | Age: 58
LOS: 1 days | Discharge: LEFT BEFORE DISCHARGE | End: 2023-01-22
Attending: EMERGENCY MEDICINE | Admitting: EMERGENCY MEDICINE
Payer: MEDICAID

## 2023-01-22 ENCOUNTER — EMERGENCY (EMERGENCY)
Facility: HOSPITAL | Age: 58
LOS: 1 days | Discharge: LEFT BEFORE DISCHARGE | End: 2023-01-22
Attending: STUDENT IN AN ORGANIZED HEALTH CARE EDUCATION/TRAINING PROGRAM | Admitting: STUDENT IN AN ORGANIZED HEALTH CARE EDUCATION/TRAINING PROGRAM
Payer: MEDICAID

## 2023-01-22 VITALS
SYSTOLIC BLOOD PRESSURE: 149 MMHG | HEART RATE: 84 BPM | TEMPERATURE: 98 F | OXYGEN SATURATION: 96 % | DIASTOLIC BLOOD PRESSURE: 98 MMHG | RESPIRATION RATE: 20 BRPM

## 2023-01-22 VITALS
RESPIRATION RATE: 18 BRPM | DIASTOLIC BLOOD PRESSURE: 86 MMHG | OXYGEN SATURATION: 95 % | SYSTOLIC BLOOD PRESSURE: 134 MMHG | HEART RATE: 100 BPM

## 2023-01-22 VITALS
HEIGHT: 71 IN | SYSTOLIC BLOOD PRESSURE: 156 MMHG | OXYGEN SATURATION: 95 % | TEMPERATURE: 98 F | WEIGHT: 240.08 LBS | RESPIRATION RATE: 16 BRPM | HEART RATE: 100 BPM | DIASTOLIC BLOOD PRESSURE: 94 MMHG

## 2023-01-22 VITALS
TEMPERATURE: 99 F | HEART RATE: 102 BPM | SYSTOLIC BLOOD PRESSURE: 145 MMHG | RESPIRATION RATE: 20 BRPM | HEIGHT: 71 IN | OXYGEN SATURATION: 97 % | DIASTOLIC BLOOD PRESSURE: 105 MMHG

## 2023-01-22 DIAGNOSIS — Z98.890 OTHER SPECIFIED POSTPROCEDURAL STATES: Chronic | ICD-10-CM

## 2023-01-22 LAB
ALBUMIN SERPL ELPH-MCNC: 3.4 G/DL — SIGNIFICANT CHANGE UP (ref 3.3–5)
ALBUMIN SERPL ELPH-MCNC: 4.1 G/DL — SIGNIFICANT CHANGE UP (ref 3.3–5)
ALP SERPL-CCNC: 87 U/L — SIGNIFICANT CHANGE UP (ref 30–120)
ALP SERPL-CCNC: 88 U/L — SIGNIFICANT CHANGE UP (ref 40–120)
ALT FLD-CCNC: 21 U/L DA — SIGNIFICANT CHANGE UP (ref 10–60)
ALT FLD-CCNC: 21 U/L — SIGNIFICANT CHANGE UP (ref 4–41)
ANION GAP SERPL CALC-SCNC: 12 MMOL/L — SIGNIFICANT CHANGE UP (ref 7–14)
ANION GAP SERPL CALC-SCNC: 8 MMOL/L — SIGNIFICANT CHANGE UP (ref 5–17)
APTT BLD: 35.4 SEC — SIGNIFICANT CHANGE UP (ref 27.5–35.5)
AST SERPL-CCNC: 14 U/L — SIGNIFICANT CHANGE UP (ref 10–40)
AST SERPL-CCNC: 19 U/L — SIGNIFICANT CHANGE UP (ref 4–40)
BASOPHILS # BLD AUTO: 0.01 K/UL — SIGNIFICANT CHANGE UP (ref 0–0.2)
BASOPHILS # BLD AUTO: 0.05 K/UL — SIGNIFICANT CHANGE UP (ref 0–0.2)
BASOPHILS NFR BLD AUTO: 0.1 % — SIGNIFICANT CHANGE UP (ref 0–2)
BASOPHILS NFR BLD AUTO: 0.4 % — SIGNIFICANT CHANGE UP (ref 0–2)
BILIRUB SERPL-MCNC: 0.5 MG/DL — SIGNIFICANT CHANGE UP (ref 0.2–1.2)
BILIRUB SERPL-MCNC: 0.8 MG/DL — SIGNIFICANT CHANGE UP (ref 0.2–1.2)
BUN SERPL-MCNC: 23 MG/DL — SIGNIFICANT CHANGE UP (ref 7–23)
BUN SERPL-MCNC: 24 MG/DL — HIGH (ref 7–23)
CALCIUM SERPL-MCNC: 9.1 MG/DL — SIGNIFICANT CHANGE UP (ref 8.4–10.5)
CALCIUM SERPL-MCNC: 9.5 MG/DL — SIGNIFICANT CHANGE UP (ref 8.4–10.5)
CHLORIDE SERPL-SCNC: 104 MMOL/L — SIGNIFICANT CHANGE UP (ref 96–108)
CHLORIDE SERPL-SCNC: 108 MMOL/L — HIGH (ref 98–107)
CO2 SERPL-SCNC: 20 MMOL/L — LOW (ref 22–31)
CO2 SERPL-SCNC: 24 MMOL/L — SIGNIFICANT CHANGE UP (ref 22–31)
CREAT SERPL-MCNC: 1.21 MG/DL — SIGNIFICANT CHANGE UP (ref 0.5–1.3)
CREAT SERPL-MCNC: 1.45 MG/DL — HIGH (ref 0.5–1.3)
EGFR: 56 ML/MIN/1.73M2 — LOW
EGFR: 69 ML/MIN/1.73M2 — SIGNIFICANT CHANGE UP
EOSINOPHIL # BLD AUTO: 0.01 K/UL — SIGNIFICANT CHANGE UP (ref 0–0.5)
EOSINOPHIL # BLD AUTO: 0.21 K/UL — SIGNIFICANT CHANGE UP (ref 0–0.5)
EOSINOPHIL NFR BLD AUTO: 0.1 % — SIGNIFICANT CHANGE UP (ref 0–6)
EOSINOPHIL NFR BLD AUTO: 1.7 % — SIGNIFICANT CHANGE UP (ref 0–6)
FLUAV AG NPH QL: SIGNIFICANT CHANGE UP
FLUBV AG NPH QL: SIGNIFICANT CHANGE UP
GLUCOSE SERPL-MCNC: 115 MG/DL — HIGH (ref 70–99)
GLUCOSE SERPL-MCNC: 94 MG/DL — SIGNIFICANT CHANGE UP (ref 70–99)
HCT VFR BLD CALC: 50.5 % — HIGH (ref 39–50)
HCT VFR BLD CALC: 51.3 % — HIGH (ref 39–50)
HGB BLD-MCNC: 16.8 G/DL — SIGNIFICANT CHANGE UP (ref 13–17)
HGB BLD-MCNC: 16.8 G/DL — SIGNIFICANT CHANGE UP (ref 13–17)
IANC: 11.72 K/UL — HIGH (ref 1.8–7.4)
IMM GRANULOCYTES NFR BLD AUTO: 0.5 % — SIGNIFICANT CHANGE UP (ref 0–0.9)
IMM GRANULOCYTES NFR BLD AUTO: 0.9 % — SIGNIFICANT CHANGE UP (ref 0–0.9)
INR BLD: 1.15 RATIO — SIGNIFICANT CHANGE UP (ref 0.88–1.16)
LACTATE SERPL-SCNC: 0.5 MMOL/L — LOW (ref 0.7–2)
LYMPHOCYTES # BLD AUTO: 0.54 K/UL — LOW (ref 1–3.3)
LYMPHOCYTES # BLD AUTO: 1.56 K/UL — SIGNIFICANT CHANGE UP (ref 1–3.3)
LYMPHOCYTES # BLD AUTO: 12.4 % — LOW (ref 13–44)
LYMPHOCYTES # BLD AUTO: 4.3 % — LOW (ref 13–44)
MCHC RBC-ENTMCNC: 30.9 PG — SIGNIFICANT CHANGE UP (ref 27–34)
MCHC RBC-ENTMCNC: 31.8 PG — SIGNIFICANT CHANGE UP (ref 27–34)
MCHC RBC-ENTMCNC: 32.7 GM/DL — SIGNIFICANT CHANGE UP (ref 32–36)
MCHC RBC-ENTMCNC: 33.3 GM/DL — SIGNIFICANT CHANGE UP (ref 32–36)
MCV RBC AUTO: 94.5 FL — SIGNIFICANT CHANGE UP (ref 80–100)
MCV RBC AUTO: 95.6 FL — SIGNIFICANT CHANGE UP (ref 80–100)
MONOCYTES # BLD AUTO: 0.22 K/UL — SIGNIFICANT CHANGE UP (ref 0–0.9)
MONOCYTES # BLD AUTO: 1.22 K/UL — HIGH (ref 0–0.9)
MONOCYTES NFR BLD AUTO: 1.8 % — LOW (ref 2–14)
MONOCYTES NFR BLD AUTO: 9.7 % — SIGNIFICANT CHANGE UP (ref 2–14)
NEUTROPHILS # BLD AUTO: 11.72 K/UL — HIGH (ref 1.8–7.4)
NEUTROPHILS # BLD AUTO: 9.39 K/UL — HIGH (ref 1.8–7.4)
NEUTROPHILS NFR BLD AUTO: 74.9 % — SIGNIFICANT CHANGE UP (ref 43–77)
NEUTROPHILS NFR BLD AUTO: 93.2 % — HIGH (ref 43–77)
NRBC # BLD: 0 /100 WBCS — SIGNIFICANT CHANGE UP (ref 0–0)
NRBC # BLD: 0 /100 WBCS — SIGNIFICANT CHANGE UP (ref 0–0)
NRBC # FLD: 0 K/UL — SIGNIFICANT CHANGE UP (ref 0–0)
PLATELET # BLD AUTO: 208 K/UL — SIGNIFICANT CHANGE UP (ref 150–400)
PLATELET # BLD AUTO: 220 K/UL — SIGNIFICANT CHANGE UP (ref 150–400)
POTASSIUM SERPL-MCNC: 4.2 MMOL/L — SIGNIFICANT CHANGE UP (ref 3.5–5.3)
POTASSIUM SERPL-MCNC: 5.2 MMOL/L — SIGNIFICANT CHANGE UP (ref 3.5–5.3)
POTASSIUM SERPL-SCNC: 4.2 MMOL/L — SIGNIFICANT CHANGE UP (ref 3.5–5.3)
POTASSIUM SERPL-SCNC: 5.2 MMOL/L — SIGNIFICANT CHANGE UP (ref 3.5–5.3)
PROT SERPL-MCNC: 7.2 G/DL — SIGNIFICANT CHANGE UP (ref 6–8.3)
PROT SERPL-MCNC: 7.6 G/DL — SIGNIFICANT CHANGE UP (ref 6–8.3)
PROTHROM AB SERPL-ACNC: 13.6 SEC — HIGH (ref 10.5–13.4)
RBC # BLD: 5.28 M/UL — SIGNIFICANT CHANGE UP (ref 4.2–5.8)
RBC # BLD: 5.43 M/UL — SIGNIFICANT CHANGE UP (ref 4.2–5.8)
RBC # FLD: 13.3 % — SIGNIFICANT CHANGE UP (ref 10.3–14.5)
RBC # FLD: 13.4 % — SIGNIFICANT CHANGE UP (ref 10.3–14.5)
RSV RNA NPH QL NAA+NON-PROBE: SIGNIFICANT CHANGE UP
S PYO DNA THROAT QL NAA+PROBE: SIGNIFICANT CHANGE UP
SARS-COV-2 RNA SPEC QL NAA+PROBE: SIGNIFICANT CHANGE UP
SODIUM SERPL-SCNC: 136 MMOL/L — SIGNIFICANT CHANGE UP (ref 135–145)
SODIUM SERPL-SCNC: 140 MMOL/L — SIGNIFICANT CHANGE UP (ref 135–145)
WBC # BLD: 12.54 K/UL — HIGH (ref 3.8–10.5)
WBC # BLD: 12.56 K/UL — HIGH (ref 3.8–10.5)
WBC # FLD AUTO: 12.54 K/UL — HIGH (ref 3.8–10.5)
WBC # FLD AUTO: 12.56 K/UL — HIGH (ref 3.8–10.5)

## 2023-01-22 PROCEDURE — 85610 PROTHROMBIN TIME: CPT

## 2023-01-22 PROCEDURE — 85730 THROMBOPLASTIN TIME PARTIAL: CPT

## 2023-01-22 PROCEDURE — 87798 DETECT AGENT NOS DNA AMP: CPT

## 2023-01-22 PROCEDURE — 87040 BLOOD CULTURE FOR BACTERIA: CPT

## 2023-01-22 PROCEDURE — 93005 ELECTROCARDIOGRAM TRACING: CPT

## 2023-01-22 PROCEDURE — 87637 SARSCOV2&INF A&B&RSV AMP PRB: CPT

## 2023-01-22 PROCEDURE — 93010 ELECTROCARDIOGRAM REPORT: CPT

## 2023-01-22 PROCEDURE — 87651 STREP A DNA AMP PROBE: CPT

## 2023-01-22 PROCEDURE — 96375 TX/PRO/DX INJ NEW DRUG ADDON: CPT

## 2023-01-22 PROCEDURE — 70491 CT SOFT TISSUE NECK W/DYE: CPT | Mod: 26,MA

## 2023-01-22 PROCEDURE — 36415 COLL VENOUS BLD VENIPUNCTURE: CPT

## 2023-01-22 PROCEDURE — 99284 EMERGENCY DEPT VISIT MOD MDM: CPT

## 2023-01-22 PROCEDURE — 96365 THER/PROPH/DIAG IV INF INIT: CPT

## 2023-01-22 PROCEDURE — 83605 ASSAY OF LACTIC ACID: CPT

## 2023-01-22 PROCEDURE — 80053 COMPREHEN METABOLIC PANEL: CPT

## 2023-01-22 PROCEDURE — 99285 EMERGENCY DEPT VISIT HI MDM: CPT

## 2023-01-22 PROCEDURE — 85025 COMPLETE CBC W/AUTO DIFF WBC: CPT

## 2023-01-22 RX ORDER — KETOROLAC TROMETHAMINE 30 MG/ML
15 SYRINGE (ML) INJECTION ONCE
Refills: 0 | Status: DISCONTINUED | OUTPATIENT
Start: 2023-01-22 | End: 2023-01-22

## 2023-01-22 RX ORDER — CEFTRIAXONE 500 MG/1
500 INJECTION, POWDER, FOR SOLUTION INTRAMUSCULAR; INTRAVENOUS ONCE
Refills: 0 | Status: DISCONTINUED | OUTPATIENT
Start: 2023-01-22 | End: 2023-01-22

## 2023-01-22 RX ORDER — AMPICILLIN SODIUM AND SULBACTAM SODIUM 250; 125 MG/ML; MG/ML
3 INJECTION, POWDER, FOR SUSPENSION INTRAMUSCULAR; INTRAVENOUS ONCE
Refills: 0 | Status: COMPLETED | OUTPATIENT
Start: 2023-01-22 | End: 2023-01-22

## 2023-01-22 RX ORDER — BUPROPION HYDROCHLORIDE 150 MG/1
1 TABLET, EXTENDED RELEASE ORAL
Qty: 0 | Refills: 0 | DISCHARGE

## 2023-01-22 RX ORDER — OXYCODONE AND ACETAMINOPHEN 5; 325 MG/1; MG/1
1 TABLET ORAL
Qty: 0 | Refills: 0 | DISCHARGE

## 2023-01-22 RX ORDER — METRONIDAZOLE 500 MG
500 TABLET ORAL ONCE
Refills: 0 | Status: DISCONTINUED | OUTPATIENT
Start: 2023-01-22 | End: 2023-01-22

## 2023-01-22 RX ORDER — DEXAMETHASONE 0.5 MG/5ML
6 ELIXIR ORAL ONCE
Refills: 0 | Status: COMPLETED | OUTPATIENT
Start: 2023-01-22 | End: 2023-01-22

## 2023-01-22 RX ORDER — IBUPROFEN 200 MG
0 TABLET ORAL
Qty: 0 | Refills: 0 | DISCHARGE

## 2023-01-22 RX ORDER — SODIUM CHLORIDE 9 MG/ML
2300 INJECTION INTRAMUSCULAR; INTRAVENOUS; SUBCUTANEOUS ONCE
Refills: 0 | Status: COMPLETED | OUTPATIENT
Start: 2023-01-22 | End: 2023-01-22

## 2023-01-22 RX ADMIN — AMPICILLIN SODIUM AND SULBACTAM SODIUM 3 GRAM(S): 250; 125 INJECTION, POWDER, FOR SUSPENSION INTRAMUSCULAR; INTRAVENOUS at 11:10

## 2023-01-22 RX ADMIN — Medication 6 MILLIGRAM(S): at 10:24

## 2023-01-22 RX ADMIN — SODIUM CHLORIDE 2300 MILLILITER(S): 9 INJECTION INTRAMUSCULAR; INTRAVENOUS; SUBCUTANEOUS at 11:25

## 2023-01-22 RX ADMIN — AMPICILLIN SODIUM AND SULBACTAM SODIUM 200 GRAM(S): 250; 125 INJECTION, POWDER, FOR SUSPENSION INTRAMUSCULAR; INTRAVENOUS at 10:42

## 2023-01-22 RX ADMIN — Medication 15 MILLIGRAM(S): at 16:46

## 2023-01-22 RX ADMIN — SODIUM CHLORIDE 2300 MILLILITER(S): 9 INJECTION INTRAMUSCULAR; INTRAVENOUS; SUBCUTANEOUS at 10:25

## 2023-01-22 NOTE — ED PROVIDER NOTE - PATIENT PORTAL LINK FT
You can access the FollowMyHealth Patient Portal offered by [facility] by registering at the following website: [email or web address]. By joining [employer]’s FollowMyHealth portal, you will also be able to view your health information using other applications (apps) compatible with our system.

## 2023-01-22 NOTE — ED PROVIDER NOTE - CARE PROVIDER_API CALL
Solo Pham)  Facial Plastic and Reconstructive Surgery; Otolaryngology  [address] [place] [address]  Phone: [phone]  Fax: [phone]  Follow Up Time: 1-3 Days    Vargas Jordan)  Otolaryngology  [address], [address]  [place] [address]  Phone: [phone]  Fax: [phone]  Follow Up Time: 1-3 Days    Teofilo Arguelles)  Otolaryngology  [address] [place] [address] [address]  [place] [address]  Phone: [phone]  Fax: [phone]  Follow Up Time: 1-3 Days    Juan Freire)  Otolaryngology  [address], [address]  [place] [address]  Phone: [phone]  Fax: [phone]  Follow Up Time: 1-3 Days

## 2023-01-22 NOTE — ED PROVIDER NOTE - PROGRESS NOTE DETAILS
Chris Hart DO (PGY1)  ENT consulted to eval patient. Patient to be seen by ENT. Still pending scans and blood work. ent recommends dc with clinda is ct Is neg for surgical pathology and pt is tolerating PO. pending  ct scan. Chris Hart DO (PGY1)  signed out patient to ABBI Collins. still pending CT and ENT recs following scans. Patient no longer in the ER. Attempted to find patient in emergency room not in room. Called patient's contact number family gave additional number called, no answer. Family reports excellent patient. Patient not currently in emergency room. Patient eloped charge nurse aware

## 2023-01-22 NOTE — ED PROVIDER NOTE - DIFFERENTIAL DIAGNOSIS
Differential including but not limited to pharyngitis peritonsillar abscess Dilshad's Differential Diagnosis Differential including but not limited to pharyngitis peritonsillar abscess Dilshad's Flu Covid

## 2023-01-22 NOTE — ED PROVIDER NOTE - OBJECTIVE STATEMENT
Patient complaining of few days of worsening throat pain such as occasional cough.  Patient seen at urgent care 2 days ago was told his heart rate was elevated and was started on Augmentin however symptoms have worsened so he came to the ER for evaluation today.  Patient relates he was only swabbed for COVID and had an EKG at the urgent care had no other testing..  Patient denies fevers chills chest pain shortness of breath abdominal pain vomiting.  PMD ana

## 2023-01-22 NOTE — ED ADULT TRIAGE NOTE - CHIEF COMPLAINT QUOTE
sore throat and swollen glands. denies fever. throat red and swelling to right upper posterior palate and right facial pain. seen at urgent care friday and given antibiotics

## 2023-01-22 NOTE — ED ADULT NURSE NOTE - OBJECTIVE STATEMENT
56 y/o M PMH obesity, SHAQUILLE, ileus, diverticulitis, rectal bleed, HLD, history of drug abuse, and history of CVA (right sided deficits) presenting to ED with mouth pain and swelling x 56 y/o M PMH obesity, SHAQUILLE, ileus, diverticulitis, rectal bleed, HLD, HTN, history of drug abuse, and history of CVA (right sided deficits) (noncompliant with medications) presenting to ED with mouth pain and swelling x 2 days. Went to UC on Thursday and given Augmentin but not diagnosis. Presents to ED for worsening palate swelling, redness and pain, able to tolerate secretions. Has not seen dentist in years. Denies CP, SOB, n/v/d, fevers, chills, abdominal pain, urinary symptoms, weakness, fatigue, numbness, tingling in upper and lower extremities, HA, blurry vision. VSS updated on plan of care.

## 2023-01-22 NOTE — ED PROVIDER NOTE - PROGRESS NOTE DETAILS
transfer request entered via [employer] D/W ENT resident, will accept er er->er for dr avery. er center relates dr rangel ([facility]) to accept er->er Had an at length discussion with patient.  Patient wishes to leave at this time, refusing transfer, will drive himself to [facility] after he takes care of a few things at home.  The patient understands that they are leaving against medical advice despite the risk of missing a potentially serious diagnosis which may lead to injury, disability and/or death.  I discussed with the patient which tests would need to be performed and what type of monitoring would be necessary for the patient as well.  I was unable to convince patient to stay for further workup.  The patient was given an opportunity to ask any questions as well.   The patient demonstrates understanding of all risks, is awake and alert and demonstrates competence to make medical decisions.  The patient understands that they may return at any time if desired. Discussed the importance of close, prompt medical follow-up.  Also present at bedside for discussion: RN

## 2023-01-22 NOTE — ED ADULT NURSE NOTE - NS ED NURSE RECORD ANOTHER HT AND WT
Wellness Visit for Adults   AMBULATORY CARE:   A wellness visit  is when you see your healthcare provider to get screened for health problems  Your healthcare provider will also give you advice on how to stay healthy  Write down your questions so you remember to ask them  Ask your healthcare provider how often you should have a wellness visit  What happens at a wellness visit:  Your healthcare provider will ask about your health, and your family history of health problems  This includes high blood pressure, heart disease, and cancer  He or she will ask if you have symptoms that concern you, if you smoke, and about your mood  You may also be asked about your intake of medicines, supplements, food, and alcohol  Any of the following may be done:  · Your weight  will be checked  Your height may also be checked so your body mass index (BMI) can be calculated  Your BMI shows if you are at a healthy weight  · Your blood pressure  and heart rate will be checked  Your temperature may also be checked  · Blood and urine tests  may be done  Blood tests may be done to check your cholesterol levels  Abnormal cholesterol levels increase your risk for heart disease and stroke  You may also need a blood or urine test to check for diabetes if you are at increased risk  Urine tests may be done to look for signs of an infection or kidney disease  · A physical exam  includes checking your heartbeat and lungs with a stethoscope  Your healthcare provider may also check your skin to look for sun damage  · Screening tests  may be recommended  A screening test is done to check for diseases that may not cause symptoms  The screening tests you may need depend on your age, gender, family history, and lifestyle habits  For example, colorectal screening may be recommended if you are 48years old or older  Screening tests you need if you are a woman:   · A Pap smear  is used to screen for cervical cancer   Pap smears are usually done every 3 to 5 years depending on your age  You may need them more often if you have had abnormal Pap smear test results in the past  Ask your healthcare provider how often you should have a Pap smear  · A mammogram  is an x-ray of your breasts to screen for breast cancer  Experts recommend mammograms every 2 years starting at age 48 years  You may need a mammogram at age 52 years or younger if you have an increased risk for breast cancer  Talk to your healthcare provider about when you should start having mammograms and how often you need them  Vaccines you may need:   · Get an influenza vaccine  every year  The influenza vaccine protects you from the flu  Several types of viruses cause the flu  The viruses change over time, so new vaccines are made each year  · Get a tetanus-diphtheria (Td) booster vaccine  every 10 years  This vaccine protects you against tetanus and diphtheria  Tetanus is a severe infection that may cause painful muscle spasms and lockjaw  Diphtheria is a severe bacterial infection that causes a thick covering in the back of your mouth and throat  · Get a human papillomavirus (HPV) vaccine  if you are female and aged 23 to 32 or male 23 to 24 and never received it  This vaccine protects you from HPV infection  HPV is the most common infection spread by sexual contact  HPV may also cause vaginal, penile, and anal cancers  · Get a pneumococcal vaccine  if you are aged 72 years or older  The pneumococcal vaccine is an injection given to protect you from pneumococcal disease  Pneumococcal disease is an infection caused by pneumococcal bacteria  The infection may cause pneumonia, meningitis, or an ear infection  · Get a shingles vaccine  if you are 60 or older, even if you have had shingles before  The shingles vaccine is an injection to protect you from the varicella-zoster virus  This is the same virus that causes chickenpox   Shingles is a painful rash that develops in people who had chickenpox or have been exposed to the virus  How to eat healthy:  My Plate is a model for planning healthy meals  It shows the types and amounts of foods that should go on your plate  Fruits and vegetables make up about half of your plate, and grains and protein make up the other half  A serving of dairy is included on the side of your plate  The amount of calories and serving sizes you need depends on your age, gender, weight, and height  Examples of healthy foods are listed below:  · Eat a variety of vegetables  such as dark green, red, and orange vegetables  You can also include canned vegetables low in sodium (salt) and frozen vegetables without added butter or sauces  · Eat a variety of fresh fruits , canned fruit in 100% juice, frozen fruit, and dried fruit  · Include whole grains  At least half of the grains you eat should be whole grains  Examples include whole-wheat bread, wheat pasta, brown rice, and whole-grain cereals such as oatmeal     · Eat a variety of protein foods such as seafood (fish and shellfish), lean meat, and poultry without skin (turkey and chicken)  Examples of lean meats include pork leg, shoulder, or tenderloin, and beef round, sirloin, tenderloin, and extra lean ground beef  Other protein foods include eggs and egg substitutes, beans, peas, soy products, nuts, and seeds  · Choose low-fat dairy products such as skim or 1% milk or low-fat yogurt, cheese, and cottage cheese  · Limit unhealthy fats  such as butter, hard margarine, and shortening  Exercise:  Exercise at least 30 minutes per day on most days of the week  Some examples of exercise include walking, biking, dancing, and swimming  You can also fit in more physical activity by taking the stairs instead of the elevator or parking farther away from stores  Include muscle strengthening activities 2 days each week  Regular exercise provides many health benefits   It helps you manage your weight, and decreases your risk for type 2 diabetes, heart disease, stroke, and high blood pressure  Exercise can also help improve your mood  Ask your healthcare provider about the best exercise plan for you  General health and safety guidelines:   · Do not smoke  Nicotine and other chemicals in cigarettes and cigars can cause lung damage  Ask your healthcare provider for information if you currently smoke and need help to quit  E-cigarettes or smokeless tobacco still contain nicotine  Talk to your healthcare provider before you use these products  · Limit alcohol  A drink of alcohol is 12 ounces of beer, 5 ounces of wine, or 1½ ounces of liquor  · Lose weight, if needed  Being overweight increases your risk of certain health conditions  These include heart disease, high blood pressure, type 2 diabetes, and certain types of cancer  · Protect your skin  Do not sunbathe or use tanning beds  Use sunscreen with a SPF 15 or higher  Apply sunscreen at least 15 minutes before you go outside  Reapply sunscreen every 2 hours  Wear protective clothing, hats, and sunglasses when you are outside  · Drive safely  Always wear your seatbelt  Make sure everyone in your car wears a seatbelt  A seatbelt can save your life if you are in an accident  Do not use your cell phone when you are driving  This could distract you and cause an accident  Pull over if you need to make a call or send a text message  · Practice safe sex  Use latex condoms if are sexually active and have more than one partner  Your healthcare provider may recommend screening tests for sexually transmitted infections (STIs)  · Wear helmets, lifejackets, and protective gear  Always wear a helmet when you ride a bike or motorcycle, go skiing, or play sports that could cause a head injury  Wear protective equipment when you play sports  Wear a lifejacket when you are on a boat or doing water sports      © Copyright [employer] 2020 Information is for End User's use only and may not be sold, redistributed or otherwise used for commercial purposes  All illustrations and images included in CareNotes® are the copyrighted property of [employer] A M [employer] Inc  or Kayla Eubanks  The above information is an  only  It is not intended as medical advice for individual conditions or treatments  Talk to your doctor, nurse or pharmacist before following any medical regimen to see if it is safe and effective for you  Weight Management   AMBULATORY CARE:   Why it is important to manage your weight:  Being overweight increases your risk of health conditions such as heart disease, high blood pressure, type 2 diabetes, and certain types of cancer  It can also increase your risk for osteoarthritis, sleep apnea, and other respiratory problems  Aim for a slow, steady weight loss  Even a small amount of weight loss can lower your risk of health problems  How to lose weight safely:  A safe and healthy way to lose weight is to eat fewer calories and get regular exercise  · You can lose up about 1 pound a week by decreasing the number of calories you eat by 500 calories each day  You can decrease calories by eating smaller portion sizes or by cutting out high-calorie foods  Read labels to find out how many calories are in the foods you eat  · You can also burn calories with exercise such as walking, swimming, or biking  You will be more likely to keep weight off if you make these changes part of your lifestyle  Exercise at least 30 minutes per day on most days of the week  You can also fit in more physical activity by taking the stairs instead of the elevator or parking farther away from stores  Ask your healthcare provider about the best exercise plan for you  Healthy meal plan for weight management:  A healthy meal plan includes a variety of foods, contains fewer calories, and helps you stay healthy   A healthy meal plan includes the following:     · Eat whole-grain foods more often  A healthy meal plan should contain fiber  Fiber is the part of grains, fruits, and vegetables that is not broken down by your body  Whole-grain foods are healthy and provide extra fiber in your diet  Some examples of whole-grain foods are whole-wheat breads and pastas, oatmeal, brown rice, and bulgur  · Eat a variety of vegetables every day  Include dark, leafy greens such as spinach, kale, jimmy greens, and mustard greens  Eat yellow and orange vegetables such as carrots, sweet potatoes, and winter squash  · Eat a variety of fruits every day  Choose fresh or canned fruit (canned in its own juice or light syrup) instead of juice  Fruit juice has very little or no fiber  · Eat low-fat dairy foods  Drink fat-free (skim) milk or 1% milk  Eat fat-free yogurt and low-fat cottage cheese  Try low-fat cheeses such as mozzarella and other reduced-fat cheeses  · Choose meat and other protein foods that are low in fat  Choose beans or other legumes such as split peas or lentils  Choose fish, skinless poultry (chicken or turkey), or lean cuts of red meat (beef or pork)  Before you cook meat or poultry, cut off any visible fat  · Use less fat and oil  Try baking foods instead of frying them  Add less fat, such as margarine, sour cream, regular salad dressing and mayonnaise to foods  Eat fewer high-fat foods  Some examples of high-fat foods include french fries, doughnuts, ice cream, and cakes  · Eat fewer sweets  Limit foods and drinks that are high in sugar  This includes candy, cookies, regular soda, and sweetened drinks  Ways to decrease calories:   · Eat smaller portions  ? Use a small plate with smaller servings  ? Do not eat second helpings  ? When you eat at a restaurant, ask for a box and place half of your meal in the box before you eat  ? Share an entrée with someone else  · Replace high-calorie snacks with healthy, low-calorie snacks  ? Choose fresh fruit, vegetables, fat-free rice cakes, or air-popped popcorn instead of potato chips, nuts, or chocolate  ? Choose water or calorie-free drinks instead of soda or sweetened drinks  · Do not shop for groceries when you are hungry  You may be more likely to make unhealthy food choices  Take a grocery list of healthy foods and shop after you have eaten  · Eat regular meals  Do not skip meals  Skipping meals can lead to overeating later in the day  This can make it harder for you to lose weight  Eat a healthy snack in place of a meal if you do not have time to eat a regular meal  Talk with a dietitian to help you create a meal plan and schedule that is right for you  Other things to consider as you try to lose weight:   · Be aware of situations that may give you the urge to overeat, such as eating while watching television  Find ways to avoid these situations  For example, read a book, go for a walk, or do crafts  · Meet with a weight loss support group or friends who are also trying to lose weight  This may help you stay motivated to continue working on your weight loss goals  © Copyright [address] Information is for End User's use only and may not be sold, redistributed or otherwise used for commercial purposes  All illustrations and images included in CareNotes® are the copyrighted property of A D A M , Inc  or [facility]   The above information is an  only  It is not intended as medical advice for individual conditions or treatments  Talk to your doctor, nurse or pharmacist before following any medical regimen to see if it is safe and effective for you  Cholesterol and Your Health   AMBULATORY CARE:   Cholesterol  is a waxy, fat-like substance  Your body uses cholesterol to make hormones and new cells, and to protect nerves  Cholesterol is made by your body  It also comes from certain foods you eat, such as meat and dairy products   Your healthcare provider can help you set goals for your cholesterol levels  He or she can help you create a plan to meet your goals  Cholesterol level goals: Your cholesterol level goals depend on your risk for heart disease, your age, and your other health conditions  The following are general guidelines:  · Total cholesterol  includes low-density lipoprotein (LDL), high-density lipoprotein (HDL), and triglyceride levels  The total cholesterol level should be lower than 200 mg/dL and is best at about 150 mg/dL  · LDL cholesterol  is called bad cholesterol  because it forms plaque in your arteries  As plaque builds up, your arteries become narrow, and less blood flows through  When plaque decreases blood flow to your heart, you may have chest pain  If plaque completely blocks an artery that brings blood to your heart, you may have a heart attack  Plaque can break off and form blood clots  Blood clots may block arteries in your brain and cause a stroke  The level should be less than 130 mg/dL and is best at about 100 mg/dL  · HDL cholesterol  is called good cholesterol  because it helps remove LDL cholesterol from your arteries  It does this by attaching to LDL cholesterol and carrying it to your liver  Your liver breaks down LDL cholesterol so your body can get rid of it  High levels of HDL cholesterol can help prevent a heart attack and stroke  Low levels of HDL cholesterol can increase your risk for heart disease, heart attack, and stroke  The level should be 60 mg/dL or higher  · Triglycerides  are a type of fat that store energy from foods you eat  High levels of triglycerides also cause plaque buildup  This can increase your risk for a heart attack or stroke  If your triglyceride level is high, your LDL cholesterol level may also be high  The level should be less than 150 mg/dL      What increases your risk for high cholesterol:   · Smoking cigarettes    · Being overweight or obese, or not getting enough exercise    · Drinking large amounts of alcohol    · A medical condition such as hypertension (high blood pressure) or diabetes    · Certain genes passed from your parents to you    · Age older than 65 years    What you need to know about having your cholesterol levels checked: Adults 21to 39years of age should have their cholesterol levels checked every 4 to 6 years  Adults 45 years or older should have their cholesterol checked every 1 to 2 years  You may need your cholesterol checked more often, or at a younger age, if you have risk factors for heart disease  You may also need to have your cholesterol checked more often if you have other health conditions, such as diabetes  Blood tests are used to check cholesterol levels  Blood tests measure your levels of triglycerides, LDL cholesterol, and HDL cholesterol  How healthy fats affect your cholesterol levels:  Healthy fats, also called unsaturated fats, help lower LDL cholesterol and triglyceride levels  Healthy fats include the following:  · Monounsaturated fats  are found in foods such as olive oil, canola oil, avocado, nuts, and olives  · Polyunsaturated fats,  such as omega 3 fats, are found in fish, such as salmon, trout, and tuna  They can also be found in plant foods such as flaxseed, walnuts, and soybeans  How unhealthy fats affect your cholesterol levels:  Unhealthy fats increase LDL cholesterol and triglyceride levels  They are found in foods high in cholesterol, saturated fat, and trans fat:  · Cholesterol  is found in eggs, dairy, and meat  · Saturated fat  is found in butter, cheese, ice cream, whole milk, and coconut oil  Saturated fat is also found in meat, such as sausage, hot dogs, and bologna  · Trans fat  is found in liquid oils and is used in fried and baked foods  Foods that contain trans fats include chips, crackers, muffins, sweet rolls, microwave popcorn, and cookies      Treatment  for high cholesterol will also decrease your risk of heart disease, heart attack, and stroke  Treatment may include any of the following:  · Lifestyle changes  may include food, exercise, weight loss, and quitting smoking  You may also need to decrease the amount of alcohol you drink  Your healthcare provider will want you to start with lifestyle changes  Other treatment may be added if lifestyle changes are not enough  · Medicines  may be given to lower your LDL cholesterol, triglyceride levels, or total cholesterol level  You may need medicines to lower your cholesterol if any of the following is true:     ? You have a history of stroke, TIA, unstable angina, or a heart attack  ? Your LDL cholesterol level is 190 mg/dL or higher  ? You are age 36 to 76 years, have diabetes or heart disease risk factors, and your LDL cholesterol is 70 mg/dL or higher  · Supplements  include fish oil, red yeast rice, and garlic  Fish oil may help lower your triglyceride and LDL cholesterol levels  It may also increase your HDL cholesterol level  Red yeast rice may help decrease your total cholesterol level and LDL cholesterol level  Garlic may help lower your total cholesterol level  Do not take these supplements without talking to your healthcare provider  Food changes you can make to lower your cholesterol levels:  A dietitian can help you create a healthy eating plan  He or she can show you how to read food labels and choose foods low in saturated fat, trans fats, and cholesterol  · Decrease the total amount of fat you eat  Choose lean meats, fat-free or 1% fat milk, and low-fat dairy products, such as yogurt and cheese  Try to limit or avoid red meats  Limit or do not eat fried foods or baked goods, such as cookies  · Replace unhealthy fats with healthy fats  Cook foods in olive oil or canola oil  Choose soft margarines that are low in saturated fat and trans fat  Seeds, nuts, and avocados are other examples of healthy fats      · Eat foods with omega-3 fats  Examples include salmon, tuna, mackerel, walnuts, and flaxseed  Eat fish 2 times per week  Pregnant women should not eat fish that have high levels of mercury, such as shark, swordfish, and sherri mackerel  · Increase the amount of high-fiber foods you eat  High-fiber foods can help lower your LDL cholesterol  Aim to get between 20 and 30 grams of fiber each day  Fruits and vegetables are high in fiber  Eat at least 5 servings each day  Other high-fiber foods are whole-grain or whole-wheat breads, pastas, or cereals, and brown rice  Eat 3 ounces of whole-grain foods each day  Increase fiber slowly  You may have abdominal discomfort, bloating, and gas if you add fiber to your diet too quickly  · Eat healthy protein foods  Examples include low-fat dairy products, skinless chicken and turkey, fish, and nuts  · Limit foods and drinks that are high in sugar  Your dietitian or healthcare provider can help you create daily limits for high-sugar foods and drinks  The limit may be lower if you have diabetes or another health condition  Limits can also help you lose weight if needed  Lifestyle changes you can make to lower your cholesterol levels:   · Maintain a healthy weight  Ask your healthcare provider what a healthy weight is for you  Ask him or her to help you create a weight loss plan if needed  Weight loss can decrease your total cholesterol and triglyceride levels  Weight loss may also help keep your blood pressure at a healthy level  · Exercise regularly  Exercise can help lower your total cholesterol level and maintain a healthy weight  Exercise can also help increase your HDL cholesterol level  Work with your healthcare provider to create an exercise program that is right for you  Get at least 30 to 40 minutes of moderate exercise most days of the week  Examples of exercise include brisk walking, swimming, or biking  · Do not smoke    Nicotine and other chemicals in cigarettes and cigars can raise your cholesterol levels  Ask your healthcare provider for information if you currently smoke and need help to quit  E-cigarettes or smokeless tobacco still contain nicotine  Talk to your healthcare provider before you use these products  · Limit or do not drink alcohol  Alcohol can increase your triglyceride levels  Ask your healthcare provider before you drink alcohol  Ask how much is okay for you to drink in 1 day or 1 week  © Copyright [address] Information is for End User's use only and may not be sold, redistributed or otherwise used for commercial purposes  All illustrations and images included in CareNotes® are the copyrighted property of [employer]  or [address] [place] [address]  The above information is an  only  It is not intended as medical advice for individual conditions or treatments  Talk to your doctor, nurse or pharmacist before following any medical regimen to see if it is safe and effective for you  Yes

## 2023-01-22 NOTE — ED PROVIDER NOTE - OBJECTIVE STATEMENT
57-year-old male past medical history of CVA presenting with right-sided throat pain.  Patient was evaluated at [facility] earlier today and was referred to go to another hospital with ENT privileges.  Evaluate for peritonsillar abscess.  Patient was given Unasyn and dexamethasone at [facility] he tested negative for COVID influenza and RSV.  Patient's been endorsing right-sided throat pain for 4 days.  Endorsing sensation of phlegm in the back of his throat but no difficulty breathing, drooling, stridor.  Patient denies any fevers at home.  Denies any chest pain, shortness of breath, abdominal pain, nausea vomiting diarrhea, urinary complaints. 57-year-old male past medical history of CVA presenting with right-sided throat pain.  Patient was evaluated at [facility] earlier today and was referred to go to another hospital with ENT privileges.  Evaluate for peritonsillar abscess.  Patient was given Unasyn and dexamethasone at [facility] he tested negative for COVID influenza and RSV.  Patient's been endorsing right-sided throat pain for 4 days. States that he was evaluated at a walk in urgent care and was Rx Augmentin which he has been taking prior to his ED visit. Endorsing sensation of phlegm in the back of his throat but no difficulty breathing, drooling, stridor.  Patient denies any fevers at home.  Denies any chest pain, shortness of breath, abdominal pain, nausea vomiting diarrhea, urinary complaints.

## 2023-01-22 NOTE — ED ADULT NURSE NOTE - OBJECTIVE STATEMENT
BREAK RN: pt. received to int. 4 A&Ox4 ambulatory p/w throat pain. pt. endorses being at [facility] today, leaving AMA because he would rather come here, but they were going to transfer him anyways. pt. endorses pain x4 days, denies difficulty talking, speaking in full and complete sentences. NAD noted. respirations even and unlabored on RA. USIV placed by MD. due medications given. comfort measures provided. safety precautions maintained.

## 2023-01-22 NOTE — ED PROVIDER NOTE - PHYSICAL EXAMINATION
Physical Exam:    Gen: NAD, AOx3  Head: NCAT  HEENT: EOMI, PEERLA, normal conjunctiva, uvula midline, erythematous hard palate on the right, subtle swelling in the right peritonsillar space. No submandibular tenderness to palpation   Lung: CTAB, no respiratory distress, no wheezes/rhonchi/rales B/L  CV: RRR, no murmurs, rubs or gallops  Abd: soft, NT, ND, no guarding, no rigidity, no rebound tenderness, no CVA tenderness   MSK: no visible deformities, ROM normal in UE/LE, no back pain  Neuro: No focal sensory or motor deficits  Skin: Warm, well perfused, no rash, no leg swelling

## 2023-01-22 NOTE — ED PROVIDER NOTE - CLINICAL SUMMARY MEDICAL DECISION MAKING FREE TEXT BOX
57-year-old male past medical history of CVA presenting with right-sided throat pain.  Patient was evaluated at [facility] earlier today and was referred to go to another hospital with ENT privileges.  Evaluate for peritonsillar abscess.  Patient was given Unasyn and dexamethasone at [facility] he tested negative for COVID influenza and RSV.  Patient's been endorsing right-sided throat pain for 4 days.  Endorsing sensation of phlegm in the back of his throat but no difficulty breathing, drooling, stridor.     Plan for basic labs, CT soft tissue neck. Pain control   Will consult ENT for eval 57-year-old male past medical history of CVA presenting with right-sided throat pain.  Patient was evaluated at [facility] earlier today and was referred to go to another hospital with ENT privileges.  Evaluate for peritonsillar abscess.  Patient was given Unasyn and dexamethasone at [facility] he tested negative for COVID influenza and RSV.  Patient's been endorsing right-sided throat pain for 4 days.  Endorsing sensation of phlegm in the back of his throat but no difficulty breathing, drooling, stridor.     Plan for basic labs, CT soft tissue neck. Pain control   Will consult ENT for eval.

## 2023-01-22 NOTE — ED PROVIDER NOTE - CLINICAL SUMMARY MEDICAL DECISION MAKING FREE TEXT BOX
Patient complaining of few days of worsening throat pain such as occasional cough.  Patient seen at urgent care 2 days ago was told his heart rate was elevated and was started on Augmentin however symptoms have worsened so he came to the ER for evaluation today.  Patient relates he was only swabbed for COVID and had an EKG at the urgent care had no other testing..  Patient denies fevers chills chest pain shortness of breath abdominal pain vomiting.  PMD abrash    Patient with peritonsillar abscess on exam plan EKG labs including lactate cultures strep flu COVID IV fluid Unasyn Decadron and transfer for ENT eval and drainage of peritonsillar abscess

## 2023-01-22 NOTE — ED PROVIDER NOTE - NS ED ROS FT
GENERAL: No fever, chills  EYES: no vision changes, no discharge.   ENT: +THROAT PAIN   CARDIAC: no chest pain/pressure, SOB, lower extremity swelling  PULMONARY: no cough, SOB  GI: no abdominal pain, n/v/d  : no dysuria  SKIN: no rashes  NEURO: no headache, lightheadedness, paresthesia  MSK: No joint pain, myalgia, weakness.

## 2023-01-22 NOTE — ED PROVIDER NOTE - NSRISKOFTRANSFER_ED_A_ED
Male
Deterioration of Condition En Route/Death or Disability/Increased Pain/Transportation Risk (There is always a risk of traffic delays resulting in deterioration of condition.)

## 2023-01-22 NOTE — ED ADULT TRIAGE NOTE - CHIEF COMPLAINT QUOTE
states" I am having throat pain since 4 days and I was at [facility] and diagnosed as throat abscess and I left AMA to come here as they want to transfer me here to see ENT. patient also states I drove there and ineed my car to go back home. patient received steroids and left at 1pm today

## 2023-01-22 NOTE — ED ADULT NURSE REASSESSMENT NOTE - NS ED NURSE REASSESS COMMENT FT1
Called multiple times on patient cell number [phone] but not accepting any calls. Called and spoke with brother in law  at [phone]. He will try to reach him and have him call us back. Patients home address is wrong as per brother in law .Currently unable to request PD to do a wellness check.

## 2023-01-22 NOTE — ED PROVIDER NOTE - NSFOLLOWUPINSTRUCTIONS_ED_ALL_ED_FT
Peritonsillar Abscess    An open mouth showing the tonsils.   A peritonsillar abscess is a collection of pus in the back of the throat, behind the tonsils. It usually occurs when an infection of the throat or tonsils (tonsillitis) spreads into the tissues around the tonsils.    What are the causes?    The infection that leads to a peritonsillar abscess is usually caused by streptococcal bacteria.    What increases the risk?    You are more likely to develop this condition if:  •You have recently been diagnosed with an infection in your mouth or throat.    •You smoke.    •You have gum disease or gingivitis (periodontal disease).    What are the signs or symptoms?    Early symptoms of this condition include:  •Fever and chills.    •A sore throat, often with pain on just one side.    •Swollen, tender glands (lymph nodes) in the neck.    •Headache.    As the infection gets worse, symptoms may include:  •Difficulty swallowing.    •Drooling because of difficulty swallowing saliva.    •Difficulty opening your mouth.    •Bad breath.    •Changes in how the voice sounds.    How is this diagnosed?    This condition may be diagnosed based on:  •Your symptoms and medical history.    •A physical exam.    •Imaging tests, such as ultrasound or CT scan.    •Testing a pus sample from the abscess. Your health care provider may collect a pus sample by swabbing the back of your throat or by removing some pus with a syringe and needle (needle aspiration).    How is this treated?    Treatment usually involves draining the pus from the abscess. This may be done through needle aspiration or by making an incision in the abscess and draining the fluid. You will also likely need to take antibiotic medicine.    Follow these instructions at home:    Medicines     •Take over-the-counter and prescription medicines only as told by your health care provider.    •If you were prescribed an antibiotic, take it as told by your health care provider. Do not stop taking the antibiotic even if you start to feel better.    Eating and drinking   A diet of soft foods, including apple sauce, yogurt, and a smoothie.    •Drink enough fluid to keep your urine pale yellow.    •While your throat is sore, try only drinking liquids or eating only soft-textured foods such as yogurt and ice cream.    Activity     •Rest as told by your health care provider.    •Return to your normal activities as told by your health care provider. Ask your health care provider what activities are safe for you.    General Instructions   •If your abscess was drained, gargle with a mixture of salt and water 3–4 times a day or as needed.  •To make salt water, completely dissolve ½–1 tsp (3–6 g) of salt in 1 cup (237 mL) of warm water.    •Do not swallow this mixture.    • Do not use any products that contain nicotine or tobacco. These products include cigarettes, chewing tobacco, and vaping devices, such as e-cigarettes. If you need help quitting, ask your health care provider.    •Keep all follow-up visits. This is important.    Contact a health care provider if:    •You have more pain, swelling, redness, or pus in your throat.    •You have a headache.    •You have a lack of energy (lethargy) or feel generally sick.    •You have a fever or chills.    •You have trouble swallowing or eating.    •You have signs of dehydration, such as:  •Light-headedness or dizziness when standing.    •Urinating less than usual.    •A fast heart rate.    •Dry mouth.    Get help right away if:    •You are unable to swallow.    •You have trouble breathing, or it is easier for you to breathe when you lean forward.    •You cough up blood or vomit blood after treatment.    •You have severe throat pain that does not get better with medicine.    These symptoms may represent a serious problem that is an emergency. Do not wait to see if the symptoms will go away. Get medical help right away. Call your local emergency services (911 in the [place] Do not drive yourself to the hospital.     Summary    •A peritonsillar abscess is a collection of pus in the back of the throat. It usually occurs when an infection of the throat or tonsils spreads to surrounding tissues.    •Symptoms include a sore throat, difficulty swallowing, fever, chills, and occasional drooling.    •This condition is treated by draining the abscess and taking antibiotic medicine.    •Call your health care provider if you have trouble swallowing or eating after treatment.    •Get help right away if you vomit blood or cough up blood after treatment.    This information is not intended to replace advice given to you by your health care provider. Make sure you discuss any questions you have with your health care provider. Follow up as soon as possible with ENT for drainage of your abscess    Peritonsillar Abscess    An open mouth showing the tonsils.   A peritonsillar abscess is a collection of pus in the back of the throat, behind the tonsils. It usually occurs when an infection of the throat or tonsils (tonsillitis) spreads into the tissues around the tonsils.    What are the causes?    The infection that leads to a peritonsillar abscess is usually caused by streptococcal bacteria.    What increases the risk?    You are more likely to develop this condition if:  •You have recently been diagnosed with an infection in your mouth or throat.    •You smoke.    •You have gum disease or gingivitis (periodontal disease).    What are the signs or symptoms?    Early symptoms of this condition include:  •Fever and chills.    •A sore throat, often with pain on just one side.    •Swollen, tender glands (lymph nodes) in the neck.    •Headache.    As the infection gets worse, symptoms may include:  •Difficulty swallowing.    •Drooling because of difficulty swallowing saliva.    •Difficulty opening your mouth.    •Bad breath.    •Changes in how the voice sounds.    How is this diagnosed?    This condition may be diagnosed based on:  •Your symptoms and medical history.    •A physical exam.    •Imaging tests, such as ultrasound or CT scan.    •Testing a pus sample from the abscess. Your health care provider may collect a pus sample by swabbing the back of your throat or by removing some pus with a syringe and needle (needle aspiration).    How is this treated?    Treatment usually involves draining the pus from the abscess. This may be done through needle aspiration or by making an incision in the abscess and draining the fluid. You will also likely need to take antibiotic medicine.    Follow these instructions at home:    Medicines     •Take over-the-counter and prescription medicines only as told by your health care provider.    •If you were prescribed an antibiotic, take it as told by your health care provider. Do not stop taking the antibiotic even if you start to feel better.    Eating and drinking   A diet of soft foods, including apple sauce, yogurt, and a smoothie.    •Drink enough fluid to keep your urine pale yellow.    •While your throat is sore, try only drinking liquids or eating only soft-textured foods such as yogurt and ice cream.    Activity     •Rest as told by your health care provider.    •Return to your normal activities as told by your health care provider. Ask your health care provider what activities are safe for you.    General Instructions   •If your abscess was drained, gargle with a mixture of salt and water 3–4 times a day or as needed.  •To make salt water, completely dissolve ½–1 tsp (3–6 g) of salt in 1 cup (237 mL) of warm water.    •Do not swallow this mixture.    • Do not use any products that contain nicotine or tobacco. These products include cigarettes, chewing tobacco, and vaping devices, such as e-cigarettes. If you need help quitting, ask your health care provider.    •Keep all follow-up visits. This is important.    Contact a health care provider if:    •You have more pain, swelling, redness, or pus in your throat.    •You have a headache.    •You have a lack of energy (lethargy) or feel generally sick.    •You have a fever or chills.    •You have trouble swallowing or eating.    •You have signs of dehydration, such as:  •Light-headedness or dizziness when standing.    •Urinating less than usual.    •A fast heart rate.    •Dry mouth.    Get help right away if:    •You are unable to swallow.    •You have trouble breathing, or it is easier for you to breathe when you lean forward.    •You cough up blood or vomit blood after treatment.    •You have severe throat pain that does not get better with medicine.    These symptoms may represent a serious problem that is an emergency. Do not wait to see if the symptoms will go away. Get medical help right away. Call your local emergency services (911 in the [place] Do not drive yourself to the hospital.     Summary    •A peritonsillar abscess is a collection of pus in the back of the throat. It usually occurs when an infection of the throat or tonsils spreads to surrounding tissues.    •Symptoms include a sore throat, difficulty swallowing, fever, chills, and occasional drooling.    •This condition is treated by draining the abscess and taking antibiotic medicine.    •Call your health care provider if you have trouble swallowing or eating after treatment.    •Get help right away if you vomit blood or cough up blood after treatment.    This information is not intended to replace advice given to you by your health care provider. Make sure you discuss any questions you have with your health care provider.

## 2023-01-22 NOTE — ED ADULT NURSE NOTE - NSIMPLEMENTINTERV_GEN_ALL_ED
Implemented All Universal Safety Interventions:  [place] to call system. Call bell, personal items and telephone within reach. Instruct patient to call for assistance. Room bathroom lighting operational. Non-slip footwear when patient is off stretcher. Physically safe environment: no spills, clutter or unnecessary equipment. Stretcher in lowest position, wheels locked, appropriate side rails in place.

## 2023-01-22 NOTE — ED PROVIDER NOTE - ATTENDING CONTRIBUTION TO CARE
I, Tye Lyons, DO personally saw the patient with MOOSE.  I have personally performed a face to face diagnostic evaluation on this patient.  I have reviewed the MOOSE note and agree with the history, exam, and plan of care, except as noted.  I personally saw the patient and performed a substantive portion of the visit including all aspects of the medical decision making.

## 2023-01-22 NOTE — ED ADULT NURSE NOTE - NSICDXPASTMEDICALHX_GEN_ALL_CORE_FT
PAST MEDICAL HISTORY:  SHAQUILLE (acute kidney injury) rhabdomyolysis 6/2018    Cervical spondylosis     CVA (cerebral vascular accident) 6/17/2018 - found by son 48 hours after CVA - residual right upper extremity weakness    Diverticulitis     Drug abuse Cocaine/IVDA - last used 6/2018    HTN (hypertension)     Hyperlipemia     Ileus 6/2018    Obesity (BMI 30-39.9)     Rectal bleeding

## 2023-01-22 NOTE — ED ADULT NURSE REASSESSMENT NOTE - NS ED NURSE REASSESS COMMENT FT1
Pt refusing transfer, wants to leave AMA. Gillian GUERIN aware and speaking to pt about risks of leaving without drainage of suspected abscess. VSS.

## 2023-01-22 NOTE — ED PROVIDER NOTE - PROVIDER TOKENS
PROVIDER:[TOKEN:[[id number]],FOLLOWUP:[1-3 Days]],PROVIDER:[TOKEN:[[id number]],FOLLOWUP:[1-3 Days]],PROVIDER:[TOKEN:[[id number]],FOLLOWUP:[1-3 Days]],PROVIDER:[TOKEN:[[id number]],FOLLOWUP:[1-3 Days]]

## 2023-01-22 NOTE — ED ADULT NURSE NOTE - WILL THE PATIENT ACCEPT THE PFIZER COVID-19 VACCINE IF ELIGIBLE AND IT IS AVAILABLE?
Quality 130: Documentation Of Current Medications In The Medical Record: Current Medications Documented Quality 431: Preventive Care And Screening: Unhealthy Alcohol Use - Screening: Patient not identified as an unhealthy alcohol user when screened for unhealthy alcohol use using a systematic screening method Quality 226: Preventive Care And Screening: Tobacco Use: Screening And Cessation Intervention: Patient screened for tobacco use and is an ex/non-smoker Detail Level: Detailed No

## 2023-01-22 NOTE — ED PROVIDER NOTE - THROAT FINDINGS
THROAT RED/PERITONSILLAR ABSCESS/TONSILLAR SWELLING/NO TONGUE ELEVATION THROAT RED/UVULAR DEVIATION/PERITONSILLAR ABSCESS/TONSILLAR SWELLING/NO TONGUE ELEVATION

## 2023-01-22 NOTE — ED PROVIDER NOTE - COVID-19 RESULT
NEGATIVE Benzoyl Peroxide Pregnancy And Lactation Text: This medication is Pregnancy Category C. It is unknown if benzoyl peroxide is excreted in breast milk.

## 2023-01-23 NOTE — CONSULT NOTE ADULT - SUBJECTIVE AND OBJECTIVE BOX
HPI:  Patient is a 57y Male with PMH [facility], he has been taking 3-days of augmentin after going to urgent care for pain. He has continued to have pain with swallowing with no improvement. He endorses some intermittent R-sided ear pain. He denies SOB, trismus, N/V, fevers/chills. CT neck shows PTA.      Physical Exam  T(C): 36.4 (01-22-23 @ 20:37), Max: 37.1 (01-22-23 @ 15:37)  HR: 84 (01-22-23 @ 20:37) (82 - 102)  BP: 149/98 (01-22-23 @ 20:37) (116/82 - 156/94)  RR: 20 (01-22-23 @ 20:37) (16 - 20)  SpO2: 96% (01-22-23 @ 20:37) (95% - 97%)  General: NAD, A+Ox3  No respiratory distress, stridor, or stertor  Voice quality: normal  Face:  Symmetric without masses or lesions  OU: PERRL, EOMI  Nose: nasal cavity clear bilaterally, inferior turbinates normal, mucosa normal without crusting or bleeding  OC/OP: R tonsil erythematous and edematous, no uvular deviation  Neck: soft/flat, no LAD

## 2023-01-23 NOTE — CONSULT NOTE ADULT - ASSESSMENT
A/P:  57y Male with PMH CVA [facility] for the past 4 days 2/2 R PTA. Patient AMA'd prior to drainage.

## 2023-01-27 LAB
CULTURE RESULTS: SIGNIFICANT CHANGE UP
CULTURE RESULTS: SIGNIFICANT CHANGE UP
SPECIMEN SOURCE: SIGNIFICANT CHANGE UP
SPECIMEN SOURCE: SIGNIFICANT CHANGE UP

## 2023-02-21 NOTE — ED ADULT NURSE NOTE - NSFALLRSKPSTHSTOCCUR_ED_ALL_ED
Single Mechanical/Accidental Fall Opioid Pregnancy And Lactation Text: These medications can lead to premature delivery and should be avoided during pregnancy. These medications are also present in breast milk in small amounts.

## 2023-03-22 NOTE — CONSULT NOTE ADULT - NEGATIVE MALE-SPECIFIC SYMPTOMS
Last Visit:12/20/22  Next Visit:6/21/23  Last Refill:12/20/22     Disp Refills Start End    guanFACINE (INTUNIV) 2 MG TABLET SR 24 HR 30 tablet 2 12/20/2022     Sig - Route: Take 1 tablet by mouth daily. - Oral        
right scrotal cyst s/p drainage

## 2023-08-30 NOTE — DISCHARGE NOTE PROVIDER - NSDCCAREPROVSEEN_GEN_ALL_CORE_FT
From: Armando Murillo  To: Byron Kennedy  Sent: 8/30/2023 3:38 PM CDT  Subject: Grover Lawler has 2 pills left . Thank you   Justine Tadeo

## 2023-10-16 NOTE — ED PROVIDER NOTE - SKIN NEGATIVE STATEMENT, MLM
6:43 PM Recheck on patient. Discussed with patient ED findings and plan for discharge. Patient was given ED warnings, discharge instructions, and follow up information to go home with. Patient understands and agrees with plan for discharge. Any questions have been answered. no rashes.

## 2024-04-09 ENCOUNTER — APPOINTMENT (OUTPATIENT)
Dept: GASTROENTEROLOGY | Facility: CLINIC | Age: 59
End: 2024-04-09

## 2024-09-16 NOTE — CONSULT NOTE ADULT - CONSULT REASON
SHAQUILLE with partial recovery
diarrhea
What Is The Reason For Today's Visit?: the risk of recurrence, and the development of new lesions

## 2024-12-18 NOTE — DIETITIAN INITIAL EVALUATION ADULT. - PROBLEM SELECTOR PROBLEM 4
Medication: levothyroxine 25 MCG tablet     Last office visit date: 08/02/2023    Medication Refill Protocol Failed.  Failed criteria: routed. Sent to clinician to review.      Drug abuse

## 2025-06-17 NOTE — ED ADULT TRIAGE NOTE - HEIGHT IN FEET
Colorectal & General Surgery  Follow - Up    Patient: Williams Joseph  YOB: 1994  MRN: [id number]      Assessment  Williams Joseph is a 30 y.o. male with history of anal fistula status post fistulotomy in [place] who presents in follow-up today with some drainage from his bottom. His wound is healing very nicely. There is a tiny opening at the distalmost aspect of the fistula tract.  I applied silver nitrate to it.  No evidence of abscess or recurrent fistula.  Advised him that we will likely take between 6 and 8 weeks for this to heal further.  He will call me if he continues to have drainage.    Chief Complaint: Anal fistula    History of Present Illness   Williams Joseph is a 30 y.o. male who presents in follow-up today regarding his anal fistula.  He has noted some drainage that appears purulent on some gauze.  Otherwise, doing well with no issues with bowel movements.  Past Medical History   Past Medical History:   Diagnosis Date    Colon polyp     Fissure, anal     GI (gastrointestinal bleed)     Rectal bleeding         Past Surgical History   Past Surgical History:   Procedure Laterality Date    APPENDECTOMY  2012    COLONOSCOPY N/A 01/17/2025    Procedure: COLONOSCOPY to cecum;  Surgeon: Luis Luna MD;  Location: [facility];  Service: General;  Laterality: N/A;  Pre: hematochezia  Post:normal    EYE SURGERY      HERNIA REPAIR  1993    RECTAL FISSURE INCISION AND DRAINAGE  03/2025    anorectal examination under anesthesia with incision and drainage of intersphincteric abscess and fistulotomy    REFRACTIVE SURGERY Bilateral 2019    WISDOM TOOTH EXTRACTION         Social History  Social History     Socioeconomic History    Marital status:    Tobacco Use    Smoking status: Never    Smokeless tobacco: Never   Vaping Use    Vaping status: Never Used   Substance and Sexual Activity    Alcohol use: Yes     Comment: socially    Drug use: Not Currently     Types: Marijuana     Sexual activity: Yes     Partners: Female     Birth control/protection: None       Family History  Family History   Problem Relation Age of Onset    Hypertension Mother     IgA nephropathy Mother     Hypertension Father     Arthritis Brother     Prostate cancer Maternal Grandfather         Age 80    Cancer Maternal Grandfather     Pancreatic cancer Paternal Grandfather         Age 75    Colon cancer Neg Hx     Inflammatory bowel disease Neg Hx     Malig Hyperthermia Neg Hx        Colorectal cancer family history: None    Review of Systems  Negative except as documented in the HPI.     Allergies  Allergies   Allergen Reactions    Cat Dander Itching       Medications    Current Outpatient Medications:     multivitamin (MULTI VITAMIN PO), Take 1 tablet by mouth Daily., Disp: , Rfl:     Vital Signs  Vitals:    06/17/25 0931   BP: 128/80   Pulse: 69   SpO2: 98%        Physical Exam  Constitutional: Resting comfortably, no acute distress  Neck: Supple, trachea midline  Respiratory: No increased work of breathing, Symmetric excursion  Cardiovascular: Well pefursed, no jugular venous distention evident   Lymphatics: No inguinal lymphadenopathy  Skin: Warm, dry, no rash on visualized skin surfaces  Musculoskeletal: Symmetric strength, no obvious gross abnormalities  Psychiatric: Alert and oriented ×3, normal affect   Perianal: Tiny open sinus in the anterior midline.          Kalen Luna MD  Colorectal & General Surgery  [facility]    [address], [address]  [place], [place], [address]  P: [phone]  F: [phone]     5